# Patient Record
Sex: MALE | Race: WHITE | NOT HISPANIC OR LATINO | Employment: UNEMPLOYED | URBAN - METROPOLITAN AREA
[De-identification: names, ages, dates, MRNs, and addresses within clinical notes are randomized per-mention and may not be internally consistent; named-entity substitution may affect disease eponyms.]

---

## 2017-01-02 ENCOUNTER — HOSPITAL ENCOUNTER (EMERGENCY)
Facility: HOSPITAL | Age: 42
Discharge: LEFT BEFORE DISCHARGE | End: 2017-01-02

## 2017-01-02 VITALS
BODY MASS INDEX: 31.15 KG/M2 | SYSTOLIC BLOOD PRESSURE: 105 MMHG | HEART RATE: 79 BPM | DIASTOLIC BLOOD PRESSURE: 64 MMHG | OXYGEN SATURATION: 97 % | HEIGHT: 72 IN | RESPIRATION RATE: 16 BRPM | WEIGHT: 230 LBS | TEMPERATURE: 96 F

## 2017-01-02 LAB — GLUCOSE BLDC GLUCOMTR-MCNC: 345 MG/DL (ref 70–130)

## 2017-01-02 PROCEDURE — 82962 GLUCOSE BLOOD TEST: CPT

## 2017-01-02 PROCEDURE — 99211 OFF/OP EST MAY X REQ PHY/QHP: CPT

## 2017-01-02 RX ORDER — BUPRENORPHINE HYDROCHLORIDE AND NALOXONE HYDROCHLORIDE DIHYDRATE 8; 2 MG/1; MG/1
1 TABLET SUBLINGUAL DAILY
COMMUNITY
End: 2017-01-06 | Stop reason: HOSPADM

## 2017-01-02 RX ORDER — GLIMEPIRIDE 4 MG/1
4 TABLET ORAL
COMMUNITY
End: 2017-01-06 | Stop reason: HOSPADM

## 2017-01-02 RX ORDER — SODIUM CHLORIDE 0.9 % (FLUSH) 0.9 %
10 SYRINGE (ML) INJECTION AS NEEDED
Status: DISCONTINUED | OUTPATIENT
Start: 2017-01-02 | End: 2017-01-02 | Stop reason: HOSPADM

## 2017-01-02 RX ORDER — LISINOPRIL 40 MG/1
40 TABLET ORAL DAILY
COMMUNITY
End: 2017-01-06 | Stop reason: HOSPADM

## 2017-01-03 LAB
ACETONE BLD QL: NEGATIVE
ALBUMIN SERPL-MCNC: 4.7 G/DL (ref 3.5–5.2)
ALBUMIN/GLOB SERPL: 1.3 G/DL
ALP SERPL-CCNC: 76 U/L (ref 39–117)
ALT SERPL W P-5'-P-CCNC: 34 U/L (ref 1–41)
ANION GAP SERPL CALCULATED.3IONS-SCNC: 18.7 MMOL/L
AST SERPL-CCNC: 20 U/L (ref 1–40)
BASOPHILS # BLD AUTO: 0.04 10*3/MM3 (ref 0–0.2)
BASOPHILS NFR BLD AUTO: 0.4 % (ref 0–1.5)
BILIRUB SERPL-MCNC: 0.3 MG/DL (ref 0.1–1.2)
BUN BLD-MCNC: 61 MG/DL (ref 6–20)
BUN/CREAT SERPL: 14 (ref 7–25)
CALCIUM SPEC-SCNC: 10.5 MG/DL (ref 8.6–10.5)
CHLORIDE SERPL-SCNC: 92 MMOL/L (ref 98–107)
CO2 SERPL-SCNC: 21.3 MMOL/L (ref 22–29)
CREAT BLD-MCNC: 4.35 MG/DL (ref 0.76–1.27)
DEPRECATED RDW RBC AUTO: 37.5 FL (ref 37–54)
EOSINOPHIL # BLD AUTO: 0.16 10*3/MM3 (ref 0–0.7)
EOSINOPHIL NFR BLD AUTO: 1.4 % (ref 0.3–6.2)
ERYTHROCYTE [DISTWIDTH] IN BLOOD BY AUTOMATED COUNT: 11.1 % (ref 11.5–14.5)
GFR SERPL CREATININE-BSD FRML MDRD: 18 ML/MIN/1.73
GLOBULIN UR ELPH-MCNC: 3.7 GM/DL
GLUCOSE BLD-MCNC: 403 MG/DL (ref 65–99)
HCT VFR BLD AUTO: 42.2 % (ref 40.4–52.2)
HGB BLD-MCNC: 13.9 G/DL (ref 13.7–17.6)
IMM GRANULOCYTES # BLD: 0 10*3/MM3 (ref 0–0.03)
IMM GRANULOCYTES NFR BLD: 0 % (ref 0–0.5)
LYMPHOCYTES # BLD AUTO: 3.11 10*3/MM3 (ref 0.9–4.8)
LYMPHOCYTES NFR BLD AUTO: 27.4 % (ref 19.6–45.3)
MCH RBC QN AUTO: 30.7 PG (ref 27–32.7)
MCHC RBC AUTO-ENTMCNC: 32.9 G/DL (ref 32.6–36.4)
MCV RBC AUTO: 93.2 FL (ref 79.8–96.2)
MONOCYTES # BLD AUTO: 0.44 10*3/MM3 (ref 0.2–1.2)
MONOCYTES NFR BLD AUTO: 3.9 % (ref 5–12)
NEUTROPHILS # BLD AUTO: 7.59 10*3/MM3 (ref 1.9–8.1)
NEUTROPHILS NFR BLD AUTO: 66.9 % (ref 42.7–76)
PLATELET # BLD AUTO: 199 10*3/MM3 (ref 140–500)
PMV BLD AUTO: 13.1 FL (ref 6–12)
POTASSIUM BLD-SCNC: 4.6 MMOL/L (ref 3.5–5.2)
PROT SERPL-MCNC: 8.4 G/DL (ref 6–8.5)
RBC # BLD AUTO: 4.53 10*6/MM3 (ref 4.6–6)
SODIUM BLD-SCNC: 132 MMOL/L (ref 136–145)
WBC NRBC COR # BLD: 11.34 10*3/MM3 (ref 4.5–10.7)

## 2017-01-03 PROCEDURE — 81001 URINALYSIS AUTO W/SCOPE: CPT | Performed by: EMERGENCY MEDICINE

## 2017-01-03 PROCEDURE — 85025 COMPLETE CBC W/AUTO DIFF WBC: CPT | Performed by: EMERGENCY MEDICINE

## 2017-01-03 PROCEDURE — 82550 ASSAY OF CK (CPK): CPT | Performed by: EMERGENCY MEDICINE

## 2017-01-03 PROCEDURE — 80053 COMPREHEN METABOLIC PANEL: CPT | Performed by: EMERGENCY MEDICINE

## 2017-01-03 PROCEDURE — 82009 KETONE BODYS QUAL: CPT | Performed by: EMERGENCY MEDICINE

## 2017-01-03 PROCEDURE — 99284 EMERGENCY DEPT VISIT MOD MDM: CPT

## 2017-01-03 RX ORDER — SODIUM CHLORIDE 0.9 % (FLUSH) 0.9 %
10 SYRINGE (ML) INJECTION AS NEEDED
Status: DISCONTINUED | OUTPATIENT
Start: 2017-01-03 | End: 2017-01-06 | Stop reason: HOSPADM

## 2017-01-04 ENCOUNTER — HOSPITAL ENCOUNTER (INPATIENT)
Facility: HOSPITAL | Age: 42
LOS: 2 days | Discharge: HOME | End: 2017-01-06
Attending: EMERGENCY MEDICINE | Admitting: INTERNAL MEDICINE

## 2017-01-04 ENCOUNTER — APPOINTMENT (OUTPATIENT)
Dept: ULTRASOUND IMAGING | Facility: HOSPITAL | Age: 42
End: 2017-01-04

## 2017-01-04 DIAGNOSIS — N17.9 ACUTE RENAL FAILURE, UNSPECIFIED ACUTE RENAL FAILURE TYPE (HCC): Primary | ICD-10-CM

## 2017-01-04 DIAGNOSIS — E11.65 TYPE 2 DIABETES MELLITUS WITH HYPERGLYCEMIA, UNSPECIFIED LONG TERM INSULIN USE STATUS: ICD-10-CM

## 2017-01-04 PROBLEM — I10 HTN (HYPERTENSION): Status: ACTIVE | Noted: 2017-01-04

## 2017-01-04 PROBLEM — E78.5 HLD (HYPERLIPIDEMIA): Status: ACTIVE | Noted: 2017-01-04

## 2017-01-04 PROBLEM — E11.9 DM TYPE 2 (DIABETES MELLITUS, TYPE 2) (HCC): Status: ACTIVE | Noted: 2017-01-04

## 2017-01-04 LAB
ANION GAP SERPL CALCULATED.3IONS-SCNC: 17.5 MMOL/L
BACTERIA UR QL AUTO: ABNORMAL /HPF
BILIRUB UR QL STRIP: NEGATIVE
BUN BLD-MCNC: 46 MG/DL (ref 6–20)
BUN/CREAT SERPL: 13.6 (ref 7–25)
CALCIUM SPEC-SCNC: 10.1 MG/DL (ref 8.6–10.5)
CHLORIDE SERPL-SCNC: 95 MMOL/L (ref 98–107)
CK SERPL-CCNC: 163 U/L (ref 20–200)
CLARITY UR: CLEAR
CO2 SERPL-SCNC: 20.5 MMOL/L (ref 22–29)
COLOR UR: YELLOW
CREAT BLD-MCNC: 3.39 MG/DL (ref 0.76–1.27)
CREAT UR-MCNC: 64.8 MG/DL
EOSINOPHIL SPEC QL MICRO: 0 % EOS/100 CELLS (ref 0–0)
GFR SERPL CREATININE-BSD FRML MDRD: 25 ML/MIN/1.73
GLUCOSE BLD-MCNC: 339 MG/DL (ref 65–99)
GLUCOSE BLDC GLUCOMTR-MCNC: 211 MG/DL (ref 70–130)
GLUCOSE BLDC GLUCOMTR-MCNC: 212 MG/DL (ref 70–130)
GLUCOSE BLDC GLUCOMTR-MCNC: 246 MG/DL (ref 70–130)
GLUCOSE BLDC GLUCOMTR-MCNC: 258 MG/DL (ref 70–130)
GLUCOSE BLDC GLUCOMTR-MCNC: 271 MG/DL (ref 70–130)
GLUCOSE BLDC GLUCOMTR-MCNC: 273 MG/DL (ref 70–130)
GLUCOSE BLDC GLUCOMTR-MCNC: 302 MG/DL (ref 70–130)
GLUCOSE UR STRIP-MCNC: ABNORMAL MG/DL
HBA1C MFR BLD: 10.5 % (ref 4.8–5.6)
HGB UR QL STRIP.AUTO: ABNORMAL
HOLD SPECIMEN: NORMAL
HYALINE CASTS UR QL AUTO: ABNORMAL /LPF
KETONES UR QL STRIP: NEGATIVE
LEUKOCYTE ESTERASE UR QL STRIP.AUTO: NEGATIVE
NITRITE UR QL STRIP: NEGATIVE
PH UR STRIP.AUTO: <=5 [PH] (ref 5–8)
POTASSIUM BLD-SCNC: 4.8 MMOL/L (ref 3.5–5.2)
PROT UR QL STRIP: ABNORMAL
RBC # UR: ABNORMAL /HPF
REF LAB TEST METHOD: ABNORMAL
SODIUM BLD-SCNC: 133 MMOL/L (ref 136–145)
SODIUM UR-SCNC: 97 MMOL/L
SP GR UR STRIP: 1.03 (ref 1–1.03)
SQUAMOUS #/AREA URNS HPF: ABNORMAL /HPF
UROBILINOGEN UR QL STRIP: ABNORMAL
WBC UR QL AUTO: ABNORMAL /HPF
WHOLE BLOOD HOLD SPECIMEN: NORMAL
WHOLE BLOOD HOLD SPECIMEN: NORMAL

## 2017-01-04 PROCEDURE — 80048 BASIC METABOLIC PNL TOTAL CA: CPT | Performed by: INTERNAL MEDICINE

## 2017-01-04 PROCEDURE — 63710000001 INSULIN ASPART PER 5 UNITS: Performed by: INTERNAL MEDICINE

## 2017-01-04 PROCEDURE — 84300 ASSAY OF URINE SODIUM: CPT | Performed by: INTERNAL MEDICINE

## 2017-01-04 PROCEDURE — 86341 ISLET CELL ANTIBODY: CPT | Performed by: INTERNAL MEDICINE

## 2017-01-04 PROCEDURE — 99255 IP/OBS CONSLTJ NEW/EST HI 80: CPT | Performed by: INTERNAL MEDICINE

## 2017-01-04 PROCEDURE — 76775 US EXAM ABDO BACK WALL LIM: CPT

## 2017-01-04 PROCEDURE — 82962 GLUCOSE BLOOD TEST: CPT

## 2017-01-04 PROCEDURE — 90791 PSYCH DIAGNOSTIC EVALUATION: CPT | Performed by: SOCIAL WORKER

## 2017-01-04 PROCEDURE — 25010000002 HEPARIN (PORCINE) PER 1000 UNITS: Performed by: INTERNAL MEDICINE

## 2017-01-04 PROCEDURE — 82570 ASSAY OF URINE CREATININE: CPT | Performed by: INTERNAL MEDICINE

## 2017-01-04 PROCEDURE — 63710000001 INSULIN REGULAR HUMAN PER 5 UNITS: Performed by: EMERGENCY MEDICINE

## 2017-01-04 PROCEDURE — 63710000001 INSULIN DETEMER PER 5 UNITS: Performed by: INTERNAL MEDICINE

## 2017-01-04 PROCEDURE — 84681 ASSAY OF C-PEPTIDE: CPT | Performed by: INTERNAL MEDICINE

## 2017-01-04 PROCEDURE — 83036 HEMOGLOBIN GLYCOSYLATED A1C: CPT | Performed by: INTERNAL MEDICINE

## 2017-01-04 PROCEDURE — 87205 SMEAR GRAM STAIN: CPT | Performed by: INTERNAL MEDICINE

## 2017-01-04 RX ORDER — ONDANSETRON 4 MG/1
4 TABLET, FILM COATED ORAL EVERY 6 HOURS PRN
Status: DISCONTINUED | OUTPATIENT
Start: 2017-01-04 | End: 2017-01-06 | Stop reason: HOSPADM

## 2017-01-04 RX ORDER — SODIUM CHLORIDE 0.9 % (FLUSH) 0.9 %
10 SYRINGE (ML) INJECTION AS NEEDED
Status: DISCONTINUED | OUTPATIENT
Start: 2017-01-04 | End: 2017-01-06 | Stop reason: HOSPADM

## 2017-01-04 RX ORDER — SODIUM CHLORIDE 9 MG/ML
250 INJECTION, SOLUTION INTRAVENOUS ONCE
Status: COMPLETED | OUTPATIENT
Start: 2017-01-04 | End: 2017-01-04

## 2017-01-04 RX ORDER — ONDANSETRON 2 MG/ML
4 INJECTION INTRAMUSCULAR; INTRAVENOUS EVERY 6 HOURS PRN
Status: DISCONTINUED | OUTPATIENT
Start: 2017-01-04 | End: 2017-01-06 | Stop reason: HOSPADM

## 2017-01-04 RX ORDER — DEXTROSE MONOHYDRATE 25 G/50ML
25 INJECTION, SOLUTION INTRAVENOUS AS NEEDED
Status: DISCONTINUED | OUTPATIENT
Start: 2017-01-04 | End: 2017-01-06 | Stop reason: HOSPADM

## 2017-01-04 RX ORDER — HEPARIN SODIUM 5000 [USP'U]/ML
5000 INJECTION, SOLUTION INTRAVENOUS; SUBCUTANEOUS EVERY 12 HOURS
Status: DISCONTINUED | OUTPATIENT
Start: 2017-01-04 | End: 2017-01-06 | Stop reason: HOSPADM

## 2017-01-04 RX ORDER — SODIUM CHLORIDE 0.9 % (FLUSH) 0.9 %
1-10 SYRINGE (ML) INJECTION AS NEEDED
Status: DISCONTINUED | OUTPATIENT
Start: 2017-01-04 | End: 2017-01-06 | Stop reason: HOSPADM

## 2017-01-04 RX ORDER — SODIUM CHLORIDE 9 MG/ML
75 INJECTION, SOLUTION INTRAVENOUS CONTINUOUS
Status: DISCONTINUED | OUTPATIENT
Start: 2017-01-04 | End: 2017-01-06

## 2017-01-04 RX ORDER — NICOTINE POLACRILEX 4 MG
15 LOZENGE BUCCAL AS NEEDED
Status: DISCONTINUED | OUTPATIENT
Start: 2017-01-04 | End: 2017-01-06 | Stop reason: HOSPADM

## 2017-01-04 RX ORDER — ONDANSETRON 4 MG/1
4 TABLET, ORALLY DISINTEGRATING ORAL EVERY 6 HOURS PRN
Status: DISCONTINUED | OUTPATIENT
Start: 2017-01-04 | End: 2017-01-06 | Stop reason: HOSPADM

## 2017-01-04 RX ADMIN — INSULIN ASPART 6 UNITS: 100 INJECTION, SOLUTION INTRAVENOUS; SUBCUTANEOUS at 11:43

## 2017-01-04 RX ADMIN — LINAGLIPTIN 5 MG: 5 TABLET, FILM COATED ORAL at 14:18

## 2017-01-04 RX ADMIN — HEPARIN SODIUM 5000 UNITS: 5000 INJECTION, SOLUTION INTRAVENOUS; SUBCUTANEOUS at 11:43

## 2017-01-04 RX ADMIN — HUMAN INSULIN 4 UNITS: 100 INJECTION, SOLUTION SUBCUTANEOUS at 00:58

## 2017-01-04 RX ADMIN — SODIUM CHLORIDE 125 ML/HR: 9 INJECTION, SOLUTION INTRAVENOUS at 09:30

## 2017-01-04 RX ADMIN — SODIUM CHLORIDE 250 ML/HR: 9 INJECTION, SOLUTION INTRAVENOUS at 01:03

## 2017-01-04 RX ADMIN — SODIUM CHLORIDE 125 ML/HR: 9 INJECTION, SOLUTION INTRAVENOUS at 05:25

## 2017-01-04 RX ADMIN — SODIUM CHLORIDE 1000 ML: 9 INJECTION, SOLUTION INTRAVENOUS at 00:59

## 2017-01-04 RX ADMIN — INSULIN ASPART 7 UNITS: 100 INJECTION, SOLUTION INTRAVENOUS; SUBCUTANEOUS at 17:03

## 2017-01-04 RX ADMIN — SODIUM CHLORIDE 125 ML/HR: 9 INJECTION, SOLUTION INTRAVENOUS at 23:54

## 2017-01-04 RX ADMIN — INSULIN DETEMIR 20 UNITS: 100 INJECTION, SOLUTION SUBCUTANEOUS at 22:19

## 2017-01-04 RX ADMIN — INSULIN ASPART 6 UNITS: 100 INJECTION, SOLUTION INTRAVENOUS; SUBCUTANEOUS at 22:20

## 2017-01-04 RX ADMIN — SODIUM CHLORIDE 125 ML/HR: 9 INJECTION, SOLUTION INTRAVENOUS at 17:05

## 2017-01-04 RX ADMIN — HEPARIN SODIUM 5000 UNITS: 5000 INJECTION, SOLUTION INTRAVENOUS; SUBCUTANEOUS at 23:53

## 2017-01-04 NOTE — ED PROVIDER NOTES
EMERGENCY DEPARTMENT ENCOUNTER    CHIEF COMPLAINT  Chief Complaint: Nausea  History given by: Patient  History limited by: n/a  Room Number: 440/1  PMD: ALEXIA Vega      HPI:  Pt is a 41 y.o. male who presents complaining of nausea onset 12/26 due to change in medication. Pt denies vomiting, leg swelling, fever. Pt admits diarrhea x3, elevated blood sugar. Pt reports he has been drinking fluids but has been urinating more than normal.    Pt was told by his PCP last week to switch some of his diabetes medication. Pt was switched to metformin and glipizide. Pt has not been taking his new medication for about 2 weeks because he has had bad experiences with metformin.    Duration:  1 week  Timing: constant  Location: n/a  Radiation: n/a  Quality: nauseated due to diabetes medication  Intensity/Severity: moderate  Progression: worsening  Associated Symptoms: see ROS  Aggravating Factors: none  Alleviating Factors: none  Previous Episodes: none  Treatment before arrival: none    PAST MEDICAL HISTORY  Active Ambulatory Problems     Diagnosis Date Noted   • No Active Ambulatory Problems     Resolved Ambulatory Problems     Diagnosis Date Noted   • No Resolved Ambulatory Problems     Past Medical History   Diagnosis Date   • Diabetes mellitus    • Hypertension        PAST SURGICAL HISTORY  Past Surgical History   Procedure Laterality Date   • Eye surgery     • Hernia repair         FAMILY HISTORY  Family History   Problem Relation Age of Onset   • Alcohol abuse Mother    • Asthma Mother    • Cancer Mother    • Drug abuse Mother    • Early death Mother    • Alcohol abuse Father    • Alcohol abuse Sister    • Drug abuse Sister    • Asthma Daughter    • Diabetes Daughter    • Alcohol abuse Maternal Aunt    • Alcohol abuse Maternal Uncle    • Alcohol abuse Paternal Aunt    • Cancer Paternal Aunt    • Diabetes Paternal Aunt    • Alcohol abuse Paternal Uncle    • Alcohol abuse Maternal Grandmother    • Asthma  Maternal Grandmother    • Cancer Maternal Grandmother    • Drug abuse Maternal Grandmother    • Hypertension Maternal Grandmother    • Alcohol abuse Maternal Grandfather    • Alcohol abuse Paternal Grandmother    • Alcohol abuse Paternal Grandfather        SOCIAL HISTORY  Social History     Social History   • Marital status:      Spouse name: N/A   • Number of children: N/A   • Years of education: N/A     Occupational History   • Not on file.     Social History Main Topics   • Smoking status: Former Smoker     Packs/day: 2.00     Types: Cigarettes, Cigars     Start date: 9/4/2003     Quit date: 1/4/2006   • Smokeless tobacco: Former User     Types: Chew   • Alcohol use 12.6 oz/week     21 Shots of liquor per week      Comment: one pint a night   • Drug use: No   • Sexual activity: Yes     Partners: Female     Other Topics Concern   • Not on file     Social History Narrative       ALLERGIES  Amlodipine; Citalopram hydrobromide; Meloxicam; Metformin and related; and Quetiapine    REVIEW OF SYSTEMS  Review of Systems   Constitutional: Negative for fever.   Respiratory: Negative for shortness of breath.    Cardiovascular: Negative for chest pain and leg swelling.   Gastrointestinal: Positive for diarrhea and nausea. Negative for abdominal pain and vomiting.   Genitourinary: Positive for frequency (increased). Negative for dysuria.   All other systems reviewed and are negative.      PHYSICAL EXAM  ED Triage Vitals   Temp Heart Rate Resp BP SpO2   01/03/17 2149 01/03/17 2149 01/03/17 2149 01/03/17 2212 01/03/17 2149   98.4 °F (36.9 °C) 125 18 118/65 97 %      Temp src Heart Rate Source Patient Position BP Location FiO2 (%)   01/03/17 2149 01/03/17 2149 -- -- --   Tympanic Monitor          Physical Exam   Constitutional: He is oriented to person, place, and time.   HENT:   Head: Normocephalic and atraumatic.   Eyes: Pupils are equal, round, and reactive to light.   Neck: Normal range of motion.   Cardiovascular:  Regular rhythm and normal heart sounds.  Tachycardia present.    Pulmonary/Chest: Effort normal and breath sounds normal. No respiratory distress.   Abdominal: Soft. There is no tenderness.   Musculoskeletal: He exhibits no edema.   Neurological: He is alert and oriented to person, place, and time.   Skin: Skin is warm and dry. No rash noted.   Nursing note and vitals reviewed.      LAB RESULTS  Lab Results (last 24 hours)     Procedure Component Value Units Date/Time    CBC & Differential [[id number]] Collected:  01/03/17 2251    Specimen:  Blood Updated:  01/03/17 2302    Narrative:       The following orders were created for panel order CBC & Differential.  Procedure                               Abnormality         Status                     ---------                               -----------         ------                     CBC Auto Differential[[id number]]         Abnormal            Final result                 Please view results for these tests on the individual orders.    Comprehensive Metabolic Panel [[id number]]  (Abnormal) Collected:  01/03/17 2251    Specimen:  Blood Updated:  01/03/17 2338     Glucose 403 (C) mg/dL      BUN 61 (H) mg/dL      Creatinine 4.35 (H) mg/dL      Sodium 132 (L) mmol/L      Potassium 4.6 mmol/L      Chloride 92 (L) mmol/L      CO2 21.3 (L) mmol/L      Calcium 10.5 mg/dL      Total Protein 8.4 g/dL      Albumin 4.70 g/dL      ALT (SGPT) 34 U/L      AST (SGOT) 20 U/L      Alkaline Phosphatase 76 U/L      Total Bilirubin 0.3 mg/dL      eGFR  African Amer 18 (L) mL/min/1.73      Globulin 3.7 gm/dL      A/G Ratio 1.3 g/dL      BUN/Creatinine Ratio 14.0      Anion Gap 18.7 mmol/L     Ketone Bodies, Serum [[id number]] Collected:  01/03/17 2251    Specimen:  Blood Updated:  01/03/17 2328    Narrative:       The following orders were created for panel order Ketone Bodies, Serum.  Procedure                               Abnormality         Status                     ---------                                -----------         ------                     Acetone[[id number]]                       Normal              Final result                 Please view results for these tests on the individual orders.    CBC Auto Differential [[id number]]  (Abnormal) Collected:  01/03/17 2251    Specimen:  Blood Updated:  01/03/17 2302     WBC 11.34 (H) 10*3/mm3      RBC 4.53 (L) 10*6/mm3      Hemoglobin 13.9 g/dL      Hematocrit 42.2 %      MCV 93.2 fL      MCH 30.7 pg      MCHC 32.9 g/dL      RDW 11.1 (L) %      RDW-SD 37.5 fl      MPV 13.1 (H) fL      Platelets 199 10*3/mm3      Neutrophil % 66.9 %      Lymphocyte % 27.4 %      Monocyte % 3.9 (L) %      Eosinophil % 1.4 %      Basophil % 0.4 %      Immature Grans % 0.0 %      Neutrophils, Absolute 7.59 10*3/mm3      Lymphocytes, Absolute 3.11 10*3/mm3      Monocytes, Absolute 0.44 10*3/mm3      Eosinophils, Absolute 0.16 10*3/mm3      Basophils, Absolute 0.04 10*3/mm3      Immature Grans, Absolute 0.00 10*3/mm3     Acetone [[id number]]  (Normal) Collected:  01/03/17 2251    Specimen:  Blood Updated:  01/03/17 2328     Acetone Negative     CK [[id number]]  (Normal) Collected:  01/03/17 2251    Specimen:  Blood Updated:  01/04/17 0028     Creatine Kinase 163 U/L     Urinalysis With / Culture If Indicated [[id number]]  (Abnormal) Collected:  01/03/17 2359    Specimen:  Urine from Urine, Clean Catch Updated:  01/04/17 0015     Color, UA Yellow      Appearance, UA Clear      pH, UA <=5.0      Specific Gravity, UA 1.028      Glucose, UA >=1000 mg/dL (3+) (A)      Ketones, UA Negative      Bilirubin, UA Negative      Blood, UA Small (1+) (A)      Protein, UA Trace (A)      Leuk Esterase, UA Negative      Nitrite, UA Negative      Urobilinogen, UA 0.2 E.U./dL     Urinalysis, Microscopic Only [[id number]]  (Abnormal) Collected:  01/03/17 2359    Specimen:  Urine from Urine, Clean Catch Updated:  01/04/17 0017     RBC, UA 0-2 (A) /HPF      WBC, UA 0-2 (A) /HPF      Bacteria, UA None Seen  /HPF      Squamous Epithelial Cells, UA 0-2 /HPF      Hyaline Casts, UA 3-6 /LPF      Methodology Automated Microscopy     POC Glucose Fingerstick [[id number]]  (Abnormal) Collected:  01/04/17 0054    Specimen:  Blood Updated:  01/04/17 0055     Glucose 258 (H) mg/dL     Narrative:       Meter: [id number] : [id number] [facility]    POC Glucose Fingerstick [[id number]]  (Abnormal) Collected:  01/04/17 0210    Specimen:  Blood Updated:  01/04/17 0213     Glucose 211 (H) mg/dL     Narrative:       Meter: [id number] : [id number] [facility]    POC Glucose Fingerstick [[id number]]  (Abnormal) Collected:  01/04/17 0252    Specimen:  Blood Updated:  01/04/17 0253     Glucose 212 (H) mg/dL     Narrative:       Meter: [id number] : [id number] Ant Alvarez I ordered the above labs and reviewed the results    PROCEDURES  Procedures      PROGRESS AND CONSULTS  ED Course     2244  Ordered labs and blood work for further evaluation.    0026  Notified pt of the elevated BUN and creatinine indicating dehydration. Discussed the plan to admit the pt to the hospital for rehydration. Pt agrees with the plan.    0030  Ordered IVF, IV insulin. Placed call to [facility].    0049  Discussed case with Dr Lema ([facility])  Reviewed history, exam, results and treatments.  Discussed concerns and plan of care. Dr Lema agrees to admit the pt to telemtry.    MEDICAL DECISION MAKING  Results were reviewed/discussed with the patient and they were also made aware of online access. Pt also made aware that some labs, such as cultures, will not be resulted during ER visit and follow up with PMD is necessary.     MDM  Number of Diagnoses or Management Options  Acute renal failure, unspecified acute renal failure type:   Type 2 diabetes mellitus with hyperglycemia, unspecified long term insulin use status:   Diagnosis management comments: Patient presented the ER complaining of nausea and elevated blood sugar.  He was recently  started on Glucotrol and metformin.  However, he states he has not been taking these.  He does report increased urinary frequency but denies dysuria or vomiting.  Patient was found to be in acute renal failure.  His potassium was normal.  His blood sugar was elevated but improved after IV fluids and IV insulin.  Case was discussed with  and he will admit the patient.       Amount and/or Complexity of Data Reviewed  Clinical lab tests: reviewed and ordered  Decide to obtain previous medical records or to obtain history from someone other than the patient: yes  Review and summarize past medical records: yes (Seen i ER Oct 2016 for heat exhaustion and nontraumatic rhabdomyolysis. BUN and creatinine were normal at that time.)  Discuss the patient with other providers: yes (D/w Dr. Lema)           DIAGNOSIS  Final diagnoses:   Acute renal failure, unspecified acute renal failure type   Type 2 diabetes mellitus with hyperglycemia, unspecified long term insulin use status       DISPOSITION  ADMISSION    Discussed treatment plan and reason for admission with pt/family and admitting physician.  Pt/family voiced understanding of the plan for admission for further testing/treatment as needed.     Latest Documented Vital Signs:  As of 12:32 AM  BP- 118/65 HR- (!) 125 Temp- 98.4 °F (36.9 °C) (Tympanic) O2 sat- 97%    --  Documentation assistance provided by morteza Ramos for Dr. Blackwell.  Information recorded by the scribe was done at my direction and has been verified and validated by me.       Sudheer Ramos  01/04/17 0054       Khai Blackwell MD  01/04/17 0338

## 2017-01-04 NOTE — CONSULTS
42 y/o  male coming to the hospital w/ c/o's n/v, stomach cramping, and increased urination.  Patient also reported daily ETOH use.  He reports due to feeling bad he had decreased his ETOH intake from 0.5 - pint liquor /day.  He denies he has an ETOH problem. There has been increased tolerance, a past DUI, and blackouts.  He states that for approx a year in the past he has abused opiates.  He stopped the use on his own.  He is denying use of other substances  He reports significant problems w/ sleep.  He is type 2 diabetes.     Patient lives w/ his 2nd wife.  He has 3 children from prior relationship.  He works in [employer].  He has some college.    Patient has seen therapist in the past.  He has been evaluated by multiple clinicians/physicians for his sleep issues.  He has been on ambien for a while.  Patient has struggled using his CPAP.    He is alert, O x 4.  No SI of HI.  No a/v hallucinations.  Speech is appropriate.  Insight re: ETOH poor.  He is denying any S/S of ETOH w/ Drawal.    Wife was in room w/ patients permission.  [facility] will follow.

## 2017-01-04 NOTE — H&P
[facility] Admission H&P    Patient Care Team:  ALEXIA Vega as PCP - General (Family Medicine)    Chief complaint: Nausea, stomach cramping, increased urination    History of Present Illness    This is a 41-year-old -American male with a history of diabetes type 2 on oral agents, hypertension, hyperlipidemia who presented to the emergency room with a one-week history of nausea.  He denies any vomiting but states that anything he tries to eat does not sit well on his stomach and he essentially has had limited by mouth intake for the last week or so.  He's had several episodes of lower abdominal cramping that is relieved when he has a bowel movement.  He denies any diarrhea.  He denies any fevers or chills.  He states that 2 weeks ago he was changed from a clinical prior to glipizide and metformin.  He previously had a bad experience with metformin and thus did not start this medication.  He only had a limited prescription for the glipizide and when this ran out he went back to the glimepiride he was using previously.  He noticed that his blood sugars were consistently running in the 300s and thus he bought some over-the-counter insulin and took this but notes that he was not entirely sure how to use it properly and at what dosage.  He states that he has been referred to an endocrinologist but has not yet seen them.  He notes increased urine output.  He feels very dried out and dehydrated.  He has continued to take his hydrochlorothiazide and lisinopril.  He does take Suboxone as a means of transition off of chronic pain medicine.  He has been on this since Thanksgiving.  He denies back pain at this time.  He states that he drinks more than he should at home.  He was drinking a pint per day and decrease this to half pint.  Over the past 2 weeks while he has not been feeling well he states he has reduce this further to 1-2 shots per evening.    Past Medical History   Diagnosis Date   • Diabetes  mellitus    • Hypertension      Past Surgical History   Procedure Laterality Date   • Eye surgery     • Hernia repair       Family History   Problem Relation Age of Onset   • Alcohol abuse Mother    • Asthma Mother    • Cancer Mother    • Drug abuse Mother    • Early death Mother    • Alcohol abuse Father    • Alcohol abuse Sister    • Drug abuse Sister    • Asthma Daughter    • Diabetes Daughter    • Alcohol abuse Maternal Aunt    • Alcohol abuse Maternal Uncle    • Alcohol abuse Paternal Aunt    • Cancer Paternal Aunt    • Diabetes Paternal Aunt    • Alcohol abuse Paternal Uncle    • Alcohol abuse Maternal Grandmother    • Asthma Maternal Grandmother    • Cancer Maternal Grandmother    • Drug abuse Maternal Grandmother    • Hypertension Maternal Grandmother    • Alcohol abuse Maternal Grandfather    • Alcohol abuse Paternal Grandmother    • Alcohol abuse Paternal Grandfather      Social History   Substance Use Topics   • Smoking status: Former Smoker     Packs/day: 2.00     Types: Cigarettes, Cigars     Start date: 9/4/2003     Quit date: 1/4/2006   • Smokeless tobacco: Former User     Types: Chew   • Alcohol use 12.6 oz/week     21 Shots of liquor per week      Comment: one pint a night     Prescriptions Prior to Admission   Medication Sig Dispense Refill Last Dose   • Zolpidem Tartrate (AMBIEN PO) Take 12.5 mg by mouth Every Night.   Past Week at Unknown time   • atorvastatin (LIPITOR) 10 MG tablet Take 10 mg by mouth Daily.      • buprenorphine-naloxone (SUBOXONE) 8-2 MG per SL tablet Place 1 tablet under the tongue Daily.   Unknown at Unknown time   • glimepiride (AMARYL) 4 MG tablet Take 4 mg by mouth Every Morning Before Breakfast.      • glipiZIDE (GLUCOTROL) 5 MG tablet Take 1 tablet by mouth 2 (Two) Times a Day Before Meals. 30 tablet 0    • hydrochlorothiazide (HYDRODIURIL) 25 MG tablet Take 25 mg by mouth Daily.   Unknown at Unknown time   • insulin regular (humuLIN R) 500 UNIT/ML CONCENTRATED injection  Inject  under the skin 3 (Three) Times a Day Before Meals. States he took 15 units 1500 and another 15 at 1500 today   Unknown at Unknown time   • lisinopril (PRINIVIL,ZESTRIL) 40 MG tablet Take 40 mg by mouth Daily.   Unknown at Unknown time     Allergies:  Amlodipine; Citalopram hydrobromide; Meloxicam; Metformin and related; and Quetiapine    Review of Systems   Constitutional: Negative for chills and fever.   HENT: Negative for congestion and sore throat.    Eyes: Negative for visual disturbance.   Respiratory: Negative for cough, chest tightness, shortness of breath and wheezing.    Cardiovascular: Negative for chest pain, palpitations and leg swelling.   Gastrointestinal: Positive for abdominal pain and nausea. Negative for abdominal distention, diarrhea and vomiting.   Endocrine: Negative for polydipsia and polyuria.   Genitourinary: Negative for difficulty urinating, dysuria, frequency and urgency.        Increased urination   Musculoskeletal: Negative for arthralgias and myalgias.   Skin: Negative for color change and rash.   Neurological: Negative for dizziness and light-headedness.        PHYSICAL EXAM    Vital Signs  tMax 24 hrs:  Temp (24hrs), Av.7 °F (37.1 °C), Min:98.4 °F (36.9 °C), Max:98.8 °F (37.1 °C)    Vitals Ranges:  Temp:  [98.4 °F (36.9 °C)-98.8 °F (37.1 °C)] 98.8 °F (37.1 °C)  Heart Rate:  [105-125] 110  Resp:  [12-18] 16  BP: (100-130)/(47-84) 117/69    Physical Exam   Constitutional: He is oriented to person, place, and time. He appears well-developed and well-nourished.   HENT:   Head: Normocephalic and atraumatic.   Eyes: EOM are normal. Pupils are equal, round, and reactive to light.   Neck: Neck supple. No tracheal deviation present.   Cardiovascular: Normal rate and regular rhythm.  Exam reveals no gallop.    No murmur heard.  Pulmonary/Chest: Effort normal. No respiratory distress. He has no wheezes.   Abdominal: Soft. Bowel sounds are normal. He exhibits no distension. There is  no tenderness.   Musculoskeletal: He exhibits no edema or tenderness.   Neurological: He is alert and oriented to person, place, and time. No cranial nerve deficit.   Skin: Skin is warm and dry.   Nursing note and vitals reviewed.      Results Review:    Results from last 7 days  Lab Units 01/03/17  2251   WBC 10*3/mm3 11.34*   HEMOGLOBIN g/dL 13.9   HEMATOCRIT % 42.2   PLATELETS 10*3/mm3 199       Results from last 7 days  Lab Units 01/03/17  2251   SODIUM mmol/L 132*   POTASSIUM mmol/L 4.6   CHLORIDE mmol/L 92*   TOTAL CO2 mmol/L 21.3*   BUN mg/dL 61*   CREATININE mg/dL 4.35*   CALCIUM mg/dL 10.5   BILIRUBIN mg/dL 0.3   ALK PHOS U/L 76   ALT (SGPT) U/L 34   AST (SGOT) U/L 20   GLUCOSE mg/dL 403*        I reviewed the patient's new clinical results.      Principal Problem:    Acute renal failure  Active Problems:    DM type 2 (diabetes mellitus, type 2)    HTN (hypertension)    HLD (hyperlipidemia)      Assessment & Plan    The patient was started on IV fluids in the emergency room and we will continue this for his acute renal failure.  We will hold his lisinopril and hydrochlorothiazide.  We'll check urine studies and renal ultrasound.  I'm going to recheck a BMP now to reassess his knee function and electrolytes.  I will ask endocrinology to see him for his uncontrolled diabetes.  For now will place on sliding scale with Accu-Cheks.  Will monitor blood pressure off of his medications.  Hopefully with rehydration his renal function will improve.  Provide supportive care with antiemetics. We'll watch him closely with additional plans based on his clinical course.    I discussed the patients findings and my recommendations with patient    Darion Pink MD  01/04/17  9:41 AM

## 2017-01-04 NOTE — PROGRESS NOTES
Continued Stay Note  [facility]     Patient Name: Wu Damon  MRN: [id number]  Today's Date: 1/4/2017    Admit Date: 1/4/2017          Discharge Plan       01/04/17 1252    Case Management/Social Work Plan    Plan Home    Additional Comments Spoke with wife at bedside  She states they obtain their meds at [facility]  Pharmacy added to the data base.      01/04/17 1238    Case Management/Social Work Plan    Plan Home with wife no needs    Additional Comments Spoke with patient  at bedside.  I introduced myself and explained CCP role.Verified face sheet and confirmed  that pt does not have a regular pharmacy. Patient states he is independent and is able to perform all ADL's independently. He is employed full time at [employer]. He uses no assistive equipment.  Pt lives withhis wife  in a house. He has no history of rehab stays or Home.  CCPwill follow.              Discharge Codes     None            Bere Celis RN

## 2017-01-04 NOTE — Clinical Note
Level of Care: Telemetry [5]   Diagnosis: Acute renal failure, unspecified acute renal failure type [[id number]]   Admitting Physician: SANTY BAILON [[id number]]   Attending Physician: SANTY BAILON [[id number]]

## 2017-01-04 NOTE — PLAN OF CARE
Problem: Fluid Volume Deficit (Adult)  Goal: Identify Related Risk Factors and Signs and Symptoms  Outcome: Outcome(s) achieved Date Met:  01/04/17 01/04/17 0531   Fluid Volume Deficit   Fluid Volume Deficit: Related Risk Factors other (see comments)  (DIABETES AND ETOH)   Signs and Symptoms (Fluid Volume Deficit) lab value changes;nausea/vomiting, anorexia, diarrhea complaints;thirst;weight loss       Goal: Fluid/Electrolyte Balance  Outcome: Ongoing (interventions implemented as appropriate)  Goal: Comfort/Well Being  Outcome: Ongoing (interventions implemented as appropriate)

## 2017-01-04 NOTE — IP AVS SNAPSHOT
AFTER VISIT SUMMARY             Wu Damon           About your hospitalization     You were admitted on:  January 4, 2017 You last received care in the:  [address]       Procedures & Surgeries         Medications    If you or your caregiver advised us that you are currently taking a medication and that medication is marked below as “Resume”, this simply indicates that we have reviewed those medications to make sure our new therapy recommendations do not interfere.  If you have concerns about medications other than those new ones which we are prescribing today, please consult the physician who prescribed them (or your primary physician).  Our review of your home medications is not meant to indicate that we are directing their use.             Your Medications      START taking these medications     fenofibrate 48 MG tablet   Take 1 tablet by mouth Daily.   Last time this was given:  1/6/2017  9:06 AM   Commonly known as:  TRICOR   Next Dose Due:  1-7-2017           insulin detemir 100 UNIT/ML injection   Inject 25 Units under the skin 2 (Two) Times a Day.   Last time this was given:  1/6/2017  9:03 AM   Commonly known as:  LEVEMIR   Next Dose Due:  levemir 50 units every am           linagliptin 5 MG tablet tablet   Take 1 tablet by mouth Daily.   Last time this was given:  1/6/2017  9:06 AM   Commonly known as:  TRADJENTA   Next Dose Due:  01/06/2017             STOP taking these medications     AMBIEN PO           atorvastatin 10 MG tablet   Commonly known as:  LIPITOR           buprenorphine-naloxone 8-2 MG per SL tablet   Commonly known as:  SUBOXONE           glimepiride 4 MG tablet   Commonly known as:  AMARYL           glipiZIDE 5 MG tablet   Commonly known as:  GLUCOTROL           hydrochlorothiazide 25 MG tablet   Commonly known as:  HYDRODIURIL           insulin regular 500 UNIT/ML CONCENTRATED injection   Commonly known as:  humuLIN R           lisinopril 40 MG tablet      Commonly known as:  PRINIVIL,ZESTRIL                Where to Get Your Medications      These medications were sent to [facility] [id number] [place], [place] - [address] [facility] - [phone]  - [phone]   [address] [facility] [address]     Phone:  [phone]     fenofibrate 48 MG tablet    insulin detemir 100 UNIT/ML injection    linagliptin 5 MG tablet tablet                  Your Medications      Your Medication List           Morning Noon Evening Bedtime As Needed    fenofibrate 48 MG tablet   Take 1 tablet by mouth Daily.   Commonly known as:  TRICOR                                   insulin detemir 100 UNIT/ML injection   Inject 25 Units under the skin 2 (Two) Times a Day.   Commonly known as:  LEVEMIR            50 units every am                       linagliptin 5 MG tablet tablet   Take 1 tablet by mouth Daily.   Commonly known as:  TRADJENTA                                            Instructions for After Discharge        Activity Instructions     Activity as Tolerated               Additional Activity Instructions:      Up as tolerated-may be return to work on Tuesday January 10, 2017              Diet Instructions     Diet: Consistent Carbohydrate; Thin Liquids, No Restrictions       Discharge Diet:  Consistent Carbohydrate   Fluid Consistency:  Thin Liquids, No Restrictions               Discharge Instructions       Pt     Discharge References/Attachments     LINAGLIPTIN ORAL TABLETS (ENGLISH)    FENOFIBRATE (MICRONIZED OR NON-MICRONIZED) TABLETS (ENGLISH)       Follow-ups for After Discharge        Follow-up Information     Follow up with ALEXIA Vega. Go on 1/10/2017.    Specialty:  Family Medicine    Why:  acute kidney injury at 8:20    Contact information:    [address] [facility] [address] [phone]          Follow up with Zeina Brady MD Follow up in 3 week(s).    Specialty:  Endocrinology    Why:  or as she recs for DM2     Contact information:    [facility]  [address] [address] [phone]        Scheduled Appointments     Dr. Brady in 4- 6 weeks  [phone]           MyChart Signup     Our records indicate that you have declined [facility] signup. If you would like to sign up for [employer], please email O [email or web address] or call [phone] to obtain an activation code.         Summary of Your Hospitalization        Reason for Hospitalization     Your primary diagnosis was:  Acute Kidney Failure    Your diagnoses also included:  Diabetes With Complication, High Blood Pressure, High Cholesterol Or Triglycerides, Low Sodium Levels      Care Providers     Provider Service Role Specialty    Shruthi Cazares MD Internal Medicine Attending Provider Internal Medicine    Dorian Rogers MD -- Consulting Physician  Endocrinology      Your Allergies  Date Reviewed: 1/4/2017    Allergen Reactions    Amlodipine Not Noted    HEART PALPITATIONS- ANXIETY         Citalopram Hydrobromide Not Noted         Meloxicam Not Noted         Metformin And Related Diarrhea         Quetiapine Not Noted    Fatigue and nausea      Pending Labs     Order Current Status    Glutamic Acid Decarboxylase In process      Patient Belongings Returned     Document Return of Belongings Flowsheet     Were the patient bedside belongings sent home?   --   Belongings Retrieved from Security & Sent Home   N/A    Belongings Sent to Safe   --   Medications Retrieved from Pharmacy & Sent Home   N/A              More Information      Linagliptin oral tablets  What is this medicine?  Linagliptin (neelima a GLIP tin) helps to treat type 2 diabetes. It helps to control blood sugar. Treatment is combined with diet and exercise.  This medicine may be used for other purposes; ask your health care provider or pharmacist if you have questions.  What should I tell my health care provider before I take this medicine?  They need to know if you have any of  these conditions:  -diabetic ketoacidosis  -type 1 diabetes  -an unusual or allergic reaction to linagliptin, other medicines, foods, dyes, or preservatives  -pregnant or trying to get pregnant  -breast-feeding  How should I use this medicine?  Take this medicine by mouth with a glass of water. Follow the directions on the prescription label. You can take it with or without food. Take your dose at the same time each day. Do not take more often than directed. Do not stop taking except on your doctor's advice.  A special MedGuide will be given to you by the pharmacist with each prescription and refill. Be sure to read this information carefully each time.  Talk to your pediatrician regarding the use of this medicine in children. Special care may be needed.  Overdosage: If you think you have taken too much of this medicine contact a poison control center or emergency room at once.  NOTE: This medicine is only for you. Do not share this medicine with others.  What if I miss a dose?  If you miss a dose, take it as soon as you can. If it is almost time for your next dose, take only that dose. Do not take double or extra doses.  What may interact with this medicine?  Do not take this medicine with any of the following medications:  -gatifloxacin  This medicine may also interact with the following medications:  -alcohol  -bosentan  -certain medicines for seizures like carbamazepine, phenobarbital, phenytoin  -rifabutin  -rifampin  -[place] Wort  -sulfonylureas like glimepiride, glipizide, glyburide  This list may not describe all possible interactions. Give your health care provider a list of all the medicines, herbs, non-prescription drugs, or dietary supplements you use. Also tell them if you smoke, drink alcohol, or use illegal drugs. Some items may interact with your medicine.  What should I watch for while using this medicine?  Visit your doctor or health care professional for regular checks on your progress.  A test  called the HbA1C (A1C) will be monitored. This is a simple blood test. It measures your blood sugar control over the last 2 to 3 months. You will receive this test every 3 to 6 months.  Learn how to check your blood sugar. Learn the symptoms of low and high blood sugar and how to manage them.  Always carry a quick-source of sugar with you in case you have symptoms of low blood sugar. Examples include hard sugar candy or glucose tablets. Make sure others know that you can choke if you eat or drink when you develop serious symptoms of low blood sugar, such as seizures or unconsciousness. They must get medical help at once.  Tell your doctor or health care professional if you have high blood sugar. You might need to change the dose of your medicine. If you are sick or exercising more than usual, you might need to change the dose of your medicine.  Do not skip meals. Ask your doctor or health care professional if you should avoid alcohol. Many nonprescription cough and cold products contain sugar or alcohol. These can affect blood sugar.  Wear a medical ID bracelet or chain, and carry a card that describes your disease and details of your medicine and dosage times.  What side effects may I notice from receiving this medicine?  Side effects that you should report to your doctor or health care professional as soon as possible:  -allergic reactions like skin rash, itching or hives, swelling of the face, lips, or tongue  -breathing problems  -fever, chills  -joint pain  -nausea, vomiting  -signs and symptoms of low blood sugar such as feeling anxious, confusion, dizziness, increased hunger, unusually weak or tired, sweating, shakiness, cold, irritable, headache, blurred vision, fast heartbeat, loss of consciousness  -unusual stomach pain or discomfort  -vomiting  Side effects that usually do not require medical attention (Report these to your doctor or health care professional if they continue or are  bothersome.):  -headache  -sore throat  -stuffy or runny nose  This list may not describe all possible side effects. Call your doctor for medical advice about side effects. You may report side effects to FDA at 4-478-FDA-9085.  Where should I keep my medicine?  Keep out of the reach of children.  Store at room temperature between 15 and 30 degrees C (59 and 86 degrees F). Throw away any unused medicine after the expiration date.  NOTE: This sheet is a summary. It may not cover all possible information. If you have questions about this medicine, talk to your doctor, pharmacist, or health care provider.     © 2016, Elsevier/Gold Standard. (2015-08-28 17:55:49)          Fenofibrate (micronized or non-micronized) tablets  What is this medicine?  FENOFIBRATE (fen oh FYE brate) can help lower blood fats and cholesterol for people who are at risk of getting inflammation of the pancreas (pancreatitis) from having very high amounts of fats in their blood. This medicine is only for patients whose blood fats are not controlled by diet.  This medicine may be used for other purposes; ask your health care provider or pharmacist if you have questions.  What should I tell my health care provider before I take this medicine?  They need to know if you have any of these conditions:  -gallbladder disease  -heart disease  -kidney disease  -liver disease  -an unusual or allergic reaction to fenofibrate, gemfibrozil, other medicines, foods, dyes, or preservatives  -pregnant or trying to get pregnant  -breast-feeding  How should I use this medicine?  Take this medicine by mouth with a glass of water. Follow the directions on the prescription label. Do not take chipped or broken tablets. Take your medicine at regular intervals. Do not take it more often than directed. Do not stop taking except on your doctor's advice.  Talk to your pediatrician regarding the use of this medicine in children. Special care may be needed.  Overdosage: If you  think you have taken too much of this medicine contact a poison control center or emergency room at once.  NOTE: This medicine is only for you. Do not share this medicine with others.  What if I miss a dose?  If you miss a dose, take it as soon as you can. If it is almost time for your next dose, take only that dose. Do not take double or extra doses.  What may interact with this medicine?  Do not take this medicine with any of the following medications:  -ezetimibe  -statin-type cholesterol lowering drugs like atorvastatin, cerivastatin, fluvastatin, lovastatin, pravastatin, or simvastatin  -red yeast rice  This medicine may also interact with the following medications:  -cholestyramine or colestipol  -cyclosporine  -warfarin  This list may not describe all possible interactions. Give your health care provider a list of all the medicines, herbs, non-prescription drugs, or dietary supplements you use. Also tell them if you smoke, drink alcohol, or use illegal drugs. Some items may interact with your medicine.  What should I watch for while using this medicine?  Visit your doctor or health care professional for regular checks on your progress. Your blood fats and other tests will be measured from time to time. Do not stop taking this medicine except on the advice of your doctor or health care professional.  This medicine is only part of a total cholesterol-lowering program. Your health care professional or dietician can suggest a low-cholesterol and low-fat diet that will reduce your risk of getting heart and blood vessel disease. Avoid alcohol and smoking, and keep a proper exercise schedule.  If you are diabetic, close regulation and monitoring of your blood sugars can help your blood fat levels. This medicine may change the way your diabetic medication works, and sometimes will require that your dosages be adjusted. Check with your doctor or health care professional.  This medicine can make you more sensitive to  the sun. Keep out of the sun. If you cannot avoid being in the sun, wear protective clothing and use sunscreen. Do not use sun lamps or tanning beds/booths.  What side effects may I notice from receiving this medicine?  Side effects that you should report to your doctor or health care professional as soon as possible:  -allergic reactions like skin rash, itching or hives, swelling of the face, lips, or tongue  -dark urine  -lower back or side pain  -muscle pain, tenderness, or weakness  -skin-bruising  -stomach pain  -trouble passing urine or change in the amount of urine  -unusually weak or tired  -yellowing of the eyes or skin  Side effects that usually do not require medical attention (report to your doctor or health care professional if they continue or are bothersome):  -constipation  -headache  -nausea  This list may not describe all possible side effects. Call your doctor for medical advice about side effects. You may report side effects to FDA at 2-531-FDA-7211.  Where should I keep my medicine?  Keep out of the reach of children.  Store the tablets in the original container at room temperature between 15 and 30 degrees C (59 and 86 degrees F). Keep container tightly closed. Throw away any unused medicine after the expiration date.  NOTE: This sheet is a summary. It may not cover all possible information. If you have questions about this medicine, talk to your doctor, pharmacist, or health care provider.     © 2016, Elsevier/Gold Standard. (2011-10-12 13:39:05)            SYMPTOMS OF A STROKE    Call 911 or have someone take you to the Emergency Department if you have any of the following:    · Sudden numbness or weakness of your face, arm or leg especially on one side of the body  · Sudden confusion, diffiiculty speaking or trouble understanding   · Changes in your vision or loss of sight in one eye  · Sudden severe headache with no known cause  · sudden dizziness, trouble walking, loss of balance or  coordination    It is important to seek emergency care right away if you have further stroke symptoms. If you get emergency help quickly, the powerful clot-dissolving medicines can reduce the disabilities caused by a stroke.     For more information:    American Stroke Association  3-677-3-STROKE  www.strokeassociation.org           IF YOU SMOKE OR USE TOBACCO PLEASE READ THE FOLLOWING:    Why is smoking bad for me?  Smoking increases the risk of heart disease, lung disease, vascular disease, stroke, and cancer.     If you smoke, STOP!    If you would like more information on quitting smoking, please visit the [employer] website: [email or web address]   This link will provide additional resources including the QUIT line and the Beat the Pack support groups.     For more information:    American Cancer Society  ([phone]    American Heart Association  1-593.445.4424               YOU ARE THE MOST IMPORTANT FACTOR IN YOUR RECOVERY.     Follow all instructions carefully.     I have reviewed my discharge instructions with my nurse, including the following information, if applicable:     Information about my illness and diagnosis   Follow up appointments (including lab draws)   Wound Care   Equipment Needs   Medications (new and continuing) along with side effects   Preventative information such as vaccines and smoking cessations   Diet   Pain   I know when to contact my Doctor's office or seek emergency care      I want my nurse to describe the side effects of my medications: YES NO   If the answer is no, I understand the side effects of my medications: YES NO   My nurse described the side effects of my medications in a way that I could understand: YES NO   I have taken my personal belongings and my own medications with me at discharge: YES NO            I have received this information and my questions have been answered. I have discussed any concerns I see with this  plan with the nurse or physician. I understand these instructions.    Signature of Patient or Responsible Person: _____________________________________    Date: _________________  Time: __________________    Signature of Healthcare Provider: _______________________________________  Date: _________________  Time: __________________

## 2017-01-04 NOTE — CONSULTS
"41 y.o.  Patient Care Team:  ALEXIA Vega as PCP - General (Family Medicine)      Chief Complaint   Patient presents with   • Hyperglycemia     \"my nurse at work took my blood sugar at it was 400 something. I have been taking over the counter insulin from [employer] and other diabetes medicines and they arent working.\" Patient states he does not know how much insulin to take and he has been guessing.       HPI 41-year-old -American male with history of type 2 diabetes mellitus on oral agents, hypertension, hyperlipidemia presented to the emergency department with complaints of nausea and increased urination and thirst.  On admission patient was noted to have acute renal failure and his blood sugars were elevated.  Endocrine has been consulted for the management of his type 2 diabetes mellitus    Type 2 diabetes mellitus was diagnosed at age 28, he has been on oral agents since then but he does report noncompliance with his medications due to financial issues.   No history of pancreatitis.  He used to be on Januvia at one point but due to financial issues he discontinued the medication.He does check his blood sugars one to 2 times a week and has noted that his blood sugars have been slowly creeping up.  The highest blood sugar he has noted in the last 1 week was in 400s range.  He went to his primary care physician on Friday started the patient on glipizide and metformin.  However patient did not refill the medication and did not start metformin because of the complaints of diarrhea.  But instead due to the elevated blood sugars he took over-the-counter insulin and was not entirely sure about the exact dosage.  He complains of increased urination and thirst.  Last eye exam was about a year ago and no diabetic retinopathy noted at that time.  He does complain of burning sensation, tingling and numbness in his bilateral feet no ulcers or amputations in his feet.  No prior history of CAD, CVA, CK " D.  HbA1c is pending and here in the hospital he has been started on NovoLog sliding scale 3 times a day before meals and at bedtime.       Past Medical History   Diagnosis Date   • Diabetes mellitus    • Hypertension        Family History   Problem Relation Age of Onset   • Alcohol abuse Mother    • Asthma Mother    • Cancer Mother    • Drug abuse Mother    • Early death Mother    • Alcohol abuse Father    • Alcohol abuse Sister    • Drug abuse Sister    • Asthma Daughter    • Diabetes Daughter    • Alcohol abuse Maternal Aunt    • Alcohol abuse Maternal Uncle    • Alcohol abuse Paternal Aunt    • Cancer Paternal Aunt    • Diabetes Paternal Aunt    • Alcohol abuse Paternal Uncle    • Alcohol abuse Maternal Grandmother    • Asthma Maternal Grandmother    • Cancer Maternal Grandmother    • Drug abuse Maternal Grandmother    • Hypertension Maternal Grandmother    • Alcohol abuse Maternal Grandfather    • Alcohol abuse Paternal Grandmother    • Alcohol abuse Paternal Grandfather        Social History     Social History   • Marital status:      Spouse name: N/A   • Number of children: N/A   • Years of education: N/A     Occupational History   • Not on file.     Social History Main Topics   • Smoking status: Former Smoker     Packs/day: 2.00     Types: Cigarettes, Cigars     Start date: 9/4/2003     Quit date: 1/4/2006   • Smokeless tobacco: Former User     Types: Chew   • Alcohol use 12.6 oz/week     21 Shots of liquor per week      Comment: one pint a night   • Drug use: No   • Sexual activity: Yes     Partners: Female     Other Topics Concern   • Not on file     Social History Narrative       Allergies   Allergen Reactions   • Amlodipine      HEART PALPITATIONS- ANXIETY   • Citalopram Hydrobromide    • Meloxicam    • Metformin And Related Diarrhea   • Quetiapine      Fatigue and nausea         Current Facility-Administered Medications:   •  dextrose (D50W) solution 25 g, 25 g, Intravenous, PRN, Darion Starr  MD Apryl  •  dextrose (GLUTOSE) oral gel 15 g, 15 g, Oral, PRN, Darion Pink MD  •  glucagon (human recombinant) (GLUCAGEN DIAGNOSTIC) injection 1 mg, 1 mg, Subcutaneous, Once PRN, Darion Pink MD  •  heparin (porcine) 5000 UNIT/ML injection 5,000 Units, 5,000 Units, Subcutaneous, Q12H, Darion Pink MD  •  insulin aspart (novoLOG) injection 0-9 Units, 0-9 Units, Subcutaneous, 4x Daily AC & at Bedtime, Darion Pink MD  •  ondansetron (ZOFRAN) injection 4 mg, 4 mg, Intravenous, Q6H PRN, Pablo Lema MD  •  ondansetron (ZOFRAN) tablet 4 mg, 4 mg, Oral, Q6H PRN **OR** ondansetron ODT (ZOFRAN-ODT) disintegrating tablet 4 mg, 4 mg, Oral, Q6H PRN **OR** ondansetron (ZOFRAN) injection 4 mg, 4 mg, Intravenous, Q6H PRN, Darion Pink MD  •  sodium chloride 0.9 % flush 1-10 mL, 1-10 mL, Intravenous, PRN, Darion Pink MD  •  sodium chloride 0.9 % flush 10 mL, 10 mL, Intravenous, PRN, Khai Blackwell MD  •  Insert peripheral IV, , , Once **AND** sodium chloride 0.9 % flush 10 mL, 10 mL, Intravenous, PRN, Khai Blackwell MD  •  sodium chloride 0.9 % infusion, 125 mL/hr, Intravenous, Continuous, Pablo Lema MD, Last Rate: 125 mL/hr at 01/04/17 0930, 125 mL/hr at 01/04/17 0930         Review of Systems   Constitutional: Positive for appetite change and fatigue. Negative for chills and diaphoresis.   HENT: Negative for hearing loss, nosebleeds, postnasal drip, trouble swallowing and voice change.    Eyes: Negative for photophobia, discharge and visual disturbance.   Respiratory: Negative for cough, shortness of breath and wheezing.    Cardiovascular: Negative for chest pain, palpitations and leg swelling.   Gastrointestinal: Negative for abdominal distention, abdominal pain, constipation, diarrhea, nausea and vomiting.   Endocrine: Positive for polydipsia and polyphagia. Negative for cold intolerance, heat intolerance and  polyuria.   Genitourinary: Negative for dysuria, frequency and hematuria.   Musculoskeletal: Positive for back pain and myalgias. Negative for arthralgias.   Skin: Negative for pallor, rash and wound.   Neurological: Negative for dizziness, tremors, seizures, syncope, weakness, numbness and headaches.   Hematological: Negative for adenopathy.   Psychiatric/Behavioral: Negative for agitation, confusion and sleep disturbance. The patient is not nervous/anxious.      Objective     Vital Signs  Temp:  [98.4 °F (36.9 °C)-98.8 °F (37.1 °C)] 98.8 °F (37.1 °C)  Heart Rate:  [105-125] 110  Resp:  [12-18] 16  BP: (100-130)/(47-84) 117/69    Physical Exam  Physical Exam   Constitutional: He is oriented to person, place, and time. He appears well-developed and well-nourished.   HENT:   Head: Normocephalic and atraumatic.   Mouth/Throat: Oropharynx is clear and moist.   Eyes: Conjunctivae and EOM are normal. Pupils are equal, round, and reactive to light.   Neck: Normal range of motion. Neck supple. Carotid bruit is not present. No tracheal deviation present. No thyromegaly present.   Acanthosis nigricans   Cardiovascular: Normal rate, regular rhythm and normal heart sounds.    Pulmonary/Chest: Effort normal and breath sounds normal. No stridor. He has no wheezes.   Abdominal: Soft. Bowel sounds are normal. He exhibits no distension. There is no tenderness. No hernia.   Musculoskeletal: Normal range of motion. He exhibits no edema.   Lymphadenopathy:     He has no cervical adenopathy.   Neurological: He is alert and oriented to person, place, and time. He has normal reflexes.   Intact pin prick and proprioception   Skin: Skin is warm and dry.   Psychiatric: He has a normal mood and affect. His behavior is normal. Judgment normal.   Vitals reviewed.      Results Review:    I reviewed the patient's new clinical results.  GLUCOSE   Date/Time Value Ref Range Status   01/04/2017 0517 246 (H) 70 - 130 mg/dL Final   01/04/2017 0252 212  (H) 70 - 130 mg/dL Final   01/04/2017 0210 211 (H) 70 - 130 mg/dL Final   01/04/2017 0054 258 (H) 70 - 130 mg/dL Final   01/02/2017 2133 345 (H) 70 - 130 mg/dL Final     Lab Results (last 72 hours)     Procedure Component Value Units Date/Time    CBC & Differential [[id number]] Collected:  01/03/17 2251    Specimen:  Blood Updated:  01/03/17 2302    Narrative:       The following orders were created for panel order CBC & Differential.  Procedure                               Abnormality         Status                     ---------                               -----------         ------                     CBC Auto Differential[[id number]]         Abnormal            Final result                 Please view results for these tests on the individual orders.    CBC Auto Differential [[id number]]  (Abnormal) Collected:  01/03/17 2251    Specimen:  Blood Updated:  01/03/17 2302     WBC 11.34 (H) 10*3/mm3      RBC 4.53 (L) 10*6/mm3      Hemoglobin 13.9 g/dL      Hematocrit 42.2 %      MCV 93.2 fL      MCH 30.7 pg      MCHC 32.9 g/dL      RDW 11.1 (L) %      RDW-SD 37.5 fl      MPV 13.1 (H) fL      Platelets 199 10*3/mm3      Neutrophil % 66.9 %      Lymphocyte % 27.4 %      Monocyte % 3.9 (L) %      Eosinophil % 1.4 %      Basophil % 0.4 %      Immature Grans % 0.0 %      Neutrophils, Absolute 7.59 10*3/mm3      Lymphocytes, Absolute 3.11 10*3/mm3      Monocytes, Absolute 0.44 10*3/mm3      Eosinophils, Absolute 0.16 10*3/mm3      Basophils, Absolute 0.04 10*3/mm3      Immature Grans, Absolute 0.00 10*3/mm3     Ketone Bodies, Serum [[id number]] Collected:  01/03/17 2251    Specimen:  Blood Updated:  01/03/17 2328    Narrative:       The following orders were created for panel order Ketone Bodies, Serum.  Procedure                               Abnormality         Status                     ---------                               -----------         ------                     Acetone[[id number]]                       Normal               Final result                 Please view results for these tests on the individual orders.    Acetone [[id number]]  (Normal) Collected:  01/03/17 2251    Specimen:  Blood Updated:  01/03/17 2328     Acetone Negative     Comprehensive Metabolic Panel [[id number]]  (Abnormal) Collected:  01/03/17 2251    Specimen:  Blood Updated:  01/03/17 2338     Glucose 403 (C) mg/dL      BUN 61 (H) mg/dL      Creatinine 4.35 (H) mg/dL      Sodium 132 (L) mmol/L      Potassium 4.6 mmol/L      Chloride 92 (L) mmol/L      CO2 21.3 (L) mmol/L      Calcium 10.5 mg/dL      Total Protein 8.4 g/dL      Albumin 4.70 g/dL      ALT (SGPT) 34 U/L      AST (SGOT) 20 U/L      Alkaline Phosphatase 76 U/L      Total Bilirubin 0.3 mg/dL      eGFR  African Amer 18 (L) mL/min/1.73      Globulin 3.7 gm/dL      A/G Ratio 1.3 g/dL      BUN/Creatinine Ratio 14.0      Anion Gap 18.7 mmol/L     Urinalysis With / Culture If Indicated [[id number]]  (Abnormal) Collected:  01/03/17 2359    Specimen:  Urine from Urine, Clean Catch Updated:  01/04/17 0015     Color, UA Yellow      Appearance, UA Clear      pH, UA <=5.0      Specific Gravity, UA 1.028      Glucose, UA >=1000 mg/dL (3+) (A)      Ketones, UA Negative      Bilirubin, UA Negative      Blood, UA Small (1+) (A)      Protein, UA Trace (A)      Leuk Esterase, UA Negative      Nitrite, UA Negative      Urobilinogen, UA 0.2 E.U./dL     Urinalysis, Microscopic Only [[id number]]  (Abnormal) Collected:  01/03/17 2359    Specimen:  Urine from Urine, Clean Catch Updated:  01/04/17 0017     RBC, UA 0-2 (A) /HPF      WBC, UA 0-2 (A) /HPF      Bacteria, UA None Seen /HPF      Squamous Epithelial Cells, UA 0-2 /HPF      Hyaline Casts, UA 3-6 /LPF      Methodology Automated Microscopy     CK [[id number]]  (Normal) Collected:  01/03/17 2251    Specimen:  Blood Updated:  01/04/17 0028     Creatine Kinase 163 U/L     POC Glucose Fingerstick [[id number]]  (Abnormal) Collected:  01/04/17 0054    Specimen:  Blood Updated:   01/04/17 0055     Glucose 258 (H) mg/dL     Narrative:       Meter: [id number] : [id number] [facility]    POC Glucose Fingerstick [[id number]]  (Abnormal) Collected:  01/04/17 0210    Specimen:  Blood Updated:  01/04/17 0213     Glucose 211 (H) mg/dL     Narrative:       Meter: [id number] : [id number] [facility]    POC Glucose Fingerstick [[id number]]  (Abnormal) Collected:  01/04/17 0252    Specimen:  Blood Updated:  01/04/17 0253     Glucose 212 (H) mg/dL     Narrative:       Meter: [id number] : [id number] [facility]varez    Light Blue Top [[id number]] Collected:  01/03/17 2251    Specimen:  Blood Updated:  01/04/17 0306     Extra Tube hold for add-on       Auto resulted       Lavender Top [[id number]] Collected:  01/03/17 2251    Specimen:  Blood Updated:  01/04/17 0306     Extra Tube hold for add-on       Auto resulted       [place] [facility] [[id number]] Collected:  01/03/17 2251    Specimen:  Blood Updated:  01/04/17 0306    Narrative:       The following orders were created for panel order [place] Draw.  Procedure                               Abnormality         Status                     ---------                               -----------         ------                     Light Blue Top[[id number]]                                    Final result               Green Top (Gel)[[id number]]                                   Final result               Lavender Top[[id number]]                                      Final result               Gold Top - SST[[id number]]                                                                 Please view results for these tests on the individual orders.    Green Top (Gel) [[id number]] Collected:  01/03/17 2251    Specimen:  Blood Updated:  01/04/17 0306     Extra Tube Hold for add-ons.       Auto resulted.       POC Glucose Fingerstick [[id number]]  (Abnormal) Collected:  01/04/17 0517    Specimen:  Blood Updated:  01/04/17 0519     Glucose 246 (H) mg/dL     Narrative:        Meter: [id number] : [id number] Ant Alvarez          Imaging Results (last 72 hours)     ** No results found for the last 72 hours. **          Assessment/Plan     Active Hospital Problems (** Indicates Principal Problem)    Diagnosis Date Noted   • **Acute renal failure [N17.9] 01/04/2017   • DM type 2 (diabetes mellitus, type 2) [E11.9] 01/04/2017   • HTN (hypertension) [I10] 01/04/2017   • HLD (hyperlipidemia) [E78.5] 01/04/2017      Resolved Hospital Problems    Diagnosis Date Noted Date Resolved   No resolved problems to display.     Type 2 diabetes mellitus uncontrolled  Will check HbA1c  Will check C-peptide, shira 65, islet cell antibodies  Will start levemir 20 units at bedtime  Will continue NovoLog sliding scale 3 times a day before meals and at bedtime  Will start patient on Tradjenta 5 mg po daily.   Cannot restart metformin or Januvia due to his renal issue.    Hyperlipidemia  Will check lipid panel    Acute renal failure  Could be related to elevated blood sugars, Will check urine microalbuminuria.    The total time spent more than 110 min for old record and lab review and face- to- face, of which greater than 50% of time was spent in counseling the patient on treatment options, instructions for management/treatment and follow up and importance of compliance with chosen management or treatment options    Zeina Brady MD.  01/04/17  10:03 AM        EMR Dragon / transcription disclaimer:    Much of this encounter note is an electronic transcription/ translation of spoken language to printed text.  Electronic translation of spoken language may permit erroneous, or at times, nonsensical words or phrases to be inadvertently transcribed; although I have reviewed the note for such errors, some may still exist.

## 2017-01-04 NOTE — CONSULTS
"Adult Nutrition  Assessment/PES    Patient Name:  Wu Damon  YOB: 1975  MRN: [id number]  Admit Date:  1/4/2017    Assessment Date:  1/4/2017     Consult complete-spoke with patient-asked if he had any questions/concerns regarding diabetes and healthy eating-patient declined; left booklet of information with patient over the basics of diabetes and nutrition; RD to monitor and follow           Reason for Assessment       01/04/17 1400    Reason for Assessment    Reason For Assessment/Visit identified at risk by screening criteria;nurse/nurse practitioner consult                Anthropometrics       01/04/17 1401    Anthropometrics (Special Considerations)    Height Used for Calculations 1.854 m (6' 1\")    Weight Used for Calculations 107 kg (235 lb)    RD Calculated IBW 79.9    RD Calculated %     RD Calculated BMI (kg/m2) 31    Body Mass Index (BMI)    BMI Grade 30 - 34.9- obesity - grade I            Labs/Tests/Procedures/Meds       01/04/17 1401    Labs/Tests/Procedures/Meds    Diagnostic Test/Procedure Review reviewed    Labs/Tests Review Reviewed;Glucose;Na+;Hgb A1C    Medication Review Reviewed, pertinent   insulin, NaCl    Significant Vitals reviewed            Physical Findings       01/04/17 1401    Physical Findings/Assessment    Additional Documentation --   B=22            Estimated/Assessed Needs       01/04/17 1401    Calculation Measurements    Weight Used For Calculations 107 kg (235 lb)    Height Used for Calculations 1.854 m (6' 1\")    Estimated/Assessed Energy Needs    Energy Need Method Kcal/kg    kcal/kg 20    20 Kcal/Kg (kcal) 2131.9    Estimated/Assessed Protein Needs    Weight Used for Protein Calculation 107 kg (235 lb)    Protein (gm/kg) 0.8    0.8 Gm Protein (gm) 85.28    Estimated/Assessed Fluid Needs    Fluid Need Method RDA method    RDA Method (mL)  2000            Nutrition Prescription Ordered       01/04/17 1402    Nutrition Prescription PO    Common " Modifiers Cardiac;Consistent Carbohydrate;Renal            Evaluation of Received Nutrient/Fluid Intake       01/04/17 1402    PO Evaluation    Number of Meals 2    % PO Intake 100              Problem/Interventions:        Problem 1       01/04/17 1402    Nutrition Diagnoses Problem 1    Problem 1 Overweight/Obesity    Etiology (related to) MNT for Treatment/Condition    Signs/Symptoms (evidenced by) BMI    BMI 30 - 34.9                    Intervention Goal       01/04/17 1402    Intervention Goal    General Maintain nutrition    PO Maintain intake    Weight Appropriate weight loss            Nutrition Intervention       01/04/17 1402    Nutrition Intervention    RD/Tech Action Follow Tx progress;Care plan reviewd;Interview for preference              Education/Evaluation       01/04/17 1402    Education    Education Provided education regarding    Provided education regarding Healthy eating for diabetes    Monitor/Evaluation    Monitor Per protocol    Education Follow-up Reinforce PRN          Electronically signed by:  Seema Wyman RD  01/04/17 2:03 PM

## 2017-01-05 LAB
ANION GAP SERPL CALCULATED.3IONS-SCNC: 15 MMOL/L
ARTICHOKE IGE QN: 112 MG/DL (ref 0–100)
BASOPHILS # BLD AUTO: 0.03 10*3/MM3 (ref 0–0.2)
BASOPHILS NFR BLD AUTO: 0.4 % (ref 0–1.5)
BUN BLD-MCNC: 36 MG/DL (ref 6–20)
BUN/CREAT SERPL: 16.5 (ref 7–25)
C PEPTIDE SERPL-MCNC: 5.7 NG/ML (ref 1.1–4.4)
CALCIUM SPEC-SCNC: 9.8 MG/DL (ref 8.6–10.5)
CHLORIDE SERPL-SCNC: 94 MMOL/L (ref 98–107)
CHOLEST SERPL-MCNC: 204 MG/DL (ref 0–200)
CO2 SERPL-SCNC: 20 MMOL/L (ref 22–29)
CREAT BLD-MCNC: 2.18 MG/DL (ref 0.76–1.27)
DEPRECATED RDW RBC AUTO: 36 FL (ref 37–54)
EOSINOPHIL # BLD AUTO: 0.23 10*3/MM3 (ref 0–0.7)
EOSINOPHIL NFR BLD AUTO: 3.1 % (ref 0.3–6.2)
ERYTHROCYTE [DISTWIDTH] IN BLOOD BY AUTOMATED COUNT: 11 % (ref 11.5–14.5)
GFR SERPL CREATININE-BSD FRML MDRD: 41 ML/MIN/1.73
GLUCOSE BLD-MCNC: 252 MG/DL (ref 65–99)
GLUCOSE BLDC GLUCOMTR-MCNC: 216 MG/DL (ref 70–130)
GLUCOSE BLDC GLUCOMTR-MCNC: 218 MG/DL (ref 70–130)
GLUCOSE BLDC GLUCOMTR-MCNC: 228 MG/DL (ref 70–130)
GLUCOSE BLDC GLUCOMTR-MCNC: 310 MG/DL (ref 70–130)
HCT VFR BLD AUTO: 37.2 % (ref 40.4–52.2)
HDLC SERPL-MCNC: 31 MG/DL (ref 40–60)
HGB BLD-MCNC: 12.6 G/DL (ref 13.7–17.6)
IMM GRANULOCYTES # BLD: 0.02 10*3/MM3 (ref 0–0.03)
IMM GRANULOCYTES NFR BLD: 0.3 % (ref 0–0.5)
LDLC SERPL CALC-MCNC: ABNORMAL MG/DL (ref 0–100)
LDLC/HDLC SERPL: ABNORMAL {RATIO}
LYMPHOCYTES # BLD AUTO: 2.7 10*3/MM3 (ref 0.9–4.8)
LYMPHOCYTES NFR BLD AUTO: 36 % (ref 19.6–45.3)
MCH RBC QN AUTO: 30.6 PG (ref 27–32.7)
MCHC RBC AUTO-ENTMCNC: 33.9 G/DL (ref 32.6–36.4)
MCV RBC AUTO: 90.3 FL (ref 79.8–96.2)
MONOCYTES # BLD AUTO: 0.41 10*3/MM3 (ref 0.2–1.2)
MONOCYTES NFR BLD AUTO: 5.5 % (ref 5–12)
NEUTROPHILS # BLD AUTO: 4.12 10*3/MM3 (ref 1.9–8.1)
NEUTROPHILS NFR BLD AUTO: 54.7 % (ref 42.7–76)
OSMOLALITY UR: 511 MOSM/KG (ref 250–1200)
PLATELET # BLD AUTO: 180 10*3/MM3 (ref 140–500)
PMV BLD AUTO: 13.1 FL (ref 6–12)
POTASSIUM BLD-SCNC: 4.1 MMOL/L (ref 3.5–5.2)
RBC # BLD AUTO: 4.12 10*6/MM3 (ref 4.6–6)
SODIUM BLD-SCNC: 129 MMOL/L (ref 136–145)
TRIGL SERPL-MCNC: 603 MG/DL (ref 0–150)
VLDLC SERPL-MCNC: ABNORMAL MG/DL (ref 5–40)
WBC NRBC COR # BLD: 7.51 10*3/MM3 (ref 4.5–10.7)

## 2017-01-05 PROCEDURE — 82962 GLUCOSE BLOOD TEST: CPT

## 2017-01-05 PROCEDURE — 80048 BASIC METABOLIC PNL TOTAL CA: CPT | Performed by: INTERNAL MEDICINE

## 2017-01-05 PROCEDURE — 80061 LIPID PANEL: CPT | Performed by: INTERNAL MEDICINE

## 2017-01-05 PROCEDURE — 99233 SBSQ HOSP IP/OBS HIGH 50: CPT | Performed by: INTERNAL MEDICINE

## 2017-01-05 PROCEDURE — 83935 ASSAY OF URINE OSMOLALITY: CPT | Performed by: INTERNAL MEDICINE

## 2017-01-05 PROCEDURE — 63710000001 INSULIN DETEMER PER 5 UNITS: Performed by: INTERNAL MEDICINE

## 2017-01-05 PROCEDURE — 63710000001 INSULIN ASPART PER 5 UNITS: Performed by: INTERNAL MEDICINE

## 2017-01-05 PROCEDURE — 85025 COMPLETE CBC W/AUTO DIFF WBC: CPT | Performed by: INTERNAL MEDICINE

## 2017-01-05 PROCEDURE — 83721 ASSAY OF BLOOD LIPOPROTEIN: CPT | Performed by: INTERNAL MEDICINE

## 2017-01-05 PROCEDURE — 25010000002 HEPARIN (PORCINE) PER 1000 UNITS: Performed by: INTERNAL MEDICINE

## 2017-01-05 RX ORDER — FENOFIBRATE 48 MG/1
48 TABLET, COATED ORAL DAILY
Status: DISCONTINUED | OUTPATIENT
Start: 2017-01-05 | End: 2017-01-06 | Stop reason: HOSPADM

## 2017-01-05 RX ORDER — ACETAMINOPHEN 325 MG/1
650 TABLET ORAL EVERY 6 HOURS PRN
Status: DISCONTINUED | OUTPATIENT
Start: 2017-01-05 | End: 2017-01-06 | Stop reason: HOSPADM

## 2017-01-05 RX ADMIN — INSULIN ASPART 4 UNITS: 100 INJECTION, SOLUTION INTRAVENOUS; SUBCUTANEOUS at 17:20

## 2017-01-05 RX ADMIN — INSULIN ASPART 7 UNITS: 100 INJECTION, SOLUTION INTRAVENOUS; SUBCUTANEOUS at 11:35

## 2017-01-05 RX ADMIN — HEPARIN SODIUM 5000 UNITS: 5000 INJECTION, SOLUTION INTRAVENOUS; SUBCUTANEOUS at 22:42

## 2017-01-05 RX ADMIN — FENOFIBRATE 48 MG: 48 TABLET ORAL at 17:21

## 2017-01-05 RX ADMIN — INSULIN ASPART 4 UNITS: 100 INJECTION, SOLUTION INTRAVENOUS; SUBCUTANEOUS at 08:20

## 2017-01-05 RX ADMIN — SODIUM CHLORIDE 75 ML/HR: 9 INJECTION, SOLUTION INTRAVENOUS at 19:48

## 2017-01-05 RX ADMIN — ACETAMINOPHEN 650 MG: 325 TABLET ORAL at 20:47

## 2017-01-05 RX ADMIN — LINAGLIPTIN 5 MG: 5 TABLET, FILM COATED ORAL at 08:20

## 2017-01-05 RX ADMIN — INSULIN DETEMIR 20 UNITS: 100 INJECTION, SOLUTION SUBCUTANEOUS at 20:47

## 2017-01-05 RX ADMIN — INSULIN ASPART 4 UNITS: 100 INJECTION, SOLUTION INTRAVENOUS; SUBCUTANEOUS at 20:47

## 2017-01-05 RX ADMIN — INSULIN DETEMIR 20 UNITS: 100 INJECTION, SOLUTION SUBCUTANEOUS at 11:08

## 2017-01-05 RX ADMIN — HEPARIN SODIUM 5000 UNITS: 5000 INJECTION, SOLUTION INTRAVENOUS; SUBCUTANEOUS at 10:52

## 2017-01-05 NOTE — PROGRESS NOTES
" LOS: 1 day     Name: Wu Damon  Age: 41 y.o.  Sex: male  :  1975  MRN: [id number]         Primary Care Physician: ALEXIA Vega    Subjective   Subjective  Nausea and abd pain improved.  Feels tired but no new complaints    Objective   Vital Signs  Temp:  [97.8 °F (36.6 °C)-98.2 °F (36.8 °C)] 98.2 °F (36.8 °C)  Heart Rate:  [] 100  Resp:  [16-18] 18  BP: (119-136)/(72-85) 120/85  Body mass index is 31 kg/(m^2).    Objective:  General Appearance:  Comfortable and in no acute distress.    Vital signs: (most recent): Blood pressure 120/85, pulse 100, temperature 98.2 °F (36.8 °C), temperature source Oral, resp. rate 18, height 73\" (185.4 cm), weight 235 lb (107 kg), SpO2 99 %.    Lungs:  Normal respiratory rate and normal effort.    Heart: Normal rate.  Regular rhythm.    Abdomen: Abdomen is soft.  Bowel sounds are normal.   There is no abdominal tenderness.     Extremities: There is no local swelling or dependent edema.    Neurological: Patient is alert and oriented to person, place and time.    Skin:  Warm and dry.              Results Review:       I reviewed the patient's new clinical results.      Results from last 7 days  Lab Units 17  0327 17  2251   WBC 10*3/mm3 7.51 11.34*   HEMOGLOBIN g/dL 12.6* 13.9   PLATELETS 10*3/mm3 180 199       Results from last 7 days  Lab Units 17  0327 17  0958 17  2251   SODIUM mmol/L 129* 133* 132*   POTASSIUM mmol/L 4.1 4.8 4.6   CHLORIDE mmol/L 94* 95* 92*   TOTAL CO2 mmol/L 20.0* 20.5* 21.3*   BUN mg/dL 36* 46* 61*   CREATININE mg/dL 2.18* 3.39* 4.35*   CALCIUM mg/dL 9.8 10.1 10.5   GLUCOSE mg/dL 252* 339* 403*         Scheduled Meds:     heparin (porcine) 5,000 Units Subcutaneous Q12H   insulin aspart 0-9 Units Subcutaneous 4x Daily AC & at Bedtime   insulin detemir 20 Units Subcutaneous Nightly   linagliptin 5 mg Oral Daily     PRN Meds:   •  dextrose  •  dextrose  •  glucagon (human recombinant)  •  " ondansetron  •  ondansetron **OR** ondansetron ODT **OR** ondansetron  •  sodium chloride  •  sodium chloride  •  Insert peripheral IV **AND** sodium chloride  Continuous Infusions:    sodium chloride 100 mL/hr Last Rate: 125 mL/hr (01/04/17 4313)       Assessment/Plan   Principal Problem:    Acute renal failure  Active Problems:    DM type 2 (diabetes mellitus, type 2)    HTN (hypertension)    HLD (hyperlipidemia)      Assessment & Plan    - Renal function improving with IVF.  Will lower rate to 75cc/hr given that he is eating and drinking well now.  Kidney U/s shows no hydronephrosis.  Urinating freely.  - Appreciate endo recs for DM.    - Triglycerides significantly elevated.  Direct LDL pending.  Will consider starting tricor dosed for renal function.  - Blood pressure stable off lisinopril and HCTZ  - Sodium down today.  Check TSH and osm.    Darion Pink MD  [facility]  01/05/17  9:21 AM

## 2017-01-05 NOTE — PLAN OF CARE
Problem: Patient Care Overview (Adult)  Goal: Plan of Care Review  Outcome: Ongoing (interventions implemented as appropriate)  Goal: Adult Individualization and Mutuality  Outcome: Ongoing (interventions implemented as appropriate)  Goal: Discharge Needs Assessment  Outcome: Ongoing (interventions implemented as appropriate)    Problem: Fluid Volume Deficit (Adult)  Goal: Fluid/Electrolyte Balance  Outcome: Ongoing (interventions implemented as appropriate)  Goal: Comfort/Well Being  Outcome: Ongoing (interventions implemented as appropriate)

## 2017-01-05 NOTE — NURSING NOTE
"[facility] f/u Pt cooperating with tx.  \" just want to sleep\"   Will f/u to assist with d/c plan as needed  "

## 2017-01-05 NOTE — PLAN OF CARE
Problem: Patient Care Overview (Adult)  Goal: Plan of Care Review    01/04/17 1957   Outcome Evaluation   Outcome Summary/Follow up Plan CONTINUES ON IVFS, -300'S WITH SSC GIVEN, BOOKLETS ON DIABETES EDUCATION AT BS, PT SLEEPING MOSTLY, STATES HE DIDNT GET MUCH SLEEP LAST NIGH

## 2017-01-05 NOTE — PROGRESS NOTES
"41 y.o.   LOS: 1 day   Patient Care Team:  ALEXIA Vega as PCP - General (Family Medicine)    Chief Complaint:  Type 2 diabetes mellitus    Chief Complaint   Patient presents with   • Hyperglycemia     \"my nurse at work took my blood sugar at it was 400 something. I have been taking over the counter insulin from [employer] and other diabetes medicines and they arent working.\" Patient states he does not know how much insulin to take and he has been guessing.       Subjective  patient's nausea and vomiting improved, tolerating by mouth well.  His abdominal pain also improved.  His blood sugars are noted to be in 200s.      Interval History:    Review of Systems:   Review of Systems   Constitutional: Positive for appetite change and fatigue. Negative for chills and diaphoresis.   HENT: Negative for hearing loss, nosebleeds, postnasal drip, trouble swallowing and voice change.    Eyes: Negative for photophobia, discharge and visual disturbance.   Respiratory: Negative for cough, shortness of breath and wheezing.    Cardiovascular: Negative for chest pain, palpitations and leg swelling.   Gastrointestinal: Negative for abdominal distention, abdominal pain, constipation, diarrhea, nausea and vomiting.   Endocrine: Negative for cold intolerance, heat intolerance, polydipsia and polyuria.   Genitourinary: Negative for dysuria, frequency and hematuria.   Musculoskeletal: Negative for arthralgias, back pain and myalgias.   Skin: Negative for pallor, rash and wound.   Neurological: Positive for weakness and numbness. Negative for dizziness, tremors, seizures, syncope and headaches.   Hematological: Negative for adenopathy.   Psychiatric/Behavioral: Negative for agitation, confusion and sleep disturbance. The patient is not nervous/anxious.      Objective     Vital Signs   Temp:  [97.8 °F (36.6 °C)-98.2 °F (36.8 °C)] 98.2 °F (36.8 °C)  Heart Rate:  [] 100  Resp:  [16-18] 18  BP: (119-136)/(72-85) " 120/85    Physical Exam:  Physical Exam   Constitutional: He is oriented to person, place, and time. He appears well-developed and well-nourished.   HENT:   Head: Normocephalic and atraumatic.   Mouth/Throat: Oropharynx is clear and moist.   Eyes: Conjunctivae and EOM are normal. Pupils are equal, round, and reactive to light.   Neck: Normal range of motion. Neck supple. Carotid bruit is not present. No tracheal deviation present. No thyromegaly present.   Acanthosis nigricans   Cardiovascular: Normal rate, regular rhythm and normal heart sounds.    Pulmonary/Chest: Effort normal and breath sounds normal. No stridor. He has no wheezes.   Abdominal: Soft. Bowel sounds are normal. He exhibits no distension. There is no tenderness. No hernia.   Musculoskeletal: Normal range of motion. He exhibits no edema.   Lymphadenopathy:     He has no cervical adenopathy.   Neurological: He is alert and oriented to person, place, and time. He has normal reflexes.   Intact pin prick and proprioception, calluses noted   Skin: Skin is warm and dry.   Psychiatric: He has a normal mood and affect. His behavior is normal. Judgment normal.   Vitals reviewed.  Results Review:     I reviewed the patient's new clinical results.      GLUCOSE   Date/Time Value Ref Range Status   01/05/2017 0327 252 (H) 65 - 99 mg/dL Final   01/04/2017 0958 339 (H) 65 - 99 mg/dL Final   01/03/2017 2251 403 (C) 65 - 99 mg/dL Final     Lab Results (last 72 hours)     Procedure Component Value Units Date/Time    CBC & Differential [[id number]] Collected:  01/03/17 2251    Specimen:  Blood Updated:  01/03/17 2302    Narrative:       The following orders were created for panel order CBC & Differential.  Procedure                               Abnormality         Status                     ---------                               -----------         ------                     CBC Auto Differential[[id number]]         Abnormal            Final result                 Please  view results for these tests on the individual orders.    CBC Auto Differential [[id number]]  (Abnormal) Collected:  01/03/17 2251    Specimen:  Blood Updated:  01/03/17 2302     WBC 11.34 (H) 10*3/mm3      RBC 4.53 (L) 10*6/mm3      Hemoglobin 13.9 g/dL      Hematocrit 42.2 %      MCV 93.2 fL      MCH 30.7 pg      MCHC 32.9 g/dL      RDW 11.1 (L) %      RDW-SD 37.5 fl      MPV 13.1 (H) fL      Platelets 199 10*3/mm3      Neutrophil % 66.9 %      Lymphocyte % 27.4 %      Monocyte % 3.9 (L) %      Eosinophil % 1.4 %      Basophil % 0.4 %      Immature Grans % 0.0 %      Neutrophils, Absolute 7.59 10*3/mm3      Lymphocytes, Absolute 3.11 10*3/mm3      Monocytes, Absolute 0.44 10*3/mm3      Eosinophils, Absolute 0.16 10*3/mm3      Basophils, Absolute 0.04 10*3/mm3      Immature Grans, Absolute 0.00 10*3/mm3     Ketone Bodies, Serum [[id number]] Collected:  01/03/17 2251    Specimen:  Blood Updated:  01/03/17 2328    Narrative:       The following orders were created for panel order Ketone Bodies, Serum.  Procedure                               Abnormality         Status                     ---------                               -----------         ------                     Acetone[[id number]]                       Normal              Final result                 Please view results for these tests on the individual orders.    Acetone [[id number]]  (Normal) Collected:  01/03/17 2251    Specimen:  Blood Updated:  01/03/17 2328     Acetone Negative     Comprehensive Metabolic Panel [[id number]]  (Abnormal) Collected:  01/03/17 2251    Specimen:  Blood Updated:  01/03/17 2338     Glucose 403 (C) mg/dL      BUN 61 (H) mg/dL      Creatinine 4.35 (H) mg/dL      Sodium 132 (L) mmol/L      Potassium 4.6 mmol/L      Chloride 92 (L) mmol/L      CO2 21.3 (L) mmol/L      Calcium 10.5 mg/dL      Total Protein 8.4 g/dL      Albumin 4.70 g/dL      ALT (SGPT) 34 U/L      AST (SGOT) 20 U/L      Alkaline Phosphatase 76 U/L      Total Bilirubin  0.3 mg/dL      eGFR  African Amer 18 (L) mL/min/1.73      Globulin 3.7 gm/dL      A/G Ratio 1.3 g/dL      BUN/Creatinine Ratio 14.0      Anion Gap 18.7 mmol/L     Urinalysis With / Culture If Indicated [[id number]]  (Abnormal) Collected:  01/03/17 2359    Specimen:  Urine from Urine, Clean Catch Updated:  01/04/17 0015     Color, UA Yellow      Appearance, UA Clear      pH, UA <=5.0      Specific Gravity, UA 1.028      Glucose, UA >=1000 mg/dL (3+) (A)      Ketones, UA Negative      Bilirubin, UA Negative      Blood, UA Small (1+) (A)      Protein, UA Trace (A)      Leuk Esterase, UA Negative      Nitrite, UA Negative      Urobilinogen, UA 0.2 E.U./dL     Urinalysis, Microscopic Only [[id number]]  (Abnormal) Collected:  01/03/17 2359    Specimen:  Urine from Urine, Clean Catch Updated:  01/04/17 0017     RBC, UA 0-2 (A) /HPF      WBC, UA 0-2 (A) /HPF      Bacteria, UA None Seen /HPF      Squamous Epithelial Cells, UA 0-2 /HPF      Hyaline Casts, UA 3-6 /LPF      Methodology Automated Microscopy     CK [[id number]]  (Normal) Collected:  01/03/17 2251    Specimen:  Blood Updated:  01/04/17 0028     Creatine Kinase 163 U/L     POC Glucose Fingerstick [[id number]]  (Abnormal) Collected:  01/04/17 0054    Specimen:  Blood Updated:  01/04/17 0055     Glucose 258 (H) mg/dL     Narrative:       Meter: [id number] : [id number] [facility] [place]    POC Glucose Fingerstick [[id number]]  (Abnormal) Collected:  01/04/17 0210    Specimen:  Blood Updated:  01/04/17 0213     Glucose 211 (H) mg/dL     Narrative:       Meter: [id number] : [id number] [facility] [place]    POC Glucose Fingerstick [[id number]]  (Abnormal) Collected:  01/04/17 0252    Specimen:  Blood Updated:  01/04/17 0253     Glucose 212 (H) mg/dL     Narrative:       Meter: [id number] : [id number] Ant Alvarez    Light Blue Top [[id number]] Collected:  01/03/17 3703    Specimen:  Blood Updated:  01/04/17 0306     Extra Tube hold for add-on       Auto resulted        Lavender Top [[id number]] Collected:  01/03/17 2251    Specimen:  Blood Updated:  01/04/17 0306     Extra Tube hold for add-on       Auto resulted       Green Top (Gel) [[id number]] Collected:  01/03/17 2251    Specimen:  Blood Updated:  01/04/17 0306     Extra Tube Hold for add-ons.       Auto resulted.       POC Glucose Fingerstick [[id number]]  (Abnormal) Collected:  01/04/17 0517    Specimen:  Blood Updated:  01/04/17 0519     Glucose 246 (H) mg/dL     Narrative:       Meter: [id number] : [id number] Ant Alvarez    Basic Metabolic Panel [[id number]]  (Abnormal) Collected:  01/04/17 0958    Specimen:  Blood Updated:  01/04/17 1034     Glucose 339 (H) mg/dL      BUN 46 (H) mg/dL      Creatinine 3.39 (H) mg/dL      Sodium 133 (L) mmol/L      Potassium 4.8 mmol/L      Chloride 95 (L) mmol/L      CO2 20.5 (L) mmol/L      Calcium 10.1 mg/dL      eGFR  African Amer 25 (L) mL/min/1.73      BUN/Creatinine Ratio 13.6      Anion Gap 17.5 mmol/L     Narrative:       GFR Normal >60  Chronic Kidney Disease <60  Kidney Failure <15    [place] Draw [[id number]] Collected:  01/03/17 2251    Specimen:  Blood Updated:  01/04/17 1051    Narrative:       The following orders were created for panel order [place] Draw.  Procedure                               Abnormality         Status                     ---------                               -----------         ------                     Light Blue Top[[id number]]                                    Final result               Green Top (Gel)[[id number]]                                   Final result               Lavender Top[[id number]]                                      Final result               Gold Top - SST[[id number]]                                                                 Please view results for these tests on the individual orders.    POC Glucose Fingerstick [[id number]]  (Abnormal) Collected:  01/04/17 1113    Specimen:  Blood Updated:  01/04/17 1115     Glucose 271 (H) mg/dL      Narrative:       Meter: [id number] : [id number] Ashok Velez    C-Peptide [[id number]] Collected:  01/04/17 1122    Specimen:  Blood Updated:  01/04/17 1134    Glutamic Acid Decarboxylase [[id number]] Collected:  01/04/17 1122    Specimen:  Blood Updated:  01/04/17 1134    Anti-islet Cell Antibody [[id number]] Collected:  01/04/17 1122    Specimen:  Blood Updated:  01/04/17 1134    Hemoglobin A1c [[id number]]  (Abnormal) Collected:  01/04/17 1122    Specimen:  Blood Updated:  01/04/17 1152     Hemoglobin A1C 10.50 (H) %     Narrative:       Hemoglobin A1C Ranges:    Increased Risk for Diabetes  5.7% to 6.4%  Diabetes                     >= 6.5%  Diabetic Goal                < 7.0%    Sodium, Urine, Random [[id number]] Collected:  01/04/17 1233    Specimen:  Urine from Urine, Clean Catch Updated:  01/04/17 1417     Sodium, Urine 97 mmol/L     Narrative:       Reference intervals for random urine have not been established.  Clinical usage is dependent upon physician's interpretation in combination with other laboratory tests.     Eosinophil Smear [[id number]]  (Normal) Collected:  01/04/17 1233    Specimen:  Urine from Urine, Clean Catch Updated:  01/04/17 1503     Eosinophil Smear 0 % EOS/100 Cells     Creatinine, Urine, Random [[id number]] Collected:  01/04/17 1233    Specimen:  Urine from Urine, Clean Catch Updated:  01/04/17 1551     Creatinine, Urine 64.8 mg/dL     Narrative:       Reference intervals for random urine have not been established.  Clinical usage is dependent upon physician's interpretation in combination with other laboratory tests.     POC Glucose Fingerstick [[id number]]  (Abnormal) Collected:  01/04/17 1636    Specimen:  Blood Updated:  01/04/17 1648     Glucose 302 (H) mg/dL     Narrative:       Meter: [id number] : [id number] Hilton Shaikh    POC Glucose Fingerstick [[id number]]  (Abnormal) Collected:  01/04/17 2035    Specimen:  Blood Updated:  01/04/17 2047     Glucose 273 (H) mg/dL      Narrative:       Meter: [id number] : [id number] Amy eZlaya CNA    CBC & Differential [[id number]] Collected:  01/05/17 0327    Specimen:  Blood Updated:  01/05/17 0417    Narrative:       The following orders were created for panel order CBC & Differential.  Procedure                               Abnormality         Status                     ---------                               -----------         ------                     CBC Auto Differential[[id number]]         Abnormal            Final result                 Please view results for these tests on the individual orders.    CBC Auto Differential [[id number]]  (Abnormal) Collected:  01/05/17 0327    Specimen:  Blood Updated:  01/05/17 0417     WBC 7.51 10*3/mm3      RBC 4.12 (L) 10*6/mm3      Hemoglobin 12.6 (L) g/dL      Hematocrit 37.2 (L) %      MCV 90.3 fL      MCH 30.6 pg      MCHC 33.9 g/dL      RDW 11.0 (L) %      RDW-SD 36.0 (L) fl      MPV 13.1 (H) fL      Platelets 180 10*3/mm3      Neutrophil % 54.7 %      Lymphocyte % 36.0 %      Monocyte % 5.5 %      Eosinophil % 3.1 %      Basophil % 0.4 %      Immature Grans % 0.3 %      Neutrophils, Absolute 4.12 10*3/mm3      Lymphocytes, Absolute 2.70 10*3/mm3      Monocytes, Absolute 0.41 10*3/mm3      Eosinophils, Absolute 0.23 10*3/mm3      Basophils, Absolute 0.03 10*3/mm3      Immature Grans, Absolute 0.02 10*3/mm3     LDL Cholesterol, Direct [[id number]] Collected:  01/05/17 0327    Specimen:  Blood Updated:  01/05/17 0441    Basic Metabolic Panel [[id number]]  (Abnormal) Collected:  01/05/17 0327    Specimen:  Blood Updated:  01/05/17 0441     Glucose 252 (H) mg/dL      BUN 36 (H) mg/dL      Creatinine 2.18 (H) mg/dL      Sodium 129 (L) mmol/L      Potassium 4.1 mmol/L      Chloride 94 (L) mmol/L      CO2 20.0 (L) mmol/L      Calcium 9.8 mg/dL      eGFR   Amer 41 (L) mL/min/1.73      BUN/Creatinine Ratio 16.5      Anion Gap 15.0 mmol/L     Narrative:       GFR Normal >60  Chronic Kidney Disease  <60  Kidney Failure <15    Lipid Panel [[id number]]  (Abnormal) Collected:  01/05/17 0327    Specimen:  Blood Updated:  01/05/17 0447     Total Cholesterol 204 (H) mg/dL      Triglycerides 603 (H) mg/dL      HDL Cholesterol 31 (L) mg/dL      LDL Cholesterol  -- mg/dL       Unable to calculate        VLDL Cholesterol -- mg/dL       Unable to calculate        LDL/HDL Ratio --       Unable to calculate       Narrative:       Cholesterol Reference Ranges  (U.S. Department of Health and Human Services ATP III Classifications)    Desirable          <200 mg/dL  Borderline High    200-239 mg/dL  High Risk          >240 mg/dL      Triglyceride Reference Ranges  (U.S. Department of Health and Human Services ATP III Classifications)    Normal           <150 mg/dL  Borderline High  150-199 mg/dL  High             200-499 mg/dL  Very High        >500 mg/dL    HDL Reference Ranges  (U.S. Department of Health and Human Services ATP III Classifcations)    Low     <40 mg/dl (major risk factor for CHD)  High    >60 mg/dl ('negative' risk factor for CHD)        LDL Reference Ranges  (U.S. Department of Health and Human Services ATP III Classifcations)    Optimal          <100 mg/dL  Near Optimal     100-129 mg/dL  Borderline High  130-159 mg/dL  High             160-189 mg/dL  Very High        >189 mg/dL    POC Glucose Fingerstick [[id number]]  (Abnormal) Collected:  01/05/17 0649    Specimen:  Blood Updated:  01/05/17 0658     Glucose 216 (H) mg/dL     Narrative:       Meter: [id number] : [id number] [facility]        Imaging Results (last 72 hours)     ** No results found for the last 72 hours. **          Medication Review: Done      Current Facility-Administered Medications:   •  dextrose (D50W) solution 25 g, 25 g, Intravenous, PRN, Darion Pink MD  •  dextrose (GLUTOSE) oral gel 15 g, 15 g, Oral, PRN, Darion Pink MD  •  fenofibrate (TRICOR) tablet 48 mg, 48 mg, Oral, Daily, Zeina Brady MD  •   glucagon (human recombinant) (GLUCAGEN DIAGNOSTIC) injection 1 mg, 1 mg, Subcutaneous, Once PRN, Darion Pink MD  •  heparin (porcine) 5000 UNIT/ML injection 5,000 Units, 5,000 Units, Subcutaneous, Q12H, Darion Pink MD, 5,000 Units at 01/05/17 1052  •  insulin aspart (novoLOG) injection 0-9 Units, 0-9 Units, Subcutaneous, 4x Daily AC & at Bedtime, Darion Pink MD, 4 Units at 01/05/17 0820  •  insulin detemir (LEVEMIR) injection 20 Units, 20 Units, Subcutaneous, Nightly, Zeina Brady MD, 20 Units at 01/04/17 2219  •  insulin detemir (LEVEMIR) injection 20 Units, 20 Units, Subcutaneous, QAM, Zeina Brady MD  •  linagliptin (TRADJENTA) tablet 5 mg, 5 mg, Oral, Daily, Zeina Brady MD, 5 mg at 01/05/17 0820  •  ondansetron (ZOFRAN) injection 4 mg, 4 mg, Intravenous, Q6H PRN, Pablo Lema MD  •  ondansetron (ZOFRAN) tablet 4 mg, 4 mg, Oral, Q6H PRN **OR** ondansetron ODT (ZOFRAN-ODT) disintegrating tablet 4 mg, 4 mg, Oral, Q6H PRN **OR** ondansetron (ZOFRAN) injection 4 mg, 4 mg, Intravenous, Q6H PRN, Darion Pink MD  •  sodium chloride 0.9 % flush 1-10 mL, 1-10 mL, Intravenous, PRN, Darion Pink MD  •  sodium chloride 0.9 % flush 10 mL, 10 mL, Intravenous, PRN, Khai Blackwell MD  •  Insert peripheral IV, , , Once **AND** sodium chloride 0.9 % flush 10 mL, 10 mL, Intravenous, PRN, Khai Blackwell MD  •  sodium chloride 0.9 % infusion, 75 mL/hr, Intravenous, Continuous, Darion Pink MD, Last Rate: 75 mL/hr at 01/05/17 1050, 75 mL/hr at 01/05/17 1050    Assessment/Plan     Active Hospital Problems (** Indicates Principal Problem)    Diagnosis Date Noted   • **Acute renal failure [N17.9] 01/04/2017   • DM type 2 (diabetes mellitus, type 2) [E11.9] 01/04/2017   • HTN (hypertension) [I10] 01/04/2017   • HLD (hyperlipidemia) [E78.5] 01/04/2017      Resolved Hospital Problems    Diagnosis Date Noted Date Resolved   No  resolved problems to display.     Type 2 diabetes mellitus uncontrolled - Hba1c - 10.5%  Pending C-peptide, shira 65, islet cell antibodies  Will increase levemir to 20 units bid.   Will continue NovoLog sliding scale 3 times a day before meals and at bedtime  Will continue on Tradjenta 5 mg po daily.    Cannot restart metformin or Januvia due to his renal issue.     Hyperlipidemia - TGY elevated.   Will start pt on tricor 48 mg po daily, renally dosed.     Acute renal failure  Could be related to elevated blood sugars.  Hyponatremia - per primary team.   Pending TSH.     The total time spent more than 35 min for lab review and face- to- face, of which greater than 50% of time was spent in counseling the patient on treatment options, instructions for management/treatment and follow up and importance of compliance with chosen management or treatment options       Zeina Brady MD.  01/05/17  11:04 AM      EMR Dragon / transcription disclaimer:    Much of this encounter note is an electronic transcription/ translation of spoken language to printed text.  Electronic translation of spoken language may permit erroneous, or at times, nonsensical words or phrases to be inadvertently transcribed; although I have reviewed the note for such errors, some may still exist.

## 2017-01-06 VITALS
SYSTOLIC BLOOD PRESSURE: 133 MMHG | HEIGHT: 73 IN | TEMPERATURE: 98.6 F | HEART RATE: 100 BPM | BODY MASS INDEX: 32.37 KG/M2 | RESPIRATION RATE: 18 BRPM | WEIGHT: 244.2 LBS | DIASTOLIC BLOOD PRESSURE: 72 MMHG | OXYGEN SATURATION: 97 %

## 2017-01-06 PROBLEM — E11.65 TYPE 2 DIABETES MELLITUS WITH HYPERGLYCEMIA: Status: ACTIVE | Noted: 2017-01-04

## 2017-01-06 PROBLEM — E87.1 HYPONATREMIA: Status: ACTIVE | Noted: 2017-01-06

## 2017-01-06 PROBLEM — E87.1 HYPONATREMIA: Status: RESOLVED | Noted: 2017-01-06 | Resolved: 2017-01-06

## 2017-01-06 LAB
ANION GAP SERPL CALCULATED.3IONS-SCNC: 17.6 MMOL/L
BASOPHILS # BLD AUTO: 0.03 10*3/MM3 (ref 0–0.2)
BASOPHILS NFR BLD AUTO: 0.3 % (ref 0–1.5)
BUN BLD-MCNC: 25 MG/DL (ref 6–20)
BUN/CREAT SERPL: 13.4 (ref 7–25)
CALCIUM SPEC-SCNC: 10 MG/DL (ref 8.6–10.5)
CHLORIDE SERPL-SCNC: 99 MMOL/L (ref 98–107)
CO2 SERPL-SCNC: 20.4 MMOL/L (ref 22–29)
CREAT BLD-MCNC: 1.87 MG/DL (ref 0.76–1.27)
DEPRECATED RDW RBC AUTO: 35.8 FL (ref 37–54)
EOSINOPHIL # BLD AUTO: 0.15 10*3/MM3 (ref 0–0.7)
EOSINOPHIL NFR BLD AUTO: 1.6 % (ref 0.3–6.2)
ERYTHROCYTE [DISTWIDTH] IN BLOOD BY AUTOMATED COUNT: 10.9 % (ref 11.5–14.5)
GAD65 AB SER-ACNC: <5 U/ML (ref 0–5)
GFR SERPL CREATININE-BSD FRML MDRD: 49 ML/MIN/1.73
GLUCOSE BLD-MCNC: 241 MG/DL (ref 65–99)
GLUCOSE BLDC GLUCOMTR-MCNC: 199 MG/DL (ref 70–130)
GLUCOSE BLDC GLUCOMTR-MCNC: 230 MG/DL (ref 70–130)
HCT VFR BLD AUTO: 37.5 % (ref 40.4–52.2)
HGB BLD-MCNC: 12.5 G/DL (ref 13.7–17.6)
IMM GRANULOCYTES # BLD: 0.02 10*3/MM3 (ref 0–0.03)
IMM GRANULOCYTES NFR BLD: 0.2 % (ref 0–0.5)
LYMPHOCYTES # BLD AUTO: 2.36 10*3/MM3 (ref 0.9–4.8)
LYMPHOCYTES NFR BLD AUTO: 25.6 % (ref 19.6–45.3)
MCH RBC QN AUTO: 30.1 PG (ref 27–32.7)
MCHC RBC AUTO-ENTMCNC: 33.3 G/DL (ref 32.6–36.4)
MCV RBC AUTO: 90.4 FL (ref 79.8–96.2)
MONOCYTES # BLD AUTO: 0.41 10*3/MM3 (ref 0.2–1.2)
MONOCYTES NFR BLD AUTO: 4.4 % (ref 5–12)
NEUTROPHILS # BLD AUTO: 6.26 10*3/MM3 (ref 1.9–8.1)
NEUTROPHILS NFR BLD AUTO: 67.9 % (ref 42.7–76)
OSMOLALITY SERPL: 295 MOSM/KG (ref 285–295)
PANC ISLET CELL AB TITR SER: NEGATIVE {TITER}
PLATELET # BLD AUTO: 199 10*3/MM3 (ref 140–500)
PMV BLD AUTO: 13.2 FL (ref 6–12)
POTASSIUM BLD-SCNC: 3.5 MMOL/L (ref 3.5–5.2)
RBC # BLD AUTO: 4.15 10*6/MM3 (ref 4.6–6)
SODIUM BLD-SCNC: 137 MMOL/L (ref 136–145)
TSH SERPL DL<=0.05 MIU/L-ACNC: 1.51 MIU/ML (ref 0.27–4.2)
WBC NRBC COR # BLD: 9.23 10*3/MM3 (ref 4.5–10.7)

## 2017-01-06 PROCEDURE — 25010000002 HEPARIN (PORCINE) PER 1000 UNITS: Performed by: INTERNAL MEDICINE

## 2017-01-06 PROCEDURE — 82962 GLUCOSE BLOOD TEST: CPT

## 2017-01-06 PROCEDURE — 80048 BASIC METABOLIC PNL TOTAL CA: CPT | Performed by: INTERNAL MEDICINE

## 2017-01-06 PROCEDURE — 84443 ASSAY THYROID STIM HORMONE: CPT | Performed by: INTERNAL MEDICINE

## 2017-01-06 PROCEDURE — 63710000001 INSULIN ASPART PER 5 UNITS: Performed by: INTERNAL MEDICINE

## 2017-01-06 PROCEDURE — 83930 ASSAY OF BLOOD OSMOLALITY: CPT | Performed by: INTERNAL MEDICINE

## 2017-01-06 PROCEDURE — 85025 COMPLETE CBC W/AUTO DIFF WBC: CPT | Performed by: INTERNAL MEDICINE

## 2017-01-06 PROCEDURE — 99233 SBSQ HOSP IP/OBS HIGH 50: CPT | Performed by: INTERNAL MEDICINE

## 2017-01-06 RX ORDER — FENOFIBRATE 48 MG/1
48 TABLET, COATED ORAL DAILY
Qty: 30 TABLET | Refills: 0 | Status: SHIPPED | OUTPATIENT
Start: 2017-01-06

## 2017-01-06 RX ADMIN — HEPARIN SODIUM 5000 UNITS: 5000 INJECTION, SOLUTION INTRAVENOUS; SUBCUTANEOUS at 11:46

## 2017-01-06 RX ADMIN — INSULIN ASPART 4 UNITS: 100 INJECTION, SOLUTION INTRAVENOUS; SUBCUTANEOUS at 11:46

## 2017-01-06 RX ADMIN — FENOFIBRATE 48 MG: 48 TABLET ORAL at 09:06

## 2017-01-06 RX ADMIN — INSULIN ASPART 2 UNITS: 100 INJECTION, SOLUTION INTRAVENOUS; SUBCUTANEOUS at 09:02

## 2017-01-06 RX ADMIN — LINAGLIPTIN 5 MG: 5 TABLET, FILM COATED ORAL at 09:06

## 2017-01-06 NOTE — PLAN OF CARE
Problem: Patient Care Overview (Adult)  Goal: Plan of Care Review  Outcome: Ongoing (interventions implemented as appropriate)    01/06/17 1055   Coping/Psychosocial Response Interventions   Plan Of Care Reviewed With patient   Patient Care Overview   Progress improving   Outcome Evaluation   Outcome Summary/Follow up Plan Diabetes education completed       Goal: Discharge Needs Assessment  Outcome: Ongoing (interventions implemented as appropriate)

## 2017-01-06 NOTE — PAYOR COMM NOTE
"Wu Pulliam (41 y.o. Male)     PLEASE SEE ATTACHED DISCHARGE SUMMARY.     REF#[id number]    THANK YOU    FLETCHER RUSSELL LPN, CCP        Date of Birth Social Security Number Address Home Phone MRN    1975  [id number]  [facility] [address] [phone] [id number]    Oriental orthodox Marital Status          None        Admission Date Admission Type Admitting Provider Attending Provider Department, Room/Bed    1/4/17 Emergency Darion Pink MD  [address], 433/1    Discharge Date Discharge Disposition Discharge Destination        1/6/2017 Home or Self Care             Attending Provider: (none)    Allergies:  Amlodipine, Citalopram Hydrobromide, Meloxicam, Metformin And Related, Quetiapine    Isolation:  None   Infection:  None   Code Status:  FULL    Ht:  73\" (185.4 cm)   Wt:  244 lb 3.2 oz (111 kg)    Admission Cmt:  None   Principal Problem:  Acute renal failure [N17.9]                 Active Insurance as of 1/4/2017     Primary Coverage     Payor Plan Insurance Group Employer/Plan Group    [id number] PPO [id number]     Payor Plan Address Payor Plan Phone Number Effective From Effective To    [phone] 6/6/2016     [place] [address]       Subscriber Name Subscriber Birth Date Member ID       WU PULLIAM 1975 [id number]                 Emergency Contacts      (Rel.) Home Phone Work Phone Mobile Phone    Minerva Pulliam (Spouse) [phone] -- [phone]                 DATE OF ADMISSION: 01/04/2017  DATE OF DISCHARGE: 01/06/2017     DISCHARGE DIAGNOSIS: Acute kidney injury.     SECONDARY DIAGNOSES:  1. Diabetes mellitus type 2, uncontrolled.  2. Hyponatremia.    3. Hyperlipidemia.    4. History of hypertension.  5. Excess alcohol intake.       CONSULTANT: Zeina Brady MD, Endocrinology       DIAGNOSTIC DATA: Ultrasound of the kidneys, 01/04/2017, showed no " hydronephrosis, possible right renal calculus; otherwise negative. Labs today: White count 9.2, hemoglobin 12.5, platelets 199,000. Sodium 137, potassium 3.5, chloride 99, CO2 of 20, BUN 25, creatinine 1.9, glucose 241. A1c 10.5. Last Accu-Cheks 199 to 228, and 218. TSH 1.5. Total cholesterol 204, triglycerides 603, HDL 31, LDL direct high at 112. C-peptide high at 5.7. Acetone negative.       HOSPITAL COURSE: The patient was admitted to our service for nausea with abdominal pain and polyuria. He was found to have a blood sugar of over 400. He started on IV fluids. He had a creatinine of 4.4 at admission. Renal ultrasound was done with results as noted above. His creatinine has continued to improve. Lisinopril, hydrochlorothiazide and metformin were all stopped. He was seen by Dr. Brady of Endocrinology. She recommended sliding scale and initiation of Levemir.       The patient feels good today and is hopeful he will be able to go home. Creatinine again is improved from yesterday. I will discontinue IV fluids. We will see what his sugars do through the morning. If okay with Dr. Brady, he can be discharged later today with close outpatient followup for the renal function. I also discussed alcohol usage with the patient. He told me that he was drinking too much and plans to correct that. His blood pressures have been fine off the diuretic and ACE inhibitor. They will likely improve more with cessation of alcohol.       CONDITION: Stable.       PROGNOSIS: Hopefully good prognosis.       DISCHARGE MEDICATIONS:  1. TriCor 48 mg daily.    2. Levemir 25 units subcutaneous b.i.d.    3. Tradjenta 5 mg daily.       DISCONTINUED MEDICATIONS:    1. Ambien.  2. Lipitor.  3. Suboxone.  4. Amaryl.  5. Glucotrol.  6. HydroDIURIL.  7. Regular insulin.  8. Lisinopril.       ACTIVITY: As tolerated. He may return to work on 01/09/2017.       DIET: No concentrated sweets.     FOLLOWUP: With Ashley Green in 4 days for acute  renal injury. He will need a BMP at that time. Followup with Dr. Brady in 3 weeks or as she recommends.       I have discussed above with the patient. Discussed with his nurse. Medical record reviewed. Total time including preparation for discharge was 35 minutes.                Shruthi Cazares M.D.  JH:dillon  D: 01/06/2017 09:49:02  T: 01/06/2017 10:37:18  Job ID: [id number]  Document ID: [id number]  cc:

## 2017-01-06 NOTE — PLAN OF CARE
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

## 2017-01-06 NOTE — DISCHARGE SUMMARY
DATE OF ADMISSION: 01/04/2017  DATE OF DISCHARGE: 01/06/2017    DISCHARGE DIAGNOSIS: Acute kidney injury.    SECONDARY DIAGNOSES:  1. Diabetes mellitus type 2, uncontrolled.  2. Hyponatremia.   3. Hyperlipidemia.   4. History of hypertension.  5. Excess alcohol intake.     CONSULTANT: Zeina Brady MD, Endocrinology     DIAGNOSTIC DATA: Ultrasound of the kidneys, 01/04/2017, showed no hydronephrosis, possible right renal calculus; otherwise negative. Labs today: White count 9.2, hemoglobin 12.5, platelets 199,000. Sodium 137, potassium 3.5, chloride 99, CO2 of 20, BUN 25, creatinine 1.9, glucose 241. A1c 10.5. Last Accu-Cheks 199 to 228, and 218. TSH 1.5. Total cholesterol 204, triglycerides 603, HDL 31, LDL direct high at 112. C-peptide high at 5.7. Acetone negative.     HOSPITAL COURSE: The patient was admitted to our service for nausea with abdominal pain and polyuria. He was found to have a blood sugar of over 400. He started on IV fluids. He had a creatinine of 4.4 at admission. Renal ultrasound was done with results as noted above. His creatinine has continued to improve. Lisinopril, hydrochlorothiazide and metformin were all stopped. He was seen by Dr. Brady of Endocrinology. She recommended sliding scale and initiation of Levemir.     The patient feels good today and is hopeful he will be able to go home. Creatinine again is improved from yesterday. I will discontinue IV fluids. We will see what his sugars do through the morning. If okay with Dr. Brady, he can be discharged later today with close outpatient followup for the renal function. I also discussed alcohol usage with the patient. He told me that he was drinking too much and plans to correct that. His blood pressures have been fine off the diuretic and ACE inhibitor. They will likely improve more with cessation of alcohol.     CONDITION: Stable.     PROGNOSIS: Hopefully good prognosis.     DISCHARGE MEDICATIONS:  1. TriCor 48 mg daily.   2.  Levemir 25 units subcutaneous b.i.d.   3. Tradjenta 5 mg daily.     DISCONTINUED MEDICATIONS:   1. Ambien.  2. Lipitor.  3. Suboxone.  4. Amaryl.  5. Glucotrol.  6. HydroDIURIL.  7. Regular insulin.  8. Lisinopril.     ACTIVITY: As tolerated. He may return to work on 01/09/2017.     DIET: No concentrated sweets.    FOLLOWUP: With Ashley Green in 4 days for acute renal injury. He will need a BMP at that time. Followup with Dr. Brady in 3 weeks or as she recommends.     I have discussed above with the patient. Discussed with his nurse. Medical record reviewed. Total time including preparation for discharge was 35 minutes.           Shruthi Cazares M.D.  JH:dillon  D:   01/06/2017 09:49:02  T:   01/06/2017 10:37:18  Job ID:   [id number]  Document ID:   [id number]  cc:

## 2017-01-06 NOTE — PLAN OF CARE
Problem: Patient Care Overview (Adult)  Goal: Plan of Care Review  Outcome: Ongoing (interventions implemented as appropriate)    01/06/17 0138   Coping/Psychosocial Response Interventions   Plan Of Care Reviewed With patient   Patient Care Overview   Progress improving   Outcome Evaluation   Outcome Summary/Follow up Plan CONTINUE TO MONITOR VS, LABS, GLUCOSE; TEACHING R/T DIABETES       Goal: Adult Individualization and Mutuality  Outcome: Ongoing (interventions implemented as appropriate)    Problem: Fluid Volume Deficit (Adult)  Goal: Fluid/Electrolyte Balance  Outcome: Ongoing (interventions implemented as appropriate)    01/06/17 0138   Fluid Volume Deficit (Adult)   Fluid/Electrolyte Balance making progress toward outcome       Goal: Comfort/Well Being  Outcome: Ongoing (interventions implemented as appropriate)    01/06/17 0138   Fluid Volume Deficit (Adult)   Comfort/Well Being making progress toward outcome

## 2017-01-06 NOTE — PLAN OF CARE
Problem: Patient Care Overview (Adult)  Goal: Adult Individualization and Mutuality  Outcome: Outcome(s) achieved Date Met:  01/06/17  Pt discharged home after instructions-medications clarified with Dr. Brady-discussed appointments that need to be kept and new meds-indications and side effects-pt verbalized understanding of plan of care and left via escorted by RN    Problem: Fluid Volume Deficit (Adult)  Goal: Comfort/Well Being  Outcome: Outcome(s) achieved Date Met:  01/06/17  vss-pt denies shortness of breath and is discharged home

## 2017-01-06 NOTE — CONSULTS
"Diabetes Education  Assessment/Teaching    Patient Name:  Wu Damon  YOB: 1975  MRN: [id number]  Admit Date:  1/4/2017      Assessment Date:  1/6/2017       Most Recent Value    General Information      Referral From:  Other (comment) [Rn consult]    Height  6' 1\" (1.854 m)    Height Method  Stated    Weight  244 lb 3.2 oz (111 kg)    Pregnancy Assessment     Diabetes History     What type of diabetes do you have?  Type 2    Length of Diabetes Diagnosis  10 + years [At 28 year old]    Current DM knowledge  fair    Have you had diabetes education/teaching in the past?  no    Do you test your blood sugar at home?  yes    Education Preferences     Nutrition Information     Assessment Topics     DM Goals     Reducing Risk - Goal  30-90 days from discharge    Contact Plan  Follow-up medical care               Most Recent Value    DM Education Needs     Meter  Has own    Blood Glucose Target Range   premeal    Medication  Insulin, Oral, Actions, Side effects [Tradjenta and Levemir.  Pt familiar with pen use.  Co pay card provided for both]    Problem Solving  Hyperglycemia    Reducing Risks  A1C testing [A1c 10.5%]    Healthy Coping  Appropriate    Discharge Plan  Home, Follow-up with MD [Encouraged follow up with endocrinologist and attending outpatient DSME.  Information provided]    Motivation  Moderate    Teaching Method  Explanation, Discussion    Patient Response  Verbalized understanding            Other Comments:          Electronically signed by:  Queta Mcgee RN  01/06/17 10:59 AM  "

## 2017-01-06 NOTE — PROGRESS NOTES
" LOS: 2 days   Primary Care Physician: ALEXIA Vega     Subjective  Feels fine.  Has been up and about.  Anxious to get home.    Vital Signs  Body mass index is 32.22 kg/(m^2).  Temp:  [98.1 °F (36.7 °C)-98.6 °F (37 °C)] 98.6 °F (37 °C)  Resp:  [18] 18  BP: (117-137)/(71-93) 133/72      Objective:  General Appearance:  In no acute distress.    Vital signs: (most recent): Blood pressure 133/72, pulse 100, temperature 98.6 °F (37 °C), temperature source Oral, resp. rate 18, height 73\" (185.4 cm), weight 244 lb 3.2 oz (111 kg), SpO2 97 %.    Lungs:  Normal respiratory rate and normal effort.  Breath sounds clear to auscultation.    Heart: Normal rate.  Regular rhythm.  No murmur.   Abdomen: Abdomen is soft and non-distended.  There is no abdominal tenderness.   There is no mass.   Extremities: There is no dependent edema.    Neurological: Patient is alert.          Results Review:    I reviewed the patient's new clinical results.      Results from last 7 days  Lab Units 01/06/17  0326 01/05/17  0327   WBC 10*3/mm3 9.23 7.51   HEMOGLOBIN g/dL 12.5* 12.6*   PLATELETS 10*3/mm3 199 180       Results from last 7 days  Lab Units 01/06/17  0326 01/05/17  0327   SODIUM mmol/L 137 129*   POTASSIUM mmol/L 3.5 4.1   CHLORIDE mmol/L 99 94*   TOTAL CO2 mmol/L 20.4* 20.0*   BUN mg/dL 25* 36*   CREATININE mg/dL 1.87* 2.18*   CALCIUM mg/dL 10.0 9.8   GLUCOSE mg/dL 241* 252*         Hemoglobin A1C:  Lab Results   Component Value Date    HGBA1C 10.50 (H) 01/04/2017       Glucose Range:  GLUCOSE   Date/Time Value Ref Range Status   01/06/2017 0555 199 (H) 70 - 130 mg/dL Final   01/05/2017 2035 228 (H) 70 - 130 mg/dL Final   01/05/2017 1630 218 (H) 70 - 130 mg/dL Final   01/05/2017 1109 310 (H) 70 - 130 mg/dL Final   01/05/2017 0649 216 (H) 70 - 130 mg/dL Final   01/04/2017 2035 273 (H) 70 - 130 mg/dL Final       Medication Review: Yes    Physical Therapy:    Assessment/Plan     Active Hospital Problems (** Indicates " Principal Problem)    Diagnosis Date Noted   • **Acute renal failure [N17.9] 01/04/2017   • Type 2 diabetes mellitus with hyperglycemia [E11.65] 01/04/2017   • HTN (hypertension) [I10] 01/04/2017   • HLD (hyperlipidemia) [E78.5] 01/04/2017      Resolved Hospital Problems    Diagnosis Date Noted Date Resolved   • Hyponatremia [E87.1] 01/06/2017 01/06/2017       Assessment & Plan  1.  Acute renal failure.  Improved.  Will DC IV fluids.  Renal ultrasound negative.  Off ACE inhibitor, diuretic and metformin.  Recheck with PCP in 4-5 days.  2.  Diabetes mellitus type 2, uncontrolled.  Numbers improving.  We'll follow this morning and if okay with endocrine, discharge home later today.  Continue insulin and tradjenta.  3.  Hyponatremia.  Resolved.  TSH normal  4.  Excessive alcohol intake.  Discussed with patient.  He had already decided to decrease intake.  5.  Hyperlipidemia.  TriCor started per endocrine  6.  Blood pressures- okay off diuretic and ACE inhibitor.  Outpatient follow-up.  History of hypertension    Disposition: Home later today  [id number]      Shruthi Cazares MD  01/06/17  9:33 AM

## 2017-01-06 NOTE — PROGRESS NOTES
"41 y.o.   LOS: 2 days   Patient Care Team:  ALEXIA Vega as PCP - General (Family Medicine)    Chief Complaint:  Type 2 diabetes mellitus    Chief Complaint   Patient presents with   • Hyperglycemia     \"my nurse at work took my blood sugar at it was 400 something. I have been taking over the counter insulin from [employer] and other diabetes medicines and they arent working.\" Patient states he does not know how much insulin to take and he has been guessing.       Subjective  no major overnight events.  BG still in 200s.  Patient eating well.  No other complaints this morning. Noted to have positive C - peptide.     Interval History:    Review of Systems:   Review of Systems   Constitutional: Positive for appetite change and fatigue. Negative for chills and diaphoresis.   HENT: Negative for hearing loss, nosebleeds, postnasal drip, trouble swallowing and voice change.    Eyes: Negative for photophobia, discharge and visual disturbance.   Respiratory: Negative for cough, shortness of breath and wheezing.    Cardiovascular: Negative for chest pain, palpitations and leg swelling.   Gastrointestinal: Negative for abdominal distention, abdominal pain, constipation, diarrhea, nausea and vomiting.   Endocrine: Negative for cold intolerance, heat intolerance, polydipsia and polyuria.   Genitourinary: Negative for dysuria, frequency and hematuria.   Musculoskeletal: Positive for back pain. Negative for arthralgias and myalgias.   Skin: Negative for pallor, rash and wound.   Neurological: Positive for weakness and numbness. Negative for dizziness, tremors, seizures, syncope and headaches.   Hematological: Negative for adenopathy.   Psychiatric/Behavioral: Negative for agitation, confusion and sleep disturbance. The patient is not nervous/anxious.      Objective     Vital Signs   Temp:  [98.1 °F (36.7 °C)-98.6 °F (37 °C)] 98.6 °F (37 °C)  Resp:  [18] 18  BP: (117-137)/(71-93) 133/72    Physical Exam:  Physical " Exam   Constitutional: He is oriented to person, place, and time. He appears well-developed and well-nourished.   HENT:   Head: Normocephalic and atraumatic.   Mouth/Throat: Oropharynx is clear and moist.   Eyes: Conjunctivae and EOM are normal. Pupils are equal, round, and reactive to light.   Neck: Normal range of motion. Neck supple. Carotid bruit is not present. No tracheal deviation present. No thyromegaly present.   Acanthosis nigricans   Cardiovascular: Normal rate, regular rhythm and normal heart sounds.    HR - 74/min   Pulmonary/Chest: Effort normal and breath sounds normal. No stridor. He has no wheezes.   Abdominal: Soft. Bowel sounds are normal. He exhibits no distension. There is no tenderness. No hernia.   Central obesity   Musculoskeletal: Normal range of motion. He exhibits no edema.   Lymphadenopathy:     He has no cervical adenopathy.   Neurological: He is alert and oriented to person, place, and time. He has normal reflexes.   Intact pin prick and proprioception, calluses noted   Skin: Skin is warm and dry.   Psychiatric: He has a normal mood and affect. His behavior is normal. Judgment normal.   Vitals reviewed.  Results Review:     I reviewed the patient's new clinical results.      GLUCOSE   Date/Time Value Ref Range Status   01/06/2017 0326 241 (H) 65 - 99 mg/dL Final   01/05/2017 0327 252 (H) 65 - 99 mg/dL Final   01/04/2017 0958 339 (H) 65 - 99 mg/dL Final   01/03/2017 2251 403 (C) 65 - 99 mg/dL Final     Lab Results (last 72 hours)     Procedure Component Value Units Date/Time    CBC & Differential [[id number]] Collected:  01/03/17 2251    Specimen:  Blood Updated:  01/03/17 2302    Narrative:       The following orders were created for panel order CBC & Differential.  Procedure                               Abnormality         Status                     ---------                               -----------         ------                     CBC Auto Differential[[id number]]         Abnormal             Final result                 Please view results for these tests on the individual orders.    CBC Auto Differential [[id number]]  (Abnormal) Collected:  01/03/17 2251    Specimen:  Blood Updated:  01/03/17 2302     WBC 11.34 (H) 10*3/mm3      RBC 4.53 (L) 10*6/mm3      Hemoglobin 13.9 g/dL      Hematocrit 42.2 %      MCV 93.2 fL      MCH 30.7 pg      MCHC 32.9 g/dL      RDW 11.1 (L) %      RDW-SD 37.5 fl      MPV 13.1 (H) fL      Platelets 199 10*3/mm3      Neutrophil % 66.9 %      Lymphocyte % 27.4 %      Monocyte % 3.9 (L) %      Eosinophil % 1.4 %      Basophil % 0.4 %      Immature Grans % 0.0 %      Neutrophils, Absolute 7.59 10*3/mm3      Lymphocytes, Absolute 3.11 10*3/mm3      Monocytes, Absolute 0.44 10*3/mm3      Eosinophils, Absolute 0.16 10*3/mm3      Basophils, Absolute 0.04 10*3/mm3      Immature Grans, Absolute 0.00 10*3/mm3     Ketone Bodies, Serum [[id number]] Collected:  01/03/17 2251    Specimen:  Blood Updated:  01/03/17 2328    Narrative:       The following orders were created for panel order Ketone Bodies, Serum.  Procedure                               Abnormality         Status                     ---------                               -----------         ------                     Acetone[[id number]]                       Normal              Final result                 Please view results for these tests on the individual orders.    Acetone [[id number]]  (Normal) Collected:  01/03/17 2251    Specimen:  Blood Updated:  01/03/17 2328     Acetone Negative     Comprehensive Metabolic Panel [[id number]]  (Abnormal) Collected:  01/03/17 2251    Specimen:  Blood Updated:  01/03/17 2338     Glucose 403 (C) mg/dL      BUN 61 (H) mg/dL      Creatinine 4.35 (H) mg/dL      Sodium 132 (L) mmol/L      Potassium 4.6 mmol/L      Chloride 92 (L) mmol/L      CO2 21.3 (L) mmol/L      Calcium 10.5 mg/dL      Total Protein 8.4 g/dL      Albumin 4.70 g/dL      ALT (SGPT) 34 U/L      AST (SGOT) 20 U/L       Alkaline Phosphatase 76 U/L      Total Bilirubin 0.3 mg/dL      eGFR  African Amer 18 (L) mL/min/1.73      Globulin 3.7 gm/dL      A/G Ratio 1.3 g/dL      BUN/Creatinine Ratio 14.0      Anion Gap 18.7 mmol/L     Urinalysis With / Culture If Indicated [[id number]]  (Abnormal) Collected:  01/03/17 2359    Specimen:  Urine from Urine, Clean Catch Updated:  01/04/17 0015     Color, UA Yellow      Appearance, UA Clear      pH, UA <=5.0      Specific Gravity, UA 1.028      Glucose, UA >=1000 mg/dL (3+) (A)      Ketones, UA Negative      Bilirubin, UA Negative      Blood, UA Small (1+) (A)      Protein, UA Trace (A)      Leuk Esterase, UA Negative      Nitrite, UA Negative      Urobilinogen, UA 0.2 E.U./dL     Urinalysis, Microscopic Only [[id number]]  (Abnormal) Collected:  01/03/17 2359    Specimen:  Urine from Urine, Clean Catch Updated:  01/04/17 0017     RBC, UA 0-2 (A) /HPF      WBC, UA 0-2 (A) /HPF      Bacteria, UA None Seen /HPF      Squamous Epithelial Cells, UA 0-2 /HPF      Hyaline Casts, UA 3-6 /LPF      Methodology Automated Microscopy     CK [[id number]]  (Normal) Collected:  01/03/17 2251    Specimen:  Blood Updated:  01/04/17 0028     Creatine Kinase 163 U/L     POC Glucose Fingerstick [[id number]]  (Abnormal) Collected:  01/04/17 0054    Specimen:  Blood Updated:  01/04/17 0055     Glucose 258 (H) mg/dL     Narrative:       Meter: [id number] : [id number] [facility]    POC Glucose Fingerstick [[id number]]  (Abnormal) Collected:  01/04/17 0210    Specimen:  Blood Updated:  01/04/17 0213     Glucose 211 (H) mg/dL     Narrative:       Meter: [id number] : [id number] [facility]    POC Glucose Fingerstick [[id number]]  (Abnormal) Collected:  01/04/17 0252    Specimen:  Blood Updated:  01/04/17 0253     Glucose 212 (H) mg/dL     Narrative:       Meter: [id number] : [id number] Ant Santoyo Blue Top [[id number]] Collected:  01/03/17 225    Specimen:  Blood Updated:  01/04/17 1234      Extra Tube hold for add-on       Auto resulted       Lavender Top [[id number]] Collected:  01/03/17 2251    Specimen:  Blood Updated:  01/04/17 0306     Extra Tube hold for add-on       Auto resulted       Green Top (Gel) [[id number]] Collected:  01/03/17 2251    Specimen:  Blood Updated:  01/04/17 0306     Extra Tube Hold for add-ons.       Auto resulted.       POC Glucose Fingerstick [[id number]]  (Abnormal) Collected:  01/04/17 0517    Specimen:  Blood Updated:  01/04/17 0519     Glucose 246 (H) mg/dL     Narrative:       Meter: [id number] : [id number] Ant Alvarez    Basic Metabolic Panel [[id number]]  (Abnormal) Collected:  01/04/17 0958    Specimen:  Blood Updated:  01/04/17 1034     Glucose 339 (H) mg/dL      BUN 46 (H) mg/dL      Creatinine 3.39 (H) mg/dL      Sodium 133 (L) mmol/L      Potassium 4.8 mmol/L      Chloride 95 (L) mmol/L      CO2 20.5 (L) mmol/L      Calcium 10.1 mg/dL      eGFR  African Amer 25 (L) mL/min/1.73      BUN/Creatinine Ratio 13.6      Anion Gap 17.5 mmol/L     Narrative:       GFR Normal >60  Chronic Kidney Disease <60  Kidney Failure <15    [place] Draw [[id number]] Collected:  01/03/17 2251    Specimen:  Blood Updated:  01/04/17 1051    Narrative:       The following orders were created for panel order [place] Draw.  Procedure                               Abnormality         Status                     ---------                               -----------         ------                     Light Blue Top[[id number]]                                    Final result               Green Top (Gel)[[id number]]                                   Final result               Lavender Top[[id number]]                                      Final result               Gold Top - SST[[id number]]                                                                 Please view results for these tests on the individual orders.    POC Glucose Fingerstick [[id number]]  (Abnormal) Collected:  01/04/17 1113    Specimen:   Blood Updated:  01/04/17 1115     Glucose 271 (H) mg/dL     Narrative:       Meter: [id number] : [id number] Ashok Rosie    C-Peptide [[id number]] Collected:  01/04/17 1122    Specimen:  Blood Updated:  01/04/17 1134    Glutamic Acid Decarboxylase [[id number]] Collected:  01/04/17 1122    Specimen:  Blood Updated:  01/04/17 1134    Anti-islet Cell Antibody [[id number]] Collected:  01/04/17 1122    Specimen:  Blood Updated:  01/04/17 1134    Hemoglobin A1c [[id number]]  (Abnormal) Collected:  01/04/17 1122    Specimen:  Blood Updated:  01/04/17 1152     Hemoglobin A1C 10.50 (H) %     Narrative:       Hemoglobin A1C Ranges:    Increased Risk for Diabetes  5.7% to 6.4%  Diabetes                     >= 6.5%  Diabetic Goal                < 7.0%    Sodium, Urine, Random [[id number]] Collected:  01/04/17 1233    Specimen:  Urine from Urine, Clean Catch Updated:  01/04/17 1417     Sodium, Urine 97 mmol/L     Narrative:       Reference intervals for random urine have not been established.  Clinical usage is dependent upon physician's interpretation in combination with other laboratory tests.     Eosinophil Smear [[id number]]  (Normal) Collected:  01/04/17 1233    Specimen:  Urine from Urine, Clean Catch Updated:  01/04/17 1503     Eosinophil Smear 0 % EOS/100 Cells     Creatinine, Urine, Random [[id number]] Collected:  01/04/17 1233    Specimen:  Urine from Urine, Clean Catch Updated:  01/04/17 1551     Creatinine, Urine 64.8 mg/dL     Narrative:       Reference intervals for random urine have not been established.  Clinical usage is dependent upon physician's interpretation in combination with other laboratory tests.     POC Glucose Fingerstick [[id number]]  (Abnormal) Collected:  01/04/17 1636    Specimen:  Blood Updated:  01/04/17 1648     Glucose 302 (H) mg/dL     Narrative:       Meter: [id number] : [id number] Hilton Shaikh    POC Glucose Fingerstick [[id number]]  (Abnormal) Collected:  01/04/17 2035    Specimen:  Blood  Updated:  01/04/17 2047     Glucose 273 (H) mg/dL     Narrative:       Meter: [id number] : [id number] Amy Zelaya CNA    CBC & Differential [[id number]] Collected:  01/05/17 0327    Specimen:  Blood Updated:  01/05/17 0417    Narrative:       The following orders were created for panel order CBC & Differential.  Procedure                               Abnormality         Status                     ---------                               -----------         ------                     CBC Auto Differential[[id number]]         Abnormal            Final result                 Please view results for these tests on the individual orders.    CBC Auto Differential [[id number]]  (Abnormal) Collected:  01/05/17 0327    Specimen:  Blood Updated:  01/05/17 0417     WBC 7.51 10*3/mm3      RBC 4.12 (L) 10*6/mm3      Hemoglobin 12.6 (L) g/dL      Hematocrit 37.2 (L) %      MCV 90.3 fL      MCH 30.6 pg      MCHC 33.9 g/dL      RDW 11.0 (L) %      RDW-SD 36.0 (L) fl      MPV 13.1 (H) fL      Platelets 180 10*3/mm3      Neutrophil % 54.7 %      Lymphocyte % 36.0 %      Monocyte % 5.5 %      Eosinophil % 3.1 %      Basophil % 0.4 %      Immature Grans % 0.3 %      Neutrophils, Absolute 4.12 10*3/mm3      Lymphocytes, Absolute 2.70 10*3/mm3      Monocytes, Absolute 0.41 10*3/mm3      Eosinophils, Absolute 0.23 10*3/mm3      Basophils, Absolute 0.03 10*3/mm3      Immature Grans, Absolute 0.02 10*3/mm3     LDL Cholesterol, Direct [[id number]] Collected:  01/05/17 0327    Specimen:  Blood Updated:  01/05/17 0441    Basic Metabolic Panel [[id number]]  (Abnormal) Collected:  01/05/17 0327    Specimen:  Blood Updated:  01/05/17 0441     Glucose 252 (H) mg/dL      BUN 36 (H) mg/dL      Creatinine 2.18 (H) mg/dL      Sodium 129 (L) mmol/L      Potassium 4.1 mmol/L      Chloride 94 (L) mmol/L      CO2 20.0 (L) mmol/L      Calcium 9.8 mg/dL      eGFR   Amer 41 (L) mL/min/1.73      BUN/Creatinine Ratio 16.5      Anion Gap 15.0 mmol/L      Narrative:       GFR Normal >60  Chronic Kidney Disease <60  Kidney Failure <15    Lipid Panel [[id number]]  (Abnormal) Collected:  01/05/17 0327    Specimen:  Blood Updated:  01/05/17 0447     Total Cholesterol 204 (H) mg/dL      Triglycerides 603 (H) mg/dL      HDL Cholesterol 31 (L) mg/dL      LDL Cholesterol  -- mg/dL       Unable to calculate        VLDL Cholesterol -- mg/dL       Unable to calculate        LDL/HDL Ratio --       Unable to calculate       Narrative:       Cholesterol Reference Ranges  (U.S. Department of Health and Human Services ATP III Classifications)    Desirable          <200 mg/dL  Borderline High    200-239 mg/dL  High Risk          >240 mg/dL      Triglyceride Reference Ranges  (U.S. Department of Health and Human Services ATP III Classifications)    Normal           <150 mg/dL  Borderline High  150-199 mg/dL  High             200-499 mg/dL  Very High        >500 mg/dL    HDL Reference Ranges  (U.S. Department of Health and Human Services ATP III Classifcations)    Low     <40 mg/dl (major risk factor for CHD)  High    >60 mg/dl ('negative' risk factor for CHD)        LDL Reference Ranges  (U.S. Department of Health and Human Services ATP III Classifcations)    Optimal          <100 mg/dL  Near Optimal     100-129 mg/dL  Borderline High  130-159 mg/dL  High             160-189 mg/dL  Very High        >189 mg/dL    POC Glucose Fingerstick [[id number]]  (Abnormal) Collected:  01/05/17 0649    Specimen:  Blood Updated:  01/05/17 0658     Glucose 216 (H) mg/dL     Narrative:       Meter: [id number] : [id number] [facility]        Imaging Results (last 72 hours)     ** No results found for the last 72 hours. **          Medication Review: Done      Current Facility-Administered Medications:   •  acetaminophen (TYLENOL) tablet 650 mg, 650 mg, Oral, Q6H PRN, Junior Fajardo MD, 650 mg at 01/05/17 2047  •  dextrose (D50W) solution 25 g, 25 g, Intravenous, PRN, Darion Starr  MD Apryl  •  dextrose (GLUTOSE) oral gel 15 g, 15 g, Oral, PRN, Darion Pink MD  •  fenofibrate (TRICOR) tablet 48 mg, 48 mg, Oral, Daily, Zeina Brady MD, 48 mg at 01/05/17 1721  •  glucagon (human recombinant) (GLUCAGEN DIAGNOSTIC) injection 1 mg, 1 mg, Subcutaneous, Once PRN, Darion Pink MD  •  heparin (porcine) 5000 UNIT/ML injection 5,000 Units, 5,000 Units, Subcutaneous, Q12H, Darion Pink MD, 5,000 Units at 01/05/17 2242  •  insulin aspart (novoLOG) injection 0-9 Units, 0-9 Units, Subcutaneous, 4x Daily AC & at Bedtime, Darion Pink MD, 4 Units at 01/05/17 2047  •  insulin detemir (LEVEMIR) injection 25 Units, 25 Units, Subcutaneous, Nightly, Zeina Brady MD  •  [START ON 1/7/2017] insulin detemir (LEVEMIR) injection 25 Units, 25 Units, Subcutaneous, QAM, Zeina Brady MD  •  linagliptin (TRADJENTA) tablet 5 mg, 5 mg, Oral, Daily, Zeina Brady MD, 5 mg at 01/05/17 0820  •  ondansetron (ZOFRAN) injection 4 mg, 4 mg, Intravenous, Q6H PRN, Pablo Lema MD  •  ondansetron (ZOFRAN) tablet 4 mg, 4 mg, Oral, Q6H PRN **OR** ondansetron ODT (ZOFRAN-ODT) disintegrating tablet 4 mg, 4 mg, Oral, Q6H PRN **OR** ondansetron (ZOFRAN) injection 4 mg, 4 mg, Intravenous, Q6H PRN, Darion Pink MD  •  sodium chloride 0.9 % flush 1-10 mL, 1-10 mL, Intravenous, PRN, Darion Pink MD  •  sodium chloride 0.9 % flush 10 mL, 10 mL, Intravenous, PRN, Khai Blackwell MD  •  Insert peripheral IV, , , Once **AND** sodium chloride 0.9 % flush 10 mL, 10 mL, Intravenous, PRN, Khai Blackwell MD  •  sodium chloride 0.9 % infusion, 75 mL/hr, Intravenous, Continuous, Darion Pink MD, Last Rate: 75 mL/hr at 01/05/17 1948, 75 mL/hr at 01/05/17 1948    Assessment/Plan     Active Hospital Problems (** Indicates Principal Problem)    Diagnosis Date Noted   • **Acute renal failure [N17.9] 01/04/2017   • DM type 2 (diabetes  mellitus, type 2) [E11.9] 01/04/2017   • HTN (hypertension) [I10] 01/04/2017   • HLD (hyperlipidemia) [E78.5] 01/04/2017      Resolved Hospital Problems    Diagnosis Date Noted Date Resolved   No resolved problems to display.     Type 2 diabetes mellitus uncontrolled - Hba1c - 10.5%  Pending C-peptide, shira 65, islet cell antibodies  Will increase levemir to 25 units bid.   Will continue NovoLog sliding scale 3 times a day before meals and at bedtime  Will continue on Tradjenta 5 mg po daily.    Cannot restart metformin or Januvia due to his renal issue.    DC instructions  Will discharge patient on levemir 50 units in the morning  Will discharge patient on Tradjenta 5 mg oral daily  Will see the patient in clinic in a period of 4-6 weeks.     Hyperlipidemia - TGY elevated.   Will continue tricor 48 mg po daily, renally dosed.     Acute renal failure  Could be related to elevated blood sugars.  Hyponatremia - per primary team.   TSH - wnl.     The total time spent more than 35 min for old record and lab review and face- to- face, of which greater than 50% of time was spent in counseling the patient on treatment options, instructions for management/treatment and follow up and importance of compliance with chosen management or treatment options         Zeina Brady MD.  01/06/17  11:04 AM      EMR Dragon / transcription disclaimer:    Much of this encounter note is an electronic transcription/ translation of spoken language to printed text.  Electronic translation of spoken language may permit erroneous, or at times, nonsensical words or phrases to be inadvertently transcribed; although I have reviewed the note for such errors, some may still exist.

## 2017-01-09 NOTE — PROGRESS NOTES
Continued Stay Note  [facility]     Patient Name: Wu Damon  MRN: [id number]  Today's Date: 1/9/2017    Admit Date: 1/4/2017          Discharge Plan       01/09/17 1532    Final Note    Final Note Home no needs              Discharge Codes       01/09/17 1532    Discharge Codes    Discharge Codes 01  Discharge to home        Expected Discharge Date and Time     Expected Discharge Date Expected Discharge Time    Jan 6, 2017             Bere Celis RN

## 2019-01-23 ENCOUNTER — HOSPITAL ENCOUNTER (EMERGENCY)
Facility: HOSPITAL | Age: 44
Discharge: HOME | End: 2019-01-23
Attending: EMERGENCY MEDICINE | Admitting: EMERGENCY MEDICINE

## 2019-01-23 VITALS
TEMPERATURE: 97.8 F | BODY MASS INDEX: 34.96 KG/M2 | SYSTOLIC BLOOD PRESSURE: 141 MMHG | OXYGEN SATURATION: 98 % | WEIGHT: 263.8 LBS | HEART RATE: 110 BPM | DIASTOLIC BLOOD PRESSURE: 84 MMHG | HEIGHT: 73 IN | RESPIRATION RATE: 18 BRPM

## 2019-01-23 DIAGNOSIS — M62.82 EXERTIONAL RHABDOMYOLYSIS: ICD-10-CM

## 2019-01-23 DIAGNOSIS — N28.9 ACUTE RENAL INSUFFICIENCY: Primary | ICD-10-CM

## 2019-01-23 LAB
ALBUMIN SERPL-MCNC: 4.6 G/DL (ref 3.5–5.2)
ALBUMIN/GLOB SERPL: 1.3 G/DL
ALP SERPL-CCNC: 52 U/L (ref 39–117)
ALT SERPL W P-5'-P-CCNC: 37 U/L (ref 1–41)
ANION GAP SERPL CALCULATED.3IONS-SCNC: 13.5 MMOL/L
AST SERPL-CCNC: 33 U/L (ref 1–40)
BACTERIA UR QL AUTO: NORMAL /HPF
BASOPHILS # BLD AUTO: 0.04 10*3/MM3 (ref 0–0.2)
BASOPHILS NFR BLD AUTO: 0.3 % (ref 0–1.5)
BILIRUB SERPL-MCNC: 0.7 MG/DL (ref 0.1–1.2)
BILIRUB UR QL STRIP: NEGATIVE
BUN BLD-MCNC: 39 MG/DL (ref 6–20)
BUN/CREAT SERPL: 14.6 (ref 7–25)
CALCIUM SPEC-SCNC: 10.3 MG/DL (ref 8.6–10.5)
CHLORIDE SERPL-SCNC: 94 MMOL/L (ref 98–107)
CK SERPL-CCNC: 836 U/L (ref 20–200)
CLARITY UR: CLEAR
CO2 SERPL-SCNC: 27.5 MMOL/L (ref 22–29)
COLOR UR: YELLOW
CREAT BLD-MCNC: 2.68 MG/DL (ref 0.76–1.27)
DEPRECATED RDW RBC AUTO: 41.2 FL (ref 37–54)
EOSINOPHIL # BLD AUTO: 0.22 10*3/MM3 (ref 0–0.7)
EOSINOPHIL NFR BLD AUTO: 1.6 % (ref 0.3–6.2)
ERYTHROCYTE [DISTWIDTH] IN BLOOD BY AUTOMATED COUNT: 11.6 % (ref 11.5–14.5)
GFR SERPL CREATININE-BSD FRML MDRD: 32 ML/MIN/1.73
GLOBULIN UR ELPH-MCNC: 3.6 GM/DL
GLUCOSE BLD-MCNC: 189 MG/DL (ref 65–99)
GLUCOSE UR STRIP-MCNC: ABNORMAL MG/DL
HCT VFR BLD AUTO: 47.1 % (ref 40.4–52.2)
HGB BLD-MCNC: 15.6 G/DL (ref 13.7–17.6)
HGB UR QL STRIP.AUTO: ABNORMAL
HYALINE CASTS UR QL AUTO: NORMAL /LPF
IMM GRANULOCYTES # BLD AUTO: 0.04 10*3/MM3 (ref 0–0.03)
IMM GRANULOCYTES NFR BLD AUTO: 0.3 % (ref 0–0.5)
KETONES UR QL STRIP: NEGATIVE
LEUKOCYTE ESTERASE UR QL STRIP.AUTO: NEGATIVE
LYMPHOCYTES # BLD AUTO: 3.21 10*3/MM3 (ref 0.9–4.8)
LYMPHOCYTES NFR BLD AUTO: 24 % (ref 19.6–45.3)
MCH RBC QN AUTO: 32 PG (ref 27–32.7)
MCHC RBC AUTO-ENTMCNC: 33.1 G/DL (ref 32.6–36.4)
MCV RBC AUTO: 96.5 FL (ref 79.8–96.2)
MONOCYTES # BLD AUTO: 1.01 10*3/MM3 (ref 0.2–1.2)
MONOCYTES NFR BLD AUTO: 7.5 % (ref 5–12)
NEUTROPHILS # BLD AUTO: 8.86 10*3/MM3 (ref 1.9–8.1)
NEUTROPHILS NFR BLD AUTO: 66.3 % (ref 42.7–76)
NITRITE UR QL STRIP: NEGATIVE
PH UR STRIP.AUTO: <=5 [PH] (ref 5–8)
PLATELET # BLD AUTO: 212 10*3/MM3 (ref 140–500)
PMV BLD AUTO: 12.9 FL (ref 6–12)
POTASSIUM BLD-SCNC: 4.2 MMOL/L (ref 3.5–5.2)
PROT SERPL-MCNC: 8.2 G/DL (ref 6–8.5)
PROT UR QL STRIP: NEGATIVE
RBC # BLD AUTO: 4.88 10*6/MM3 (ref 4.6–6)
RBC # UR: NORMAL /HPF
REF LAB TEST METHOD: NORMAL
SODIUM BLD-SCNC: 135 MMOL/L (ref 136–145)
SP GR UR STRIP: 1.02 (ref 1–1.03)
SQUAMOUS #/AREA URNS HPF: NORMAL /HPF
UROBILINOGEN UR QL STRIP: ABNORMAL
WBC NRBC COR # BLD: 13.38 10*3/MM3 (ref 4.5–10.7)
WBC UR QL AUTO: NORMAL /HPF

## 2019-01-23 PROCEDURE — 96360 HYDRATION IV INFUSION INIT: CPT

## 2019-01-23 PROCEDURE — 96361 HYDRATE IV INFUSION ADD-ON: CPT

## 2019-01-23 PROCEDURE — 99284 EMERGENCY DEPT VISIT MOD MDM: CPT

## 2019-01-23 PROCEDURE — 80053 COMPREHEN METABOLIC PANEL: CPT | Performed by: EMERGENCY MEDICINE

## 2019-01-23 PROCEDURE — 81001 URINALYSIS AUTO W/SCOPE: CPT | Performed by: EMERGENCY MEDICINE

## 2019-01-23 PROCEDURE — 93010 ELECTROCARDIOGRAM REPORT: CPT | Performed by: INTERNAL MEDICINE

## 2019-01-23 PROCEDURE — 82550 ASSAY OF CK (CPK): CPT | Performed by: EMERGENCY MEDICINE

## 2019-01-23 PROCEDURE — 93005 ELECTROCARDIOGRAM TRACING: CPT | Performed by: EMERGENCY MEDICINE

## 2019-01-23 PROCEDURE — 85025 COMPLETE CBC W/AUTO DIFF WBC: CPT | Performed by: EMERGENCY MEDICINE

## 2019-01-23 RX ORDER — INSULIN GLARGINE 100 [IU]/ML
36 INJECTION, SOLUTION SUBCUTANEOUS 2 TIMES DAILY
COMMUNITY
End: 2020-08-14 | Stop reason: HOSPADM

## 2019-01-23 RX ORDER — HYDROCHLOROTHIAZIDE 25 MG/1
25 TABLET ORAL DAILY
COMMUNITY
End: 2020-08-14 | Stop reason: HOSPADM

## 2019-01-23 RX ORDER — SODIUM CHLORIDE 0.9 % (FLUSH) 0.9 %
10 SYRINGE (ML) INJECTION AS NEEDED
Status: DISCONTINUED | OUTPATIENT
Start: 2019-01-23 | End: 2019-01-23 | Stop reason: HOSPADM

## 2019-01-23 RX ORDER — METOPROLOL TARTRATE 50 MG/1
50 TABLET, FILM COATED ORAL DAILY
COMMUNITY
End: 2020-08-14 | Stop reason: HOSPADM

## 2019-01-23 RX ADMIN — SODIUM CHLORIDE 1000 ML: 9 INJECTION, SOLUTION INTRAVENOUS at 03:28

## 2019-01-23 RX ADMIN — SODIUM CHLORIDE 1000 ML: 9 INJECTION, SOLUTION INTRAVENOUS at 01:33

## 2019-01-23 RX ADMIN — SODIUM CHLORIDE 1000 ML: 9 INJECTION, SOLUTION INTRAVENOUS at 05:16

## 2019-01-23 RX ADMIN — SODIUM CHLORIDE 1000 ML: 9 INJECTION, SOLUTION INTRAVENOUS at 03:29

## 2019-01-23 NOTE — ED PROVIDER NOTES
EMERGENCY DEPARTMENT ENCOUNTER    CHIEF COMPLAINT  Chief Complaint: Fatigue  History given by: patient   History limited by: n/a  Room Number: 15/15  PMD: Juan Carlos Kwan APRN      HPI:  Pt is a 43 y.o. male who presents complaining of fatigue that has been ongoing for about 5-6 weeks. Pt states that he has recently had issues with hypertension, and was started on Metoprolol about 6 weeks ago. Pt states that since starting the medication, he has been fatigued. He denies any associated pain, SOA, fever, chills, nausea, vomiting, or any other sx.     Duration:  5-6 weeks  Onset: gradual  Timing: constant   Location: generalized   Radiation: none  Quality: fatigue   Intensity/Severity: moderate   Progression: unchanged   Associated Symptoms: none  Aggravating Factors: none  Alleviating Factors: none    PAST MEDICAL HISTORY  Active Ambulatory Problems     Diagnosis Date Noted   • Acute renal failure (CMS/HCC) 01/04/2017   • Type 2 diabetes mellitus with hyperglycemia (CMS/HCC) 01/04/2017   • HTN (hypertension) 01/04/2017   • HLD (hyperlipidemia) 01/04/2017     Resolved Ambulatory Problems     Diagnosis Date Noted   • Hyponatremia 01/06/2017     Past Medical History:   Diagnosis Date   • Diabetes mellitus (CMS/HCC)    • Hypertension        PAST SURGICAL HISTORY  Past Surgical History:   Procedure Laterality Date   • EYE SURGERY     • HERNIA REPAIR         FAMILY HISTORY  Family History   Problem Relation Age of Onset   • Alcohol abuse Mother    • Asthma Mother    • Cancer Mother    • Drug abuse Mother    • Early death Mother    • Alcohol abuse Father    • Alcohol abuse Sister    • Drug abuse Sister    • Asthma Daughter    • Diabetes Daughter    • Alcohol abuse Maternal Aunt    • Alcohol abuse Maternal Uncle    • Alcohol abuse Paternal Aunt    • Cancer Paternal Aunt    • Diabetes Paternal Aunt    • Alcohol abuse Paternal Uncle    • Alcohol abuse Maternal Grandmother    • Asthma Maternal Grandmother    • Cancer Maternal  Grandmother    • Drug abuse Maternal Grandmother    • Hypertension Maternal Grandmother    • Alcohol abuse Maternal Grandfather    • Alcohol abuse Paternal Grandmother    • Alcohol abuse Paternal Grandfather        SOCIAL HISTORY  Social History     Socioeconomic History   • Marital status:      Spouse name: Not on file   • Number of children: Not on file   • Years of education: Not on file   • Highest education level: Not on file   Social Needs   • Financial resource strain: Not on file   • Food insecurity - worry: Not on file   • Food insecurity - inability: Not on file   • Transportation needs - medical: Not on file   • Transportation needs - non-medical: Not on file   Occupational History   • Not on file   Tobacco Use   • Smoking status: Former Smoker     Packs/day: 2.00     Types: Cigarettes, Cigars     Start date: 2003     Last attempt to quit: 2006     Years since quittin.0   • Smokeless tobacco: Former User     Types: Chew   Substance and Sexual Activity   • Alcohol use: Yes     Alcohol/week: 12.6 oz     Types: 21 Shots of liquor per week     Comment: one pint a night   • Drug use: No   • Sexual activity: Yes     Partners: Female   Other Topics Concern   • Not on file   Social History Narrative   • Not on file       ALLERGIES  Avelox [moxifloxacin hcl]; Amlodipine; Citalopram hydrobromide; Meloxicam; Metformin and related; and Quetiapine    REVIEW OF SYSTEMS  Review of Systems   Constitutional: Positive for fatigue. Negative for activity change, appetite change and fever.   HENT: Negative for congestion and sore throat.    Eyes: Negative.    Respiratory: Negative for cough and shortness of breath.    Cardiovascular: Negative for chest pain and leg swelling.   Gastrointestinal: Negative for abdominal pain, diarrhea and vomiting.   Endocrine: Negative.    Genitourinary: Negative for decreased urine volume and dysuria.   Musculoskeletal: Negative for neck pain.   Skin: Negative for rash and  wound.   Allergic/Immunologic: Negative.    Neurological: Negative for weakness, numbness and headaches.   Hematological: Negative.    Psychiatric/Behavioral: Negative.    All other systems reviewed and are negative.      PHYSICAL EXAM  ED Triage Vitals   Temp Heart Rate Resp BP SpO2   01/23/19 0057 01/23/19 0057 01/23/19 0057 01/23/19 0116 01/23/19 0057   97.8 °F (36.6 °C) (!) 122 16 122/75 98 %      Temp src Heart Rate Source Patient Position BP Location FiO2 (%)   01/23/19 0057 01/23/19 0057 -- -- --   Tympanic Monitor          Physical Exam   Constitutional: He is oriented to person, place, and time. No distress.   HENT:   Head: Normocephalic and atraumatic.   Eyes: EOM are normal. Pupils are equal, round, and reactive to light.   Neck: Normal range of motion. Neck supple.   Cardiovascular: Regular rhythm and normal heart sounds. Tachycardia present.   Pulmonary/Chest: Effort normal and breath sounds normal. No respiratory distress.   Abdominal: Soft. There is no tenderness. There is no rebound and no guarding.   Musculoskeletal: Normal range of motion. He exhibits no edema.   Neurological: He is alert and oriented to person, place, and time. He has normal sensation and normal strength.   Skin: Skin is warm and dry.   Psychiatric: Mood and affect normal.   Nursing note and vitals reviewed.      LAB RESULTS  Lab Results (last 24 hours)     Procedure Component Value Units Date/Time    CBC & Differential [[id number]] Collected:  01/23/19 0122    Specimen:  Blood Updated:  01/23/19 0153    Narrative:       The following orders were created for panel order CBC & Differential.  Procedure                               Abnormality         Status                     ---------                               -----------         ------                     CBC Auto Differential[[id number]]         Abnormal            Final result                 Please view results for these tests on the individual orders.    Comprehensive  Metabolic Panel [[id number]]  (Abnormal) Collected:  01/23/19 0122    Specimen:  Blood Updated:  01/23/19 0216     Glucose 189 mg/dL      BUN 39 mg/dL      Creatinine 2.68 mg/dL      Sodium 135 mmol/L      Potassium 4.2 mmol/L      Chloride 94 mmol/L      CO2 27.5 mmol/L      Calcium 10.3 mg/dL      Total Protein 8.2 g/dL      Albumin 4.60 g/dL      ALT (SGPT) 37 U/L      AST (SGOT) 33 U/L      Alkaline Phosphatase 52 U/L      Total Bilirubin 0.7 mg/dL      eGFR  African Amer 32 mL/min/1.73      Globulin 3.6 gm/dL      A/G Ratio 1.3 g/dL      BUN/Creatinine Ratio 14.6     Anion Gap 13.5 mmol/L     CK [[id number]]  (Abnormal) Collected:  01/23/19 0122    Specimen:  Blood Updated:  01/23/19 0216     Creatine Kinase 836 U/L     CBC Auto Differential [[id number]]  (Abnormal) Collected:  01/23/19 0122    Specimen:  Blood Updated:  01/23/19 0153     WBC 13.38 10*3/mm3      RBC 4.88 10*6/mm3      Hemoglobin 15.6 g/dL      Hematocrit 47.1 %      MCV 96.5 fL      MCH 32.0 pg      MCHC 33.1 g/dL      RDW 11.6 %      RDW-SD 41.2 fl      MPV 12.9 fL      Platelets 212 10*3/mm3      Neutrophil % 66.3 %      Lymphocyte % 24.0 %      Monocyte % 7.5 %      Eosinophil % 1.6 %      Basophil % 0.3 %      Immature Grans % 0.3 %      Neutrophils, Absolute 8.86 10*3/mm3      Lymphocytes, Absolute 3.21 10*3/mm3      Monocytes, Absolute 1.01 10*3/mm3      Eosinophils, Absolute 0.22 10*3/mm3      Basophils, Absolute 0.04 10*3/mm3      Immature Grans, Absolute 0.04 10*3/mm3     Urinalysis With Microscopic If Indicated (No Culture) - Urine, Clean Catch [[id number]]  (Abnormal) Collected:  01/23/19 0123    Specimen:  Urine, Clean Catch Updated:  01/23/19 0227     Color, UA Yellow     Appearance, UA Clear     pH, UA <=5.0     Specific Gravity, UA 1.022     Glucose, UA >=1000 mg/dL (3+)     Ketones, UA Negative     Bilirubin, UA Negative     Blood, UA Small (1+)     Protein, UA Negative     Leuk Esterase, UA Negative     Nitrite, UA Negative      Urobilinogen, UA 0.2 E.U./dL    Urinalysis, Microscopic Only - Urine, Clean Catch [[id number]] Collected:  01/23/19 0123    Specimen:  Urine, Clean Catch Updated:  01/23/19 0228     RBC, UA 0-2 /HPF      WBC, UA 0-2 /HPF      Bacteria, UA None Seen /HPF      Squamous Epithelial Cells, UA 0-2 /HPF      Hyaline Casts, UA None Seen /LPF      Methodology Automated Microscopy          I ordered the above labs and reviewed the results      PROCEDURES  Procedures    EKG          EKG time: 01:28  Rhythm/Rate: Sinus tachycardia 106  P waves and NM: nml  QRS, axis: nml   ST and T waves: nml     Interpreted Contemporaneously by me, independently viewed  Unchanged compared to prior 10/17/16    PROGRESS AND CONSULTS        01;09  UA, CMP, CBC, CK, and EKG ordered..     01:17  BP- 122/75 HR- 110 Temp- 97.8 °F (36.6 °C) (Tympanic) O2 sat- 97%  Informed pt of the plan for labs and EKG. Pt understands and agrees with the plan, all questions answered.    01:22  IVF ordered for hydration.     02:30  IVF ordered for hydration.     02:41  BP- 122/75 HR- 110 Temp- 97.8 °F (36.6 °C) (Tympanic) O2 sat- 97%  Rechecked the patient who is in NAD and is resting comfortably. Informed pt that the labs show HAVEN. Informed him of the plan for admission for IVF. Pt states that his wife is pregnant and due to deliver with triplets at any time, and is not sure that he can stay. He would like to call his wife prior to disposition.     02:59  Additional IVF ordered.    03:31  BP- 122/75 HR- 110 Temp- 97.8 °F (36.6 °C) (Tympanic) O2 sat- 97%  Rechecked the patient who is in NAD and is resting comfortably. Again discussed the need for admission for continued IVF with the pt. Informed pt that the IVF in the ED will begin to correct the dehydration and HAVEN, he needs to be admitted. Pt still awaiting phone call from his wife.     05:12  BP- 122/75 HR- 110 Temp- 97.8 °F (36.6 °C) (Tympanic) O2 sat- 97%  Rechecked the patient who is in NAD and is resting  comfortably. Plan for a 4th L of IVF in the ED, and pt will be discharged. Pt is instructed to f/u closely with his PMD. Pt understands and agrees with the plan, all questions answered.    05:14  IVF ordered for hydration.     MEDICAL DECISION MAKING  Results were reviewed/discussed with the patient and they were also made aware of online access. Pt also made aware that some labs, such as cultures, will not be resulted during ER visit and follow up with PMD is necessary.     MDM  Number of Diagnoses or Management Options  Acute renal insufficiency:      Amount and/or Complexity of Data Reviewed  Clinical lab tests: ordered and reviewed (CO=874, Creatinine=2.68, BUN=39)  Tests in the medicine section of CPT®: ordered and reviewed (See EKG procedure note. )  Decide to obtain previous medical records or to obtain history from someone other than the patient: yes  Review and summarize past medical records: yes (Pt was admitted on 1/4/17 s/t acute renal failure and DM type 2.)    Patient Progress  Patient progress: stable         DIAGNOSIS  Final diagnoses:   Acute renal insufficiency   Exertional rhabdomyolysis       DISPOSITION  DISCHARGE    Patient discharged in stable condition.    Reviewed implications of results, diagnosis, meds, responsibility to follow up, warning signs and symptoms of possible worsening, potential complications and reasons to return to E.    Patient/Family voiced understanding of above instructions.    Discussed plan for discharge, as there is no emergent indication for admission. Patient referred to primary care provider for BP management due to today's BP. Pt/family is agreeable and understands need for follow up and repeat testing.  Pt is aware that discharge does not mean that nothing is wrong but it indicates no emergency is present that requires admission and they must continue care with follow-up as given below or physician of their choice.     FOLLOW-UP  Juan Carlos Kwan, APRN  [id number] [address]  [phone]    Schedule an appointment as soon as possible for a visit            Medication List      No changes were made to your prescriptions during this visit.       Latest Documented Vital Signs:  As of 6:45 AM  BP- 135/78 HR- 110 Temp- 97.8 °F (36.6 °C) (Tympanic) O2 sat- 99%    --  Documentation assistance provided by morteza Gonzalez for Dr Gomez.  Information recorded by the scribdon was done at my direction and has been verified and validated by me.       Kim Gonzalez  01/23/19 0626       Fidencio Gomez MD  01/23/19 0645

## 2020-03-01 ENCOUNTER — HOSPITAL ENCOUNTER (EMERGENCY)
Facility: HOSPITAL | Age: 45
Discharge: HOME | End: 2020-03-02
Attending: EMERGENCY MEDICINE | Admitting: EMERGENCY MEDICINE

## 2020-03-01 DIAGNOSIS — F10.920 ALCOHOLIC INTOXICATION WITHOUT COMPLICATION (HCC): ICD-10-CM

## 2020-03-01 DIAGNOSIS — F32.A DEPRESSION, UNSPECIFIED DEPRESSION TYPE: Primary | ICD-10-CM

## 2020-03-01 LAB
AMPHET+METHAMPHET UR QL: NEGATIVE
BARBITURATES UR QL SCN: NEGATIVE
BENZODIAZ UR QL SCN: NEGATIVE
CANNABINOIDS SERPL QL: NEGATIVE
COCAINE UR QL: NEGATIVE
ETHANOL BLD-MCNC: 150 MG/DL (ref 0–10)
ETHANOL BLD-MCNC: 198 MG/DL (ref 0–10)
ETHANOL BLD-MCNC: 237 MG/DL (ref 0–10)
ETHANOL BLD-MCNC: 340 MG/DL (ref 0–10)
ETHANOL UR QL: 0.15 %
ETHANOL UR QL: 0.2 %
ETHANOL UR QL: 0.24 %
ETHANOL UR QL: 0.34 %
GLUCOSE BLDC GLUCOMTR-MCNC: 110 MG/DL (ref 70–130)
METHADONE UR QL SCN: NEGATIVE
OPIATES UR QL: NEGATIVE
OXYCODONE UR QL SCN: NEGATIVE

## 2020-03-01 PROCEDURE — 82962 GLUCOSE BLOOD TEST: CPT

## 2020-03-01 PROCEDURE — 80307 DRUG TEST PRSMV CHEM ANLYZR: CPT | Performed by: EMERGENCY MEDICINE

## 2020-03-01 PROCEDURE — 25010000002 LORAZEPAM PER 2 MG: Performed by: EMERGENCY MEDICINE

## 2020-03-01 PROCEDURE — 96361 HYDRATE IV INFUSION ADD-ON: CPT

## 2020-03-01 PROCEDURE — 96374 THER/PROPH/DIAG INJ IV PUSH: CPT

## 2020-03-01 PROCEDURE — 99284 EMERGENCY DEPT VISIT MOD MDM: CPT

## 2020-03-01 RX ORDER — SODIUM CHLORIDE 0.9 % (FLUSH) 0.9 %
10 SYRINGE (ML) INJECTION AS NEEDED
Status: DISCONTINUED | OUTPATIENT
Start: 2020-03-01 | End: 2020-03-02 | Stop reason: HOSPADM

## 2020-03-01 RX ORDER — LORAZEPAM 2 MG/ML
1 INJECTION INTRAMUSCULAR ONCE
Status: COMPLETED | OUTPATIENT
Start: 2020-03-01 | End: 2020-03-01

## 2020-03-01 RX ADMIN — SODIUM CHLORIDE 1000 ML: 9 INJECTION, SOLUTION INTRAVENOUS at 14:25

## 2020-03-01 RX ADMIN — LORAZEPAM 1 MG: 2 INJECTION INTRAMUSCULAR; INTRAVENOUS at 18:10

## 2020-03-01 NOTE — ED PROVIDER NOTES
EMERGENCY DEPARTMENT ENCOUNTER    Room number:  35/35  Date Seen:  3/1/2020  Time of transfer: 1600  PCP:  Juan Carlos Kwan APRN HPI  Wu Damon is a 44 y.o. male who presents to the ED c/o gradual onset of constant depression.    LABORATORY RESULTS:  Recent Results (from the past 24 hour(s))   POC Glucose Once    Collection Time: 03/01/20 11:52 AM   Result Value Ref Range    Glucose 110 70 - 130 mg/dL   Ethanol    Collection Time: 03/01/20 12:29 PM   Result Value Ref Range    Ethanol 340 (H) 0 - 10 mg/dL    Ethanol % 0.340 %   Urine Drug Screen - Urine, Clean Catch    Collection Time: 03/01/20 12:29 PM   Result Value Ref Range    Amphet/Methamphet, Screen Negative Negative    Barbiturates Screen, Urine Negative Negative    Benzodiazepine Screen, Urine Negative Negative    Cocaine Screen, Urine Negative Negative    Opiate Screen Negative Negative    THC, Screen, Urine Negative Negative    Methadone Screen, Urine Negative Negative    Oxycodone Screen, Urine Negative Negative   Ethanol    Collection Time: 03/01/20  5:15 PM   Result Value Ref Range    Ethanol 237 (H) 0 - 10 mg/dL    Ethanol % 0.237 %   Ethanol    Collection Time: 03/01/20  7:33 PM   Result Value Ref Range    Ethanol 198 (H) 0 - 10 mg/dL    Ethanol % 0.198 %   Ethanol    Collection Time: 03/01/20 11:03 PM   Result Value Ref Range    Ethanol 150 (H) 0 - 10 mg/dL    Ethanol % 0.150 %     I reviewed the above results.     RADIOLOGY:  No orders to display     I reviewed the above results    MEDICATIONS ORDERED IN THE ED:  Medications   sodium chloride 0.9 % flush 10 mL (has no administration in time range)   sodium chloride 0.9 % bolus 1,000 mL (0 mL Intravenous Stopped 3/1/20 1658)   LORazepam (ATIVAN) injection 1 mg (1 mg Intravenous Given 3/1/20 1810)       PROGRESS AND CONSULT NOTES:  1600:  Patient care transferred from Dr. Veras for pending ACCESS evaluation and final disposition.    1817: As per nurse in charge, pt is hypertensive and  tachycardiac. His old records show he has hx of heavy EtOH use. Nurse reported the pt was anxious, ordered Ativan for anxiety. Upon examination, pt is currently sleeping and his HR is down to 100 to 105 BPM.    2210 I called [facility]ESS to see when they were going to see patient.  Given that patient's alcohol level still high, the request that we repeat his alcohol level so they can determine when patient can be seen.    2305 patient's alcohol is 150.  He is currently sleeping.  We will have to wait until patient's alcohol level is down to 100 before [facility] will come to assess patient.    0002 I have discussed the case with Dr. Garner.  Plan is to wait for access to assess patient or we determine disposition.    DIAGNOSIS:  Final diagnoses:   Depression, unspecified depression type   Alcoholic intoxication without complication (CMS/[facility])       DISPOSITION:  Pending    Provider attestation:  I personally reviewed the past medical history, past surgical history, social history, family history, current medications, and allergies as they appear in the chart.    Documentation assistance provided by morteza Gates for Dr. Martin Tee MD.  Information recorded by the scribe was done at my direction and has been verified and validated by me.       Michelle Gates  03/01/20 4677       Andrew Tee MD  03/02/20 9524

## 2020-03-01 NOTE — ED NOTES
Patient denies suicide ideation and wanting to harm himself.  This was witnessed by myself, Dr Veras and newtonibdon.     Burt Goldberg, RN  03/01/20 9962

## 2020-03-01 NOTE — ED PROVIDER NOTES
" EMERGENCY DEPARTMENT ENCOUNTER    Room Number:  35/35  Date seen:  3/1/2020  Time seen: 12:05 PM  PCP: Juan Carlos Kwan APRN  Historian: patient      HPI:  Chief Complaint: depression  A complete HPI/ROS/PMH/PSH/SH/FH are unobtainable due to: nothing  Context: Wu Damon is a 44 y.o. male who presents to the ED c/o gradual onset of constant depression. He states \"I am tired of life. I just want to sleep. I want to sleep, and I don't want to wake up. What's the point of living? I just don't get it.\" Patient reports \"my wife and kids deserve better than me\". However, he denies suicidal ideality. The patient reports his depression has been exacerbated by his recent job loss, guilt related to his addiction, and Efren Bob's death. The patient reports he has a hx of opioid and EtOH abuse. The patient reports he is currently taking Suboxone as directed. The patient reports he consumes half a gallon of vodka daily. The patient denies hx of EtOH withdrawal symptoms in the past. The patient denies any physical symptoms at this time. There are no other complaints at this time. Pt's spouse at bedside.          PAST MEDICAL HISTORY  Active Ambulatory Problems     Diagnosis Date Noted   • Acute renal failure (CMS/HCC) 01/04/2017   • Type 2 diabetes mellitus with hyperglycemia (CMS/HCC) 01/04/2017   • HTN (hypertension) 01/04/2017   • HLD (hyperlipidemia) 01/04/2017     Resolved Ambulatory Problems     Diagnosis Date Noted   • Hyponatremia 01/06/2017     Past Medical History:   Diagnosis Date   • Diabetes mellitus (CMS/HCC)    • Hypertension          PAST SURGICAL HISTORY  Past Surgical History:   Procedure Laterality Date   • EYE SURGERY     • HERNIA REPAIR           FAMILY HISTORY  Family History   Problem Relation Age of Onset   • Alcohol abuse Mother    • Asthma Mother    • Cancer Mother    • Drug abuse Mother    • Early death Mother    • Alcohol abuse Father    • Alcohol abuse Sister    • Drug abuse Sister    • " Asthma Daughter    • Diabetes Daughter    • Alcohol abuse Maternal Aunt    • Alcohol abuse Maternal Uncle    • Alcohol abuse Paternal Aunt    • Cancer Paternal Aunt    • Diabetes Paternal Aunt    • Alcohol abuse Paternal Uncle    • Alcohol abuse Maternal Grandmother    • Asthma Maternal Grandmother    • Cancer Maternal Grandmother    • Drug abuse Maternal Grandmother    • Hypertension Maternal Grandmother    • Alcohol abuse Maternal Grandfather    • Alcohol abuse Paternal Grandmother    • Alcohol abuse Paternal Grandfather          SOCIAL HISTORY  Social History     Socioeconomic History   • Marital status:      Spouse name: Not on file   • Number of children: Not on file   • Years of education: Not on file   • Highest education level: Not on file   Tobacco Use   • Smoking status: Former Smoker     Packs/day: 2.00     Types: Cigarettes, Cigars     Start date: 2003     Last attempt to quit: 2006     Years since quittin.1   • Smokeless tobacco: Former User     Types: Chew   Substance and Sexual Activity   • Alcohol use: Yes     Alcohol/week: 21.0 standard drinks     Types: 21 Shots of liquor per week     Comment: one pint a night   • Drug use: No   • Sexual activity: Yes     Partners: Female         ALLERGIES  Avelox [moxifloxacin hcl]; Amlodipine; Citalopram hydrobromide; Meloxicam; Metformin and related; and Quetiapine        REVIEW OF SYSTEMS  Review of Systems   Constitutional: Negative for diaphoresis and fever.   HENT: Negative for congestion.    Eyes: Negative for visual disturbance.   Respiratory: Negative for shortness of breath.    Cardiovascular: Negative for palpitations.   Gastrointestinal: Negative for blood in stool and vomiting.   Endocrine: Negative for polyuria.   Genitourinary: Negative for flank pain.   Musculoskeletal: Negative for joint swelling.   Skin: Negative for wound.   Neurological: Negative for seizures.   Hematological: Negative for adenopathy.  "  Psychiatric/Behavioral: Positive for dysphoric mood (depressed mood). Negative for sleep disturbance and suicidal ideas.       PHYSICAL EXAM  ED Triage Vitals [03/01/20 1152]   Temp Heart Rate Resp BP SpO2   99 °F (37.2 °C) (!) 121 16 -- 99 %      Temp src Heart Rate Source Patient Position BP Location FiO2 (%)   Tympanic Monitor -- -- --         GENERAL: awake, alert, tearful, no acute distress  HENT: nares patent  EYES: no scleral icterus  CV: regular rhythm, normal rate  RESPIRATORY: normal effort, CTAB  ABDOMEN: soft, non-tender throughout  MUSCULOSKELETAL: no deformity  NEURO: alert, moves all extremities, follows commands  PSYCH: tearful, depressed mood, pt reports \" I just want to sleep and not wake up\" but denies suicidal ideality  SKIN: warm, dry    Vital signs and nursing notes reviewed.          LAB RESULTS  Recent Results (from the past 24 hour(s))   POC Glucose Once    Collection Time: 03/01/20 11:52 AM   Result Value Ref Range    Glucose 110 70 - 130 mg/dL   Ethanol    Collection Time: 03/01/20 12:29 PM   Result Value Ref Range    Ethanol 340 (H) 0 - 10 mg/dL    Ethanol % 0.340 %   Urine Drug Screen - Urine, Clean Catch    Collection Time: 03/01/20 12:29 PM   Result Value Ref Range    Amphet/Methamphet, Screen Negative Negative    Barbiturates Screen, Urine Negative Negative    Benzodiazepine Screen, Urine Negative Negative    Cocaine Screen, Urine Negative Negative    Opiate Screen Negative Negative    THC, Screen, Urine Negative Negative    Methadone Screen, Urine Negative Negative    Oxycodone Screen, Urine Negative Negative       Ordered the above labs and reviewed the results.      PROCEDURES  Procedures        MEDICATIONS GIVEN IN ER  Medications   sodium chloride 0.9 % flush 10 mL (has no administration in time range)   sodium chloride 0.9 % bolus 1,000 mL (1,000 mL Intravenous New Bag 3/1/20 1178)             MEDICAL DECISION MAKING and ED Course  ED Course as of Mar 01 1526   Sun Mar 01, " 2020   1314 Ethanol(!): 340 [JR]      ED Course User Index  [JR] Chris Veras MD     1201 Ordered BAL and UDS for further evaluation.    1224 Ordered ACCESS consult for further evaluation.    1339 Reassessed the patient. They are resting comfortably and in NAD. Patient is stable. BP- 148/96 HR- 99 Temp- 99 °F (37.2 °C) (Tympanic) O2 sat- 98%. Informed the patient of his BAL of 340. Discussed the plan for ACCESS to evaluate the patient once he is sober. Pt understands and agrees with the plan, all questions answered. Pt requests IV Fluids to treat dehydration.    1419 I complied with the patient's request and ordered IV Fluids for hydration.    1600 Patient's history and workup reviewed with Dr. Tee. He agrees with the plan to assume patient care pending sobriety and ACCESS evaluation. Please see his note for final disposition.    MDM  Number of Diagnoses or Management Options     Amount and/or Complexity of Data Reviewed  Clinical lab tests: ordered and reviewed (BAL: 340 (at 1229) and negative UDS)  Decide to obtain previous medical records or to obtain history from someone other than the patient: yes  Review and summarize past medical records: yes (The patient was last seen at [facility] ER in 01/2019 for acute renal insufficiency.)  Discuss the patient with other providers: yes    Patient Progress  Patient progress: stable         44-year-old male with depression, continued problems with Suboxone and alcohol use, presented today for psychiatric evaluation.  He denies suicidal ideation however he does make comments and concern me such as I just want to go to sleep and not wake up.  He is currently intoxicated and is awaiting sobriety to be evaluated by ACCESS.  Care has been transferred to the oncoming physician at the end of my shift.    DIAGNOSIS  Final diagnoses:   Depression, unspecified depression type   Alcoholic intoxication without complication (CMS/[facility])         DISPOSITION  Patient care transferred  to Dr. Tee. Please see his note for final disposition.            Latest Documented Vital Signs:  As of 3:26 PM  BP- 148/96 HR- 99 Temp- 99 °F (37.2 °C) (Tympanic) O2 sat- 98%        --  Documentation assistance provided by morteza Guerin for Dr. PATRICIA Veras MD.  Information recorded by the scribe was done at my direction and has been verified and validated by me.      Please note that portions of this were completed with a voice recognition program.     Kelly Guerin  03/01/20 8113       Chris Veras MD  03/01/20 7987

## 2020-03-02 VITALS
TEMPERATURE: 99 F | HEIGHT: 71 IN | RESPIRATION RATE: 18 BRPM | BODY MASS INDEX: 36.79 KG/M2 | OXYGEN SATURATION: 97 % | HEART RATE: 129 BPM | SYSTOLIC BLOOD PRESSURE: 176 MMHG | DIASTOLIC BLOOD PRESSURE: 108 MMHG

## 2020-03-02 LAB — GLUCOSE BLDC GLUCOMTR-MCNC: 123 MG/DL (ref 70–130)

## 2020-03-02 PROCEDURE — 82962 GLUCOSE BLOOD TEST: CPT

## 2020-03-02 PROCEDURE — 90791 PSYCH DIAGNOSTIC EVALUATION: CPT

## 2020-03-02 RX ORDER — CLONIDINE HYDROCHLORIDE 0.1 MG/1
0.1 TABLET ORAL ONCE
Status: COMPLETED | OUTPATIENT
Start: 2020-03-02 | End: 2020-03-02

## 2020-03-02 RX ADMIN — CLONIDINE HYDROCHLORIDE 0.1 MG: 0.1 TABLET ORAL at 02:37
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
## 2020-03-02 NOTE — ED PROVIDER NOTES
EMERGENCY DEPARTMENT ENCOUNTER    Room number:  10/10  Date Seen:  3/2/2020  Time of transfer:0002  PCP:  Juan Carlos Kwan, APRN    HPI  Pt presents to ED due to worsening depression and vague SI without plan. Pt affirms EtOH usage.     PHYSICAL EXAM  Pt is AOx3 and in NAD. Pt's heart is RRR and lungs CTAB.    LABORATORY RESULTS:  Recent Results (from the past 24 hour(s))   POC Glucose Once    Collection Time: 03/01/20 11:52 AM   Result Value Ref Range    Glucose 110 70 - 130 mg/dL   Ethanol    Collection Time: 03/01/20 12:29 PM   Result Value Ref Range    Ethanol 340 (H) 0 - 10 mg/dL    Ethanol % 0.340 %   Urine Drug Screen - Urine, Clean Catch    Collection Time: 03/01/20 12:29 PM   Result Value Ref Range    Amphet/Methamphet, Screen Negative Negative    Barbiturates Screen, Urine Negative Negative    Benzodiazepine Screen, Urine Negative Negative    Cocaine Screen, Urine Negative Negative    Opiate Screen Negative Negative    THC, Screen, Urine Negative Negative    Methadone Screen, Urine Negative Negative    Oxycodone Screen, Urine Negative Negative   Ethanol    Collection Time: 03/01/20  5:15 PM   Result Value Ref Range    Ethanol 237 (H) 0 - 10 mg/dL    Ethanol % 0.237 %   Ethanol    Collection Time: 03/01/20  7:33 PM   Result Value Ref Range    Ethanol 198 (H) 0 - 10 mg/dL    Ethanol % 0.198 %   Ethanol    Collection Time: 03/01/20 11:03 PM   Result Value Ref Range    Ethanol 150 (H) 0 - 10 mg/dL    Ethanol % 0.150 %   POC Glucose Once    Collection Time: 03/02/20  2:30 AM   Result Value Ref Range    Glucose 123 70 - 130 mg/dL     I reviewed the above results.     RADIOLOGY:  No orders to display     I reviewed the above results    MEDICATIONS ORDERED IN THE ED:  Medications   sodium chloride 0.9 % flush 10 mL (has no administration in time range)   sodium chloride 0.9 % bolus 1,000 mL (0 mL Intravenous Stopped 3/1/20 1658)   LORazepam (ATIVAN) injection 1 mg (1 mg Intravenous Given 3/1/20 1810)   cloNIDine  (CATAPRES) tablet 0.1 mg (0.1 mg Oral Given 3/2/20 0237)       PROGRESS AND CONSULT NOTES:  0002:  Patient care transferred from Dr. Tee pending Access evaluation and final disposition.    0225: RN approached and states pt is requesting to leave AMA. Pt informed of need to stay for Access evaluation due to SI complaints, plan for awaiting sobriety. Pt is willing to stay for box lunch in ED. Pt expressed concern for hypertension and diabetes, states he has not taken his nightly medication. Pt's blood glucose is 115 and blood pressure is 150s systolic. Plan for BP treatment in ED but plan to not treat glucose level at this time.     0233: Catapres ordered for BP.     0237: Call to Access for bedside evaluation of patient. Access agreed to evaluate pt in ED in about 1 hour.     0445: Discussed pt's case with Teo Vaughan RN, following bedside evaluation. She states pt denies SI/HI and is prepared for discharge. She offered pt outpatient resources and pt affirms he has appointment scheduled. I agreed with plan for care.     0510: I am aware of pt's BP at this time upon discharge. Pt directed to take his BP medication as soon as he arrives home and follow up with PCP for recheck. Pt understands and agrees with the plan, all questions answered.    DIAGNOSIS:  Final diagnoses:   Depression, unspecified depression type   Alcoholic intoxication without complication (CMS/[facility])       DISPOSITION:  DISCHARGE    Patient discharged in stable condition.    Reviewed implications of results, diagnosis, meds, responsibility to follow up, warning signs and symptoms of possible worsening, potential complications and reasons to return to ER.    Patient/Family voiced understanding of above instructions.    Discussed plan for discharge, as there is no emergent indication for admission. Patient referred to primary care provider for BP management due to today's BP. Pt/family is agreeable and understands need for follow up and repeat  testing.  Pt is aware that discharge does not mean that nothing is wrong but it indicates no emergency is present that requires admission and they must continue care with follow-up as given below or physician of their choice.     FOLLOW-UP  Juan Carlos Kwan, APRN  [address] [facility] [place] [id number] [phone]    Schedule an appointment as soon as possible for a visit            Medication List      No changes were made to your prescriptions during this visit.         Provider attestation:  I personally reviewed the past medical history, past surgical history, social history, family history, current medications, and allergies as they appear in the chart.    Documentation assistance provided by morteza Coley for Dr. Garner.  Information recorded by the scribe was done at my direction and has been verified and validated by me.              Danielle Coley  03/02/20 0513       Prasanna Garner MD  03/02/20 0647

## 2020-08-10 ENCOUNTER — HOSPITAL ENCOUNTER (INPATIENT)
Facility: HOSPITAL | Age: 45
LOS: 4 days | Discharge: HOME | End: 2020-08-14
Attending: EMERGENCY MEDICINE | Admitting: HOSPITALIST

## 2020-08-10 DIAGNOSIS — R45.851 SUICIDAL IDEATION: ICD-10-CM

## 2020-08-10 DIAGNOSIS — F10.931 ALCOHOL WITHDRAWAL SYNDROME, WITH DELIRIUM (HCC): Primary | ICD-10-CM

## 2020-08-10 DIAGNOSIS — K70.10 ALCOHOLIC HEPATITIS, UNSPECIFIED WHETHER ASCITES PRESENT: ICD-10-CM

## 2020-08-10 PROBLEM — G72.1 ALCOHOLIC MYOPATHY: Status: ACTIVE | Noted: 2020-08-10

## 2020-08-10 PROBLEM — K76.9 LIVER LESION: Status: ACTIVE | Noted: 2020-08-10

## 2020-08-10 PROBLEM — R07.9 CHEST PAIN: Status: ACTIVE | Noted: 2020-08-10

## 2020-08-10 PROBLEM — F10.10 ALCOHOL ABUSE: Status: ACTIVE | Noted: 2020-08-10

## 2020-08-10 PROBLEM — E88.89 ALCOHOLIC KETOSIS (HCC): Status: ACTIVE | Noted: 2020-08-10

## 2020-08-10 LAB
ALBUMIN SERPL-MCNC: 4.7 G/DL (ref 3.5–5.2)
ALBUMIN/GLOB SERPL: 1.5 G/DL
ALP SERPL-CCNC: 120 U/L (ref 39–117)
ALT SERPL W P-5'-P-CCNC: 242 U/L (ref 1–41)
AMPHET+METHAMPHET UR QL: NEGATIVE
ANION GAP SERPL CALCULATED.3IONS-SCNC: 27.1 MMOL/L (ref 5–15)
APAP SERPL-MCNC: <5 MCG/ML (ref 10–30)
AST SERPL-CCNC: 411 U/L (ref 1–40)
BACTERIA UR QL AUTO: NORMAL /HPF
BARBITURATES UR QL SCN: NEGATIVE
BASOPHILS # BLD AUTO: 0.07 10*3/MM3 (ref 0–0.2)
BASOPHILS NFR BLD AUTO: 1 % (ref 0–1.5)
BENZODIAZ UR QL SCN: NEGATIVE
BILIRUB SERPL-MCNC: 1.8 MG/DL (ref 0–1.2)
BILIRUB UR QL STRIP: NEGATIVE
BUN SERPL-MCNC: 10 MG/DL (ref 6–20)
BUN/CREAT SERPL: 9.7 (ref 7–25)
CALCIUM SPEC-SCNC: 9.3 MG/DL (ref 8.6–10.5)
CANNABINOIDS SERPL QL: NEGATIVE
CHLORIDE SERPL-SCNC: 101 MMOL/L (ref 98–107)
CLARITY UR: CLEAR
CO2 SERPL-SCNC: 18.9 MMOL/L (ref 22–29)
COCAINE UR QL: NEGATIVE
COLOR UR: ABNORMAL
CREAT SERPL-MCNC: 1.03 MG/DL (ref 0.76–1.27)
DEPRECATED RDW RBC AUTO: 43 FL (ref 37–54)
EOSINOPHIL # BLD AUTO: 0.02 10*3/MM3 (ref 0–0.4)
EOSINOPHIL NFR BLD AUTO: 0.3 % (ref 0.3–6.2)
ERYTHROCYTE [DISTWIDTH] IN BLOOD BY AUTOMATED COUNT: 12.1 % (ref 12.3–15.4)
ETHANOL BLD-MCNC: 302 MG/DL (ref 0–10)
ETHANOL UR QL: 0.3 %
GFR SERPL CREATININE-BSD FRML MDRD: 96 ML/MIN/1.73
GLOBULIN UR ELPH-MCNC: 3.2 GM/DL
GLUCOSE BLDC GLUCOMTR-MCNC: 172 MG/DL (ref 70–130)
GLUCOSE BLDC GLUCOMTR-MCNC: 193 MG/DL (ref 70–130)
GLUCOSE SERPL-MCNC: 103 MG/DL (ref 65–99)
GLUCOSE UR STRIP-MCNC: NEGATIVE MG/DL
HAV IGM SERPL QL IA: NORMAL
HBA1C MFR BLD: 5.9 % (ref 4.8–5.6)
HBV CORE IGM SERPL QL IA: NORMAL
HBV SURFACE AG SERPL QL IA: NORMAL
HCT VFR BLD AUTO: 48.5 % (ref 37.5–51)
HCV AB SER DONR QL: NORMAL
HGB BLD-MCNC: 16.6 G/DL (ref 13–17.7)
HGB UR QL STRIP.AUTO: ABNORMAL
HYALINE CASTS UR QL AUTO: NORMAL /LPF
IMM GRANULOCYTES # BLD AUTO: 0.02 10*3/MM3 (ref 0–0.05)
IMM GRANULOCYTES NFR BLD AUTO: 0.3 % (ref 0–0.5)
KETONES UR QL STRIP: ABNORMAL
LEUKOCYTE ESTERASE UR QL STRIP.AUTO: ABNORMAL
LIPASE SERPL-CCNC: 58 U/L (ref 13–60)
LYMPHOCYTES # BLD AUTO: 1.85 10*3/MM3 (ref 0.7–3.1)
LYMPHOCYTES NFR BLD AUTO: 27 % (ref 19.6–45.3)
MCH RBC QN AUTO: 33.3 PG (ref 26.6–33)
MCHC RBC AUTO-ENTMCNC: 34.2 G/DL (ref 31.5–35.7)
MCV RBC AUTO: 97.2 FL (ref 79–97)
METHADONE UR QL SCN: NEGATIVE
MONOCYTES # BLD AUTO: 0.5 10*3/MM3 (ref 0.1–0.9)
MONOCYTES NFR BLD AUTO: 7.3 % (ref 5–12)
NEUTROPHILS NFR BLD AUTO: 4.39 10*3/MM3 (ref 1.7–7)
NEUTROPHILS NFR BLD AUTO: 64.1 % (ref 42.7–76)
NITRITE UR QL STRIP: NEGATIVE
NRBC BLD AUTO-RTO: 0 /100 WBC (ref 0–0.2)
OPIATES UR QL: NEGATIVE
OXYCODONE UR QL SCN: NEGATIVE
PH UR STRIP.AUTO: 5.5 [PH] (ref 5–8)
PLATELET # BLD AUTO: 186 10*3/MM3 (ref 140–450)
PMV BLD AUTO: 11.8 FL (ref 6–12)
POTASSIUM SERPL-SCNC: 3.6 MMOL/L (ref 3.5–5.2)
PROT SERPL-MCNC: 7.9 G/DL (ref 6–8.5)
PROT UR QL STRIP: ABNORMAL
RBC # BLD AUTO: 4.99 10*6/MM3 (ref 4.14–5.8)
RBC # UR: NORMAL /HPF
REF LAB TEST METHOD: NORMAL
SODIUM SERPL-SCNC: 147 MMOL/L (ref 136–145)
SP GR UR STRIP: >=1.03 (ref 1–1.03)
SQUAMOUS #/AREA URNS HPF: NORMAL /HPF
TROPONIN T SERPL-MCNC: <0.01 NG/ML (ref 0–0.03)
UROBILINOGEN UR QL STRIP: ABNORMAL
WBC # BLD AUTO: 6.85 10*3/MM3 (ref 3.4–10.8)
WBC UR QL AUTO: NORMAL /HPF

## 2020-08-10 PROCEDURE — 80307 DRUG TEST PRSMV CHEM ANLYZR: CPT | Performed by: EMERGENCY MEDICINE

## 2020-08-10 PROCEDURE — 81001 URINALYSIS AUTO W/SCOPE: CPT | Performed by: HOSPITALIST

## 2020-08-10 PROCEDURE — 82962 GLUCOSE BLOOD TEST: CPT

## 2020-08-10 PROCEDURE — 63710000001 INSULIN GLARGINE PER 5 UNITS: Performed by: HOSPITALIST

## 2020-08-10 PROCEDURE — 84484 ASSAY OF TROPONIN QUANT: CPT | Performed by: HOSPITALIST

## 2020-08-10 PROCEDURE — 85025 COMPLETE CBC W/AUTO DIFF WBC: CPT | Performed by: EMERGENCY MEDICINE

## 2020-08-10 PROCEDURE — 25010000002 THIAMINE PER 100 MG: Performed by: EMERGENCY MEDICINE

## 2020-08-10 PROCEDURE — 25010000002 LORAZEPAM PER 2 MG: Performed by: HOSPITALIST

## 2020-08-10 PROCEDURE — 80074 ACUTE HEPATITIS PANEL: CPT | Performed by: EMERGENCY MEDICINE

## 2020-08-10 PROCEDURE — 25010000002 LORAZEPAM PER 2 MG: Performed by: EMERGENCY MEDICINE

## 2020-08-10 PROCEDURE — 83690 ASSAY OF LIPASE: CPT | Performed by: EMERGENCY MEDICINE

## 2020-08-10 PROCEDURE — 99285 EMERGENCY DEPT VISIT HI MDM: CPT

## 2020-08-10 PROCEDURE — 83036 HEMOGLOBIN GLYCOSYLATED A1C: CPT | Performed by: HOSPITALIST

## 2020-08-10 PROCEDURE — 80053 COMPREHEN METABOLIC PANEL: CPT | Performed by: EMERGENCY MEDICINE

## 2020-08-10 PROCEDURE — 63710000001 INSULIN LISPRO (HUMAN) PER 5 UNITS: Performed by: HOSPITALIST

## 2020-08-10 PROCEDURE — U0004 COV-19 TEST NON-CDC HGH THRU: HCPCS | Performed by: EMERGENCY MEDICINE

## 2020-08-10 RX ORDER — SODIUM CHLORIDE, SODIUM LACTATE, POTASSIUM CHLORIDE, CALCIUM CHLORIDE 600; 310; 30; 20 MG/100ML; MG/100ML; MG/100ML; MG/100ML
100 INJECTION, SOLUTION INTRAVENOUS CONTINUOUS
Status: DISCONTINUED | OUTPATIENT
Start: 2020-08-10 | End: 2020-08-12

## 2020-08-10 RX ORDER — NICOTINE POLACRILEX 4 MG
15 LOZENGE BUCCAL
Status: DISCONTINUED | OUTPATIENT
Start: 2020-08-10 | End: 2020-08-14 | Stop reason: HOSPADM

## 2020-08-10 RX ORDER — LORAZEPAM 2 MG/ML
2 INJECTION INTRAMUSCULAR
Status: DISCONTINUED | OUTPATIENT
Start: 2020-08-10 | End: 2020-08-14 | Stop reason: HOSPADM

## 2020-08-10 RX ORDER — ACETAMINOPHEN 500 MG
1000 TABLET ORAL ONCE
Status: COMPLETED | OUTPATIENT
Start: 2020-08-10 | End: 2020-08-10

## 2020-08-10 RX ORDER — BUPROPION HYDROCHLORIDE 150 MG/1
150 TABLET ORAL EVERY MORNING
Status: DISCONTINUED | OUTPATIENT
Start: 2020-08-11 | End: 2020-08-14 | Stop reason: HOSPADM

## 2020-08-10 RX ORDER — AMLODIPINE BESYLATE 10 MG/1
10 TABLET ORAL DAILY
Status: DISCONTINUED | OUTPATIENT
Start: 2020-08-10 | End: 2020-08-14 | Stop reason: HOSPADM

## 2020-08-10 RX ORDER — METOPROLOL SUCCINATE 50 MG/1
50 TABLET, EXTENDED RELEASE ORAL DAILY
Status: DISCONTINUED | OUTPATIENT
Start: 2020-08-10 | End: 2020-08-14 | Stop reason: HOSPADM

## 2020-08-10 RX ORDER — THIAMINE MONONITRATE (VIT B1) 100 MG
100 TABLET ORAL DAILY
Status: COMPLETED | OUTPATIENT
Start: 2020-08-11 | End: 2020-08-13

## 2020-08-10 RX ORDER — BUPRENORPHINE AND NALOXONE 8; 2 MG/1; MG/1
1 FILM, SOLUBLE BUCCAL; SUBLINGUAL DAILY
Status: DISCONTINUED | OUTPATIENT
Start: 2020-08-10 | End: 2020-08-14 | Stop reason: HOSPADM

## 2020-08-10 RX ORDER — BUPRENORPHINE HYDROCHLORIDE AND NALOXONE HYDROCHLORIDE DIHYDRATE 8; 2 MG/1; MG/1
1.5 TABLET SUBLINGUAL DAILY
COMMUNITY

## 2020-08-10 RX ORDER — INSULIN GLARGINE 100 [IU]/ML
15 INJECTION, SOLUTION SUBCUTANEOUS NIGHTLY
Status: DISCONTINUED | OUTPATIENT
Start: 2020-08-10 | End: 2020-08-10

## 2020-08-10 RX ORDER — SODIUM CHLORIDE 0.9 % (FLUSH) 0.9 %
10 SYRINGE (ML) INJECTION EVERY 12 HOURS SCHEDULED
Status: DISCONTINUED | OUTPATIENT
Start: 2020-08-10 | End: 2020-08-14 | Stop reason: HOSPADM

## 2020-08-10 RX ORDER — INSULIN GLARGINE 100 [IU]/ML
20 INJECTION, SOLUTION SUBCUTANEOUS NIGHTLY
Status: DISCONTINUED | OUTPATIENT
Start: 2020-08-10 | End: 2020-08-14 | Stop reason: HOSPADM

## 2020-08-10 RX ORDER — LORAZEPAM 2 MG/ML
1 INJECTION INTRAMUSCULAR
Status: DISCONTINUED | OUTPATIENT
Start: 2020-08-10 | End: 2020-08-14 | Stop reason: HOSPADM

## 2020-08-10 RX ORDER — THIAMINE MONONITRATE (VIT B1) 100 MG
100 TABLET ORAL ONCE
Status: DISCONTINUED | OUTPATIENT
Start: 2020-08-10 | End: 2020-08-14 | Stop reason: HOSPADM

## 2020-08-10 RX ORDER — LORAZEPAM 2 MG/ML
1 INJECTION INTRAMUSCULAR ONCE
Status: COMPLETED | OUTPATIENT
Start: 2020-08-10 | End: 2020-08-10

## 2020-08-10 RX ORDER — METOPROLOL TARTRATE 50 MG/1
50 TABLET, FILM COATED ORAL DAILY
Status: DISCONTINUED | OUTPATIENT
Start: 2020-08-10 | End: 2020-08-10

## 2020-08-10 RX ORDER — FOLIC ACID 1 MG/1
1 TABLET ORAL DAILY
Status: COMPLETED | OUTPATIENT
Start: 2020-08-11 | End: 2020-08-13

## 2020-08-10 RX ORDER — AMLODIPINE BESYLATE 10 MG/1
10 TABLET ORAL DAILY
COMMUNITY
End: 2021-10-10

## 2020-08-10 RX ORDER — LORAZEPAM 1 MG/1
1 TABLET ORAL
Status: DISCONTINUED | OUTPATIENT
Start: 2020-08-10 | End: 2020-08-14 | Stop reason: HOSPADM

## 2020-08-10 RX ORDER — SODIUM CHLORIDE 9 MG/ML
125 INJECTION, SOLUTION INTRAVENOUS CONTINUOUS
Status: DISCONTINUED | OUTPATIENT
Start: 2020-08-10 | End: 2020-08-10

## 2020-08-10 RX ORDER — CHLORDIAZEPOXIDE HYDROCHLORIDE 25 MG/1
50 CAPSULE, GELATIN COATED ORAL ONCE
Status: COMPLETED | OUTPATIENT
Start: 2020-08-10 | End: 2020-08-10

## 2020-08-10 RX ORDER — SODIUM CHLORIDE 0.9 % (FLUSH) 0.9 %
10 SYRINGE (ML) INJECTION AS NEEDED
Status: DISCONTINUED | OUTPATIENT
Start: 2020-08-10 | End: 2020-08-14 | Stop reason: HOSPADM

## 2020-08-10 RX ORDER — DIPHENOXYLATE HYDROCHLORIDE AND ATROPINE SULFATE 2.5; .025 MG/1; MG/1
1 TABLET ORAL DAILY
Status: COMPLETED | OUTPATIENT
Start: 2020-08-11 | End: 2020-08-13

## 2020-08-10 RX ORDER — DEXTROSE MONOHYDRATE 25 G/50ML
25 INJECTION, SOLUTION INTRAVENOUS
Status: DISCONTINUED | OUTPATIENT
Start: 2020-08-10 | End: 2020-08-14 | Stop reason: HOSPADM

## 2020-08-10 RX ORDER — BUPROPION HYDROCHLORIDE 150 MG/1
150 TABLET ORAL EVERY MORNING
COMMUNITY
End: 2020-08-14 | Stop reason: HOSPADM

## 2020-08-10 RX ORDER — ZOLPIDEM TARTRATE 12.5 MG/1
12.5 TABLET, FILM COATED, EXTENDED RELEASE ORAL NIGHTLY PRN
COMMUNITY

## 2020-08-10 RX ORDER — LORAZEPAM 1 MG/1
2 TABLET ORAL
Status: DISCONTINUED | OUTPATIENT
Start: 2020-08-10 | End: 2020-08-14 | Stop reason: HOSPADM

## 2020-08-10 RX ORDER — METOPROLOL SUCCINATE 50 MG/1
50 TABLET, EXTENDED RELEASE ORAL DAILY
COMMUNITY
End: 2021-10-10

## 2020-08-10 RX ADMIN — INSULIN GLARGINE 20 UNITS: 100 INJECTION, SOLUTION SUBCUTANEOUS at 21:00

## 2020-08-10 RX ADMIN — SODIUM CHLORIDE, PRESERVATIVE FREE 10 ML: 5 INJECTION INTRAVENOUS at 21:03

## 2020-08-10 RX ADMIN — LORAZEPAM 1 MG: 2 INJECTION INTRAMUSCULAR; INTRAVENOUS at 21:55

## 2020-08-10 RX ADMIN — BUPRENORPHINE AND NALOXONE 1 FILM: 8; 2 FILM BUCCAL; SUBLINGUAL at 22:42

## 2020-08-10 RX ADMIN — INSULIN LISPRO 2 UNITS: 100 INJECTION, SOLUTION INTRAVENOUS; SUBCUTANEOUS at 21:00

## 2020-08-10 RX ADMIN — INSULIN LISPRO 2 UNITS: 100 INJECTION, SOLUTION INTRAVENOUS; SUBCUTANEOUS at 18:04

## 2020-08-10 RX ADMIN — SODIUM CHLORIDE, POTASSIUM CHLORIDE, SODIUM LACTATE AND CALCIUM CHLORIDE 100 ML/HR: 600; 310; 30; 20 INJECTION, SOLUTION INTRAVENOUS at 17:36

## 2020-08-10 RX ADMIN — THIAMINE HYDROCHLORIDE 100 MG: 100 INJECTION, SOLUTION INTRAMUSCULAR; INTRAVENOUS at 15:12

## 2020-08-10 RX ADMIN — CHLORDIAZEPOXIDE HYDROCHLORIDE 50 MG: 25 CAPSULE ORAL at 13:16

## 2020-08-10 RX ADMIN — METOPROLOL SUCCINATE 50 MG: 50 TABLET, EXTENDED RELEASE ORAL at 18:33

## 2020-08-10 RX ADMIN — AMLODIPINE BESYLATE 10 MG: 10 TABLET ORAL at 18:33

## 2020-08-10 RX ADMIN — LORAZEPAM 1 MG: 2 INJECTION INTRAMUSCULAR; INTRAVENOUS at 15:01

## 2020-08-10 RX ADMIN — ACETAMINOPHEN 1000 MG: 500 TABLET ORAL at 15:02

## 2020-08-10 RX ADMIN — SODIUM CHLORIDE 125 ML/HR: 9 INJECTION, SOLUTION INTRAVENOUS at 15:12

## 2020-08-10 RX ADMIN — LORAZEPAM 1 MG: 2 INJECTION INTRAMUSCULAR; INTRAVENOUS at 13:19

## 2020-08-10 RX ADMIN — LORAZEPAM 1 MG: 2 INJECTION INTRAMUSCULAR; INTRAVENOUS at 17:37

## 2020-08-10 NOTE — ED PROVIDER NOTES
EMERGENCY DEPARTMENT ENCOUNTER    Room Number:  10/10  Date seen:  8/10/2020  PCP: Juan Carlos Kwan APRN  Historian: Patient      HPI:  Chief Complaint: Suicidal thoughts  A complete HPI/ROS/PMH/PSH/SH/FH are unobtainable due to: Nothing  Context: Wu Damon is a 45 y.o. male who presents to the ED c/o losing weight, hallucinating, uncontrolled shaking, and also having some thoughts of suicide this morning.  He reports that he has been drinking heavily since he lost his job.  He was drinking 1/2 gallon of liquor every 2 days.  He has been trying to wean himself off and currently is drinking about 1/2 pint to a pint each day.  He cannot recall much from the last 24 hours.  He thought that the last time he had drank was Saturday.  Reportedly, he told family today that he felt so bad that he would shoot himself if he had a gun.  He is currently denying SI.  He reports he has had some vomiting but denies abdominal pain.  He reports decreased appetite.            PAST MEDICAL HISTORY  Active Ambulatory Problems     Diagnosis Date Noted   • Acute renal failure (CMS/HCC) 01/04/2017   • Type 2 diabetes mellitus with hyperglycemia (CMS/HCC) 01/04/2017   • HTN (hypertension) 01/04/2017   • HLD (hyperlipidemia) 01/04/2017     Resolved Ambulatory Problems     Diagnosis Date Noted   • Hyponatremia 01/06/2017     Past Medical History:   Diagnosis Date   • Diabetes mellitus (CMS/HCC)    • Hypertension          PAST SURGICAL HISTORY  Past Surgical History:   Procedure Laterality Date   • EYE SURGERY     • HERNIA REPAIR           FAMILY HISTORY  Family History   Problem Relation Age of Onset   • Alcohol abuse Mother    • Asthma Mother    • Cancer Mother    • Drug abuse Mother    • Early death Mother    • Alcohol abuse Father    • Alcohol abuse Sister    • Drug abuse Sister    • Asthma Daughter    • Diabetes Daughter    • Alcohol abuse Maternal Aunt    • Alcohol abuse Maternal Uncle    • Alcohol abuse Paternal Aunt    •  Cancer Paternal Aunt    • Diabetes Paternal Aunt    • Alcohol abuse Paternal Uncle    • Alcohol abuse Maternal Grandmother    • Asthma Maternal Grandmother    • Cancer Maternal Grandmother    • Drug abuse Maternal Grandmother    • Hypertension Maternal Grandmother    • Alcohol abuse Maternal Grandfather    • Alcohol abuse Paternal Grandmother    • Alcohol abuse Paternal Grandfather          SOCIAL HISTORY  Social History     Socioeconomic History   • Marital status:      Spouse name: Not on file   • Number of children: Not on file   • Years of education: Not on file   • Highest education level: Not on file   Tobacco Use   • Smoking status: Former Smoker     Packs/day: 2.00     Types: Cigarettes, Cigars     Start date: 2003     Last attempt to quit: 2006     Years since quittin.6   • Smokeless tobacco: Former User     Types: Chew   Substance and Sexual Activity   • Alcohol use: Yes     Alcohol/week: 21.0 standard drinks     Types: 21 Shots of liquor per week     Comment: one pint a night   • Drug use: No   • Sexual activity: Yes     Partners: Female         ALLERGIES  Avelox [moxifloxacin hcl]; Amlodipine; Citalopram hydrobromide; Meloxicam; Metformin and related; and Quetiapine        REVIEW OF SYSTEMS  Review of Systems   Review of all 14 systems is negative other than stated in the HPI above.      PHYSICAL EXAM  ED Triage Vitals [08/10/20 1144]   Temp Heart Rate Resp BP SpO2   97.8 °F (36.6 °C) (!) 126 20 172/89 99 %      Temp src Heart Rate Source Patient Position BP Location FiO2 (%)   -- -- -- -- --         GENERAL: Awake and alert, anxious appearing, mild distress   HENT: nares patent  EYES: no scleral icterus, pupils 3 mm reactive bilaterally  CV: regular rhythm, tachycardic  RESPIRATORY: normal effort, lungs clear to auscultation bilaterally  ABDOMEN: soft, nondistended, nontender throughout  MUSCULOSKELETAL: no deformity  NEURO: alert, moves all extremities, follows commands, fine tremor  in both hands and both feet  PSYCH: Very anxious, denies SI currently  SKIN: warm, dry    Vital signs and nursing notes reviewed.          LAB RESULTS  Recent Results (from the past 24 hour(s))   Comprehensive Metabolic Panel    Collection Time: 08/10/20 12:01 PM   Result Value Ref Range    Glucose 103 (H) 65 - 99 mg/dL    BUN 10 6 - 20 mg/dL    Creatinine 1.03 0.76 - 1.27 mg/dL    Sodium 147 (H) 136 - 145 mmol/L    Potassium 3.6 3.5 - 5.2 mmol/L    Chloride 101 98 - 107 mmol/L    CO2 18.9 (L) 22.0 - 29.0 mmol/L    Calcium 9.3 8.6 - 10.5 mg/dL    Total Protein 7.9 6.0 - 8.5 g/dL    Albumin 4.70 3.50 - 5.20 g/dL    ALT (SGPT) 242 (H) 1 - 41 U/L    AST (SGOT) 411 (H) 1 - 40 U/L    Alkaline Phosphatase 120 (H) 39 - 117 U/L    Total Bilirubin 1.8 (H) 0.0 - 1.2 mg/dL    eGFR  African Amer 96 >60 mL/min/1.73    Globulin 3.2 gm/dL    A/G Ratio 1.5 g/dL    BUN/Creatinine Ratio 9.7 7.0 - 25.0    Anion Gap 27.1 (H) 5.0 - 15.0 mmol/L   Lipase    Collection Time: 08/10/20 12:01 PM   Result Value Ref Range    Lipase 58 13 - 60 U/L   Ethanol    Collection Time: 08/10/20 12:01 PM   Result Value Ref Range    Ethanol 302 (H) 0 - 10 mg/dL    Ethanol % 0.302 %   CBC Auto Differential    Collection Time: 08/10/20 12:01 PM   Result Value Ref Range    WBC 6.85 3.40 - 10.80 10*3/mm3    RBC 4.99 4.14 - 5.80 10*6/mm3    Hemoglobin 16.6 13.0 - 17.7 g/dL    Hematocrit 48.5 37.5 - 51.0 %    MCV 97.2 (H) 79.0 - 97.0 fL    MCH 33.3 (H) 26.6 - 33.0 pg    MCHC 34.2 31.5 - 35.7 g/dL    RDW 12.1 (L) 12.3 - 15.4 %    RDW-SD 43.0 37.0 - 54.0 fl    MPV 11.8 6.0 - 12.0 fL    Platelets 186 140 - 450 10*3/mm3    Neutrophil % 64.1 42.7 - 76.0 %    Lymphocyte % 27.0 19.6 - 45.3 %    Monocyte % 7.3 5.0 - 12.0 %    Eosinophil % 0.3 0.3 - 6.2 %    Basophil % 1.0 0.0 - 1.5 %    Immature Grans % 0.3 0.0 - 0.5 %    Neutrophils, Absolute 4.39 1.70 - 7.00 10*3/mm3    Lymphocytes, Absolute 1.85 0.70 - 3.10 10*3/mm3    Monocytes, Absolute 0.50 0.10 - 0.90 10*3/mm3     Eosinophils, Absolute 0.02 0.00 - 0.40 10*3/mm3    Basophils, Absolute 0.07 0.00 - 0.20 10*3/mm3    Immature Grans, Absolute 0.02 0.00 - 0.05 10*3/mm3    nRBC 0.0 0.0 - 0.2 /100 WBC   Acetaminophen Level    Collection Time: 08/10/20 12:01 PM   Result Value Ref Range    Acetaminophen <5.0 (L) 10.0 - 30.0 mcg/mL   Urine Drug Screen - Urine, Clean Catch    Collection Time: 08/10/20 12:25 PM   Result Value Ref Range    Amphet/Methamphet, Screen Negative Negative    Barbiturates Screen, Urine Negative Negative    Benzodiazepine Screen, Urine Negative Negative    Cocaine Screen, Urine Negative Negative    Opiate Screen Negative Negative    THC, Screen, Urine Negative Negative    Methadone Screen, Urine Negative Negative    Oxycodone Screen, Urine Negative Negative       Ordered the above labs and reviewed the results.            PROCEDURES  Procedures          MEDICATIONS GIVEN IN ER  Medications   sodium chloride 0.9 % flush 10 mL (has no administration in time range)   sodium chloride 0.9 % infusion (has no administration in time range)   thiamine (B-1) 100 mg in sodium chloride 0.9 % 100 mL IVPB (has no administration in time range)   LORazepam (ATIVAN) injection 1 mg (1 mg Intravenous Given 8/10/20 1319)   chlordiazePOXIDE (LIBRIUM) capsule 50 mg (50 mg Oral Given 8/10/20 1316)                   MEDICAL DECISION MAKING, PROGRESS, and CONSULTS    ED Course as of Aug 10 1336   Mon Aug 10, 2020   1257 Ethanol(!): 302 [JR]   1257 Sodium(!): 147 [JR]   1257 CO2(!): 18.9 [JR]   1257 ALT (SGPT)(!): 242 [JR]   1257 AST (SGOT)(!): 411 [JR]   1257 Alkaline Phosphatase(!): 120 [JR]   1257 Total Bilirubin(!): 1.8 [JR]   1257 Lipase: 58 [JR]      ED Course User Index  [JR] Chris Vears MD       MDM        45-year-old male with history of alcohol abuse presents for hallucinations, tremors, nausea vomiting, and also some suicidal thoughts this morning.  He currently denies SI.  His symptoms are concerning for alcohol  withdrawal despite his current blood alcohol level of 302.  He has been trying to wean himself off of alcohol at home.  He was given 1 mg IV Ativan and 50 mg oral Librium for the symptoms.  Initial CIWA score is 13.  He will require admission for management of withdrawal symptoms.  I have also ordered suicide precautions and ACCESS consult.      I wore a surgical mask, gloves, and eye protection during this patient encounter.  Patient also wearing a surgical mask.  Hand hygeine performed before and after seeing the patient.    DIAGNOSIS  Final diagnoses:   Alcohol withdrawal syndrome, with delirium (CMS/HCC)   Suicidal ideation         DISPOSITION  ADMIT            Latest Documented Vital Signs:  As of 13:36  BP- (!) 155/113 HR- (!) 128 Temp- 97.8 °F (36.6 °C) O2 sat- 97%        --    Please note that portions of this were completed with a voice recognition program.          Chris Veras MD  08/10/20 8320

## 2020-08-10 NOTE — ED NOTES
"Pt has multiple complaints and does not appear to be a good historian with his health/medical information. Pt states that he has \"not been able to get out of bed for the past couple of weeks\" and he was evaluated recently when he was told he has a \"mass on his liver the size of a golf ball.\" When asked about his suicidal thoughts, pt states that he has suicidal thoughts d/t his health issues and mentions \"slitting his wrists and trying to get a gun\", but states that not that he is in the hospital, he changed his mind. Pt reports he drinks a pint of vodka daily and thinks he drank today but cannot remember. Pt states he has not taken any his home medications in 2 days. lauryn Gannon at bedside sitting.           Mariana Horn, RN  08/10/20 7731    "

## 2020-08-10 NOTE — NURSING NOTE
Personal items placed in bag and under MT desk. Wallet with $150 cash with credit cards and keys picked up my security. Medications taken to pharmacy.

## 2020-08-10 NOTE — PLAN OF CARE
Patient alert and oriented with c/o HA and anxiety, denies SI. Patient states with his children he would never consider suicide. Sitter at bedside. Patient oriented to room. No acute distress noted will continue to monitor.

## 2020-08-10 NOTE — H&P
[facility]    Patient Identification:  Name: Wu Damon  Age: 45 y.o.  Sex: male  :  1975  MRN: [id number]         Primary Care Physician: Juan Carlos Kwan APRN    Chief Complaint   Patient presents with   • Psychiatric Evaluation     PT REPORTS INCREASED DEPRESSION AND NOT WANTING TO GET OUT OF BED ALONG WITH SUICIDAL THOUGHTS AND POSS. ETOH WITHDRAWAL; PT REPORTS THAT HE WOULD SHOOT HIMSELF IF HE HAD A GUN BUT DOESN'T WANT TO DIE BECAUSE HE HAS TRIPLETS AT HOME; PT WEARING FACE MASK     Subjective   Patient is a 45 y.o. male came to the hospital for alcohol withdrawal.  He normally drinks about half a gallon of liquor every couple of days and has weaned him self down to maybe a pint a day.  He expressed to family suicidal ideation this morning.  He states he was at another emergency department Saturday ([place]).  He declined admission but they sent him on metoprolol to help with his tachycardia.  He denies any chest pain today and presented with initially suicidal ideation, hallucinations, shaking.  He was also told he had a mass on his liver.  Reviewed CT angiogram from 2020 that showed fatty liver and a 3.3 x 1.5 cm masslike lesion posterior segment of right hepatic lobe.  MRI of the liver was recommended at that time.  At the moment he denies any suicidal ideation.  Alcohol level in the emergency department 302.  He also states he used to be addicted to opioids and is supposed to be on Suboxone.    Past Medical History:   Diagnosis Date   • Diabetes mellitus (CMS/HCC)    • Hypertension      Past Surgical History:   Procedure Laterality Date   • EYE SURGERY     • HERNIA REPAIR       Current medications reviewed.    Family History   Problem Relation Age of Onset   • Alcohol abuse Mother    • Asthma Mother    • Cancer Mother    • Drug abuse Mother    • Early death Mother    • Alcohol abuse Father    • Alcohol abuse Sister    • Drug  abuse Sister    • Asthma Daughter    • Diabetes Daughter    • Alcohol abuse Maternal Aunt    • Alcohol abuse Maternal Uncle    • Alcohol abuse Paternal Aunt    • Cancer Paternal Aunt    • Diabetes Paternal Aunt    • Alcohol abuse Paternal Uncle    • Alcohol abuse Maternal Grandmother    • Asthma Maternal Grandmother    • Cancer Maternal Grandmother    • Drug abuse Maternal Grandmother    • Hypertension Maternal Grandmother    • Alcohol abuse Maternal Grandfather    • Alcohol abuse Paternal Grandmother    • Alcohol abuse Paternal Grandfather      Social History     Tobacco Use   • Smoking status: Former Smoker     Packs/day: 2.00     Types: Cigarettes, Cigars     Start date: 2003     Last attempt to quit: 2006     Years since quittin.6   • Smokeless tobacco: Former User     Types: Chew   Substance Use Topics   • Alcohol use: Yes     Alcohol/week: 21.0 standard drinks     Types: 21 Shots of liquor per week     Comment: one pint a night   • Drug use: No     Review of Systems   Constitutional: Negative.  Negative for chills and fever.   HENT: Negative.    Eyes: Negative.    Respiratory: Negative.  Negative for chest tightness and shortness of breath.    Cardiovascular: Positive for chest pain.   Gastrointestinal: Negative.  Negative for abdominal pain.   Endocrine: Negative.    Genitourinary: Negative.    Musculoskeletal: Negative.    Skin: Negative.    Allergic/Immunologic: Negative.    Neurological: Positive for tremors.   Hematological: Negative.    Psychiatric/Behavioral: Positive for hallucinations.     Objective      Vital Signs  Temp:  [97.8 °F (36.6 °C)] 97.8 °F (36.6 °C)  Heart Rate:  [114-128] 128  Resp:  [20] 20  BP: (155-172)/() 155/113  Body mass index is 33.33 kg/m².    Physical Exam   Constitutional: He is oriented to person, place, and time. He appears well-developed and well-nourished.  Non-toxic appearance. He does not have a sickly appearance. He does not appear ill. No distress.      HENT:   Head: Normocephalic and atraumatic.   Eyes: Conjunctivae and EOM are normal.   Neck: Neck supple. No tracheal deviation present.   Cardiovascular: Regular rhythm and intact distal pulses. Tachycardia present.   Pulses:       Radial pulses are 2+ on the right side, and 2+ on the left side.        Dorsalis pedis pulses are 2+ on the right side, and 2+ on the left side.   Pulmonary/Chest: Effort normal and breath sounds normal. No respiratory distress. He has no wheezes. He has no rales.   Abdominal: Soft. Bowel sounds are normal. He exhibits no distension. There is no tenderness. There is no rebound and no guarding.   Musculoskeletal: He exhibits no edema.   Lymphadenopathy:     He has no cervical adenopathy.   Neurological: He is alert and oriented to person, place, and time. He displays tremor.   Skin: Skin is warm and dry.   Psychiatric: His behavior is normal. His affect is not angry and not inappropriate. He is not agitated and not aggressive. He exhibits a depressed mood.     While in the room and during my examination of the patient I wore gloves, mask, eye protection.  I washed my hands before and after this patient encounter.     Results Review:  I reviewed the patient's new clinical results.  I reviewed the patient's new imaging results and agree with the interpretation.  I personally viewed and interpreted the patient's EKG/Telemetry data.    Lab Results   Component Value Date    ANIONGAP 27.1 (H) 08/10/2020     Estimated Creatinine Clearance: 122.8 mL/min (by C-G formula based on SCr of 1.03 mg/dL).    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     45 y.o. male with a history of alcohol abuse admitted for alcohol withdrawal, alcoholic hepatitis and probably acute alcoholic myopathy, probably alcoholic ketosis.    · Alcohol withdrawal protocol.  PRN Ativan and close monitoring for deterioration.  Low threshold for transfer to unit if so  · Monitor LFTs, CPK.  Check urinalysis for ketones  · Access consult for suicidal ideation  · Liver mass on outside CT: Obtain MRI with and without contrast.  Creatinine is normal  · History of opiate use disorder supposed to be on Suboxone- not on medication list will need to verify  · Recent chest pain: Check troponin, echocardiogram  · Diabetes: Continue long-acting insulin.  Add correctional.  Check A1c  · discussed with patient and Dr. Rito Braswell MD  08/10/20  14:46

## 2020-08-11 ENCOUNTER — APPOINTMENT (OUTPATIENT)
Dept: MRI IMAGING | Facility: HOSPITAL | Age: 45
End: 2020-08-11

## 2020-08-11 ENCOUNTER — APPOINTMENT (OUTPATIENT)
Dept: CARDIOLOGY | Facility: HOSPITAL | Age: 45
End: 2020-08-11

## 2020-08-11 LAB
ALBUMIN SERPL-MCNC: 4 G/DL (ref 3.5–5.2)
ALBUMIN/GLOB SERPL: 1.4 G/DL
ALP SERPL-CCNC: 106 U/L (ref 39–117)
ALT SERPL W P-5'-P-CCNC: 173 U/L (ref 1–41)
ANION GAP SERPL CALCULATED.3IONS-SCNC: 12.7 MMOL/L (ref 5–15)
AORTIC DIMENSIONLESS INDEX: 0.9 (DI)
AST SERPL-CCNC: 205 U/L (ref 1–40)
BH CV ECHO MEAS - ACS: 2 CM
BH CV ECHO MEAS - AO ARCH DIAM (PROXIMAL TRANS.): 2.4 CM
BH CV ECHO MEAS - AO MAX PG (FULL): 2.9 MMHG
BH CV ECHO MEAS - AO MAX PG: 8.4 MMHG
BH CV ECHO MEAS - AO MEAN PG (FULL): 2 MMHG
BH CV ECHO MEAS - AO MEAN PG: 4 MMHG
BH CV ECHO MEAS - AO ROOT AREA (BSA CORRECTED): 1.5
BH CV ECHO MEAS - AO ROOT AREA: 10.2 CM^2
BH CV ECHO MEAS - AO ROOT DIAM: 3.6 CM
BH CV ECHO MEAS - AO V2 MAX: 144.8 CM/SEC
BH CV ECHO MEAS - AO V2 MEAN: 94.9 CM/SEC
BH CV ECHO MEAS - AO V2 VTI: 25.9 CM
BH CV ECHO MEAS - ASC AORTA: 3.1 CM
BH CV ECHO MEAS - AVA(I,A): 3.6 CM^2
BH CV ECHO MEAS - AVA(I,D): 3.6 CM^2
BH CV ECHO MEAS - AVA(V,A): 3.4 CM^2
BH CV ECHO MEAS - AVA(V,D): 3.4 CM^2
BH CV ECHO MEAS - BSA(HAYCOCK): 2.5 M^2
BH CV ECHO MEAS - BSA: 2.4 M^2
BH CV ECHO MEAS - BZI_BMI: 33.4 KILOGRAMS/M^2
BH CV ECHO MEAS - BZI_METRIC_HEIGHT: 185.4 CM
BH CV ECHO MEAS - BZI_METRIC_WEIGHT: 114.8 KG
BH CV ECHO MEAS - DESC AORTA: 2 CM
BH CV ECHO MEAS - EDV(CUBED): 59.3 ML
BH CV ECHO MEAS - EDV(MOD-SP2): 96 ML
BH CV ECHO MEAS - EDV(MOD-SP4): 143 ML
BH CV ECHO MEAS - EDV(TEICH): 65.9 ML
BH CV ECHO MEAS - EF(CUBED): 76.7 %
BH CV ECHO MEAS - EF(MOD-BP): 63 %
BH CV ECHO MEAS - EF(MOD-SP2): 63.5 %
BH CV ECHO MEAS - EF(MOD-SP4): 62.2 %
BH CV ECHO MEAS - EF(TEICH): 69.4 %
BH CV ECHO MEAS - ESV(CUBED): 13.8 ML
BH CV ECHO MEAS - ESV(MOD-SP2): 35 ML
BH CV ECHO MEAS - ESV(MOD-SP4): 54 ML
BH CV ECHO MEAS - ESV(TEICH): 20.2 ML
BH CV ECHO MEAS - FS: 38.5 %
BH CV ECHO MEAS - IVS/LVPW: 1
BH CV ECHO MEAS - IVSD: 1.1 CM
BH CV ECHO MEAS - LA DIMENSION: 3.5 CM
BH CV ECHO MEAS - LA/AO: 0.97
BH CV ECHO MEAS - LAT PEAK E' VEL: 11 CM/SEC
BH CV ECHO MEAS - LV DIASTOLIC VOL/BSA (35-75): 60.1 ML/M^2
BH CV ECHO MEAS - LV MASS(C)D: 140.1 GRAMS
BH CV ECHO MEAS - LV MASS(C)DI: 58.9 GRAMS/M^2
BH CV ECHO MEAS - LV MAX PG: 5.5 MMHG
BH CV ECHO MEAS - LV MEAN PG: 2 MMHG
BH CV ECHO MEAS - LV SYSTOLIC VOL/BSA (12-30): 22.7 ML/M^2
BH CV ECHO MEAS - LV V1 MAX: 117 CM/SEC
BH CV ECHO MEAS - LV V1 MEAN: 71.8 CM/SEC
BH CV ECHO MEAS - LV V1 VTI: 22.5 CM
BH CV ECHO MEAS - LVIDD: 3.9 CM
BH CV ECHO MEAS - LVIDS: 2.4 CM
BH CV ECHO MEAS - LVLD AP2: 8.3 CM
BH CV ECHO MEAS - LVLD AP4: 8.6 CM
BH CV ECHO MEAS - LVLS AP2: 7 CM
BH CV ECHO MEAS - LVLS AP4: 7.3 CM
BH CV ECHO MEAS - LVOT AREA (M): 4.2 CM^2
BH CV ECHO MEAS - LVOT AREA: 4.2 CM^2
BH CV ECHO MEAS - LVOT DIAM: 2.3 CM
BH CV ECHO MEAS - LVPWD: 1.1 CM
BH CV ECHO MEAS - MED PEAK E' VEL: 12 CM/SEC
BH CV ECHO MEAS - MV A DUR: 0.15 SEC
BH CV ECHO MEAS - MV A MAX VEL: 110 CM/SEC
BH CV ECHO MEAS - MV DEC SLOPE: 550 CM/SEC^2
BH CV ECHO MEAS - MV DEC TIME: 170 SEC
BH CV ECHO MEAS - MV E MAX VEL: 80.9 CM/SEC
BH CV ECHO MEAS - MV E/A: 0.74
BH CV ECHO MEAS - MV MAX PG: 5.9 MMHG
BH CV ECHO MEAS - MV MEAN PG: 3 MMHG
BH CV ECHO MEAS - MV P1/2T MAX VEL: 85.5 CM/SEC
BH CV ECHO MEAS - MV P1/2T: 45.5 MSEC
BH CV ECHO MEAS - MV V2 MAX: 121.5 CM/SEC
BH CV ECHO MEAS - MV V2 MEAN: 74.1 CM/SEC
BH CV ECHO MEAS - MV V2 VTI: 17.8 CM
BH CV ECHO MEAS - MVA P1/2T LCG: 2.6 CM^2
BH CV ECHO MEAS - MVA(P1/2T): 4.8 CM^2
BH CV ECHO MEAS - MVA(VTI): 5.3 CM^2
BH CV ECHO MEAS - PA ACC SLOPE: 1349 CM/SEC^2
BH CV ECHO MEAS - PA ACC TIME: 0.09 SEC
BH CV ECHO MEAS - PA MAX PG (FULL): 3.2 MMHG
BH CV ECHO MEAS - PA MAX PG: 5.4 MMHG
BH CV ECHO MEAS - PA PR(ACCEL): 40.8 MMHG
BH CV ECHO MEAS - PA V2 MAX: 116 CM/SEC
BH CV ECHO MEAS - PULM A REVS DUR: 0.17 SEC
BH CV ECHO MEAS - PULM A REVS VEL: 45.5 CM/SEC
BH CV ECHO MEAS - PULM DIAS VEL: 66.2 CM/SEC
BH CV ECHO MEAS - PULM S/D: 0.81
BH CV ECHO MEAS - PULM SYS VEL: 53.9 CM/SEC
BH CV ECHO MEAS - PVA(V,A): 3.6 CM^2
BH CV ECHO MEAS - PVA(V,D): 3.6 CM^2
BH CV ECHO MEAS - QP/QS: 0.71
BH CV ECHO MEAS - RAP SYSTOLE: 8 MMHG
BH CV ECHO MEAS - RV MAX PG: 2.2 MMHG
BH CV ECHO MEAS - RV MEAN PG: 1 MMHG
BH CV ECHO MEAS - RV V1 MAX: 73.9 CM/SEC
BH CV ECHO MEAS - RV V1 MEAN: 48.2 CM/SEC
BH CV ECHO MEAS - RV V1 VTI: 11.6 CM
BH CV ECHO MEAS - RVOT AREA: 5.7 CM^2
BH CV ECHO MEAS - RVOT DIAM: 2.7 CM
BH CV ECHO MEAS - RVSP: 38.9 MMHG
BH CV ECHO MEAS - SI(AO): 110.9 ML/M^2
BH CV ECHO MEAS - SI(CUBED): 19.1 ML/M^2
BH CV ECHO MEAS - SI(LVOT): 39.3 ML/M^2
BH CV ECHO MEAS - SI(MOD-SP2): 25.7 ML/M^2
BH CV ECHO MEAS - SI(MOD-SP4): 37.4 ML/M^2
BH CV ECHO MEAS - SI(TEICH): 19.2 ML/M^2
BH CV ECHO MEAS - SV(AO): 263.6 ML
BH CV ECHO MEAS - SV(CUBED): 45.5 ML
BH CV ECHO MEAS - SV(LVOT): 93.5 ML
BH CV ECHO MEAS - SV(MOD-SP2): 61 ML
BH CV ECHO MEAS - SV(MOD-SP4): 89 ML
BH CV ECHO MEAS - SV(RVOT): 66.4 ML
BH CV ECHO MEAS - SV(TEICH): 45.8 ML
BH CV ECHO MEAS - TAPSE (>1.6): 2.7 CM2
BH CV ECHO MEAS - TR MAX VEL: 278 CM/SEC
BH CV ECHO MEASUREMENTS AVERAGE E/E' RATIO: 7.03
BH CV VAS BP LEFT ARM: NORMAL MMHG
BH CV XLRA - RV BASE: 3.5 CM
BH CV XLRA - RV LENGTH: 7.5 CM
BH CV XLRA - RV MID: 2.6 CM
BH CV XLRA - TDI S': 23 CM/SEC
BILIRUB SERPL-MCNC: 2.3 MG/DL (ref 0–1.2)
BUN SERPL-MCNC: 8 MG/DL (ref 6–20)
BUN/CREAT SERPL: 10.3 (ref 7–25)
CALCIUM SPEC-SCNC: 9.1 MG/DL (ref 8.6–10.5)
CHLORIDE SERPL-SCNC: 97 MMOL/L (ref 98–107)
CK SERPL-CCNC: 609 U/L (ref 20–200)
CO2 SERPL-SCNC: 26.3 MMOL/L (ref 22–29)
CREAT SERPL-MCNC: 0.78 MG/DL (ref 0.76–1.27)
DEPRECATED RDW RBC AUTO: 40.3 FL (ref 37–54)
ERYTHROCYTE [DISTWIDTH] IN BLOOD BY AUTOMATED COUNT: 11.5 % (ref 12.3–15.4)
GFR SERPL CREATININE-BSD FRML MDRD: 132 ML/MIN/1.73
GLOBULIN UR ELPH-MCNC: 2.9 GM/DL
GLUCOSE BLDC GLUCOMTR-MCNC: 155 MG/DL (ref 70–130)
GLUCOSE BLDC GLUCOMTR-MCNC: 193 MG/DL (ref 70–130)
GLUCOSE BLDC GLUCOMTR-MCNC: 194 MG/DL (ref 70–130)
GLUCOSE BLDC GLUCOMTR-MCNC: 212 MG/DL (ref 70–130)
GLUCOSE SERPL-MCNC: 218 MG/DL (ref 65–99)
HCT VFR BLD AUTO: 38.6 % (ref 37.5–51)
HGB BLD-MCNC: 13.7 G/DL (ref 13–17.7)
HOLD SPECIMEN: NORMAL
LEFT ATRIUM VOLUME INDEX: 19 ML/M2
LEFT ATRIUM VOLUME: 43 CM3
MAXIMAL PREDICTED HEART RATE: 177 BPM
MCH RBC QN AUTO: 33.9 PG (ref 26.6–33)
MCHC RBC AUTO-ENTMCNC: 35.5 G/DL (ref 31.5–35.7)
MCV RBC AUTO: 95.5 FL (ref 79–97)
PLATELET # BLD AUTO: 119 10*3/MM3 (ref 140–450)
PMV BLD AUTO: 12.1 FL (ref 6–12)
POTASSIUM SERPL-SCNC: 3.5 MMOL/L (ref 3.5–5.2)
PROT SERPL-MCNC: 6.9 G/DL (ref 6–8.5)
RBC # BLD AUTO: 4.04 10*6/MM3 (ref 4.14–5.8)
REF LAB TEST METHOD: NORMAL
SARS-COV-2 RNA RESP QL NAA+PROBE: NOT DETECTED
SODIUM SERPL-SCNC: 136 MMOL/L (ref 136–145)
STRESS TARGET HR: 150 BPM
WBC # BLD AUTO: 6.66 10*3/MM3 (ref 3.4–10.8)

## 2020-08-11 PROCEDURE — 25010000002 HYDRALAZINE PER 20 MG: Performed by: NURSE PRACTITIONER

## 2020-08-11 PROCEDURE — 93306 TTE W/DOPPLER COMPLETE: CPT

## 2020-08-11 PROCEDURE — 93306 TTE W/DOPPLER COMPLETE: CPT | Performed by: INTERNAL MEDICINE

## 2020-08-11 PROCEDURE — 85027 COMPLETE CBC AUTOMATED: CPT | Performed by: HOSPITALIST

## 2020-08-11 PROCEDURE — 82550 ASSAY OF CK (CPK): CPT | Performed by: HOSPITALIST

## 2020-08-11 PROCEDURE — 80053 COMPREHEN METABOLIC PANEL: CPT | Performed by: HOSPITALIST

## 2020-08-11 PROCEDURE — 90791 PSYCH DIAGNOSTIC EVALUATION: CPT

## 2020-08-11 PROCEDURE — 25010000002 GADOTERATE MEGLUMINE 10 MMOL/20ML SOLUTION: Performed by: INTERNAL MEDICINE

## 2020-08-11 PROCEDURE — 74183 MRI ABD W/O CNTR FLWD CNTR: CPT

## 2020-08-11 PROCEDURE — 82962 GLUCOSE BLOOD TEST: CPT

## 2020-08-11 PROCEDURE — A9575 INJ GADOTERATE MEGLUMI 0.1ML: HCPCS | Performed by: INTERNAL MEDICINE

## 2020-08-11 PROCEDURE — 63710000001 INSULIN GLARGINE PER 5 UNITS: Performed by: HOSPITALIST

## 2020-08-11 PROCEDURE — 25010000002 LORAZEPAM PER 2 MG: Performed by: HOSPITALIST

## 2020-08-11 PROCEDURE — 63710000001 INSULIN LISPRO (HUMAN) PER 5 UNITS: Performed by: HOSPITALIST

## 2020-08-11 RX ORDER — ZOLPIDEM TARTRATE 5 MG/1
10 TABLET ORAL NIGHTLY PRN
Status: DISCONTINUED | OUTPATIENT
Start: 2020-08-11 | End: 2020-08-14 | Stop reason: HOSPADM

## 2020-08-11 RX ORDER — CLONIDINE HYDROCHLORIDE 0.1 MG/1
0.1 TABLET ORAL ONCE
Status: COMPLETED | OUTPATIENT
Start: 2020-08-11 | End: 2020-08-11

## 2020-08-11 RX ORDER — GADOTERATE MEGLUMINE 376.9 MG/ML
20 INJECTION INTRAVENOUS
Status: COMPLETED | OUTPATIENT
Start: 2020-08-11 | End: 2020-08-11

## 2020-08-11 RX ORDER — HYDRALAZINE HYDROCHLORIDE 20 MG/ML
10 INJECTION INTRAMUSCULAR; INTRAVENOUS EVERY 6 HOURS PRN
Status: DISCONTINUED | OUTPATIENT
Start: 2020-08-11 | End: 2020-08-14 | Stop reason: HOSPADM

## 2020-08-11 RX ADMIN — INSULIN LISPRO 2 UNITS: 100 INJECTION, SOLUTION INTRAVENOUS; SUBCUTANEOUS at 21:33

## 2020-08-11 RX ADMIN — LORAZEPAM 1 MG: 1 TABLET ORAL at 10:13

## 2020-08-11 RX ADMIN — INSULIN LISPRO 2 UNITS: 100 INJECTION, SOLUTION INTRAVENOUS; SUBCUTANEOUS at 07:52

## 2020-08-11 RX ADMIN — FOLIC ACID 1 MG: 1 TABLET ORAL at 07:53

## 2020-08-11 RX ADMIN — SODIUM CHLORIDE, POTASSIUM CHLORIDE, SODIUM LACTATE AND CALCIUM CHLORIDE 100 ML/HR: 600; 310; 30; 20 INJECTION, SOLUTION INTRAVENOUS at 14:45

## 2020-08-11 RX ADMIN — BUPROPION HYDROCHLORIDE 150 MG: 150 TABLET, FILM COATED, EXTENDED RELEASE ORAL at 07:52

## 2020-08-11 RX ADMIN — LORAZEPAM 1 MG: 1 TABLET ORAL at 08:04

## 2020-08-11 RX ADMIN — LORAZEPAM 1 MG: 1 TABLET ORAL at 12:42

## 2020-08-11 RX ADMIN — LORAZEPAM 1 MG: 1 TABLET ORAL at 14:45

## 2020-08-11 RX ADMIN — INSULIN LISPRO 3 UNITS: 100 INJECTION, SOLUTION INTRAVENOUS; SUBCUTANEOUS at 17:44

## 2020-08-11 RX ADMIN — INSULIN LISPRO 2 UNITS: 100 INJECTION, SOLUTION INTRAVENOUS; SUBCUTANEOUS at 12:42

## 2020-08-11 RX ADMIN — METOPROLOL SUCCINATE 50 MG: 50 TABLET, EXTENDED RELEASE ORAL at 07:53

## 2020-08-11 RX ADMIN — LORAZEPAM 1 MG: 1 TABLET ORAL at 18:46

## 2020-08-11 RX ADMIN — SODIUM CHLORIDE, POTASSIUM CHLORIDE, SODIUM LACTATE AND CALCIUM CHLORIDE 100 ML/HR: 600; 310; 30; 20 INJECTION, SOLUTION INTRAVENOUS at 01:38

## 2020-08-11 RX ADMIN — HYDRALAZINE HYDROCHLORIDE 10 MG: 20 INJECTION INTRAMUSCULAR; INTRAVENOUS at 21:32

## 2020-08-11 RX ADMIN — CLONIDINE HYDROCHLORIDE 0.1 MG: 0.1 TABLET ORAL at 15:37

## 2020-08-11 RX ADMIN — ZOLPIDEM TARTRATE 10 MG: 5 TABLET ORAL at 21:32

## 2020-08-11 RX ADMIN — Medication 100 MG: at 07:53

## 2020-08-11 RX ADMIN — GADOTERATE MEGLUMINE 20 ML: 376.9 INJECTION, SOLUTION INTRAVENOUS at 07:11

## 2020-08-11 RX ADMIN — Medication 1 TABLET: at 07:53

## 2020-08-11 RX ADMIN — SODIUM CHLORIDE, PRESERVATIVE FREE 10 ML: 5 INJECTION INTRAVENOUS at 07:52

## 2020-08-11 RX ADMIN — BUPRENORPHINE AND NALOXONE 1 FILM: 8; 2 FILM BUCCAL; SUBLINGUAL at 07:54

## 2020-08-11 RX ADMIN — LORAZEPAM 1 MG: 2 INJECTION INTRAMUSCULAR; INTRAVENOUS at 01:46

## 2020-08-11 RX ADMIN — SODIUM CHLORIDE, PRESERVATIVE FREE 10 ML: 5 INJECTION INTRAVENOUS at 21:32

## 2020-08-11 RX ADMIN — AMLODIPINE BESYLATE 10 MG: 10 TABLET ORAL at 07:53

## 2020-08-11 RX ADMIN — INSULIN GLARGINE 20 UNITS: 100 INJECTION, SOLUTION SUBCUTANEOUS at 21:32

## 2020-08-11 RX ADMIN — LORAZEPAM 1 MG: 1 TABLET ORAL at 16:45

## 2020-08-11 NOTE — PROGRESS NOTES
"Adult Nutrition  Assessment/PES    Patient Name:  Wu Damon  YOB: 1975  MRN: [id number]  Admit Date:  8/10/2020    Assessment Date:  8/11/2020    Comments:  Nutrition assessment triggered by MST score of 3 per nurse admission screen.  Admitted with suicidal ideation, ETOH withdrawal.  Access following.  Possible mass on liver, needs liver MRI per chart review.      Eating well, % at meals.  Obese.  Weight appears fairly stable per chart weight history.    RD to continue to follow.    Reason for Assessment     Row Name 08/11/20 1416          Reason for Assessment    Reason For Assessment  identified at risk by screening criteria     Diagnosis  psychosocial;substance use/abuse;cardiac disease;diabetes diagnosis/complications DM, HTN, ETOH abuse, history of opioid addiction; adm with ETOH withdrawal, suicidal ideations     Identified At Risk by Screening Criteria  MST SCORE 2+;unintentional loss of 10 lbs or more in the past 2 mos         Nutrition/Diet History     Row Name 08/11/20 1417          Nutrition/Diet History    Typical Food/Fluid Intake  eating % at meals         Anthropometrics     Row Name 08/11/20 1417 08/11/20 0737       Anthropometrics    Height  185.4 cm (72.99\")  185.4 cm (73\")    Weight  --  115 kg (253 lb)       Admit Weight    Admit Weight  -- 253# 8/11  --       Ideal Body Weight (IBW)    Ideal Body Weight (IBW) (kg)  84.84  84.86    % Ideal Body Weight  --  135.23       Usual Body Weight (UBW)    Weight Loss Time Frame  weight appears fairly stable per chart weight history  --       Body Mass Index (BMI)    BMI (kg/m2)  --  33.45    BMI Assessment  BMI 30-34.9: obesity grade I 33.38  --        Labs/Tests/Procedures/Meds     Row Name 08/11/20 1418          Labs/Procedures/Meds    Lab Results Reviewed  reviewed, pertinent     Lab Results Comments  Gluc, ALT, AST, Platelets        Diagnostic Tests/Procedures    Diagnostic Test/Procedure Reviewed  reviewed, " "pertinent     Diagnostic Test/Procedures Comments  needs liver MRI        Medications    Pertinent Medications Reviewed  reviewed, pertinent     Pertinent Medications Comments  folic acid, lantus, MVI, thiamine, IVFs         Physical Findings     Row Name 08/11/20 1419          Physical Findings    Overall Physical Appearance  obese     Skin  -- B=20, intact         Estimated/Assessed Needs     Row Name 08/11/20 1419 08/11/20 1417       Calculation Measurements    Weight Used For Calculations  115 kg (253 lb 8.5 oz)  --    Height  --  185.4 cm (72.99\")       Estimated/Assessed Needs       KCAL/KG    KCAL/KG  15 Kcal/Kg (kcal);20 Kcal/Kg (kcal)  --    15 Kcal/Kg (kcal)  1725  --    20 Kcal/Kg (kcal)  2300  --       Protein Requirements    Weight Used For Protein Calculations  115 kg (253 lb 8.5 oz)  --    Est Protein Requirement Amount (gms/kg)  1.0 gm protein  --    Estimated Protein Requirements (gms/day)  115  --       Fluid Requirements    Estimated Fluid Requirements (mL/day)  2300  --    Row Name 08/11/20 0737          Calculation Measurements    Height  185.4 cm (73\")         Nutrition Prescription Ordered     Row Name 08/11/20 1419          Nutrition Prescription PO    Current PO Diet  Regular         Evaluation of Received Nutrient/Fluid Intake     Row Name 08/11/20 1419          PO Evaluation    Number of Meals  2     % PO Intake  88         Problem/Interventions:  Problem 1     Row Name 08/11/20 1419          Nutrition Diagnoses Problem 1    Problem 1  Nutrition Appropriate for Condition at this Time     Etiology (related to)  MNT for Treatment/Condition     Signs/Symptoms (evidenced by)  PO Intake;Report/Observation     Percent (%) intake recorded  88 %     Over number of meals  2         Intervention Goal     Row Name 08/11/20 1420          Intervention Goal    General  Maintain nutrition;Reduce/improve symptoms;Disease management/therapy     PO  Maintain intake     Weight  Appropriate weight loss     "     Nutrition Intervention     Row Name 08/11/20 1420          Nutrition Intervention    RD/Tech Action  Follow Tx progress;Care plan reviewd         Education/Evaluation     Row Name 08/11/20 1420          Education    Education  Will Instruct as appropriate        Monitor/Evaluation    Monitor  Per protocol;PO intake;Pertinent labs;Weight;Symptoms           Electronically signed by:  Deanna Benites RD  08/11/20 14:20

## 2020-08-11 NOTE — PROGRESS NOTES
Continued Stay Note  [facility]     Patient Name: Wu Damon  MRN: [id number]  Today's Date: 8/11/2020    Admit Date: 8/10/2020    Discharge Plan     Row Name 08/11/20 0939       Plan    Plan  Plan home with family.  BRIANA Hassan RN    Patient/Family in Agreement with Plan  yes    Plan Comments  FACE SHEET VERIFIED.  Spoke to pt at bedside.  Pt's PCP is ALEXIA Liu.  Pt lives in a single story house with spouse  ( Minerva Damon ( [phone]) and triplets  ( Martin, Marizol, and Kizzy 17 months old).  Pt is independent with ADL's.  Pt has a glucometer for home use.  Pt gets prescriptions at [facility]  ([address]  Pt denies any issues affording medications.  Pt is not current with HH.  Pt has not been in SNF.  Pt may need assistance with transportation home.  Plan home with family.  BRIANA Hassan RN        Discharge Codes    No documentation.             Neena Hassan RN

## 2020-08-11 NOTE — PROGRESS NOTES
Name: Wu Damon ADMIT: 8/10/2020   : 1975  PCP: Juan Carlos Kwan ALEXIA    MRN: [id number] LOS: 1 days   AGE/SEX: 45 y.o. male  ROOM: [facility]/     Subjective   Subjective   Chest got back from echocardiogram.  States he feels anxious. no events over night.    Review of Systems   Constitutional: Negative for chills and fever.   Respiratory: Negative for chest tightness and shortness of breath.    Cardiovascular: Negative for chest pain, palpitations and leg swelling.   Genitourinary: Negative for difficulty urinating and dysuria.   Neurological: Positive for tremors. Negative for seizures.        Objective   Objective   Vital Signs  Temp:  [98.2 °F (36.8 °C)-99.3 °F (37.4 °C)] 98.2 °F (36.8 °C)  Heart Rate:  [101-145] 118  Resp:  [18-22] 18  BP: (136-185)/() 185/117  SpO2:  [94 %-98 %] 98 %  on   ;   Device (Oxygen Therapy): room air  Body mass index is 33.38 kg/m².  Physical Exam   Constitutional: He is oriented to person, place, and time. He appears well-developed and well-nourished.   HENT:   Head: Normocephalic and atraumatic.   Eyes: Conjunctivae and EOM are normal.   Neck: Neck supple. No JVD present.   Cardiovascular: Normal rate and regular rhythm.   Pulmonary/Chest: Effort normal and breath sounds normal.   Abdominal: Soft. Bowel sounds are normal. He exhibits no distension. There is no tenderness.   Musculoskeletal: Normal range of motion. He exhibits no edema.   Neurological: He is alert and oriented to person, place, and time.   Skin: Skin is warm and dry.   Psychiatric: His behavior is normal. His mood appears anxious. He is not actively hallucinating.   Nursing note and vitals reviewed.      Results Review:       I reviewed the patient's new clinical results.  Results from last 7 days   Lab Units 20  0539 08/10/20  1201 20  1734   WBC 10*3/mm3 6.66 6.85 7.06   HEMOGLOBIN g/dL 13.7 16.6 15.2   PLATELETS 10*3/mm3 119* 186 189     Results from last 7 days   Lab Units  08/11/20  0539 08/10/20  1201 08/08/20  1734   SODIUM mmol/L 136 147* 138   POTASSIUM mmol/L 3.5 3.6 3.8   CHLORIDE mmol/L 97* 101 102   TOTAL CO2 mmol/L  --   --  16*   CO2 mmol/L 26.3 18.9*  --    BUN mg/dL 8 10 17   CREATININE mg/dL 0.78 1.03 1.1   GLUCOSE mg/dL 218* 103*  --    Estimated Creatinine Clearance: 162.2 mL/min (by C-G formula based on SCr of 0.78 mg/dL).  Results from last 7 days   Lab Units 08/11/20  0539 08/10/20  1201 08/08/20  1734   ALBUMIN g/dL 4.00 4.70 5.0   BILIRUBIN mg/dL 2.3* 1.8* 2.2*   ALK PHOS U/L 106 120* 114   AST (SGOT) U/L 205* 411* 362*   ALT (SGPT) U/L 173* 242* 211*     Results from last 7 days   Lab Units 08/11/20  0539 08/10/20  1201 08/08/20  1734   CALCIUM mg/dL 9.1 9.3 9.3   ALBUMIN g/dL 4.00 4.70 5.0     No results found for: COVID19  Hemoglobin A1C   Date/Time Value Ref Range Status   08/10/2020 1201 5.90 (H) 4.80 - 5.60 % Final     Glucose   Date/Time Value Ref Range Status   08/11/2020 1227 194 (H) 70 - 130 mg/dL Final   08/11/2020 0607 193 (H) 70 - 130 mg/dL Final   08/10/2020 1923 193 (H) 70 - 130 mg/dL Final   08/10/2020 1729 172 (H) 70 - 130 mg/dL Final       Transthoracic Echo Complete With Contrast if Necessary Per Protocol  · Left ventricular systolic function is normal. Calculated EF = 63.0%.   Estimated EF was in agreement with the calculated EF. Normal left   ventricular cavity size noted. All left ventricular wall segments contract   normally. Left ventricular wall thickness is consistent with mild   concentric hypertrophy. Left ventricular diastolic function is normal.     MRI Abdomen With & Without Contrast  MRI ABDOMEN WITH AND WITHOUT CONTRAST     HISTORY: Alcohol abuse with liver mass.      TECHNIQUE: Multiplanar multisequence MRI of the abdomen was performed  before and after the IV administration of 20 mL of Clariscan.     COMPARISON: None     FINDINGS:  The gallbladder is distended, measuring approximately 11.7 cm in length.     There is a 0.7 cm T2  hyperintense lesion arising off the anterior aspect  of the pancreatic head. Pancreatic duct is not dilated.     Trace amount of ascites is present within the left upper quadrant.     The adrenal glands have an unremarkable pre and postcontrast MR  appearance. Spleen is not enlarged. Subcentimeter nonenhancing T2  hyperintense lesion within the right kidney is too small to  characterize.     Marked hepatic steatosis is present. There are 3 T2 hyperintense lesions  within the right hepatic lobe. The largest lesion is located posteriorly  (subsegment VII) and measures 3.8 x 2.1 cm. It demonstrates  discontinuous peripheral nodular enhancement with progressive filling.     The smaller lesions are located more superiorly within subsegment VII  and anteriorly within subsegment VIII and measure 1.2 cm and 0.9 cm,  respectively. While there is a suggestion of peripheral enhancement with  suggestive filling, findings remain indeterminate at this time. There is  definitely no washout.     IMPRESSION  1.  Three enhancing lesions within the right hepatic lobe. The larger  lesion meets criteria for benign hemangioma. The 2 smaller lesions have  a suggestion of hemangiomas; however, remain indeterminate at this time,  especially given patient history. If this patient has diagnosed  cirrhosis, follow-up with abdominal MRI with and without contrast in 3  months is recommended. If the patient does not have documented cirrhosis  at this time, follow-up with MRI of the abdomen with and without  contrast in 6 months is recommended to ensure stability.  2.  The gallbladder is distended, and while nonspecific can be seen in  the setting of early cholecystitis in the appropriate clinical context  and correlation with patient history is recommended with follow-up HIDA  scan if clinically indicated.  3.  Extensive hepatic steatosis.  4.  Subcentimeter T2 hyperintense lesion within the pancreatic head  without findings of additional  aggressive features. Attention on above  mentioned follow-up is recommended.  5.  Other findings as above.              amLODIPine 10 mg Oral Daily   buprenorphine-naloxone 1 film Sublingual Daily   buPROPion  mg Oral QAM   vitamin B-1 100 mg Oral Daily   And      multivitamin 1 tablet Oral Daily   And      folic acid 1 mg Oral Daily   insulin glargine 20 Units Subcutaneous Nightly   insulin lispro 0-7 Units Subcutaneous 4x Daily With Meals & Nightly   metoprolol succinate XL 50 mg Oral Daily   sodium chloride 10 mL Intravenous Q12H   thiamine (VITAMIN B1) IVPB 100 mg Intravenous Daily   thiamine (VITAMIN B1) IVPB 100 mg Intravenous Once   Or      thiamine 100 mg Oral Once       lactated ringers 100 mL/hr Last Rate: 100 mL/hr (08/11/20 0138)   Diet Regular       Assessment/Plan     Active Hospital Problems    Diagnosis  POA   • Alcohol withdrawal syndrome, with delirium (CMS/HCC) [F10.231]  Yes   • Alcoholic hepatitis [K70.10]  Unknown   • Alcohol abuse [F10.10]  Unknown   • Suicidal ideations [R45.851]  Not Applicable   • 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       · Alcohol withdrawal protocol.  PRN Ativan and close monitoring for deterioration.  BP elevated, will give one-time dose of clonidine and monitor.  Will also add on as needed hydralazine for SBP greater than 180 and DBP greater than 100.  · Monitor LFTs, CPK.  Check urinalysis for ketones  · Access consult noted  · Liver mass on outside CT. MRI ordered and demonstrates 3 enhancing lesions within the right hepatic lobe.  Likely benign hemangiomas though indeterminate at this time since he does not have any evidence of cirrhosis at this time recommendations are for MRI of the abdomen with and without contrast in 6 months.  Gallbladder was distended and could be early cholecystitis  however patient denies any abdominal pain at this time.  MRI also showed extensive hepatic steatosis.  T2 hyperintense lesion within the pancreatic head without findings of additional aggressive features.  Recommendations as above.  · History of opiate use disorder, on Suboxone  · Recent chest pain: Troponin negative and echocardiogram normal.  · Diabetes: Continue long-acting insulin.  Add correctional.   A1c 5.9.  Continue current management.  · SCDs for DVT prophylaxis.  · Full code.  · Discussed with patient and nursing staff.  · Anticipate discharge home 1-2 days      ALEXIA Miller  [facility]  08/11/20  13:28    I wore protective equipment throughout this patient encounter including a face mask, gloves and protective eyewear.  Hand hygiene was performed before donning protective equipment and after removal when leaving the room.

## 2020-08-11 NOTE — PROGRESS NOTES
Discharge Planning Assessment  [facility]     Patient Name: Wu Damon  MRN: [id number]  Today's Date: 8/11/2020    Admit Date: 8/10/2020    Discharge Needs Assessment     Row Name 08/11/20 0936       Living Environment    Lives With  child(bharti), dependent;spouse    Name(s) of Who Lives With Patient  Spouse  ( Minerva Damon ( [phone]) and triplets  ( Sernity, Journey, and Pr[place] 17 months old)    Current Living Arrangements  home/apartment/condo    Primary Care Provided by  self    Provides Primary Care For  no one    Family Caregiver if Needed  spouse    Family Caregiver Names  Spouse  ( Minerva Damon ( [phone])     Quality of Family Relationships  involved;supportive    Able to Return to Prior Arrangements  yes    Living Arrangement Comments  Pt lives in a single story house with spouse  ( Minerva Damon ( [phone]) and triplets  ( Sernity, Journey, and [place] 17 months old).       Resource/Environmental Concerns    Resource/Environmental Concerns  none    Transportation Concerns  car, none       Transition Planning    Patient/Family Anticipates Transition to  home with family    Patient/Family Anticipated Services at Transition  mental health services    Transportation Anticipated  family or friend will provide       Discharge Needs Assessment    Readmission Within the Last 30 Days  no previous admission in last 30 days    Concerns to be Addressed  denies needs/concerns at this time    Equipment Currently Used at Home  glucometer    Anticipated Changes Related to Illness  none    Equipment Needed After Discharge  glucometer        Discharge Plan     Row Name 08/11/20 0939       Plan    Plan  Plan home with family.  BRIANA Hassan RN    Patient/Family in Agreement with Plan  yes    Plan Comments  FACE SHEET VERIFIED.  Spoke to pt at bedside.  Pt's PCP is ALEXIA Liu.  Pt lives in a single story house with spouse  ( Minerva Damon ( [phone]) and triplets  (  Martin Marizol, and Kizzy 17 months old).  Pt is independent with ADL's.  Pt has a glucometer for home use.  Pt gets prescriptions at [facility]  ([address]  Pt denies any issues affording medications.  Pt is not current with HH.  Pt has not been in SNF.  Pt may need assistance with transportation home.  Plan home with family.  BRIANA Hassan RN        Destination      Coordination has not been started for this encounter.      Durable Medical Equipment      Coordination has not been started for this encounter.      Dialysis/Infusion      Coordination has not been started for this encounter.      Home Medical Care      Coordination has not been started for this encounter.      Therapy      Coordination has not been started for this encounter.      Community Resources      Coordination has not been started for this encounter.          Demographic Summary     Row Name 08/11/20 0936       General Information    Admission Type  inpatient    Arrived From  emergency department    Referral Source  admission list    Reason for Consult  discharge planning    Preferred Language  English     Used During This Interaction  no        Functional Status     Row Name 08/11/20 0936       Functional Status    Usual Activity Tolerance  good    Current Activity Tolerance  good       Functional Status, IADL    Medications  independent    Meal Preparation  independent    Housekeeping  independent    Laundry  independent    Shopping  independent       Mental Status    General Appearance WDL  WDL       Mental Status Summary    Recent Changes in Mental Status/Cognitive Functioning  no changes        Psychosocial    No documentation.       Abuse/Neglect    No documentation.       Legal    No documentation.       Substance Abuse    No documentation.       Patient Forms    No documentation.           Neena Hassan, JAZ

## 2020-08-11 NOTE — PLAN OF CARE
Patient alert and oriented on suicide and seizure precautions with CIWA ranging from 8-10 most of the day, denies pain. Medicated with PO Ativan Q 2 hours prn. Sitter at bedside. Echo and MRI completed today. Patient seen by [facility] today for SI statements from yesterday in ED and at home according to family. Patient denies SI. IV fluids infusing. Patient with good appetite. No acute distress noted, will continue to monitor.

## 2020-08-11 NOTE — PLAN OF CARE
Pt c/o h/a throughout shift, PRN ativan administered with relief. A&Ox4. IVFs and medications administered per orders. PRN ativan administered per orders. Sitter at bedside. Access to see pt today. NPO since midnight for MRI today. Up with assist. Tachycardic on monitor. Rest of VSS. No s/s of distress at this time. Will continue to monitor.       Problem: Suicide Risk (Adult)  Goal: Identify Related Risk Factors and Signs and Symptoms  Outcome: Ongoing (interventions implemented as appropriate)  Goal: Strength-Based Wellness/Recovery  Outcome: Ongoing (interventions implemented as appropriate)  Goal: Physical Safety  Outcome: Ongoing (interventions implemented as appropriate)     Problem: Patient Care Overview  Goal: Plan of Care Review  Outcome: Ongoing (interventions implemented as appropriate)  Flowsheets (Taken 8/11/2020 0322)  Progress: no change  Plan of Care Reviewed With: patient  Goal: Individualization and Mutuality  Outcome: Ongoing (interventions implemented as appropriate)  Goal: Discharge Needs Assessment  Outcome: Ongoing (interventions implemented as appropriate)  Goal: Interprofessional Rounds/Family Conf  Outcome: Ongoing (interventions implemented as appropriate)     Problem: Alcohol Withdrawal Acute, Risk/Actual (Adult)  Goal: Signs and Symptoms of Listed Potential Problems Will be Absent, Minimized or Managed (Alcohol Withdrawal Acute, Risk/Actual)  Outcome: Ongoing (interventions implemented as appropriate)

## 2020-08-11 NOTE — CONSULTS
"[facility] consulted regarding alcohol and suicidal thoughts.  Patient was evaluated alone in room 667.  He is alert and oriented however needed to read date from whiteboard.  He is pleasant and cooperative, talkative, appropriate.      He is a 46 yo MAAM, lives with his wife and their 17 month old triplets, he has 3 other adult children from a previous relationship.  He is currently unemployed, lost his job with [employer] about 8 months ago.  He reports a history of alcohol use to help him sleep, this increased after he lost his job and was drinking up to a galloon a day.  He reports that he has been trying to cut back and has now been drinking about 2 pints a day (BAL on arrival was 302, UDS negative).  He has been going to [facility] monthly, is receiving Suboxone (Lizandro torrez same) for past 4 years regarding history of opiate abuse.  He also reports attending some AA zoom meetings.  He denies tobacco use and illicit drug use.  He denies hx of withdrawal, currently noted to have tremors, BP is elevated, and tachycardic as well.    He adamantly denies SI and a wish to be dead, stating \"I wouldn't dare to.  I want to be around for those girls and watch them grow up\" referring to his triplets.  He is future oriented stated he wants to get healthy, wants to stop drinking, and hopes to improve his diabetes as he is afraid he will end up on dialysis and/or lose his extremities.  He discussed goals of being compliant with medication and having his wife poor out any remaining alcohol.  He denies intention/plan to harm/kill self.  He denies past SI attempts, has no mental health provider.  Was recently prescribed Wellbutrin.  He denies HI.  He reports poor sleep, stating when he lays down at night his mind starts racing and is difficult for him to sleep.  He reports low appetite related to drinking.  Rated current anxiety at a 4, doesn't feel he is currently depressed.  Does report stressors of being unemployed and " arguments with wife, is interested in couples therapy.    He would like to continue to follow with [facility] has a counselor named Riley, has a good rapport, is interested in receiving other outpatient psych resources.  He again denies SI, plan and intention, able to contract to safety.  SP precautions could be discontinued, RN informed.    AC will follow to provide support and other outpatient resources as needed.

## 2020-08-12 PROBLEM — E87.6 HYPOKALEMIA: Status: ACTIVE | Noted: 2020-08-12

## 2020-08-12 LAB
ALBUMIN SERPL-MCNC: 4.1 G/DL (ref 3.5–5.2)
ALBUMIN/GLOB SERPL: 1.4 G/DL
ALP SERPL-CCNC: 123 U/L (ref 39–117)
ALT SERPL W P-5'-P-CCNC: 144 U/L (ref 1–41)
ANION GAP SERPL CALCULATED.3IONS-SCNC: 11.9 MMOL/L (ref 5–15)
AST SERPL-CCNC: 145 U/L (ref 1–40)
BASOPHILS # BLD AUTO: 0.05 10*3/MM3 (ref 0–0.2)
BASOPHILS NFR BLD AUTO: 0.7 % (ref 0–1.5)
BILIRUB SERPL-MCNC: 1.9 MG/DL (ref 0–1.2)
BUN SERPL-MCNC: 6 MG/DL (ref 6–20)
BUN/CREAT SERPL: 8.7 (ref 7–25)
CALCIUM SPEC-SCNC: 9.5 MG/DL (ref 8.6–10.5)
CHLORIDE SERPL-SCNC: 95 MMOL/L (ref 98–107)
CK SERPL-CCNC: 350 U/L (ref 20–200)
CO2 SERPL-SCNC: 26.1 MMOL/L (ref 22–29)
CREAT SERPL-MCNC: 0.69 MG/DL (ref 0.76–1.27)
DEPRECATED RDW RBC AUTO: 37.9 FL (ref 37–54)
EOSINOPHIL # BLD AUTO: 0.22 10*3/MM3 (ref 0–0.4)
EOSINOPHIL NFR BLD AUTO: 3.3 % (ref 0.3–6.2)
ERYTHROCYTE [DISTWIDTH] IN BLOOD BY AUTOMATED COUNT: 11.2 % (ref 12.3–15.4)
GFR SERPL CREATININE-BSD FRML MDRD: >150 ML/MIN/1.73
GLOBULIN UR ELPH-MCNC: 2.9 GM/DL
GLUCOSE BLDC GLUCOMTR-MCNC: 162 MG/DL (ref 70–130)
GLUCOSE BLDC GLUCOMTR-MCNC: 190 MG/DL (ref 70–130)
GLUCOSE BLDC GLUCOMTR-MCNC: 214 MG/DL (ref 70–130)
GLUCOSE BLDC GLUCOMTR-MCNC: 241 MG/DL (ref 70–130)
GLUCOSE SERPL-MCNC: 193 MG/DL (ref 65–99)
HCT VFR BLD AUTO: 38.8 % (ref 37.5–51)
HGB BLD-MCNC: 14.2 G/DL (ref 13–17.7)
IMM GRANULOCYTES # BLD AUTO: 0.02 10*3/MM3 (ref 0–0.05)
IMM GRANULOCYTES NFR BLD AUTO: 0.3 % (ref 0–0.5)
LYMPHOCYTES # BLD AUTO: 1.59 10*3/MM3 (ref 0.7–3.1)
LYMPHOCYTES NFR BLD AUTO: 23.8 % (ref 19.6–45.3)
MCH RBC QN AUTO: 34.1 PG (ref 26.6–33)
MCHC RBC AUTO-ENTMCNC: 36.6 G/DL (ref 31.5–35.7)
MCV RBC AUTO: 93 FL (ref 79–97)
MONOCYTES # BLD AUTO: 0.55 10*3/MM3 (ref 0.1–0.9)
MONOCYTES NFR BLD AUTO: 8.2 % (ref 5–12)
NEUTROPHILS NFR BLD AUTO: 4.24 10*3/MM3 (ref 1.7–7)
NEUTROPHILS NFR BLD AUTO: 63.7 % (ref 42.7–76)
NRBC BLD AUTO-RTO: 0 /100 WBC (ref 0–0.2)
PLATELET # BLD AUTO: 101 10*3/MM3 (ref 140–450)
PMV BLD AUTO: 13 FL (ref 6–12)
POTASSIUM SERPL-SCNC: 3.1 MMOL/L (ref 3.5–5.2)
POTASSIUM SERPL-SCNC: 3.9 MMOL/L (ref 3.5–5.2)
PROT SERPL-MCNC: 7 G/DL (ref 6–8.5)
RBC # BLD AUTO: 4.17 10*6/MM3 (ref 4.14–5.8)
SODIUM SERPL-SCNC: 133 MMOL/L (ref 136–145)
WBC # BLD AUTO: 6.67 10*3/MM3 (ref 3.4–10.8)

## 2020-08-12 PROCEDURE — 84132 ASSAY OF SERUM POTASSIUM: CPT | Performed by: INTERNAL MEDICINE

## 2020-08-12 PROCEDURE — 82962 GLUCOSE BLOOD TEST: CPT

## 2020-08-12 PROCEDURE — 25010000002 HYDRALAZINE PER 20 MG: Performed by: NURSE PRACTITIONER

## 2020-08-12 PROCEDURE — 80053 COMPREHEN METABOLIC PANEL: CPT | Performed by: NURSE PRACTITIONER

## 2020-08-12 PROCEDURE — 85025 COMPLETE CBC W/AUTO DIFF WBC: CPT | Performed by: NURSE PRACTITIONER

## 2020-08-12 PROCEDURE — 25010000002 THIAMINE PER 100 MG: Performed by: HOSPITALIST

## 2020-08-12 PROCEDURE — 82550 ASSAY OF CK (CPK): CPT | Performed by: NURSE PRACTITIONER

## 2020-08-12 PROCEDURE — 63710000001 INSULIN LISPRO (HUMAN) PER 5 UNITS: Performed by: HOSPITALIST

## 2020-08-12 PROCEDURE — 63710000001 INSULIN GLARGINE PER 5 UNITS: Performed by: HOSPITALIST

## 2020-08-12 RX ORDER — SODIUM CHLORIDE 9 MG/ML
100 INJECTION, SOLUTION INTRAVENOUS CONTINUOUS
Status: DISCONTINUED | OUTPATIENT
Start: 2020-08-12 | End: 2020-08-14 | Stop reason: HOSPADM

## 2020-08-12 RX ORDER — ACETAMINOPHEN 325 MG/1
650 TABLET ORAL EVERY 6 HOURS PRN
Status: DISCONTINUED | OUTPATIENT
Start: 2020-08-12 | End: 2020-08-14 | Stop reason: HOSPADM

## 2020-08-12 RX ORDER — POTASSIUM CHLORIDE 750 MG/1
40 CAPSULE, EXTENDED RELEASE ORAL ONCE
Status: COMPLETED | OUTPATIENT
Start: 2020-08-12 | End: 2020-08-12

## 2020-08-12 RX ADMIN — THIAMINE HYDROCHLORIDE 100 MG: 100 INJECTION, SOLUTION INTRAMUSCULAR; INTRAVENOUS at 10:58

## 2020-08-12 RX ADMIN — INSULIN LISPRO 3 UNITS: 100 INJECTION, SOLUTION INTRAVENOUS; SUBCUTANEOUS at 17:24

## 2020-08-12 RX ADMIN — METOPROLOL SUCCINATE 50 MG: 50 TABLET, EXTENDED RELEASE ORAL at 08:39

## 2020-08-12 RX ADMIN — LORAZEPAM 1 MG: 1 TABLET ORAL at 21:36

## 2020-08-12 RX ADMIN — SODIUM CHLORIDE, POTASSIUM CHLORIDE, SODIUM LACTATE AND CALCIUM CHLORIDE 100 ML/HR: 600; 310; 30; 20 INJECTION, SOLUTION INTRAVENOUS at 11:01

## 2020-08-12 RX ADMIN — LORAZEPAM 1 MG: 1 TABLET ORAL at 12:00

## 2020-08-12 RX ADMIN — HYDRALAZINE HYDROCHLORIDE 10 MG: 20 INJECTION INTRAMUSCULAR; INTRAVENOUS at 15:04

## 2020-08-12 RX ADMIN — Medication 1 TABLET: at 08:38

## 2020-08-12 RX ADMIN — INSULIN LISPRO 2 UNITS: 100 INJECTION, SOLUTION INTRAVENOUS; SUBCUTANEOUS at 21:29

## 2020-08-12 RX ADMIN — ACETAMINOPHEN 650 MG: 325 TABLET, FILM COATED ORAL at 21:36

## 2020-08-12 RX ADMIN — SODIUM CHLORIDE, PRESERVATIVE FREE 10 ML: 5 INJECTION INTRAVENOUS at 21:38

## 2020-08-12 RX ADMIN — INSULIN GLARGINE 20 UNITS: 100 INJECTION, SOLUTION SUBCUTANEOUS at 21:28

## 2020-08-12 RX ADMIN — HYDRALAZINE HYDROCHLORIDE 10 MG: 20 INJECTION INTRAMUSCULAR; INTRAVENOUS at 21:29

## 2020-08-12 RX ADMIN — SODIUM CHLORIDE, PRESERVATIVE FREE 10 ML: 5 INJECTION INTRAVENOUS at 08:40

## 2020-08-12 RX ADMIN — AMLODIPINE BESYLATE 10 MG: 10 TABLET ORAL at 08:39

## 2020-08-12 RX ADMIN — ACETAMINOPHEN 650 MG: 325 TABLET, FILM COATED ORAL at 03:49

## 2020-08-12 RX ADMIN — HYDRALAZINE HYDROCHLORIDE 10 MG: 20 INJECTION INTRAMUSCULAR; INTRAVENOUS at 06:48

## 2020-08-12 RX ADMIN — POTASSIUM CHLORIDE 40 MEQ: 750 CAPSULE, EXTENDED RELEASE ORAL at 13:08

## 2020-08-12 RX ADMIN — BUPROPION HYDROCHLORIDE 150 MG: 150 TABLET, FILM COATED, EXTENDED RELEASE ORAL at 08:39

## 2020-08-12 RX ADMIN — ACETAMINOPHEN 650 MG: 325 TABLET, FILM COATED ORAL at 10:58

## 2020-08-12 RX ADMIN — FOLIC ACID 1 MG: 1 TABLET ORAL at 08:39

## 2020-08-12 RX ADMIN — LORAZEPAM 1 MG: 1 TABLET ORAL at 03:49

## 2020-08-12 RX ADMIN — SODIUM CHLORIDE 100 ML/HR: 9 INJECTION, SOLUTION INTRAVENOUS at 21:29

## 2020-08-12 RX ADMIN — SODIUM CHLORIDE, POTASSIUM CHLORIDE, SODIUM LACTATE AND CALCIUM CHLORIDE 100 ML/HR: 600; 310; 30; 20 INJECTION, SOLUTION INTRAVENOUS at 00:42

## 2020-08-12 RX ADMIN — LORAZEPAM 1 MG: 1 TABLET ORAL at 18:27

## 2020-08-12 RX ADMIN — INSULIN LISPRO 2 UNITS: 100 INJECTION, SOLUTION INTRAVENOUS; SUBCUTANEOUS at 12:00

## 2020-08-12 RX ADMIN — POTASSIUM CHLORIDE 40 MEQ: 750 CAPSULE, EXTENDED RELEASE ORAL at 09:56

## 2020-08-12 RX ADMIN — Medication 100 MG: at 08:39

## 2020-08-12 RX ADMIN — SODIUM CHLORIDE 100 ML/HR: 9 INJECTION, SOLUTION INTRAVENOUS at 12:02

## 2020-08-12 RX ADMIN — LORAZEPAM 1 MG: 1 TABLET ORAL at 08:39

## 2020-08-12 RX ADMIN — INSULIN LISPRO 3 UNITS: 100 INJECTION, SOLUTION INTRAVENOUS; SUBCUTANEOUS at 08:40

## 2020-08-12 NOTE — PROGRESS NOTES
"[facility] Note.    Chart reviewed.    On approach, found Pt abed with sitter at bedside. Pt readily agreed to follow up w/this writer on introduction.     Pt expressed disappointment with his still being on suicide precautoins. Pt shared with this writer that he was not ever suicidal, He shared how he spent \"15 minutes telling (EMS) I don't want to die. I've got these 17 month old babies and I don't want to die anytime before they are at least 20 yrs old. I want to see that they do\" Pt reportedly got agitated and sarcastically said yes he did want to die when EMS staff asked him after he'd spent \"15 minutes telling them I didn't want to die\". [facility] JAZ Alvarez indicated no need for further suicide precautions after consult on 8/11/2020.     Pt clearly stated to this writer he has no SI/Hi or wish to die naturally. With 10 being the most, he rated depression \"0\" and anxiety \"3 or 4\". He reported improvements in how he feels physically. Pt plans to cinue outpt tx with [facility] and his counselor Riley. Gave Pt referrals for couples' therapy.     Spoke to Jessa SEBASTIAN about recommendation to remove suicide precautions. RN places call to [facility] to request such removal.    Access to follow further briefly.      "

## 2020-08-12 NOTE — PROGRESS NOTES
Name: Wu Damon ADMIT: 8/10/2020   : 1975  PCP: Juan Carlos Kwan ALEXIA    MRN: [id number] LOS: 2 days   AGE/SEX: 45 y.o. male  ROOM: [facility]     Subjective   Subjective   feels a little better today, just very fatigued. no events overnight.     Review of Systems   Constitutional: Negative for chills and fever.   Respiratory: Negative for chest tightness and shortness of breath.    Cardiovascular: Negative for chest pain, palpitations and leg swelling.   Genitourinary: Negative for difficulty urinating and dysuria.   Neurological: Positive for tremors. Negative for seizures.        Objective   Objective   Vital Signs  Temp:  [98.2 °F (36.8 °C)-98.7 °F (37.1 °C)] 98.7 °F (37.1 °C)  Heart Rate:  [102-118] 102  Resp:  [16-18] 16  BP: (132-185)/() 132/83  SpO2:  [98 %-100 %] 98 %  on   ;   Device (Oxygen Therapy): room air  Body mass index is 33.39 kg/m².  Physical Exam   Constitutional: He is oriented to person, place, and time. He appears well-developed and well-nourished.   HENT:   Head: Normocephalic and atraumatic.   Eyes: Conjunctivae and EOM are normal.   Neck: Neck supple. No JVD present.   Cardiovascular: Normal rate and regular rhythm.   Pulmonary/Chest: Effort normal and breath sounds normal.   Abdominal: Soft. Bowel sounds are normal. He exhibits no distension. There is no tenderness.   Musculoskeletal: Normal range of motion. He exhibits no edema.   Neurological: He is alert and oriented to person, place, and time.   Skin: Skin is warm and dry.   Psychiatric: His behavior is normal. His mood appears anxious. He is not actively hallucinating.   Nursing note and vitals reviewed.      Results Review:       I reviewed the patient's new clinical results.  Results from last 7 days   Lab Units 20  0713 20  0539 08/10/20  1201 20  1734   WBC 10*3/mm3 6.67 6.66 6.85 7.06   HEMOGLOBIN g/dL 14.2 13.7 16.6 15.2   PLATELETS 10*3/mm3 101* 119* 186 189     Results from last 7  days   Lab Units 08/12/20  0713 08/11/20  0539 08/10/20  1201 08/08/20  1734   SODIUM mmol/L 133* 136 147* 138   POTASSIUM mmol/L 3.1* 3.5 3.6 3.8   CHLORIDE mmol/L 95* 97* 101 102   TOTAL CO2 mmol/L  --   --   --  16*   CO2 mmol/L 26.1 26.3 18.9*  --    BUN mg/dL 6 8 10 17   CREATININE mg/dL 0.69* 0.78 1.03 1.1   GLUCOSE mg/dL 193* 218* 103*  --    Estimated Creatinine Clearance: 183.3 mL/min (A) (by C-G formula based on SCr of 0.69 mg/dL (L)).  Results from last 7 days   Lab Units 08/12/20  0713 08/11/20  0539 08/10/20  1201 08/08/20  1734   ALBUMIN g/dL 4.10 4.00 4.70 5.0   BILIRUBIN mg/dL 1.9* 2.3* 1.8* 2.2*   ALK PHOS U/L 123* 106 120* 114   AST (SGOT) U/L 145* 205* 411* 362*   ALT (SGPT) U/L 144* 173* 242* 211*     Results from last 7 days   Lab Units 08/12/20 0713 08/11/20  0539 08/10/20  1201 08/08/20  1734   CALCIUM mg/dL 9.5 9.1 9.3 9.3   ALBUMIN g/dL 4.10 4.00 4.70 5.0       COVID19   Date Value Ref Range Status   08/10/2020 Not Detected Not Detected - Ref. Range Final     Hemoglobin A1C   Date/Time Value Ref Range Status   08/10/2020 1201 5.90 (H) 4.80 - 5.60 % Final     Glucose   Date/Time Value Ref Range Status   08/12/2020 1102 162 (H) 70 - 130 mg/dL Final   08/12/2020 0604 214 (H) 70 - 130 mg/dL Final   08/11/2020 2053 155 (H) 70 - 130 mg/dL Final   08/11/2020 1643 212 (H) 70 - 130 mg/dL Final   08/11/2020 1227 194 (H) 70 - 130 mg/dL Final   08/11/2020 0607 193 (H) 70 - 130 mg/dL Final   08/10/2020 1923 193 (H) 70 - 130 mg/dL Final       MRI Abdomen With & Without Contrast  MRI ABDOMEN WITH AND WITHOUT CONTRAST     HISTORY: Alcohol abuse with liver mass.      TECHNIQUE: Multiplanar multisequence MRI of the abdomen was performed  before and after the IV administration of 20 mL of Clariscan.     COMPARISON: None     FINDINGS:  The gallbladder is distended, measuring approximately 11.7 cm in length.     There is a 0.7 cm T2 hyperintense lesion arising off the anterior aspect  of the pancreatic head.  Pancreatic duct is not dilated.     Trace amount of ascites is present within the left upper quadrant.     The adrenal glands have an unremarkable pre and postcontrast MR  appearance. Spleen is not enlarged. Subcentimeter nonenhancing T2  hyperintense lesion within the right kidney is too small to  characterize.     Marked hepatic steatosis is present. There are 3 T2 hyperintense lesions  within the right hepatic lobe. The largest lesion is located posteriorly  (subsegment VII) and measures 3.8 x 2.1 cm. It demonstrates  discontinuous peripheral nodular enhancement with progressive filling.     The smaller lesions are located more superiorly within subsegment VII  and anteriorly within subsegment VIII and measure 1.2 cm and 0.9 cm,  respectively. While there is a suggestion of peripheral enhancement with  suggestive filling, findings remain indeterminate at this time. There is  definitely no washout.     IMPRESSION  1.  Three enhancing lesions within the right hepatic lobe. The larger  lesion meets criteria for benign hemangioma. The 2 smaller lesions have  a suggestion of hemangiomas; however, remain indeterminate at this time,  especially given patient history. If this patient has diagnosed  cirrhosis, follow-up with abdominal MRI with and without contrast in 3  months is recommended. If the patient does not have documented cirrhosis  at this time, follow-up with MRI of the abdomen with and without  contrast in 6 months is recommended to ensure stability.  2.  The gallbladder is distended, and while nonspecific can be seen in  the setting of early cholecystitis in the appropriate clinical context  and correlation with patient history is recommended with follow-up HIDA  scan if clinically indicated.  3.  Extensive hepatic steatosis.  4.  Subcentimeter T2 hyperintense lesion within the pancreatic head  without findings of additional aggressive features. Attention on above  mentioned follow-up is recommended.  5.   Other findings as above.     This report was finalized on 8/11/2020 5:10 PM by Dr. Samir Cardona M.D.     Transthoracic Echo Complete With Contrast if Necessary Per Protocol  · Left ventricular systolic function is normal. Calculated EF = 63.0%.   Estimated EF was in agreement with the calculated EF. Normal left   ventricular cavity size noted. All left ventricular wall segments contract   normally. Left ventricular wall thickness is consistent with mild   concentric hypertrophy. Left ventricular diastolic function is normal.           amLODIPine 10 mg Oral Daily   buprenorphine-naloxone 1 film Sublingual Daily   buPROPion  mg Oral QAM   vitamin B-1 100 mg Oral Daily   And      multivitamin 1 tablet Oral Daily   And      folic acid 1 mg Oral Daily   insulin glargine 20 Units Subcutaneous Nightly   insulin lispro 0-7 Units Subcutaneous 4x Daily With Meals & Nightly   metoprolol succinate XL 50 mg Oral Daily   potassium chloride 40 mEq Oral Once   sodium chloride 10 mL Intravenous Q12H   thiamine (VITAMIN B1) IVPB 100 mg Intravenous Daily   thiamine (VITAMIN B1) IVPB 100 mg Intravenous Once   Or      thiamine 100 mg Oral Once       lactated ringers 100 mL/hr Last Rate: 100 mL/hr (08/12/20 1101)   Diet Regular; Consistent Carbohydrate       Assessment/Plan     Active Hospital Problems    Diagnosis  POA   • Hypokalemia [E87.6]  Unknown   • Alcohol withdrawal syndrome, with delirium (CMS/HCC) [F10.231]  Yes   • Alcoholic hepatitis [K70.10]  Unknown   • Alcohol abuse [F10.10]  Unknown   • Suicidal ideations [R45.851]  Not Applicable   • 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       · Alcohol withdrawal protocol.  PRN Ativan and close monitoring for deterioration. BP improving.  Continue as needed hydralazine for SBP  greater than 180 and DBP greater than 100.  · Monitor LFTs, CPK. replace potassium.   · Access consult noted  · Liver mass on outside CT. MRI ordered and demonstrates 3 enhancing lesions within the right hepatic lobe.  Likely benign hemangiomas though indeterminate at this time since he does not have any evidence of cirrhosis at this time recommendations are for MRI of the abdomen with and without contrast in 6 months.  Gallbladder was distended and could be early cholecystitis however patient denies any abdominal pain at this time.  MRI also showed extensive hepatic steatosis.  T2 hyperintense lesion within the pancreatic head without findings of additional aggressive features.  Recommendations as above.  · History of opiate use disorder, on Suboxone  · Recent chest pain: Troponin negative and echocardiogram normal.  · Diabetes: Continue long-acting insulin.  Add correctional.   A1c 5.9.  Continue current management.  · DC sitter at bedside  · SCDs for DVT prophylaxis.  · Full code.  · Discussed with patient and nursing staff.  · Anticipate discharge home 1-2 days      ALEXIA Miller  [facility]  08/12/20  11:30    I wore protective equipment throughout this patient encounter including a face mask, gloves and protective eyewear.  Hand hygiene was performed before donning protective equipment and after removal when leaving the room.

## 2020-08-12 NOTE — NURSING NOTE
Pt was given back his cell phone and his backpack, sitter has been d/c'd. Pt reports no SI and no HI, pt is alert and oriented.

## 2020-08-13 LAB
ALBUMIN SERPL-MCNC: 4.2 G/DL (ref 3.5–5.2)
ALBUMIN/GLOB SERPL: 1.2 G/DL
ALP SERPL-CCNC: 110 U/L (ref 39–117)
ALT SERPL W P-5'-P-CCNC: 148 U/L (ref 1–41)
ANION GAP SERPL CALCULATED.3IONS-SCNC: 14.4 MMOL/L (ref 5–15)
AST SERPL-CCNC: 132 U/L (ref 1–40)
BASOPHILS # BLD AUTO: 0.05 10*3/MM3 (ref 0–0.2)
BASOPHILS NFR BLD AUTO: 0.6 % (ref 0–1.5)
BILIRUB SERPL-MCNC: 1.5 MG/DL (ref 0–1.2)
BUN SERPL-MCNC: 7 MG/DL (ref 6–20)
BUN/CREAT SERPL: 7 (ref 7–25)
CALCIUM SPEC-SCNC: 10.4 MG/DL (ref 8.6–10.5)
CHLORIDE SERPL-SCNC: 95 MMOL/L (ref 98–107)
CO2 SERPL-SCNC: 24.6 MMOL/L (ref 22–29)
CREAT SERPL-MCNC: 1 MG/DL (ref 0.76–1.27)
DEPRECATED RDW RBC AUTO: 38.4 FL (ref 37–54)
EOSINOPHIL # BLD AUTO: 0.19 10*3/MM3 (ref 0–0.4)
EOSINOPHIL NFR BLD AUTO: 2.4 % (ref 0.3–6.2)
ERYTHROCYTE [DISTWIDTH] IN BLOOD BY AUTOMATED COUNT: 11.5 % (ref 12.3–15.4)
GFR SERPL CREATININE-BSD FRML MDRD: 99 ML/MIN/1.73
GLOBULIN UR ELPH-MCNC: 3.5 GM/DL
GLUCOSE BLDC GLUCOMTR-MCNC: 155 MG/DL (ref 70–130)
GLUCOSE BLDC GLUCOMTR-MCNC: 163 MG/DL (ref 70–130)
GLUCOSE BLDC GLUCOMTR-MCNC: 166 MG/DL (ref 70–130)
GLUCOSE BLDC GLUCOMTR-MCNC: 206 MG/DL (ref 70–130)
GLUCOSE SERPL-MCNC: 175 MG/DL (ref 65–99)
HCT VFR BLD AUTO: 39.8 % (ref 37.5–51)
HGB BLD-MCNC: 14.4 G/DL (ref 13–17.7)
LYMPHOCYTES # BLD AUTO: 1.2 10*3/MM3 (ref 0.7–3.1)
LYMPHOCYTES NFR BLD AUTO: 15.2 % (ref 19.6–45.3)
MCH RBC QN AUTO: 33.3 PG (ref 26.6–33)
MCHC RBC AUTO-ENTMCNC: 36.2 G/DL (ref 31.5–35.7)
MCV RBC AUTO: 92.1 FL (ref 79–97)
MONOCYTES # BLD AUTO: 0.6 10*3/MM3 (ref 0.1–0.9)
MONOCYTES NFR BLD AUTO: 7.6 % (ref 5–12)
NEUTROPHILS NFR BLD AUTO: 5.85 10*3/MM3 (ref 1.7–7)
NEUTROPHILS NFR BLD AUTO: 73.8 % (ref 42.7–76)
PLATELET # BLD AUTO: 107 10*3/MM3 (ref 140–450)
PMV BLD AUTO: 13.1 FL (ref 6–12)
POTASSIUM SERPL-SCNC: 3.8 MMOL/L (ref 3.5–5.2)
PROT SERPL-MCNC: 7.7 G/DL (ref 6–8.5)
RBC # BLD AUTO: 4.32 10*6/MM3 (ref 4.14–5.8)
SODIUM SERPL-SCNC: 134 MMOL/L (ref 136–145)
WBC # BLD AUTO: 7.92 10*3/MM3 (ref 3.4–10.8)

## 2020-08-13 PROCEDURE — 85025 COMPLETE CBC W/AUTO DIFF WBC: CPT | Performed by: NURSE PRACTITIONER

## 2020-08-13 PROCEDURE — 25010000002 HYDRALAZINE PER 20 MG: Performed by: NURSE PRACTITIONER

## 2020-08-13 PROCEDURE — 82962 GLUCOSE BLOOD TEST: CPT

## 2020-08-13 PROCEDURE — 63710000001 INSULIN LISPRO (HUMAN) PER 5 UNITS: Performed by: HOSPITALIST

## 2020-08-13 PROCEDURE — 80053 COMPREHEN METABOLIC PANEL: CPT | Performed by: NURSE PRACTITIONER

## 2020-08-13 PROCEDURE — 63710000001 INSULIN GLARGINE PER 5 UNITS: Performed by: HOSPITALIST

## 2020-08-13 RX ORDER — CHLORDIAZEPOXIDE HYDROCHLORIDE 25 MG/1
25 CAPSULE, GELATIN COATED ORAL EVERY 8 HOURS SCHEDULED
Status: DISCONTINUED | OUTPATIENT
Start: 2020-08-13 | End: 2020-08-13

## 2020-08-13 RX ORDER — CHLORDIAZEPOXIDE HYDROCHLORIDE 10 MG/1
10 CAPSULE, GELATIN COATED ORAL EVERY 8 HOURS SCHEDULED
Status: DISCONTINUED | OUTPATIENT
Start: 2020-08-13 | End: 2020-08-14

## 2020-08-13 RX ADMIN — INSULIN LISPRO 3 UNITS: 100 INJECTION, SOLUTION INTRAVENOUS; SUBCUTANEOUS at 21:39

## 2020-08-13 RX ADMIN — HYDRALAZINE HYDROCHLORIDE 10 MG: 20 INJECTION INTRAMUSCULAR; INTRAVENOUS at 13:42

## 2020-08-13 RX ADMIN — INSULIN LISPRO 2 UNITS: 100 INJECTION, SOLUTION INTRAVENOUS; SUBCUTANEOUS at 12:21

## 2020-08-13 RX ADMIN — LORAZEPAM 1 MG: 1 TABLET ORAL at 08:56

## 2020-08-13 RX ADMIN — AMLODIPINE BESYLATE 10 MG: 10 TABLET ORAL at 08:46

## 2020-08-13 RX ADMIN — FOLIC ACID 1 MG: 1 TABLET ORAL at 08:46

## 2020-08-13 RX ADMIN — HYDRALAZINE HYDROCHLORIDE 10 MG: 20 INJECTION INTRAMUSCULAR; INTRAVENOUS at 03:26

## 2020-08-13 RX ADMIN — INSULIN GLARGINE 20 UNITS: 100 INJECTION, SOLUTION SUBCUTANEOUS at 21:39

## 2020-08-13 RX ADMIN — CHLORDIAZEPOXIDE HYDROCHLORIDE 10 MG: 10 CAPSULE ORAL at 21:39

## 2020-08-13 RX ADMIN — METOPROLOL SUCCINATE 50 MG: 50 TABLET, EXTENDED RELEASE ORAL at 08:46

## 2020-08-13 RX ADMIN — Medication 100 MG: at 08:46

## 2020-08-13 RX ADMIN — INSULIN LISPRO 2 UNITS: 100 INJECTION, SOLUTION INTRAVENOUS; SUBCUTANEOUS at 08:46

## 2020-08-13 RX ADMIN — Medication 1 TABLET: at 08:46

## 2020-08-13 RX ADMIN — SODIUM CHLORIDE, PRESERVATIVE FREE 10 ML: 5 INJECTION INTRAVENOUS at 21:42

## 2020-08-13 RX ADMIN — LORAZEPAM 1 MG: 1 TABLET ORAL at 12:31

## 2020-08-13 RX ADMIN — INSULIN LISPRO 2 UNITS: 100 INJECTION, SOLUTION INTRAVENOUS; SUBCUTANEOUS at 17:35

## 2020-08-13 RX ADMIN — SODIUM CHLORIDE, PRESERVATIVE FREE 10 ML: 5 INJECTION INTRAVENOUS at 08:58

## 2020-08-13 RX ADMIN — SODIUM CHLORIDE 100 ML/HR: 9 INJECTION, SOLUTION INTRAVENOUS at 06:24

## 2020-08-13 RX ADMIN — LORAZEPAM 1 MG: 1 TABLET ORAL at 03:26

## 2020-08-13 RX ADMIN — BUPROPION HYDROCHLORIDE 150 MG: 150 TABLET, FILM COATED, EXTENDED RELEASE ORAL at 06:24

## 2020-08-13 RX ADMIN — SODIUM CHLORIDE 100 ML/HR: 9 INJECTION, SOLUTION INTRAVENOUS at 17:35

## 2020-08-13 NOTE — PLAN OF CARE
Patient alert and oriented. New order for librium for patient and he is refusing until he obtains more information from the doctor. CIWA between 7-10 this shift medicated per MAR. No acute distress noted, will continue to monitor. Patient still needs assistance for ambulating in the room.

## 2020-08-13 NOTE — PROGRESS NOTES
Name: Wu Damon ADMIT: 8/10/2020   : 1975  PCP: Juan Carlos Kwan ALEXIA    MRN: [id number] LOS: 3 days   AGE/SEX: 45 y.o. male  ROOM: [facility]     Subjective   Subjective   anxious today, has been requiring iv ativan q2h. apparently was more agitated last night.     Review of Systems   Constitutional: Negative for chills and fever.   Respiratory: Negative for chest tightness and shortness of breath.    Cardiovascular: Negative for chest pain, palpitations and leg swelling.   Genitourinary: Negative for difficulty urinating and dysuria.   Neurological: Positive for tremors. Negative for seizures.        Objective   Objective   Vital Signs  Temp:  [97.5 °F (36.4 °C)-98.9 °F (37.2 °C)] 98.9 °F (37.2 °C)  Heart Rate:  [101-123] 104  Resp:  [16-18] 18  BP: (128-174)/() 150/107  SpO2:  [92 %-99 %] 97 %  on   ;   Device (Oxygen Therapy): room air  Body mass index is 33.39 kg/m².  Physical Exam   Constitutional: He is oriented to person, place, and time. He appears well-developed and well-nourished.   HENT:   Head: Normocephalic and atraumatic.   Eyes: Conjunctivae and EOM are normal.   Neck: Neck supple. No JVD present.   Cardiovascular: Normal rate and regular rhythm.   Pulmonary/Chest: Effort normal and breath sounds normal.   Abdominal: Soft. Bowel sounds are normal. He exhibits no distension. There is no tenderness.   Musculoskeletal: Normal range of motion. He exhibits no edema.   Neurological: He is alert and oriented to person, place, and time.   Skin: Skin is warm and dry.   Psychiatric: His behavior is normal. His mood appears anxious. He is not actively hallucinating.   Nursing note and vitals reviewed.      Results Review:       I reviewed the patient's new clinical results.  Results from last 7 days   Lab Units 20  0537 20  0713 20  0539 08/10/20  1201   WBC 10*3/mm3 7.92 6.67 6.66 6.85   HEMOGLOBIN g/dL 14.4 14.2 13.7 16.6   PLATELETS 10*3/mm3 107* 101* 119* 186          Results from last 7 days   Lab Units 08/13/20 0918 08/12/20 2209 08/12/20 0713 08/11/20  0539 08/10/20  1201   SODIUM mmol/L 134*  --  133* 136 147*   POTASSIUM mmol/L 3.8 3.9 3.1* 3.5 3.6   CHLORIDE mmol/L 95*  --  95* 97* 101   CO2 mmol/L 24.6  --  26.1 26.3 18.9*   BUN mg/dL 7  --  6 8 10   CREATININE mg/dL 1.00  --  0.69* 0.78 1.03   GLUCOSE mg/dL 175*  --  193* 218* 103*   Estimated Creatinine Clearance: 126.5 mL/min (by C-G formula based on SCr of 1 mg/dL).  Results from last 7 days   Lab Units 08/13/20 0918 08/12/20 0713 08/11/20 0539 08/10/20  1201   ALBUMIN g/dL 4.20 4.10 4.00 4.70   BILIRUBIN mg/dL 1.5* 1.9* 2.3* 1.8*   ALK PHOS U/L 110 123* 106 120*   AST (SGOT) U/L 132* 145* 205* 411*   ALT (SGPT) U/L 148* 144* 173* 242*     Results from last 7 days   Lab Units 08/13/20 0918 08/12/20 0713 08/11/20 0539 08/10/20  1201   CALCIUM mg/dL 10.4 9.5 9.1 9.3   ALBUMIN g/dL 4.20 4.10 4.00 4.70       COVID19   Date Value Ref Range Status   08/10/2020 Not Detected Not Detected - Ref. Range Final     Glucose   Date/Time Value Ref Range Status   08/13/2020 1050 155 (H) 70 - 130 mg/dL Final   08/13/2020 0606 163 (H) 70 - 130 mg/dL Final   08/12/2020 2055 190 (H) 70 - 130 mg/dL Final   08/12/2020 1703 241 (H) 70 - 130 mg/dL Final   08/12/2020 1102 162 (H) 70 - 130 mg/dL Final   08/12/2020 0604 214 (H) 70 - 130 mg/dL Final   08/11/2020 2053 155 (H) 70 - 130 mg/dL Final       MRI Abdomen With & Without Contrast  MRI ABDOMEN WITH AND WITHOUT CONTRAST     HISTORY: Alcohol abuse with liver mass.      TECHNIQUE: Multiplanar multisequence MRI of the abdomen was performed  before and after the IV administration of 20 mL of Clariscan.     COMPARISON: None     FINDINGS:  The gallbladder is distended, measuring approximately 11.7 cm in length.     There is a 0.7 cm T2 hyperintense lesion arising off the anterior aspect  of the pancreatic head. Pancreatic duct is not dilated.     Trace amount of ascites is present  within the left upper quadrant.     The adrenal glands have an unremarkable pre and postcontrast MR  appearance. Spleen is not enlarged. Subcentimeter nonenhancing T2  hyperintense lesion within the right kidney is too small to  characterize.     Marked hepatic steatosis is present. There are 3 T2 hyperintense lesions  within the right hepatic lobe. The largest lesion is located posteriorly  (subsegment VII) and measures 3.8 x 2.1 cm. It demonstrates  discontinuous peripheral nodular enhancement with progressive filling.     The smaller lesions are located more superiorly within subsegment VII  and anteriorly within subsegment VIII and measure 1.2 cm and 0.9 cm,  respectively. While there is a suggestion of peripheral enhancement with  suggestive filling, findings remain indeterminate at this time. There is  definitely no washout.     IMPRESSION  1.  Three enhancing lesions within the right hepatic lobe. The larger  lesion meets criteria for benign hemangioma. The 2 smaller lesions have  a suggestion of hemangiomas; however, remain indeterminate at this time,  especially given patient history. If this patient has diagnosed  cirrhosis, follow-up with abdominal MRI with and without contrast in 3  months is recommended. If the patient does not have documented cirrhosis  at this time, follow-up with MRI of the abdomen with and without  contrast in 6 months is recommended to ensure stability.  2.  The gallbladder is distended, and while nonspecific can be seen in  the setting of early cholecystitis in the appropriate clinical context  and correlation with patient history is recommended with follow-up HIDA  scan if clinically indicated.  3.  Extensive hepatic steatosis.  4.  Subcentimeter T2 hyperintense lesion within the pancreatic head  without findings of additional aggressive features. Attention on above  mentioned follow-up is recommended.  5.  Other findings as above.     This report was finalized on 8/11/2020 5:10  PM by Dr. Samir Cardona M.D.     Transthoracic Echo Complete With Contrast if Necessary Per Protocol  · Left ventricular systolic function is normal. Calculated EF = 63.0%.   Estimated EF was in agreement with the calculated EF. Normal left   ventricular cavity size noted. All left ventricular wall segments contract   normally. Left ventricular wall thickness is consistent with mild   concentric hypertrophy. Left ventricular diastolic function is normal.           amLODIPine 10 mg Oral Daily   buprenorphine-naloxone 1 film Sublingual Daily   buPROPion  mg Oral QAM   chlordiazePOXIDE 25 mg Oral Q8H   insulin glargine 20 Units Subcutaneous Nightly   insulin lispro 0-7 Units Subcutaneous 4x Daily With Meals & Nightly   metoprolol succinate XL 50 mg Oral Daily   sodium chloride 10 mL Intravenous Q12H   thiamine (VITAMIN B1) IVPB 100 mg Intravenous Daily   thiamine (VITAMIN B1) IVPB 100 mg Intravenous Once   Or      thiamine 100 mg Oral Once       sodium chloride 100 mL/hr Last Rate: 100 mL/hr (08/13/20 0624)   Diet Regular; Consistent Carbohydrate       Assessment/Plan     Active Hospital Problems    Diagnosis  POA   • Hypokalemia [E87.6]  Yes   • Alcohol withdrawal syndrome, with delirium (CMS/HCC) [F10.231]  Yes   • Alcoholic hepatitis [K70.10]  Yes   • Alcohol abuse [F10.10]  Yes   • Suicidal ideations [R45.851]  Not Applicable   • 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       · Alcohol withdrawal protocol.  close monitoring for deterioration. BP elevated.  Continue as needed hydralazine for SBP greater than 180 or DBP greater than 100.  Add Librium every 8 H and keep PRN Ativan but try to avoid if possible.  Plan is to follow-up with psychiatrist at [facility] recovery and start couples therapy.  · Monitor LFTs. replaced potassium.    · Access consult noted  · Liver mass on outside CT. MRI ordered and demonstrates 3 enhancing lesions within the right hepatic lobe.  Likely benign hemangiomas though indeterminate at this time since he does not have any evidence of cirrhosis at this time recommendations are for MRI of the abdomen with and without contrast in 6 months.  Gallbladder was distended and could be early cholecystitis however patient denies any abdominal pain at this time.  MRI also showed extensive hepatic steatosis.  T2 hyperintense lesion within the pancreatic head without findings of additional aggressive features.  Recommendations as above.  · History of opiate use disorder, on Suboxone  · Recent chest pain: Troponin negative and echocardiogram normal.  · Diabetes: Continue long-acting insulin.  Add correctional.   A1c 5.9.  Continue current management.  · DC sitter at bedside  · SCDs for DVT prophylaxis.  · Full code.  · Discussed with patient and nursing staff.  · Anticipate discharge home 1-2 days      ALEXIA Miller  [facility]  08/13/20  13:55    I wore protective equipment throughout this patient encounter including a face mask, gloves and protective eyewear.  Hand hygiene was performed before donning protective equipment and after removal when leaving the room.

## 2020-08-13 NOTE — CONSULTS
attempted spiritual care consult multiple times today, but both times pt was sleeping heavily and did not wake when I said his name.    Will attempt again at later date.

## 2020-08-13 NOTE — PLAN OF CARE
Spoke with pt about his anxiety. Pt is worried about what will happen when he is discharged home and does not have any medications that he could take to help with this anxiety/stress. Medicated for a headache with relief. No c/o SOA or nausea. Medications and IVFs administered per orders. BP has been elevated, PRN medication administered with improvement. BP is normally elevated when pt is anxious. Rest of VSS. No s/s of distress at this time. Will continue to monitor.      Problem: Suicide Risk (Adult)  Goal: Identify Related Risk Factors and Signs and Symptoms  Outcome: Ongoing (interventions implemented as appropriate)  Goal: Strength-Based Wellness/Recovery  Outcome: Ongoing (interventions implemented as appropriate)  Goal: Physical Safety  Outcome: Ongoing (interventions implemented as appropriate)     Problem: Patient Care Overview  Goal: Plan of Care Review  Outcome: Ongoing (interventions implemented as appropriate)  Flowsheets (Taken 8/13/2020 0348)  Progress: no change  Plan of Care Reviewed With: patient  Goal: Individualization and Mutuality  Outcome: Ongoing (interventions implemented as appropriate)  Goal: Discharge Needs Assessment  Outcome: Ongoing (interventions implemented as appropriate)  Goal: Interprofessional Rounds/Family Conf  Outcome: Ongoing (interventions implemented as appropriate)     Problem: Alcohol Withdrawal Acute, Risk/Actual (Adult)  Goal: Signs and Symptoms of Listed Potential Problems Will be Absent, Minimized or Managed (Alcohol Withdrawal Acute, Risk/Actual)  Outcome: Ongoing (interventions implemented as appropriate)     Problem: Fall Risk (Adult)  Goal: Identify Related Risk Factors and Signs and Symptoms  Outcome: Ongoing (interventions implemented as appropriate)  Goal: Absence of Fall  Outcome: Ongoing (interventions implemented as appropriate)

## 2020-08-14 VITALS
TEMPERATURE: 99.3 F | HEIGHT: 73 IN | SYSTOLIC BLOOD PRESSURE: 156 MMHG | OXYGEN SATURATION: 100 % | HEART RATE: 94 BPM | DIASTOLIC BLOOD PRESSURE: 110 MMHG | BODY MASS INDEX: 33.53 KG/M2 | RESPIRATION RATE: 18 BRPM | WEIGHT: 253 LBS

## 2020-08-14 LAB
ALBUMIN SERPL-MCNC: 3.9 G/DL (ref 3.5–5.2)
ALBUMIN/GLOB SERPL: 1.2 G/DL
ALP SERPL-CCNC: 92 U/L (ref 39–117)
ALT SERPL W P-5'-P-CCNC: 136 U/L (ref 1–41)
ANION GAP SERPL CALCULATED.3IONS-SCNC: 14.1 MMOL/L (ref 5–15)
AST SERPL-CCNC: 128 U/L (ref 1–40)
BASOPHILS # BLD AUTO: 0.05 10*3/MM3 (ref 0–0.2)
BASOPHILS NFR BLD AUTO: 0.6 % (ref 0–1.5)
BILIRUB SERPL-MCNC: 1.5 MG/DL (ref 0–1.2)
BUN SERPL-MCNC: 8 MG/DL (ref 6–20)
BUN/CREAT SERPL: 9.4 (ref 7–25)
CALCIUM SPEC-SCNC: 10 MG/DL (ref 8.6–10.5)
CHLORIDE SERPL-SCNC: 101 MMOL/L (ref 98–107)
CO2 SERPL-SCNC: 19.9 MMOL/L (ref 22–29)
CREAT SERPL-MCNC: 0.85 MG/DL (ref 0.76–1.27)
DEPRECATED RDW RBC AUTO: 39.3 FL (ref 37–54)
EOSINOPHIL # BLD AUTO: 0.19 10*3/MM3 (ref 0–0.4)
EOSINOPHIL NFR BLD AUTO: 2.3 % (ref 0.3–6.2)
ERYTHROCYTE [DISTWIDTH] IN BLOOD BY AUTOMATED COUNT: 11.5 % (ref 12.3–15.4)
GFR SERPL CREATININE-BSD FRML MDRD: 119 ML/MIN/1.73
GLOBULIN UR ELPH-MCNC: 3.3 GM/DL
GLUCOSE BLDC GLUCOMTR-MCNC: 137 MG/DL (ref 70–130)
GLUCOSE BLDC GLUCOMTR-MCNC: 173 MG/DL (ref 70–130)
GLUCOSE SERPL-MCNC: 157 MG/DL (ref 65–99)
HCT VFR BLD AUTO: 40.9 % (ref 37.5–51)
HGB BLD-MCNC: 14.8 G/DL (ref 13–17.7)
IMM GRANULOCYTES # BLD AUTO: 0.04 10*3/MM3 (ref 0–0.05)
IMM GRANULOCYTES NFR BLD AUTO: 0.5 % (ref 0–0.5)
LYMPHOCYTES # BLD AUTO: 1.22 10*3/MM3 (ref 0.7–3.1)
LYMPHOCYTES NFR BLD AUTO: 14.9 % (ref 19.6–45.3)
MCH RBC QN AUTO: 33.9 PG (ref 26.6–33)
MCHC RBC AUTO-ENTMCNC: 36.2 G/DL (ref 31.5–35.7)
MCV RBC AUTO: 93.8 FL (ref 79–97)
MONOCYTES # BLD AUTO: 0.65 10*3/MM3 (ref 0.1–0.9)
MONOCYTES NFR BLD AUTO: 7.9 % (ref 5–12)
NEUTROPHILS NFR BLD AUTO: 6.03 10*3/MM3 (ref 1.7–7)
NEUTROPHILS NFR BLD AUTO: 73.8 % (ref 42.7–76)
NRBC BLD AUTO-RTO: 0 /100 WBC (ref 0–0.2)
PLATELET # BLD AUTO: 114 10*3/MM3 (ref 140–450)
PMV BLD AUTO: 12.9 FL (ref 6–12)
POTASSIUM SERPL-SCNC: 3.5 MMOL/L (ref 3.5–5.2)
PROT SERPL-MCNC: 7.2 G/DL (ref 6–8.5)
RBC # BLD AUTO: 4.36 10*6/MM3 (ref 4.14–5.8)
SODIUM SERPL-SCNC: 135 MMOL/L (ref 136–145)
WBC # BLD AUTO: 8.18 10*3/MM3 (ref 3.4–10.8)

## 2020-08-14 PROCEDURE — 82962 GLUCOSE BLOOD TEST: CPT

## 2020-08-14 PROCEDURE — 85025 COMPLETE CBC W/AUTO DIFF WBC: CPT | Performed by: NURSE PRACTITIONER

## 2020-08-14 PROCEDURE — 80053 COMPREHEN METABOLIC PANEL: CPT | Performed by: NURSE PRACTITIONER

## 2020-08-14 PROCEDURE — 63710000001 INSULIN LISPRO (HUMAN) PER 5 UNITS: Performed by: HOSPITALIST

## 2020-08-14 PROCEDURE — 25010000002 THIAMINE PER 100 MG: Performed by: HOSPITALIST

## 2020-08-14 RX ORDER — PAROXETINE 10 MG/1
10 TABLET, FILM COATED ORAL EVERY MORNING
Qty: 30 TABLET | Refills: 0 | Status: SHIPPED | OUTPATIENT
Start: 2020-08-14 | End: 2021-10-10

## 2020-08-14 RX ORDER — CHLORDIAZEPOXIDE HYDROCHLORIDE 10 MG/1
10 CAPSULE, GELATIN COATED ORAL EVERY 12 HOURS
Status: DISCONTINUED | OUTPATIENT
Start: 2020-08-14 | End: 2020-08-14 | Stop reason: HOSPADM

## 2020-08-14 RX ORDER — HYDROXYZINE HYDROCHLORIDE 25 MG/1
25 TABLET, FILM COATED ORAL EVERY 8 HOURS PRN
Qty: 30 TABLET | Refills: 0 | Status: SHIPPED | OUTPATIENT
Start: 2020-08-14 | End: 2021-10-10

## 2020-08-14 RX ADMIN — METOPROLOL SUCCINATE 50 MG: 50 TABLET, EXTENDED RELEASE ORAL at 08:35

## 2020-08-14 RX ADMIN — THIAMINE HYDROCHLORIDE 100 MG: 100 INJECTION, SOLUTION INTRAMUSCULAR; INTRAVENOUS at 08:36

## 2020-08-14 RX ADMIN — SODIUM CHLORIDE 100 ML/HR: 9 INJECTION, SOLUTION INTRAVENOUS at 03:36

## 2020-08-14 RX ADMIN — CHLORDIAZEPOXIDE HYDROCHLORIDE 10 MG: 10 CAPSULE ORAL at 05:34

## 2020-08-14 RX ADMIN — INSULIN LISPRO 2 UNITS: 100 INJECTION, SOLUTION INTRAVENOUS; SUBCUTANEOUS at 12:36

## 2020-08-14 RX ADMIN — SODIUM CHLORIDE, PRESERVATIVE FREE 10 ML: 5 INJECTION INTRAVENOUS at 08:36

## 2020-08-14 RX ADMIN — AMLODIPINE BESYLATE 10 MG: 10 TABLET ORAL at 08:36

## 2020-08-14 NOTE — PROGRESS NOTES
Continued Stay Note  [facility]     Patient Name: Wu Damon  MRN: [id number]  Today's Date: 8/14/2020    Admit Date: 8/10/2020    Discharge Plan     Row Name 08/14/20 0839       Plan    Plan  Plan home with family.  BRIANA Hassan RN    Patient/Family in Agreement with Plan  yes    Plan Comments  Spoke with pt at bedside.  Pt denies any needs.  Plan home with family.  BRIANA Hassan RN        Discharge Codes    No documentation.             Neena Hassan RN

## 2020-08-14 NOTE — NURSING NOTE
Access center follow up:    Pt asleep upon approach, awakens easily.  Pt states he will be seeing someone at [facility] recovery for his anxiety.  Pt is already in their care r/t suboxone.  Spoke with JAZ Dukes, who reports pt has been gamey about his medications, he refused wellbutrin, librium and suboxone this morning but then asked for ativan.  Access will follow.

## 2020-08-14 NOTE — PLAN OF CARE
Pt denies pain this shift. Medicated with Librium, with good results. HR now below 100. BP has been much better this shift, no PRN hydralazine needed. Pt did not request ativan this shift. Rest of VSS. No s/s of distress at this time. Will continue to monitor.       Problem: Suicide Risk (Adult)  Goal: Identify Related Risk Factors and Signs and Symptoms  Outcome: Ongoing (interventions implemented as appropriate)  Goal: Strength-Based Wellness/Recovery  Outcome: Ongoing (interventions implemented as appropriate)  Goal: Physical Safety  Outcome: Ongoing (interventions implemented as appropriate)     Problem: Patient Care Overview  Goal: Plan of Care Review  Outcome: Ongoing (interventions implemented as appropriate)  Flowsheets (Taken 8/14/2020 5717)  Progress: no change  Plan of Care Reviewed With: patient  Goal: Individualization and Mutuality  Outcome: Ongoing (interventions implemented as appropriate)  Goal: Discharge Needs Assessment  Outcome: Ongoing (interventions implemented as appropriate)  Goal: Interprofessional Rounds/Family Conf  Outcome: Ongoing (interventions implemented as appropriate)     Problem: Alcohol Withdrawal Acute, Risk/Actual (Adult)  Goal: Signs and Symptoms of Listed Potential Problems Will be Absent, Minimized or Managed (Alcohol Withdrawal Acute, Risk/Actual)  Outcome: Ongoing (interventions implemented as appropriate)     Problem: Fall Risk (Adult)  Goal: Identify Related Risk Factors and Signs and Symptoms  Outcome: Ongoing (interventions implemented as appropriate)  Goal: Absence of Fall  Outcome: Ongoing (interventions implemented as appropriate)

## 2020-08-14 NOTE — PROGRESS NOTES
Continued Stay Note  [facility]     Patient Name: Wu Damon  MRN: [id number]  Today's Date: 8/14/2020    Admit Date: 8/10/2020    Discharge Plan     Row Name 08/14/20 1347       Plan    Plan  Plan home with family.   BRIANA Hassan RN    Patient/Family in Agreement with Plan  yes    Plan Comments  Pt provided BMA list and referral number.  Plan home with family.   BRIANA Hassan RN        Discharge Codes    No documentation.       Expected Discharge Date and Time     Expected Discharge Date Expected Discharge Time    Aug 14, 2020             Neena Hassan, RN

## 2020-08-14 NOTE — DISCHARGE SUMMARY
Patient Name: Wu Damon  : 1975  MRN: [id number]    Date of Admission: 8/10/2020  Date of Discharge:  2020  Primary Care Physician: Juan Carlos Kwan APRN      Chief Complaint:   Psychiatric Evaluation (PT REPORTS INCREASED DEPRESSION AND NOT WANTING TO GET OUT OF BED ALONG WITH SUICIDAL THOUGHTS AND POSS. 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        Hospital Course     Mr. Damon is a 45 y.o. male with a history of depression, alcohol abuse, hypertension, opioid dependency (subaxone) and DM 2 who presented to [facility] initially complaining of depression, suicidal ideation and alcohol intoxication.  Please see the admitting history and physical for further details.  He was found to have alcoholic ketosis and was admitted to the hospital for further evaluation and treatment.  He was placed on CIWA protocol and required quite a bit of IV Ativan. Patient was advised to back off of the ativan as this would slow the detox process. He was placed on Librium to help with the detox process but per nursing declined the medication after two doses. He has also been refusing subaxone and wellbutrin though per med rec patient has not been taking wellbutrin. His blood pressure remained elevated  most of this admission and he was given clonopin x1 dose and was placed on PRN hydralazine. he should monitor his BP at  home as he will likely need HTN medication. at some point he was on lisinopril and metoprolol but states he is no longer taking.  MRI was ordered here and demonstrates 3 enhancing lesions within the right hepatic lobe as well as T2 hyperintense lesions within the pancreatic head without findings of additional aggressive features. This is likely benign and he will need to have a repeat MRI of the abdomen and pelvis with and without contrast in 6 months. The patient experienecd chest pain. Troponin and echo normal. He will be discharged home today in stable condition and should follow-up with his PCP in 1 to 2 weeks and with psychiatry at [facility] recovery. will start couples therapy as well. EtOH cessation was discussed ad nauseam.         Day of Discharge     No new complaints or events overnight.  Patient is ready to go home.    Denies chest pain, palpitations, SOA, edema, fever, chills, nausea, vomiting and diarrhea.    Physical Exam:  Temp:  [97 °F (36.1 °C)-99.3 °F (37.4 °C)] 99.3 °F (37.4 °C)  Heart Rate:  [] 94  Resp:  [18] 18  BP: (139-156)/() 156/110  Body mass index is 33.39 kg/m².  Physical Exam   Constitutional: He is oriented to person, place, and time. He appears well-developed and well-nourished.   HENT:   Head: Normocephalic and atraumatic.   Eyes: Conjunctivae and EOM are normal.   Neck: Neck supple. No JVD present.   Cardiovascular: Regular rhythm.   tachy at times   Pulmonary/Chest: Effort normal and breath sounds normal.   Abdominal: Soft. Bowel sounds are normal. He exhibits no distension. There is no tenderness.   Musculoskeletal: Normal range of motion. He exhibits no edema.   Neurological: He is alert and oriented to person, place, and time.   Skin: Skin is warm and dry.   Psychiatric: He has a normal mood and affect. His behavior is normal.   Nursing note and  vitals reviewed.      Consultants     Consult Orders (all) (From admission, onward)     Start     Ordered    08/10/20 1313  Psych / [facility]  Once     Provider:  (Not yet assigned)    08/10/20 1312              Procedures     Imaging Results (All)     Procedure Component Value Units Date/Time    MRI Abdomen With & Without Contrast [[id number]] Collected:  08/11/20 0919     Updated:  08/11/20 1713    Narrative:       MRI ABDOMEN WITH AND WITHOUT CONTRAST     HISTORY: Alcohol abuse with liver mass.      TECHNIQUE: Multiplanar multisequence MRI of the abdomen was performed  before and after the IV administration of 20 mL of Clariscan.     COMPARISON: None     FINDINGS:  The gallbladder is distended, measuring approximately 11.7 cm in length.     There is a 0.7 cm T2 hyperintense lesion arising off the anterior aspect  of the pancreatic head. Pancreatic duct is not dilated.     Trace amount of ascites is present within the left upper quadrant.     The adrenal glands have an unremarkable pre and postcontrast MR  appearance. Spleen is not enlarged. Subcentimeter nonenhancing T2  hyperintense lesion within the right kidney is too small to  characterize.     Marked hepatic steatosis is present. There are 3 T2 hyperintense lesions  within the right hepatic lobe. The largest lesion is located posteriorly  (subsegment VII) and measures 3.8 x 2.1 cm. It demonstrates  discontinuous peripheral nodular enhancement with progressive filling.     The smaller lesions are located more superiorly within subsegment VII  and anteriorly within subsegment VIII and measure 1.2 cm and 0.9 cm,  respectively. While there is a suggestion of peripheral enhancement with  suggestive filling, findings remain indeterminate at this time. There is  definitely no washout.     IMPRESSION  1.  Three enhancing lesions within the right hepatic lobe. The larger  lesion meets criteria for benign hemangioma. The 2 smaller lesions have  a suggestion of  hemangiomas; however, remain indeterminate at this time,  especially given patient history. If this patient has diagnosed  cirrhosis, follow-up with abdominal MRI with and without contrast in 3  months is recommended. If the patient does not have documented cirrhosis  at this time, follow-up with MRI of the abdomen with and without  contrast in 6 months is recommended to ensure stability.  2.  The gallbladder is distended, and while nonspecific can be seen in  the setting of early cholecystitis in the appropriate clinical context  and correlation with patient history is recommended with follow-up HIDA  scan if clinically indicated.  3.  Extensive hepatic steatosis.  4.  Subcentimeter T2 hyperintense lesion within the pancreatic head  without findings of additional aggressive features. Attention on above  mentioned follow-up is recommended.  5.  Other findings as above.     This report was finalized on 8/11/2020 5:10 PM by Dr. Samir Cardona M.D.             Pertinent Labs     Results from last 7 days   Lab Units 08/14/20 0658 08/13/20 0537 08/12/20 0713 08/11/20  0539   WBC 10*3/mm3 8.18 7.92 6.67 6.66   HEMOGLOBIN g/dL 14.8 14.4 14.2 13.7   PLATELETS 10*3/mm3 114* 107* 101* 119*     Results from last 7 days   Lab Units 08/14/20 0658 08/13/20 0918 08/12/20 2209 08/12/20 0713 08/11/20  0539   SODIUM mmol/L 135* 134*  --  133* 136   POTASSIUM mmol/L 3.5 3.8 3.9 3.1* 3.5   CHLORIDE mmol/L 101 95*  --  95* 97*   CO2 mmol/L 19.9* 24.6  --  26.1 26.3   BUN mg/dL 8 7  --  6 8   CREATININE mg/dL 0.85 1.00  --  0.69* 0.78   GLUCOSE mg/dL 157* 175*  --  193* 218*   Estimated Creatinine Clearance: 148.8 mL/min (by C-G formula based on SCr of 0.85 mg/dL).  Results from last 7 days   Lab Units 08/14/20 0658 08/13/20 0918 08/12/20 0713 08/11/20  0539   ALBUMIN g/dL 3.90 4.20 4.10 4.00   BILIRUBIN mg/dL 1.5* 1.5* 1.9* 2.3*   ALK PHOS U/L 92 110 123* 106   AST (SGOT) U/L 128* 132* 145* 205*   ALT (SGPT) U/L 136* 148*  144* 173*     Results from last 7 days   Lab Units 08/14/20  0658 08/13/20  0918 08/12/20  0713 08/11/20  0539   CALCIUM mg/dL 10.0 10.4 9.5 9.1   ALBUMIN g/dL 3.90 4.20 4.10 4.00     Results from last 7 days   Lab Units 08/10/20  1201   LIPASE U/L 58     Results from last 7 days   Lab Units 08/12/20  0713 08/11/20  0539 08/10/20  1201 08/08/20  1927 08/08/20  1734   CK TOTAL U/L 350* 609*  --   --  1,355*   TROPONIN I ng/mL  --   --   --  <0.012 <0.012   TROPONIN T ng/mL  --   --  <0.010  --   --            Invalid input(s): LDLCALC        Test Results Pending at Discharge       Discharge Details        Discharge Medications      New Medications      Instructions Start Date   hydrOXYzine 25 MG tablet  Commonly known as:  ATARAX   25 mg, Oral, Every 8 Hours PRN      PARoxetine 10 MG tablet  Commonly known as:  Paxil   10 mg, Oral, Every Morning         Changes to Medications      Instructions Start Date   insulin detemir 100 UNIT/ML injection  Commonly known as:  LEVEMIR  What changed:  how much to take   25 Units, Subcutaneous, 2 Times Daily         Continue These Medications      Instructions Start Date   Ambien CR 12.5 MG CR tablet  Generic drug:  zolpidem CR   12.5 mg, Oral, Nightly PRN      amLODIPine 10 MG tablet  Commonly known as:  NORVASC   10 mg, Oral, Daily      buprenorphine-naloxone 8-2 MG per SL tablet  Commonly known as:  SUBOXONE   1.5 tablets, Sublingual, Daily      fenofibrate 48 MG tablet  Commonly known as:  TRICOR   48 mg, Oral, Daily      insulin aspart 100 UNIT/ML injection  Commonly known as:  novoLOG   2 Units, Subcutaneous, 3 Times Daily Before Meals      linagliptin 5 MG tablet tablet  Commonly known as:  TRADJENTA   5 mg, Oral, Daily      LISINOPRIL PO   40 mg, Oral, Daily      metoprolol succinate XL 50 MG 24 hr tablet  Commonly known as:  TOPROL-XL   50 mg, Oral, Daily         Stop These Medications    buPROPion  MG 24 hr tablet  Commonly known as:  WELLBUTRIN XL        hydroCHLOROthiazide 25 MG tablet  Commonly known as:  HYDRODIURIL     Insulin Glargine 100 UNIT/ML injection pen  Commonly known as:  BASAGLAR KWIKPEN     metoprolol tartrate 50 MG tablet  Commonly known as:  LOPRESSOR            Allergies   Allergen Reactions   • Avelox [Moxifloxacin Hcl] Shortness Of Breath   • Amlodipine      HEART PALPITATIONS- ANXIETY   • Citalopram Hydrobromide    • Meloxicam    • Metformin And Related Diarrhea   • Quetiapine      Fatigue and nausea         Discharge Disposition:  Home or Self Care    Discharge Diet:  Diet Order   Procedures   • Diet Regular; Consistent Carbohydrate       Discharge Activity:   Activity Instructions     Activity as Tolerated            CODE STATUS:    Code Status and Medical Interventions:   Ordered at: 08/10/20 4170     Code Status:    CPR     Medical Interventions (Level of Support Prior to Arrest):    Full       No future appointments.  Additional Instructions for the Follow-ups that You Need to Schedule     Discharge Follow-up with PCP   As directed       Currently Documented PCP:    Juan Carlos Kwan APRN    PCP Phone Number:    [phone]     Follow Up Details:  1-2 weeks         Discharge Follow-up with Specified Provider: Psychiatrist at [facility]   As directed      To:  Psychiatrist at [facility]           Follow-up Information     Juan Carlos Kwan APRN .    Specialties:  Nurse Practitioner, Family Medicine, Urgent Care, Emergency Medicine  Why:  1-2 weeks  Contact information:  [address] [place] [address] [phone]                   Additional Instructions for the Follow-ups that You Need to Schedule     Discharge Follow-up with PCP   As directed       Currently Documented PCP:    Juan Carlos Kwan APRN    PCP Phone Number:    [phone]     Follow Up Details:  1-2 weeks         Discharge Follow-up with Specified Provider: Psychiatrist at [facility]   As directed      To:  Psychiatrist at [facility]            Time Spent on Discharge:  Greater than 30 minutes      ALEXIA Miller  [facility]  08/14/20  1:11 PM

## 2020-08-14 NOTE — PROGRESS NOTES
Continued Stay Note  [facility]     Patient Name: Wu Damon  MRN: [id number]  Today's Date: 8/14/2020    Admit Date: 8/10/2020    Discharge Plan     Row Name 08/14/20 1632       Plan    Plan  Plan home with family transport per [facility].  BRIANA Hassan RN    Patient/Family in Agreement with Plan  yes    Plan Comments  Spoke with pt at bedside.  Pt does not have ride home.  Pt is agreeable to [facility].   Release signed for [facility] and release of cell phone number.  Plan home with family transported per [facility].   RADHA Ambrosio    Row Name 08/14/20 1347       Plan    Plan  Plan home with family.   BRIANA Hassan RN    Patient/Family in Agreement with Plan  yes    Plan Comments  Pt provided BMA list and referral number.  Plan home with family.   BRIANA Hassan RN        Discharge Codes    No documentation.       Expected Discharge Date and Time     Expected Discharge Date Expected Discharge Time    Aug 14, 2020             Neena Hassan RN

## 2020-08-14 NOTE — PROGRESS NOTES
Case Management Discharge Note      Final Note: Pt discharged home.  BRIANA Hassan RN         Destination      No service has been selected for the patient.      Durable Medical Equipment      No service has been selected for the patient.      Dialysis/Infusion      No service has been selected for the patient.      Home Medical Care      No service has been selected for the patient.      Therapy      No service has been selected for the patient.      Community Resources      No service has been selected for the patient.        Transportation Services  Taxi: other([facility])    Final Discharge Disposition Code: 01 - home or self-care

## 2020-08-15 ENCOUNTER — READMISSION MANAGEMENT (OUTPATIENT)
Dept: CALL CENTER | Facility: HOSPITAL | Age: 45
End: 2020-08-15

## 2020-08-15 NOTE — OUTREACH NOTE
Prep Survey      Responses   [facility] patient discharged from?  [facility]   Is LACE score < 7 ?  No   Eligibility  Not Eligible   What are the reasons patient is not eligible?  Behavioral Health   COVID-19 Test Status  Negative   Does the patient have one of the following disease processes/diagnoses(primary or secondary)?  Other   Prep survey completed?  Yes          Pilar Rawls RN

## 2021-08-13 ENCOUNTER — APPOINTMENT (OUTPATIENT)
Dept: GENERAL RADIOLOGY | Facility: HOSPITAL | Age: 46
End: 2021-08-13

## 2021-08-13 ENCOUNTER — APPOINTMENT (OUTPATIENT)
Dept: CT IMAGING | Facility: HOSPITAL | Age: 46
End: 2021-08-13

## 2021-08-13 ENCOUNTER — HOSPITAL ENCOUNTER (INPATIENT)
Facility: HOSPITAL | Age: 46
LOS: 1 days | Discharge: LEFT BEFORE DISCHARGE | End: 2021-08-14
Attending: EMERGENCY MEDICINE | Admitting: INTERNAL MEDICINE

## 2021-08-13 DIAGNOSIS — E87.29 ALCOHOLIC KETOACIDOSIS: ICD-10-CM

## 2021-08-13 DIAGNOSIS — F10.930 ALCOHOL WITHDRAWAL SYNDROME WITHOUT COMPLICATION (HCC): Primary | ICD-10-CM

## 2021-08-13 DIAGNOSIS — R79.89 ELEVATED LFTS: ICD-10-CM

## 2021-08-13 DIAGNOSIS — E87.5 HYPERKALEMIA: ICD-10-CM

## 2021-08-13 DIAGNOSIS — I10 HYPERTENSION, UNSPECIFIED TYPE: ICD-10-CM

## 2021-08-13 DIAGNOSIS — G47.33 OSA (OBSTRUCTIVE SLEEP APNEA): ICD-10-CM

## 2021-08-13 LAB
ALBUMIN SERPL-MCNC: 4.9 G/DL (ref 3.5–5.2)
ALBUMIN SERPL-MCNC: 5.2 G/DL (ref 3.5–5.2)
ALBUMIN/GLOB SERPL: 1.5 G/DL
ALBUMIN/GLOB SERPL: 1.5 G/DL
ALP SERPL-CCNC: 76 U/L (ref 39–117)
ALP SERPL-CCNC: 77 U/L (ref 39–117)
ALT SERPL W P-5'-P-CCNC: 101 U/L (ref 1–41)
ALT SERPL W P-5'-P-CCNC: 121 U/L (ref 1–41)
AMYLASE SERPL-CCNC: 27 U/L (ref 28–100)
ANION GAP SERPL CALCULATED.3IONS-SCNC: 21 MMOL/L (ref 5–15)
ANION GAP SERPL CALCULATED.3IONS-SCNC: 24.9 MMOL/L (ref 5–15)
ANION GAP SERPL CALCULATED.3IONS-SCNC: 27.5 MMOL/L (ref 5–15)
ANION GAP SERPL CALCULATED.3IONS-SCNC: 32.5 MMOL/L (ref 5–15)
APAP SERPL-MCNC: <5 MCG/ML (ref 0–30)
APTT PPP: 25.9 SECONDS (ref 22.7–35.4)
ARTERIAL PATENCY WRIST A: POSITIVE
AST SERPL-CCNC: 117 U/L (ref 1–40)
AST SERPL-CCNC: 84 U/L (ref 1–40)
ATMOSPHERIC PRESS: 753.5 MMHG
B-OH-BUTYR SERPL-SCNC: 4.25 MMOL/L (ref 0.02–0.27)
B-OH-BUTYR SERPL-SCNC: 7.11 MMOL/L (ref 0.02–0.27)
BACTERIA UR QL AUTO: ABNORMAL /HPF
BASE EXCESS BLDA CALC-SCNC: -13.2 MMOL/L (ref 0–2)
BASOPHILS # BLD AUTO: 0.04 10*3/MM3 (ref 0–0.2)
BASOPHILS # BLD AUTO: 0.06 10*3/MM3 (ref 0–0.2)
BASOPHILS NFR BLD AUTO: 0.3 % (ref 0–1.5)
BASOPHILS NFR BLD AUTO: 0.4 % (ref 0–1.5)
BDY SITE: ABNORMAL
BILIRUB SERPL-MCNC: 1.3 MG/DL (ref 0–1.2)
BILIRUB SERPL-MCNC: 1.4 MG/DL (ref 0–1.2)
BILIRUB UR QL STRIP: NEGATIVE
BUN SERPL-MCNC: 10 MG/DL (ref 6–20)
BUN SERPL-MCNC: 11 MG/DL (ref 6–20)
BUN/CREAT SERPL: 8.8 (ref 7–25)
BUN/CREAT SERPL: 9.1 (ref 7–25)
BUN/CREAT SERPL: 9.3 (ref 7–25)
BUN/CREAT SERPL: 9.6 (ref 7–25)
CALCIUM SPEC-SCNC: 10.1 MG/DL (ref 8.6–10.5)
CALCIUM SPEC-SCNC: 8.8 MG/DL (ref 8.6–10.5)
CALCIUM SPEC-SCNC: 8.9 MG/DL (ref 8.6–10.5)
CALCIUM SPEC-SCNC: 9.2 MG/DL (ref 8.6–10.5)
CHLORIDE SERPL-SCNC: 101 MMOL/L (ref 98–107)
CHLORIDE SERPL-SCNC: 91 MMOL/L (ref 98–107)
CHLORIDE SERPL-SCNC: 97 MMOL/L (ref 98–107)
CHLORIDE SERPL-SCNC: 98 MMOL/L (ref 98–107)
CLARITY UR: CLEAR
CO2 SERPL-SCNC: 11.1 MMOL/L (ref 22–29)
CO2 SERPL-SCNC: 11.5 MMOL/L (ref 22–29)
CO2 SERPL-SCNC: 15 MMOL/L (ref 22–29)
CO2 SERPL-SCNC: 8.5 MMOL/L (ref 22–29)
COLOR UR: YELLOW
CREAT SERPL-MCNC: 1.08 MG/DL (ref 0.76–1.27)
CREAT SERPL-MCNC: 1.14 MG/DL (ref 0.76–1.27)
CREAT SERPL-MCNC: 1.21 MG/DL (ref 0.76–1.27)
CREAT SERPL-MCNC: 1.25 MG/DL (ref 0.76–1.27)
D-LACTATE SERPL-SCNC: 2.9 MMOL/L (ref 0.5–2)
D-LACTATE SERPL-SCNC: 6.5 MMOL/L (ref 0.5–2)
DEPRECATED RDW RBC AUTO: 38.4 FL (ref 37–54)
DEPRECATED RDW RBC AUTO: 40.3 FL (ref 37–54)
EOSINOPHIL # BLD AUTO: 0.01 10*3/MM3 (ref 0–0.4)
EOSINOPHIL # BLD AUTO: 0.08 10*3/MM3 (ref 0–0.4)
EOSINOPHIL NFR BLD AUTO: 0.1 % (ref 0.3–6.2)
EOSINOPHIL NFR BLD AUTO: 0.5 % (ref 0.3–6.2)
ERYTHROCYTE [DISTWIDTH] IN BLOOD BY AUTOMATED COUNT: 11.1 % (ref 12.3–15.4)
ERYTHROCYTE [DISTWIDTH] IN BLOOD BY AUTOMATED COUNT: 11.7 % (ref 12.3–15.4)
ETHANOL BLD-MCNC: <10 MG/DL (ref 0–10)
ETHANOL UR QL: <0.01 %
GFR SERPL CREATININE-BSD FRML MDRD: 76 ML/MIN/1.73
GFR SERPL CREATININE-BSD FRML MDRD: 79 ML/MIN/1.73
GFR SERPL CREATININE-BSD FRML MDRD: 85 ML/MIN/1.73
GFR SERPL CREATININE-BSD FRML MDRD: 90 ML/MIN/1.73
GLOBULIN UR ELPH-MCNC: 3.3 GM/DL
GLOBULIN UR ELPH-MCNC: 3.5 GM/DL
GLUCOSE BLDC GLUCOMTR-MCNC: 105 MG/DL (ref 70–130)
GLUCOSE BLDC GLUCOMTR-MCNC: 108 MG/DL (ref 70–130)
GLUCOSE BLDC GLUCOMTR-MCNC: 134 MG/DL (ref 70–130)
GLUCOSE BLDC GLUCOMTR-MCNC: 139 MG/DL (ref 70–130)
GLUCOSE BLDC GLUCOMTR-MCNC: 140 MG/DL (ref 70–130)
GLUCOSE BLDC GLUCOMTR-MCNC: 142 MG/DL (ref 70–130)
GLUCOSE BLDC GLUCOMTR-MCNC: 150 MG/DL (ref 70–130)
GLUCOSE BLDC GLUCOMTR-MCNC: 178 MG/DL (ref 70–130)
GLUCOSE BLDC GLUCOMTR-MCNC: 184 MG/DL (ref 70–130)
GLUCOSE BLDC GLUCOMTR-MCNC: 223 MG/DL (ref 70–130)
GLUCOSE BLDC GLUCOMTR-MCNC: 232 MG/DL (ref 70–130)
GLUCOSE BLDC GLUCOMTR-MCNC: 252 MG/DL (ref 70–130)
GLUCOSE BLDC GLUCOMTR-MCNC: 283 MG/DL (ref 70–130)
GLUCOSE BLDC GLUCOMTR-MCNC: 301 MG/DL (ref 70–130)
GLUCOSE BLDC GLUCOMTR-MCNC: 415 MG/DL (ref 70–130)
GLUCOSE BLDC GLUCOMTR-MCNC: 97 MG/DL (ref 70–130)
GLUCOSE SERPL-MCNC: 233 MG/DL (ref 65–99)
GLUCOSE SERPL-MCNC: 291 MG/DL (ref 65–99)
GLUCOSE SERPL-MCNC: 352 MG/DL (ref 65–99)
GLUCOSE SERPL-MCNC: 86 MG/DL (ref 65–99)
GLUCOSE UR STRIP-MCNC: ABNORMAL MG/DL
HBA1C MFR BLD: 7.8 % (ref 4.8–5.6)
HCO3 BLDA-SCNC: 12.2 MMOL/L (ref 22–28)
HCT VFR BLD AUTO: 43.2 % (ref 37.5–51)
HCT VFR BLD AUTO: 47.3 % (ref 37.5–51)
HGB BLD-MCNC: 14.3 G/DL (ref 13–17.7)
HGB BLD-MCNC: 15.9 G/DL (ref 13–17.7)
HGB UR QL STRIP.AUTO: ABNORMAL
HYALINE CASTS UR QL AUTO: ABNORMAL /LPF
IMM GRANULOCYTES # BLD AUTO: 0.07 10*3/MM3 (ref 0–0.05)
IMM GRANULOCYTES # BLD AUTO: 0.1 10*3/MM3 (ref 0–0.05)
IMM GRANULOCYTES NFR BLD AUTO: 0.5 % (ref 0–0.5)
IMM GRANULOCYTES NFR BLD AUTO: 0.7 % (ref 0–0.5)
INR PPP: 1.13 (ref 0.9–1.1)
KETONES UR QL STRIP: ABNORMAL
LEUKOCYTE ESTERASE UR QL STRIP.AUTO: NEGATIVE
LIPASE SERPL-CCNC: 37 U/L (ref 13–60)
LYMPHOCYTES # BLD AUTO: 0.5 10*3/MM3 (ref 0.7–3.1)
LYMPHOCYTES # BLD AUTO: 0.53 10*3/MM3 (ref 0.7–3.1)
LYMPHOCYTES NFR BLD AUTO: 3.4 % (ref 19.6–45.3)
LYMPHOCYTES NFR BLD AUTO: 3.8 % (ref 19.6–45.3)
MAGNESIUM SERPL-MCNC: 1.5 MG/DL (ref 1.6–2.6)
MAGNESIUM SERPL-MCNC: 1.5 MG/DL (ref 1.6–2.6)
MAGNESIUM SERPL-MCNC: 1.8 MG/DL (ref 1.6–2.6)
MAGNESIUM SERPL-MCNC: 2.5 MG/DL (ref 1.6–2.6)
MAGNESIUM SERPL-MCNC: 2.6 MG/DL (ref 1.6–2.6)
MCH RBC QN AUTO: 31.6 PG (ref 26.6–33)
MCH RBC QN AUTO: 32.1 PG (ref 26.6–33)
MCHC RBC AUTO-ENTMCNC: 33.1 G/DL (ref 31.5–35.7)
MCHC RBC AUTO-ENTMCNC: 33.6 G/DL (ref 31.5–35.7)
MCV RBC AUTO: 95.4 FL (ref 79–97)
MCV RBC AUTO: 95.6 FL (ref 79–97)
MODALITY: ABNORMAL
MONOCYTES # BLD AUTO: 0.55 10*3/MM3 (ref 0.1–0.9)
MONOCYTES # BLD AUTO: 0.65 10*3/MM3 (ref 0.1–0.9)
MONOCYTES NFR BLD AUTO: 3.7 % (ref 5–12)
MONOCYTES NFR BLD AUTO: 4.6 % (ref 5–12)
NEUTROPHILS NFR BLD AUTO: 12.69 10*3/MM3 (ref 1.7–7)
NEUTROPHILS NFR BLD AUTO: 13.42 10*3/MM3 (ref 1.7–7)
NEUTROPHILS NFR BLD AUTO: 90.5 % (ref 42.7–76)
NEUTROPHILS NFR BLD AUTO: 91.5 % (ref 42.7–76)
NITRITE UR QL STRIP: NEGATIVE
NRBC BLD AUTO-RTO: 0 /100 WBC (ref 0–0.2)
NRBC BLD AUTO-RTO: 0.1 /100 WBC (ref 0–0.2)
NT-PROBNP SERPL-MCNC: <5 PG/ML (ref 0–450)
OSMOLALITY SERPL: 302 MOSM/KG (ref 275–300)
PCO2 BLDA: 27.6 MM HG (ref 35–45)
PH BLDA: 7.25 PH UNITS (ref 7.35–7.45)
PH UR STRIP.AUTO: <=5 [PH] (ref 5–8)
PHOSPHATE SERPL-MCNC: 1.1 MG/DL (ref 2.5–4.5)
PHOSPHATE SERPL-MCNC: 1.4 MG/DL (ref 2.5–4.5)
PHOSPHATE SERPL-MCNC: 1.8 MG/DL (ref 2.5–4.5)
PHOSPHATE SERPL-MCNC: 2.3 MG/DL (ref 2.5–4.5)
PHOSPHATE SERPL-MCNC: 2.5 MG/DL (ref 2.5–4.5)
PLATELET # BLD AUTO: 175 10*3/MM3 (ref 140–450)
PLATELET # BLD AUTO: 234 10*3/MM3 (ref 140–450)
PMV BLD AUTO: 11.7 FL (ref 6–12)
PMV BLD AUTO: 12.2 FL (ref 6–12)
PO2 BLDA: 95.4 MM HG (ref 80–100)
POTASSIUM SERPL-SCNC: 4.9 MMOL/L (ref 3.5–5.2)
POTASSIUM SERPL-SCNC: 5 MMOL/L (ref 3.5–5.2)
POTASSIUM SERPL-SCNC: 5.3 MMOL/L (ref 3.5–5.2)
POTASSIUM SERPL-SCNC: 6.4 MMOL/L (ref 3.5–5.2)
PROCALCITONIN SERPL-MCNC: 0.23 NG/ML (ref 0–0.25)
PROT SERPL-MCNC: 8.2 G/DL (ref 6–8.5)
PROT SERPL-MCNC: 8.7 G/DL (ref 6–8.5)
PROT UR QL STRIP: ABNORMAL
PROTHROMBIN TIME: 14.3 SECONDS (ref 11.7–14.2)
QT INTERVAL: 312 MS
RBC # BLD AUTO: 4.53 10*6/MM3 (ref 4.14–5.8)
RBC # BLD AUTO: 4.95 10*6/MM3 (ref 4.14–5.8)
RBC # UR: ABNORMAL /HPF
REF LAB TEST METHOD: ABNORMAL
SAO2 % BLDCOA: 96.3 % (ref 92–99)
SARS-COV-2 RNA RESP QL NAA+PROBE: NOT DETECTED
SODIUM SERPL-SCNC: 133 MMOL/L (ref 136–145)
SODIUM SERPL-SCNC: 134 MMOL/L (ref 136–145)
SODIUM SERPL-SCNC: 135 MMOL/L (ref 136–145)
SODIUM SERPL-SCNC: 137 MMOL/L (ref 136–145)
SP GR UR STRIP: 1.02 (ref 1–1.03)
SQUAMOUS #/AREA URNS HPF: ABNORMAL /HPF
TOTAL RATE: 20 BREATHS/MINUTE
TROPONIN T SERPL-MCNC: <0.01 NG/ML (ref 0–0.03)
TROPONIN T SERPL-MCNC: <0.01 NG/ML (ref 0–0.03)
TSH SERPL DL<=0.05 MIU/L-ACNC: 2.73 UIU/ML (ref 0.27–4.2)
UROBILINOGEN UR QL STRIP: ABNORMAL
WBC # BLD AUTO: 14.02 10*3/MM3 (ref 3.4–10.8)
WBC # BLD AUTO: 14.68 10*3/MM3 (ref 3.4–10.8)
WBC UR QL AUTO: ABNORMAL /HPF

## 2021-08-13 PROCEDURE — 84484 ASSAY OF TROPONIN QUANT: CPT | Performed by: EMERGENCY MEDICINE

## 2021-08-13 PROCEDURE — 99285 EMERGENCY DEPT VISIT HI MDM: CPT

## 2021-08-13 PROCEDURE — 83605 ASSAY OF LACTIC ACID: CPT | Performed by: EMERGENCY MEDICINE

## 2021-08-13 PROCEDURE — 82962 GLUCOSE BLOOD TEST: CPT

## 2021-08-13 PROCEDURE — 83880 ASSAY OF NATRIURETIC PEPTIDE: CPT | Performed by: EMERGENCY MEDICINE

## 2021-08-13 PROCEDURE — 82803 BLOOD GASES ANY COMBINATION: CPT

## 2021-08-13 PROCEDURE — 83930 ASSAY OF BLOOD OSMOLALITY: CPT | Performed by: INTERNAL MEDICINE

## 2021-08-13 PROCEDURE — 87040 BLOOD CULTURE FOR BACTERIA: CPT | Performed by: INTERNAL MEDICINE

## 2021-08-13 PROCEDURE — 25010000003 INSULIN REGULAR HUMAN PER 5 UNITS: Performed by: INTERNAL MEDICINE

## 2021-08-13 PROCEDURE — 84100 ASSAY OF PHOSPHORUS: CPT | Performed by: INTERNAL MEDICINE

## 2021-08-13 PROCEDURE — 84443 ASSAY THYROID STIM HORMONE: CPT | Performed by: INTERNAL MEDICINE

## 2021-08-13 PROCEDURE — 82010 KETONE BODYS QUAN: CPT | Performed by: INTERNAL MEDICINE

## 2021-08-13 PROCEDURE — 25010000002 ONDANSETRON PER 1 MG: Performed by: INTERNAL MEDICINE

## 2021-08-13 PROCEDURE — 82077 ASSAY SPEC XCP UR&BREATH IA: CPT | Performed by: EMERGENCY MEDICINE

## 2021-08-13 PROCEDURE — 74176 CT ABD & PELVIS W/O CONTRAST: CPT

## 2021-08-13 PROCEDURE — 36600 WITHDRAWAL OF ARTERIAL BLOOD: CPT

## 2021-08-13 PROCEDURE — 25010000002 ENOXAPARIN PER 10 MG: Performed by: INTERNAL MEDICINE

## 2021-08-13 PROCEDURE — 85025 COMPLETE CBC W/AUTO DIFF WBC: CPT | Performed by: INTERNAL MEDICINE

## 2021-08-13 PROCEDURE — 25010000002 PIPERACILLIN SOD-TAZOBACTAM PER 1 G: Performed by: INTERNAL MEDICINE

## 2021-08-13 PROCEDURE — 80048 BASIC METABOLIC PNL TOTAL CA: CPT | Performed by: INTERNAL MEDICINE

## 2021-08-13 PROCEDURE — 82150 ASSAY OF AMYLASE: CPT | Performed by: INTERNAL MEDICINE

## 2021-08-13 PROCEDURE — 83036 HEMOGLOBIN GLYCOSYLATED A1C: CPT | Performed by: INTERNAL MEDICINE

## 2021-08-13 PROCEDURE — 80143 DRUG ASSAY ACETAMINOPHEN: CPT | Performed by: INTERNAL MEDICINE

## 2021-08-13 PROCEDURE — 25010000002 THIAMINE PER 100 MG: Performed by: INTERNAL MEDICINE

## 2021-08-13 PROCEDURE — 85730 THROMBOPLASTIN TIME PARTIAL: CPT | Performed by: EMERGENCY MEDICINE

## 2021-08-13 PROCEDURE — 84484 ASSAY OF TROPONIN QUANT: CPT | Performed by: INTERNAL MEDICINE

## 2021-08-13 PROCEDURE — 83690 ASSAY OF LIPASE: CPT | Performed by: INTERNAL MEDICINE

## 2021-08-13 PROCEDURE — 99284 EMERGENCY DEPT VISIT MOD MDM: CPT

## 2021-08-13 PROCEDURE — 83735 ASSAY OF MAGNESIUM: CPT | Performed by: INTERNAL MEDICINE

## 2021-08-13 PROCEDURE — 25010000002 LORAZEPAM PER 2 MG: Performed by: EMERGENCY MEDICINE

## 2021-08-13 PROCEDURE — 93005 ELECTROCARDIOGRAM TRACING: CPT | Performed by: EMERGENCY MEDICINE

## 2021-08-13 PROCEDURE — 36415 COLL VENOUS BLD VENIPUNCTURE: CPT | Performed by: INTERNAL MEDICINE

## 2021-08-13 PROCEDURE — 85610 PROTHROMBIN TIME: CPT | Performed by: EMERGENCY MEDICINE

## 2021-08-13 PROCEDURE — 84145 PROCALCITONIN (PCT): CPT | Performed by: INTERNAL MEDICINE

## 2021-08-13 PROCEDURE — U0003 INFECTIOUS AGENT DETECTION BY NUCLEIC ACID (DNA OR RNA); SEVERE ACUTE RESPIRATORY SYNDROME CORONAVIRUS 2 (SARS-COV-2) (CORONAVIRUS DISEASE [COVID-19]), AMPLIFIED PROBE TECHNIQUE, MAKING USE OF HIGH THROUGHPUT TECHNOLOGIES AS DESCRIBED BY CMS-2020-01-R: HCPCS | Performed by: EMERGENCY MEDICINE

## 2021-08-13 PROCEDURE — 80053 COMPREHEN METABOLIC PANEL: CPT | Performed by: EMERGENCY MEDICINE

## 2021-08-13 PROCEDURE — 25010000002 LORAZEPAM PER 2 MG: Performed by: INTERNAL MEDICINE

## 2021-08-13 PROCEDURE — 93010 ELECTROCARDIOGRAM REPORT: CPT | Performed by: INTERNAL MEDICINE

## 2021-08-13 PROCEDURE — 85025 COMPLETE CBC W/AUTO DIFF WBC: CPT | Performed by: EMERGENCY MEDICINE

## 2021-08-13 PROCEDURE — 81001 URINALYSIS AUTO W/SCOPE: CPT | Performed by: INTERNAL MEDICINE

## 2021-08-13 PROCEDURE — 25010000002 MAGNESIUM SULFATE 2 GM/50ML SOLUTION: Performed by: INTERNAL MEDICINE

## 2021-08-13 PROCEDURE — 71045 X-RAY EXAM CHEST 1 VIEW: CPT

## 2021-08-13 PROCEDURE — 63710000001 INSULIN REGULAR HUMAN PER 5 UNITS: Performed by: EMERGENCY MEDICINE

## 2021-08-13 PROCEDURE — 80053 COMPREHEN METABOLIC PANEL: CPT | Performed by: INTERNAL MEDICINE

## 2021-08-13 PROCEDURE — 90791 PSYCH DIAGNOSTIC EVALUATION: CPT

## 2021-08-13 RX ORDER — SODIUM POLYSTYRENE SULFONATE 15 G/60ML
15 SUSPENSION ORAL; RECTAL ONCE
Status: DISCONTINUED | OUTPATIENT
Start: 2021-08-13 | End: 2021-08-13

## 2021-08-13 RX ORDER — POTASSIUM CHLORIDE 7.45 MG/ML
10 INJECTION INTRAVENOUS
Status: DISCONTINUED | OUTPATIENT
Start: 2021-08-13 | End: 2021-08-14 | Stop reason: HOSPADM

## 2021-08-13 RX ORDER — ACETAMINOPHEN 325 MG/1
650 TABLET ORAL EVERY 6 HOURS PRN
Status: DISCONTINUED | OUTPATIENT
Start: 2021-08-13 | End: 2021-08-13 | Stop reason: SDUPTHER

## 2021-08-13 RX ORDER — FOLIC ACID 1 MG/1
1 TABLET ORAL DAILY
Status: DISCONTINUED | OUTPATIENT
Start: 2021-08-13 | End: 2021-08-14 | Stop reason: HOSPADM

## 2021-08-13 RX ORDER — SODIUM CHLORIDE 450 MG/100ML
250 INJECTION, SOLUTION INTRAVENOUS CONTINUOUS PRN
Status: DISCONTINUED | OUTPATIENT
Start: 2021-08-13 | End: 2021-08-14

## 2021-08-13 RX ORDER — DEXTROSE, SODIUM CHLORIDE, AND POTASSIUM CHLORIDE 5; .9; .15 G/100ML; G/100ML; G/100ML
150 INJECTION INTRAVENOUS CONTINUOUS PRN
Status: DISCONTINUED | OUTPATIENT
Start: 2021-08-13 | End: 2021-08-14

## 2021-08-13 RX ORDER — SODIUM CHLORIDE AND POTASSIUM CHLORIDE 300; 900 MG/100ML; MG/100ML
250 INJECTION, SOLUTION INTRAVENOUS CONTINUOUS PRN
Status: DISCONTINUED | OUTPATIENT
Start: 2021-08-13 | End: 2021-08-14

## 2021-08-13 RX ORDER — LORAZEPAM 1 MG/1
1 TABLET ORAL
Status: DISCONTINUED | OUTPATIENT
Start: 2021-08-13 | End: 2021-08-14 | Stop reason: HOSPADM

## 2021-08-13 RX ORDER — SODIUM CHLORIDE 9 MG/ML
10 INJECTION, SOLUTION INTRAVENOUS CONTINUOUS PRN
Status: DISCONTINUED | OUTPATIENT
Start: 2021-08-13 | End: 2021-08-14

## 2021-08-13 RX ORDER — SODIUM CHLORIDE 0.9 % (FLUSH) 0.9 %
10 SYRINGE (ML) INJECTION AS NEEDED
Status: DISCONTINUED | OUTPATIENT
Start: 2021-08-13 | End: 2021-08-14

## 2021-08-13 RX ORDER — FAMOTIDINE 10 MG/ML
20 INJECTION, SOLUTION INTRAVENOUS ONCE
Status: COMPLETED | OUTPATIENT
Start: 2021-08-13 | End: 2021-08-13

## 2021-08-13 RX ORDER — LORAZEPAM 2 MG/ML
1 INJECTION INTRAMUSCULAR
Status: DISCONTINUED | OUTPATIENT
Start: 2021-08-13 | End: 2021-08-14 | Stop reason: HOSPADM

## 2021-08-13 RX ORDER — ALUMINA, MAGNESIA, AND SIMETHICONE 2400; 2400; 240 MG/30ML; MG/30ML; MG/30ML
15 SUSPENSION ORAL EVERY 6 HOURS PRN
Status: DISCONTINUED | OUTPATIENT
Start: 2021-08-13 | End: 2021-08-14 | Stop reason: HOSPADM

## 2021-08-13 RX ORDER — DEXTROSE AND SODIUM CHLORIDE 5; .9 G/100ML; G/100ML
150 INJECTION, SOLUTION INTRAVENOUS CONTINUOUS PRN
Status: DISCONTINUED | OUTPATIENT
Start: 2021-08-13 | End: 2021-08-14

## 2021-08-13 RX ORDER — POTASSIUM CHLORIDE 1.5 G/1.77G
40 POWDER, FOR SOLUTION ORAL AS NEEDED
Status: DISCONTINUED | OUTPATIENT
Start: 2021-08-13 | End: 2021-08-14 | Stop reason: HOSPADM

## 2021-08-13 RX ORDER — SODIUM CHLORIDE 0.9 % (FLUSH) 0.9 %
10 SYRINGE (ML) INJECTION ONCE AS NEEDED
Status: DISCONTINUED | OUTPATIENT
Start: 2021-08-13 | End: 2021-08-14

## 2021-08-13 RX ORDER — POTASSIUM CHLORIDE, DEXTROSE MONOHYDRATE AND SODIUM CHLORIDE 300; 5; 900 MG/100ML; G/100ML; MG/100ML
150 INJECTION, SOLUTION INTRAVENOUS CONTINUOUS PRN
Status: DISCONTINUED | OUTPATIENT
Start: 2021-08-13 | End: 2021-08-14

## 2021-08-13 RX ORDER — SODIUM CHLORIDE 0.9 % (FLUSH) 0.9 %
10 SYRINGE (ML) INJECTION EVERY 12 HOURS SCHEDULED
Status: DISCONTINUED | OUTPATIENT
Start: 2021-08-13 | End: 2021-08-14 | Stop reason: HOSPADM

## 2021-08-13 RX ORDER — LORAZEPAM 1 MG/1
2 TABLET ORAL
Status: DISCONTINUED | OUTPATIENT
Start: 2021-08-13 | End: 2021-08-14 | Stop reason: HOSPADM

## 2021-08-13 RX ORDER — LORAZEPAM 2 MG/ML
4 INJECTION INTRAMUSCULAR
Status: DISCONTINUED | OUTPATIENT
Start: 2021-08-13 | End: 2021-08-14 | Stop reason: HOSPADM

## 2021-08-13 RX ORDER — CLONIDINE HYDROCHLORIDE 0.1 MG/1
0.1 TABLET ORAL EVERY 4 HOURS PRN
Status: DISCONTINUED | OUTPATIENT
Start: 2021-08-13 | End: 2021-08-14 | Stop reason: HOSPADM

## 2021-08-13 RX ORDER — ONDANSETRON 2 MG/ML
4 INJECTION INTRAMUSCULAR; INTRAVENOUS EVERY 6 HOURS PRN
Status: DISCONTINUED | OUTPATIENT
Start: 2021-08-13 | End: 2021-08-13 | Stop reason: SDUPTHER

## 2021-08-13 RX ORDER — SODIUM CHLORIDE AND POTASSIUM CHLORIDE 150; 900 MG/100ML; MG/100ML
250 INJECTION, SOLUTION INTRAVENOUS CONTINUOUS PRN
Status: DISCONTINUED | OUTPATIENT
Start: 2021-08-13 | End: 2021-08-14

## 2021-08-13 RX ORDER — BUPRENORPHINE AND NALOXONE 8; 2 MG/1; MG/1
1 FILM, SOLUBLE BUCCAL; SUBLINGUAL NIGHTLY
Status: DISCONTINUED | OUTPATIENT
Start: 2021-08-13 | End: 2021-08-14 | Stop reason: HOSPADM

## 2021-08-13 RX ORDER — POTASSIUM CHLORIDE 750 MG/1
40 TABLET, FILM COATED, EXTENDED RELEASE ORAL AS NEEDED
Status: DISCONTINUED | OUTPATIENT
Start: 2021-08-13 | End: 2021-08-14 | Stop reason: HOSPADM

## 2021-08-13 RX ORDER — ONDANSETRON 2 MG/ML
4 INJECTION INTRAMUSCULAR; INTRAVENOUS EVERY 6 HOURS PRN
Status: DISCONTINUED | OUTPATIENT
Start: 2021-08-13 | End: 2021-08-14 | Stop reason: HOSPADM

## 2021-08-13 RX ORDER — SODIUM CHLORIDE 9 MG/ML
250 INJECTION, SOLUTION INTRAVENOUS CONTINUOUS PRN
Status: DISCONTINUED | OUTPATIENT
Start: 2021-08-13 | End: 2021-08-14

## 2021-08-13 RX ORDER — MAGNESIUM SULFATE HEPTAHYDRATE 40 MG/ML
2 INJECTION, SOLUTION INTRAVENOUS AS NEEDED
Status: DISCONTINUED | OUTPATIENT
Start: 2021-08-13 | End: 2021-08-14 | Stop reason: HOSPADM

## 2021-08-13 RX ORDER — DEXTROSE AND SODIUM CHLORIDE 5; .45 G/100ML; G/100ML
150 INJECTION, SOLUTION INTRAVENOUS CONTINUOUS PRN
Status: DISCONTINUED | OUTPATIENT
Start: 2021-08-13 | End: 2021-08-14

## 2021-08-13 RX ORDER — ONDANSETRON 4 MG/1
4 TABLET, FILM COATED ORAL EVERY 6 HOURS PRN
Status: DISCONTINUED | OUTPATIENT
Start: 2021-08-13 | End: 2021-08-13 | Stop reason: SDUPTHER

## 2021-08-13 RX ORDER — LORAZEPAM 2 MG/ML
1 INJECTION INTRAMUSCULAR ONCE
Status: COMPLETED | OUTPATIENT
Start: 2021-08-13 | End: 2021-08-13

## 2021-08-13 RX ORDER — BUPRENORPHINE AND NALOXONE 8; 2 MG/1; MG/1
1 FILM, SOLUBLE BUCCAL; SUBLINGUAL DAILY
Status: DISCONTINUED | OUTPATIENT
Start: 2021-08-13 | End: 2021-08-13

## 2021-08-13 RX ORDER — ONDANSETRON 4 MG/1
4 TABLET, FILM COATED ORAL EVERY 6 HOURS PRN
Status: DISCONTINUED | OUTPATIENT
Start: 2021-08-13 | End: 2021-08-14 | Stop reason: HOSPADM

## 2021-08-13 RX ORDER — DEXTROSE MONOHYDRATE 25 G/50ML
12.5 INJECTION, SOLUTION INTRAVENOUS AS NEEDED
Status: DISCONTINUED | OUTPATIENT
Start: 2021-08-13 | End: 2021-08-14

## 2021-08-13 RX ORDER — LORAZEPAM 2 MG/ML
2 INJECTION INTRAMUSCULAR
Status: DISCONTINUED | OUTPATIENT
Start: 2021-08-13 | End: 2021-08-14 | Stop reason: HOSPADM

## 2021-08-13 RX ORDER — DEXTROSE, SODIUM CHLORIDE, AND POTASSIUM CHLORIDE 5; .45; .15 G/100ML; G/100ML; G/100ML
150 INJECTION INTRAVENOUS CONTINUOUS PRN
Status: DISCONTINUED | OUTPATIENT
Start: 2021-08-13 | End: 2021-08-14

## 2021-08-13 RX ORDER — HYDROMORPHONE HYDROCHLORIDE 1 MG/ML
0.5 INJECTION, SOLUTION INTRAMUSCULAR; INTRAVENOUS; SUBCUTANEOUS
Status: DISCONTINUED | OUTPATIENT
Start: 2021-08-13 | End: 2021-08-14 | Stop reason: HOSPADM

## 2021-08-13 RX ORDER — ACETAMINOPHEN 650 MG/1
650 SUPPOSITORY RECTAL EVERY 4 HOURS PRN
Status: DISCONTINUED | OUTPATIENT
Start: 2021-08-13 | End: 2021-08-14 | Stop reason: HOSPADM

## 2021-08-13 RX ORDER — ACETAMINOPHEN 325 MG/1
650 TABLET ORAL EVERY 4 HOURS PRN
Status: DISCONTINUED | OUTPATIENT
Start: 2021-08-13 | End: 2021-08-14 | Stop reason: HOSPADM

## 2021-08-13 RX ORDER — LORAZEPAM 1 MG/1
4 TABLET ORAL
Status: DISCONTINUED | OUTPATIENT
Start: 2021-08-13 | End: 2021-08-14 | Stop reason: HOSPADM

## 2021-08-13 RX ORDER — MAGNESIUM SULFATE HEPTAHYDRATE 40 MG/ML
4 INJECTION, SOLUTION INTRAVENOUS AS NEEDED
Status: DISCONTINUED | OUTPATIENT
Start: 2021-08-13 | End: 2021-08-14 | Stop reason: HOSPADM

## 2021-08-13 RX ORDER — SODIUM CHLORIDE AND POTASSIUM CHLORIDE 150; 450 MG/100ML; MG/100ML
250 INJECTION, SOLUTION INTRAVENOUS CONTINUOUS PRN
Status: DISCONTINUED | OUTPATIENT
Start: 2021-08-13 | End: 2021-08-14

## 2021-08-13 RX ORDER — DEXTROSE, SODIUM CHLORIDE, AND POTASSIUM CHLORIDE 5; .45; .3 G/100ML; G/100ML; G/100ML
150 INJECTION INTRAVENOUS CONTINUOUS PRN
Status: DISCONTINUED | OUTPATIENT
Start: 2021-08-13 | End: 2021-08-14

## 2021-08-13 RX ORDER — METOPROLOL SUCCINATE 50 MG/1
50 TABLET, EXTENDED RELEASE ORAL DAILY
Status: DISCONTINUED | OUTPATIENT
Start: 2021-08-13 | End: 2021-08-14 | Stop reason: HOSPADM

## 2021-08-13 RX ORDER — SODIUM CHLORIDE 0.9 % (FLUSH) 0.9 %
10 SYRINGE (ML) INJECTION AS NEEDED
Status: DISCONTINUED | OUTPATIENT
Start: 2021-08-13 | End: 2021-08-14 | Stop reason: HOSPADM

## 2021-08-13 RX ORDER — SODIUM CHLORIDE 0.9 % (FLUSH) 0.9 %
3 SYRINGE (ML) INJECTION EVERY 12 HOURS SCHEDULED
Status: DISCONTINUED | OUTPATIENT
Start: 2021-08-13 | End: 2021-08-14

## 2021-08-13 RX ADMIN — INSULIN HUMAN 10 UNITS: 100 INJECTION, SOLUTION PARENTERAL at 06:30

## 2021-08-13 RX ADMIN — ONDANSETRON 4 MG: 2 INJECTION INTRAMUSCULAR; INTRAVENOUS at 18:31

## 2021-08-13 RX ADMIN — ACETAMINOPHEN 650 MG: 325 TABLET, FILM COATED ORAL at 18:31

## 2021-08-13 RX ADMIN — POTASSIUM CHLORIDE, DEXTROSE MONOHYDRATE AND SODIUM CHLORIDE 150 ML/HR: 150; 5; 450 INJECTION, SOLUTION INTRAVENOUS at 14:00

## 2021-08-13 RX ADMIN — SODIUM CHLORIDE 16 UNITS/HR: 9 INJECTION, SOLUTION INTRAVENOUS at 14:19

## 2021-08-13 RX ADMIN — ACETAMINOPHEN 650 MG: 325 TABLET, FILM COATED ORAL at 08:57

## 2021-08-13 RX ADMIN — SODIUM CHLORIDE 250 ML/HR: 9 INJECTION, SOLUTION INTRAVENOUS at 08:23

## 2021-08-13 RX ADMIN — SODIUM PHOSPHATE, MONOBASIC, MONOHYDRATE 20 MMOL: 276; 142 INJECTION, SOLUTION INTRAVENOUS at 14:19

## 2021-08-13 RX ADMIN — POTASSIUM CHLORIDE, DEXTROSE MONOHYDRATE AND SODIUM CHLORIDE 150 ML/HR: 150; 5; 450 INJECTION, SOLUTION INTRAVENOUS at 22:29

## 2021-08-13 RX ADMIN — THIAMINE HYDROCHLORIDE 100 MG: 100 INJECTION, SOLUTION INTRAMUSCULAR; INTRAVENOUS at 08:36

## 2021-08-13 RX ADMIN — SODIUM CHLORIDE, PRESERVATIVE FREE 3 ML: 5 INJECTION INTRAVENOUS at 08:30

## 2021-08-13 RX ADMIN — LORAZEPAM 1 MG: 2 INJECTION INTRAMUSCULAR; INTRAVENOUS at 04:53

## 2021-08-13 RX ADMIN — MAGNESIUM SULFATE HEPTAHYDRATE 2 G: 40 INJECTION, SOLUTION INTRAVENOUS at 14:14

## 2021-08-13 RX ADMIN — SODIUM CHLORIDE 5 MG/HR: 9 INJECTION, SOLUTION INTRAVENOUS at 08:29

## 2021-08-13 RX ADMIN — METOPROLOL SUCCINATE 50 MG: 50 TABLET, EXTENDED RELEASE ORAL at 10:33

## 2021-08-13 RX ADMIN — ONDANSETRON 4 MG: 2 INJECTION INTRAMUSCULAR; INTRAVENOUS at 11:58

## 2021-08-13 RX ADMIN — SODIUM CHLORIDE, PRESERVATIVE FREE 10 ML: 5 INJECTION INTRAVENOUS at 10:17

## 2021-08-13 RX ADMIN — SODIUM CHLORIDE 1000 ML: 9 INJECTION, SOLUTION INTRAVENOUS at 04:55

## 2021-08-13 RX ADMIN — SODIUM CHLORIDE 1000 ML: 9 INJECTION, SOLUTION INTRAVENOUS at 06:27

## 2021-08-13 RX ADMIN — SODIUM CHLORIDE 1000 ML: 9 INJECTION, SOLUTION INTRAVENOUS at 07:41

## 2021-08-13 RX ADMIN — LORAZEPAM 1 MG: 2 INJECTION INTRAMUSCULAR; INTRAVENOUS at 05:59

## 2021-08-13 RX ADMIN — SODIUM CHLORIDE 5 MG/HR: 9 INJECTION, SOLUTION INTRAVENOUS at 12:11

## 2021-08-13 RX ADMIN — LORAZEPAM 2 MG: 2 INJECTION INTRAMUSCULAR; INTRAVENOUS at 08:44

## 2021-08-13 RX ADMIN — MAGNESIUM SULFATE HEPTAHYDRATE 2 G: 40 INJECTION, SOLUTION INTRAVENOUS at 16:22

## 2021-08-13 RX ADMIN — MAGNESIUM SULFATE HEPTAHYDRATE 2 G: 40 INJECTION, SOLUTION INTRAVENOUS at 11:59

## 2021-08-13 RX ADMIN — BUPRENORPHINE AND NALOXONE 1 FILM: 8; 2 FILM BUCCAL; SUBLINGUAL at 21:12

## 2021-08-13 RX ADMIN — DEXTROSE MONOHYDRATE 12.5 G: 25 INJECTION, SOLUTION INTRAVENOUS at 17:26

## 2021-08-13 RX ADMIN — SODIUM CHLORIDE 11 UNITS/HR: 9 INJECTION, SOLUTION INTRAVENOUS at 08:20

## 2021-08-13 RX ADMIN — METOROPROLOL TARTRATE 5 MG: 5 INJECTION, SOLUTION INTRAVENOUS at 05:14

## 2021-08-13 RX ADMIN — ENOXAPARIN SODIUM 40 MG: 40 INJECTION SUBCUTANEOUS at 21:12

## 2021-08-13 RX ADMIN — TAZOBACTAM SODIUM AND PIPERACILLIN SODIUM 3.38 G: 375; 3 INJECTION, SOLUTION INTRAVENOUS at 10:36

## 2021-08-13 RX ADMIN — FAMOTIDINE 20 MG: 10 INJECTION INTRAVENOUS at 05:22

## 2021-08-13 RX ADMIN — LORAZEPAM 2 MG: 2 INJECTION INTRAMUSCULAR; INTRAVENOUS at 18:31

## 2021-08-13 RX ADMIN — FOLIC ACID 1 MG: 1 TABLET ORAL at 12:44

## 2021-08-13 NOTE — CONSULTS
"[facility] consulted regarding alcohol use.  Patient is alert and oriented, pleasant and cooperative; is talkative.    He is a 45 yo AA/M/M, lives with his wife and their children.  He is currently unemployed-used to work in security; has hx of one DUI.      He reports an increase in drinking within the past week related to stress with his wife and family member health issues (his CASSANDRA recently started dialysis, his triplets had ear infections, and someone is hospitalized with COVID).  He reports he had \"two bottles\" of vodka over the past 4 days; usually drinks one bottle over a week.  His last drink was 2 days ago (BAL was 0).  He reports usually drinking a \"small amount\" of alcohol daily.  He denies hx of seizures and alcohol withdrawal symptoms.  He has a 3 month hx of sobriety last year following detox.  He relapsed around Thanksgiving of last year.  He reports he is going to try this time to stay sober adding he is going to \"take it out before it takes me out\".  But he also talks about wanting to be able to control the amount and drinks and wishes he could be \"that conchis\" that can drink only one during social events.      He tried to attend rehab through [facility] but stated he could not be compliant with their treatment plan so he did not go to either.  He is currently working with [facility] for hx of pain pill abuse and is on Suboxone.  He has been on this for 6 years with hopes of being able to taper and stop.  He initially started using pain pills in 2008/2009 following the death of his mother.      He denies past/current SI, denies HI and mental health issues but alluded to some depression since the death of his mother.  He is reports poor appetite for past few days and described sleep as \"so-so\".  He denies tobacco and illicit drug use.  There are no UDS results at time of this note.        AC will follow and provide resources as needed.           "

## 2021-08-13 NOTE — PROGRESS NOTES
[facility]  [phone]  Natanael Mcneil MD    Subjective:  LOS: 0    Chief Complaint: Weakness    Came up from ED.  Spoke with night call intensivist about his admission.  Drinks alcohol daily.  Does not take meds for his diabetes and apparently not for his hypertension either.  Has been very thirsty past few days.    Objective   Vital Signs past 24hrs    Temp range: Temp (24hrs), Av.1 °F (36.2 °C), Min:97.1 °F (36.2 °C), Max:97.1 °F (36.2 °C)    BP range: BP: (126-178)/() 126/76  Pulse range: Heart Rate:  [113-137] 126  Resp rate range: Resp:  [20-22] 20        Oxygen range:SpO2:  [95 %-98 %] 95 %      115 kg (253 lb 8.5 oz); Body mass index is 33.45 kg/m².    Intake/Output Summary (Last 24 hours) at 2021 1122  Last data filed at 2021 0741  Gross per 24 hour   Intake 2000 ml   Output --   Net 2000 ml       Physical Exam  Constitutional:       Appearance: He is obese. He is ill-appearing.   Eyes:      Pupils: Pupils are equal, round, and reactive to light.   Cardiovascular:      Rate and Rhythm: Regular rhythm. Tachycardia present.      Pulses: Normal pulses.      Heart sounds: Normal heart sounds.   Pulmonary:      Effort: Pulmonary effort is normal.      Breath sounds: Normal breath sounds.   Abdominal:      General: Bowel sounds are normal. There is no distension.      Palpations: Abdomen is soft. There is no mass.      Tenderness: There is no abdominal tenderness.   Musculoskeletal:         General: No swelling.   Skin:     General: Skin is warm.   Neurological:      General: No focal deficit present.      Mental Status: He is alert.       Results Review:    I have reviewed the laboratory and imaging data since the last note by [facility] physician.  My annotations are noted in assessment and plan.    Medication Review:  I have reviewed the current MAR.  My annotations are noted in assessment and plan.    dextrose 5 % and sodium chloride 0.45 %, 150 mL/hr  dextrose 5 % and sodium  chloride 0.45 % with KCl 20 mEq/L, 150 mL/hr  dextrose 5 % and sodium chloride 0.45 % with KCl 40 mEq/L, 150 mL/hr  dextrose 5 % and sodium chloride 0.9 %, 150 mL/hr  dextrose 5 % and sodium chloride 0.9 % with KCl 20 mEq, 150 mL/hr  dextrose 5% and sodium chloride 0.9% with KCl 40 mEq/L, 150 mL/hr  insulin, 11 Units/hr, Last Rate: 14 Units/hr (08/13/21 1014)  niCARdipine, 5-15 mg/hr, Last Rate: 5 mg/hr (08/13/21 1044)  custom IV KCl infusion builder, 250 mL/hr  sodium chloride, 250 mL/hr  sodium chloride 0.45 % with KCl 20 mEq, 250 mL/hr  sodium chloride, 250 mL/hr, Last Rate: 250 mL/hr (08/13/21 0823)  sodium chloride, 10 mL/hr  sodium chloride 0.9 % with KCl 20 mEq, 250 mL/hr  sodium chloride 0.9 % with KCl 40 mEq/L, 250 mL/hr      Plan   PCCM Problems  DKA  Acute metabolic acidosis  Hypertension emergency  Noncompliance with meds  Obesity  Suspected sleep apnea  Electrolyte abnormalities  History opioid abuse on Suboxone  Alcohol abuse      THESE ARE NEW MEDICAL PROBLEMS TO ME.    Plan of Treatment  Continue treatment for DKA.  Labs consistent with same.  Beta hydroxybutyric acid is high.    Acute metabolic acidosis due to DKA.  Lactic acidosis has improved.  No evidence infection.  Stop antibiotics.    Hypertension emergency.  Use Cardene.  Resume home metoprolol.  At some point he needs addition of ACE inhibitor given his underlying diabetes.  He should probably also be weaned off his beta-blocker for the same reason.    Noncompliance with meds and patient needs education about better management of diabetes and hypertension to prevent future morbidity.    Obese and suspected sleep apnea.  Recommend outpatient sleep study.    Replace electrolytes per protocol.    Patient is on Suboxone at home.  Will resume here.  Suspect due to opioid abuse but need to confirm.  We will get a drug screen as well - pending.    Alcohol abuse - watch for withdrawals. On CIWA. Add thiamine and folic acid. Check TSH.    I spent 45  mins critical care time in care of this patient outside of any procedures.      Natanael Mcneil MD  08/13/21  11:22 EDT    While in the room and during my examination of the patient I wore gloves, gown, mask, eye protection and followed enhanced droplet/contact isolation protocol and precautions.  I washed my hands before and after this patient encounter.    Part of this note may be an electronic transcription/translation of spoken language to printed text using the Dragon Dictation System.

## 2021-08-13 NOTE — PROGRESS NOTES
Discharge Planning Assessment  [facility]     Patient Name: Wu Damon  MRN: [id number]  Today's Date: 8/13/2021    Admit Date: 8/13/2021    Discharge Needs Assessment     Row Name 08/13/21 1327       Living Environment    Lives With  child(bharti), dependent;spouse    Current Living Arrangements  home/apartment/condo    Potentially Unsafe Housing Conditions  unable to assess    Primary Care Provided by  self    Provides Primary Care For  no one    Family Caregiver if Needed  spouse    Quality of Family Relationships  unable to assess    Able to Return to Prior Arrangements  yes       Resource/Environmental Concerns    Resource/Environmental Concerns  none    Transportation Concerns  car, none       Transition Planning    Patient/Family Anticipates Transition to  home with family    Patient/Family Anticipated Services at Transition  mental health services;rehabilitation services       Discharge Needs Assessment    Current Outpatient/Agency/Support Group  outpatient substance abuse treatment    Concerns to be Addressed  discharge planning    Anticipated Changes Related to Illness  none    Equipment Needed After Discharge  none        Discharge Plan     Row Name 08/13/21 1323       Plan    Plan  Home  declines referrals  Pt needs glucometer ordered at DC    Plan Comments  CCP spoke with patient at bedside.  CCP role explained and discharge planning discussed. Face sheet verified.   Pt PCP is Juan Carlos HALE.  His wife Minerva, [phone]  is his emergency contact.      Pt lives in a single story house with his wife and three dependent children.  Pt uses a no DME  to ambulate.  . He does not have a working glucometer.  He is independent with ADL’s. Pt obtains his medications from [facility] on [place] and [address] [place] [address].  Pt has no past rehab history.  He has not had Home Health in the past   CCP following for DC needs        Continued Care and Services - Admitted Since 8/13/2021    Coordination has not been  started for this encounter.         Demographic Summary    No documentation.       Functional Status    No documentation.       Psychosocial    No documentation.       Abuse/Neglect    No documentation.       Legal    No documentation.       Substance Abuse    No documentation.       Patient Forms    No documentation.           Bere Celis RN

## 2021-08-13 NOTE — H&P
.     Admission Note    Patient Identification:  Wu Damon  46 y.o.  male  1975  [id number]            CC: Short of breath    History of Present Illness:  Patient is a 46-year-old male who presented to the emergency room with what he complains of shortness of breath.  He is confused and a very poor historian.  He describes having to move and dealing with his triplets when asked questions about emesis chest pain or cough.  Very challenging historian limiting ability to obtain accurate history.  When asked about frequent urination he says he has done this significantly over the past 2 days.  They are to urine canisters in the room full of urine.  Patient states he has been very thirsty over the past day.  He does consume alcohol daily.  He tells me last drink was about 24 hours ago  Of note in ER 3 days ago with etoh level in 300s    Upon arrival he was significantly tachycardic hypertensive.  Improved with benzodiazepine.  Laboratory investigations revealed anion gap metabolic acidosis with some lactic acidosis.  Hyperglycemia.  Potassium was elevated however specimen was hemolyzed.      Past Medical History:   Diagnosis Date   • Diabetes mellitus (CMS/HCC)    • Hypertension        Past Surgical History:   Procedure Laterality Date   • EYE SURGERY     • HERNIA REPAIR          Social History     Socioeconomic History   • Marital status:      Spouse name: Not on file   • Number of children: Not on file   • Years of education: Not on file   • Highest education level: Not on file   Tobacco Use   • Smoking status: Former Smoker     Packs/day: 2.00     Types: Cigarettes, Cigars     Start date: 9/4/2003     Quit date: 1/4/2006     Years since quitting: 15.6   • Smokeless tobacco: Former User     Types: Chew   Substance and Sexual Activity   • Alcohol use: Yes     Alcohol/week: 21.0 standard drinks     Types: 21 Shots of liquor per week     Comment: one pint a night   • Drug use: No   • Sexual  "activity: Yes     Partners: Female       Family History   Problem Relation Age of Onset   • Alcohol abuse Mother    • Asthma Mother    • Cancer Mother    • Drug abuse Mother    • Early death Mother    • Alcohol abuse Father    • Alcohol abuse Sister    • Drug abuse Sister    • Asthma Daughter    • Diabetes Daughter    • Alcohol abuse Maternal Aunt    • Alcohol abuse Maternal Uncle    • Alcohol abuse Paternal Aunt    • Cancer Paternal Aunt    • Diabetes Paternal Aunt    • Alcohol abuse Paternal Uncle    • Alcohol abuse Maternal Grandmother    • Asthma Maternal Grandmother    • Cancer Maternal Grandmother    • Drug abuse Maternal Grandmother    • Hypertension Maternal Grandmother    • Alcohol abuse Maternal Grandfather    • Alcohol abuse Paternal Grandmother    • Alcohol abuse Paternal Grandfather        (Not in a hospital admission)      Allergies   Allergen Reactions   • Avelox [Moxifloxacin Hcl] Shortness Of Breath   • Amlodipine      HEART PALPITATIONS- ANXIETY   • Citalopram Hydrobromide    • Meloxicam    • Metformin And Related Diarrhea   • Quetiapine      Fatigue and nausea       Review of Systems:  History limited as patient will not answer questions appropriately when asked  Does endorse polyuria polydipsia and shortness of breath denies abdominal pain    Physical Exam:  Vitals:  Vitals:    08/13/21 0333 08/13/21 0512   BP: (!) 167/115    Pulse: (!) 137    Resp: 22    Temp: 97.1 °F (36.2 °C)    TempSrc: Temporal    SpO2: 98%    Weight:  115 kg (253 lb 8.5 oz)   Height: 185.4 cm (73\")            Body mass index is 33.45 kg/m².  No intake or output data in the 24 hours ending 08/13/21 0610    Exam:  General appearance: Confused, conversant   Eyes: anicteric sclerae, moist conjunctivae; no lid-lag;   HENT: Atraumatic; oropharynx clear with dry mucous membranes and no mucosal ulcerations; normal hard and soft palate  Neck: Trachea midline; FROM, supple, large neck circumference  Lungs: CTA no rhonchi wheezes, " with normal respiratory effort and no intercostal retractions  CV: Tachycardic regular no rub  Abdomen: Soft, non-tender obese nonrigid  Extremities: No peripheral edema or extremity lymphadenopathy  Skin: Warm well perfused no diffuse rash  Psych: Confused affect, alert  Neuro cranial 2 through 12 intact speech intact moves all extremities        Scheduled meds:  sodium chloride, 1,000 mL, Intravenous, Once        Data Review:   I reviewed the patient's medications and new clinical results.  Lab Results   Component Value Date    CALCIUM 10.1 08/13/2021     Results from last 7 days   Lab Units 08/13/21  0452   AST (SGOT) U/L 117*   SODIUM mmol/L 135*   POTASSIUM mmol/L 6.4*   CHLORIDE mmol/L 91*   CO2 mmol/L 11.5*   BUN mg/dL 11   CREATININE mg/dL 1.25   GLUCOSE mg/dL 352*   CALCIUM mg/dL 10.1   WBC 10*3/mm3 14.68*   HEMOGLOBIN g/dL 15.9   PLATELETS 10*3/mm3 234   ALT (SGPT) U/L 121*     Results from last 7 days   Lab Units 08/13/21 0452   TROPONIN T ng/mL <0.010         Estimated Creatinine Clearance: 100.2 mL/min (by C-G formula based on SCr of 1.25 mg/dL).    IMAGING:  I have reviewed all imaging studies since my last documentation.  Imaging Results (Most Recent)     Procedure Component Value Units Date/Time    XR Chest 1 View [[id number]] Collected: 08/13/21 0447     Updated: 08/13/21 0451    Narrative:      SINGLE VIEW OF THE CHEST     HISTORY: Shortness of air     COMPARISON: 10/17/2016     FINDINGS:  Heart size is within normal limits. Lungs appear clear. No pneumothorax,  pleural effusion, or acute infiltrate is seen.       Impression:      No acute findings.     This report was finalized on 8/13/2021 4:48 AM by Dr. Pam Padgett M.D.             ASSESSMENT /   PLAN:  DMII  Lactic acidosis  Alcohol withdrawal  HAVEN  Hyperkalemia however specimen is hemolyzed we will need to redraw this  AGMA - dka, etoh ketoacidosis lactic acidosis  Transaminits - suspect alcoholic  hepatitis  Sepsis  Leukocytosis  Hyponatremia      ciwa  IVFS  Send of betahydroxy  Polyuria, polydypsia, hyperglycemia to [address] [place] [address]  IV insulin, dka protocol     Multiple likely causes for acidosis - alcoholic ketoacidosis, dka,  lactic acidosis in setting of renal failure.   Send ua for ketones, ua  Lipase amylase procal  Tylenol level  u tox  Stat abg    Zosyn   blood cultures  Cant rule out infectious etiology  procal   ct abd/pelvis - given unreliable history to investigate lactic acidosis   ivfs bolus    Admit to icu    SEPTIC SHOCK FOCUSED EXAM ATTESTATION    I attest that I have reassessed tissue perfusion after the fluid bolus given.    Chapo Gillette MD  08/13/21  06:26 EDT    Total critical care time was 50 minutes, excluding any separately billable procedure time.    Chapo Gillette MD  [facility]  08/13/21  06:53 EDT

## 2021-08-13 NOTE — ED PROVIDER NOTES
EMERGENCY DEPARTMENT ENCOUNTER    CHIEF COMPLAINT  Chief Complaint: Shortness of breath/thirst  History given by: Patient   History limited by: None  Room Number: 11/11  PMD: Juan Carlos Kwan, ALEXIA      HPI:  Pt is a 46 y.o. male who presents complaining of gradual in onset and progressive worsening of shortness of breath as well as tremors and excess thirst that have been present now since awakening this morning a few hours prior to ED arrival.  He does have a history of type 2 diabetes.  The patient also states that he drinks pretty heavy alcohol yesterday and has stopped just over 12 hours ago.  He denies chest pain, abdominal pain, nausea/vomiting, or fevers and chills.  He has had similar symptoms previously when he has been in alcohol withdrawal.    Duration:  Just PTA  Quality: dyspnea/thirst  Intensity/Severity: moderate to severe   Progression: worsening  Associated Symptoms:   Aggravating Factors: alcohol cessation  Alleviating Factors: none  Previous Episodes: yes, previous alcohol withdrawal  Treatment before arrival: none    PAST MEDICAL HISTORY  Active Ambulatory Problems     Diagnosis Date Noted   • Acute renal failure (CMS/HCC) 01/04/2017   • Type 2 diabetes mellitus with hyperglycemia (CMS/HCC) 01/04/2017   • HTN (hypertension) 01/04/2017   • HLD (hyperlipidemia) 01/04/2017   • Hyponatremia 01/06/2017   • Alcohol withdrawal syndrome, with delirium (CMS/HCC) 08/10/2020   • Alcoholic hepatitis 08/10/2020   • Alcohol abuse 08/10/2020   • Suicidal ideations 08/10/2020   • 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   • Chest pain 08/10/2020   • Alcoholic ketosis 08/10/2020   • Liver lesion 08/10/2020   • Hypokalemia 08/12/2020     Resolved Ambulatory Problems     Diagnosis Date Noted   • No Resolved Ambulatory Problems     Past Medical History:   Diagnosis Date   • Diabetes mellitus (CMS/HCC)    • Hypertension        PAST SURGICAL HISTORY  Past Surgical History:   Procedure Laterality Date   • EYE SURGERY     •  HERNIA REPAIR         FAMILY HISTORY  Family History   Problem Relation Age of Onset   • Alcohol abuse Mother    • Asthma Mother    • Cancer Mother    • Drug abuse Mother    • Early death Mother    • Alcohol abuse Father    • Alcohol abuse Sister    • Drug abuse Sister    • Asthma Daughter    • Diabetes Daughter    • Alcohol abuse Maternal Aunt    • Alcohol abuse Maternal Uncle    • Alcohol abuse Paternal Aunt    • Cancer Paternal Aunt    • Diabetes Paternal Aunt    • Alcohol abuse Paternal Uncle    • Alcohol abuse Maternal Grandmother    • Asthma Maternal Grandmother    • Cancer Maternal Grandmother    • Drug abuse Maternal Grandmother    • Hypertension Maternal Grandmother    • Alcohol abuse Maternal Grandfather    • Alcohol abuse Paternal Grandmother    • Alcohol abuse Paternal Grandfather        SOCIAL HISTORY  Social History     Socioeconomic History   • Marital status:      Spouse name: Not on file   • Number of children: Not on file   • Years of education: Not on file   • Highest education level: Not on file   Tobacco Use   • Smoking status: Former Smoker     Packs/day: 2.00     Types: Cigarettes, Cigars     Start date: 9/4/2003     Quit date: 1/4/2006     Years since quitting: 15.6   • Smokeless tobacco: Former User     Types: Chew   Substance and Sexual Activity   • Alcohol use: Yes     Alcohol/week: 21.0 standard drinks     Types: 21 Shots of liquor per week     Comment: one pint a night   • Drug use: No   • Sexual activity: Yes     Partners: Female       ALLERGIES  Avelox [moxifloxacin hcl], Amlodipine, Citalopram hydrobromide, Meloxicam, Metformin and related, and Quetiapine    REVIEW OF SYSTEMS  Review of Systems   Constitutional: Negative for activity change, appetite change and fever.   HENT: Negative for congestion and sore throat.    Eyes: Negative.    Respiratory: Positive for shortness of breath. Negative for cough.    Cardiovascular: Negative for chest pain and leg swelling.    Gastrointestinal: Negative for abdominal pain, diarrhea and vomiting.   Endocrine: Negative.    Genitourinary: Negative for decreased urine volume and dysuria.   Musculoskeletal: Negative for neck pain.   Skin: Negative for rash and wound.   Allergic/Immunologic: Negative.    Neurological: Positive for weakness. Negative for numbness and headaches.   Hematological: Negative.    Psychiatric/Behavioral: Negative.    All other systems reviewed and are negative.      PHYSICAL EXAM  ED Triage Vitals [08/13/21 0333]   Temp Heart Rate Resp BP SpO2   97.1 °F (36.2 °C) (!) 137 22 (!) 167/115 98 %      Temp src Heart Rate Source Patient Position BP Location FiO2 (%)   Temporal Monitor -- -- --       Physical Exam  Vitals and nursing note reviewed.   Constitutional:       General: He is not in acute distress.     Appearance: He is diaphoretic.   HENT:      Head: Normocephalic and atraumatic.   Eyes:      Pupils: Pupils are equal, round, and reactive to light.   Cardiovascular:      Rate and Rhythm: Regular rhythm. Tachycardia present.      Heart sounds: Normal heart sounds.   Pulmonary:      Effort: Pulmonary effort is normal. No respiratory distress.      Breath sounds: Normal breath sounds.   Abdominal:      Palpations: Abdomen is soft.      Tenderness: There is no abdominal tenderness. There is no guarding or rebound.   Musculoskeletal:         General: Normal range of motion.      Cervical back: Normal range of motion and neck supple.   Skin:     General: Skin is warm.   Neurological:      Mental Status: He is alert and oriented to person, place, and time.      Sensory: Sensation is intact.      Comments: Patient mildly tremulous throughout extremities   Psychiatric:         Mood and Affect: Mood and affect normal.         LAB RESULTS  Lab Results (last 24 hours)     Procedure Component Value Units Date/Time    CBC & Differential [[id number]]  (Abnormal) Collected: 08/13/21 0452    Specimen: Blood Updated: 08/13/21 0522     Narrative:      The following orders were created for panel order CBC & Differential.  Procedure                               Abnormality         Status                     ---------                               -----------         ------                     CBC Auto Differential[[id number]]        Abnormal            Final result                 Please view results for these tests on the individual orders.    Comprehensive Metabolic Panel [[id number]]  (Abnormal) Collected: 08/13/21 0452    Specimen: Blood Updated: 08/13/21 0550     Glucose 352 mg/dL      BUN 11 mg/dL      Creatinine 1.25 mg/dL      Sodium 135 mmol/L      Potassium 6.4 mmol/L      Comment: Specimen hemolyzed.  Results may be affected.        Chloride 91 mmol/L      CO2 11.5 mmol/L      Calcium 10.1 mg/dL      Total Protein 8.7 g/dL      Albumin 5.20 g/dL      ALT (SGPT) 121 U/L      Comment: Specimen hemolyzed.  Results may be affected.        AST (SGOT) 117 U/L      Comment: Specimen hemolyzed.  Results may be affected.        Alkaline Phosphatase 77 U/L      Total Bilirubin 1.3 mg/dL      eGFR  African Amer 76 mL/min/1.73      Globulin 3.5 gm/dL      A/G Ratio 1.5 g/dL      BUN/Creatinine Ratio 8.8     Anion Gap 32.5 mmol/L     Narrative:      GFR Normal >60  Chronic Kidney Disease <60  Kidney Failure <15      BNP [[id number]]  (Normal) Collected: 08/13/21 0452    Specimen: Blood Updated: 08/13/21 0544     proBNP <5.0 pg/mL     Narrative:      Among patients with dyspnea, NT-proBNP is highly sensitive for the detection of acute congestive heart failure. In addition NT-proBNP of <300 pg/ml effectively rules out acute congestive heart failure with 99% negative predictive value.    Results may be falsely decreased if patient taking Biotin.      Troponin [[id number]]  (Normal) Collected: 08/13/21 0452    Specimen: Blood Updated: 08/13/21 0530     Troponin T <0.010 ng/mL      Comment: Specimen hemolyzed.  Results may be affected.       Narrative:       Troponin T Reference Range:  <= 0.03 ng/mL-   Negative for AMI  >0.03 ng/mL-     Abnormal for myocardial necrosis.  Clinicians would have to utilize clinical acumen, EKG, Troponin and serial changes to determine if it is an Acute Myocardial Infarction or myocardial injury due to an underlying chronic condition.       Results may be falsely decreased if patient taking Biotin.      Ethanol [[id number]] Collected: 08/13/21 0452    Specimen: Blood Updated: 08/13/21 0544     Ethanol <10 mg/dL      Comment: Specimen hemolyzed.  Results may be affected.        Ethanol % <0.010 %     CBC Auto Differential [[id number]]  (Abnormal) Collected: 08/13/21 0452    Specimen: Blood Updated: 08/13/21 0522     WBC 14.68 10*3/mm3      RBC 4.95 10*6/mm3      Hemoglobin 15.9 g/dL      Hematocrit 47.3 %      MCV 95.6 fL      MCH 32.1 pg      MCHC 33.6 g/dL      RDW 11.7 %      RDW-SD 40.3 fl      MPV 12.2 fL      Platelets 234 10*3/mm3      Neutrophil % 91.5 %      Lymphocyte % 3.4 %      Monocyte % 3.7 %      Eosinophil % 0.5 %      Basophil % 0.4 %      Immature Grans % 0.5 %      Neutrophils, Absolute 13.42 10*3/mm3      Lymphocytes, Absolute 0.50 10*3/mm3      Monocytes, Absolute 0.55 10*3/mm3      Eosinophils, Absolute 0.08 10*3/mm3      Basophils, Absolute 0.06 10*3/mm3      Immature Grans, Absolute 0.07 10*3/mm3      nRBC 0.1 /100 WBC     Protime-INR [[id number]]  (Abnormal) Collected: 08/13/21 0453    Specimen: Blood Updated: 08/13/21 0532     Protime 20.5 Seconds      INR 1.78    aPTT [[id number]]  (Normal) Collected: 08/13/21 0453    Specimen: Blood Updated: 08/13/21 0532     PTT 25.9 seconds     Lactic Acid, Plasma [[id number]]  (Abnormal) Collected: 08/13/21 0453    Specimen: Blood Updated: 08/13/21 0548     Lactate 6.5 mmol/L     COVID-19,[facility]SE CEPHEID/TORIE NP SWAB IN TRANSPORT MEDIA 8-12 HR TAT - Swab, Nasopharynx [[id number]]  (Normal) Collected: 08/13/21 0500    Specimen: Swab from Nasopharynx Updated: 08/13/21 0633      COVID19 Not Detected    Narrative:      Fact sheet for providers: https://www.fda.gov/media/016087/download     Fact sheet for patients: https://www.fda.gov/media/089806/download    POC Glucose Once [[id number]]  (Abnormal) Collected: 08/13/21 0630    Specimen: Blood Updated: 08/13/21 0631     Glucose 415 mg/dL      Comment: Repeat Test Confirmed by Repeat Meter: [id number] : [id number] Kory Hamilton I ordered the above labs and reviewed the results    RADIOLOGY  XR Chest 1 View   Final Result   No acute findings.       This report was finalized on 8/13/2021 4:48 AM by Dr. Pam Padgett M.D.               I ordered the above noted radiological studies. Interpreted by radiologist. Reviewed by me in PACS.       PROCEDURES  Critical Care  Performed by: Fidencio Gomez MD  Authorized by: Fidencio Gomez MD     Critical care provider statement:     Critical care time (minutes):  35    Critical care time was exclusive of:  Separately billable procedures and treating other patients    Critical care was necessary to treat or prevent imminent or life-threatening deterioration of the following conditions:  Metabolic crisis (Alcoholic ketoacidosis)    Critical care was time spent personally by me on the following activities:  Development of treatment plan with patient or surrogate, blood draw for specimens, discussions with consultants, evaluation of patient's response to treatment, examination of patient, obtaining history from patient or surrogate, ordering and performing treatments and interventions, ordering and review of laboratory studies, ordering and review of radiographic studies, pulse oximetry, re-evaluation of patient's condition and review of old charts      EKG          EKG time: 0415  Rhythm/Rate: sinus tachycardia, 133  P waves and HI: nml  QRS, axis: nml, nml   ST and T waves: nml     Interpreted Contemporaneously by me, independently viewed  changed compared to prior  1/23/19      PROGRESS AND CONSULTS     The patient was wearing a facemask upon entrance into the room and remained in such throughout their visit.  I was wearing PPE including a facemask, eye protection, as well as gloves at any point entering the room and throughout the visit    0505  Following IV Ativan administration, the patient continues to be quite tachycardic and significantly hypertensive.  We will treat with 5 mg of IV Lopressor and reassess following.    0555  On reevaluation, the patient remains significantly anxious with heart rate that remains at 130.  I will treat with another dose of IV Ativan as well as another liter of normal saline.  We will continue to treat monitor accordingly.    0605  We will give the patient more IV fluids as well as insulin and Kayexalate to treat the patient's probable alcoholic ketoacidosis as well as the significant hyperkalemia.  I believe given the patient's lactic acidosis and the alcohol withdrawal that he is currently in, he will need admission to the intensive care unit for close monitoring and management.    0610  Case discussed with Dr. Gillette, pulmonary/ICU, who agrees to admit the patient to the intensive care unit.          MEDICAL DECISION MAKING  Results were reviewed/discussed with the patient and they were also made aware of online access. Pt also made aware that some labs, such as cultures, will not be resulted during ER visit and follow up with PMD is necessary.     MDM  Number of Diagnoses or Management Options     Amount and/or Complexity of Data Reviewed  Clinical lab tests: ordered and reviewed  Tests in the radiology section of CPT®: ordered and reviewed  Tests in the medicine section of CPT®: ordered and reviewed  Review and summarize past medical records: yes (Upon medical records review, the patient was last seen and evaluated on 8/10/2020 and admitted to the hospital for alcohol withdrawal syndrome.)  Discuss the patient with other providers: yes  (Dr. Gillette, pulmonary/ICU, who will admit the patient to the intensive care unit.)  Independent visualization of images, tracings, or specimens: yes (Unremarkable chest x-ray)           DIAGNOSIS  Final diagnoses:   Alcohol withdrawal syndrome without complication (CMS/HCC)   Alcoholic ketoacidosis   Hyperkalemia   Elevated LFTs   Hypertension, unspecified type       DISPOSITION  ADMISSION    Discussed treatment plan and reason for admission with pt/family and admitting physician.  Pt/family voiced understanding of the plan for admission for further testing/treatment as needed.         Latest Documented Vital Signs:  As of 06:52 EDT  BP- (!) 178/115 HR- 113 Temp- 97.1 °F (36.2 °C) (Temporal) O2 sat- 95%         Fidencio Gomez MD  08/13/21 0652

## 2021-08-13 NOTE — PLAN OF CARE
Goal Outcome Evaluation: improving; discussed with pt and spouse; insulin gtt decreasing;  anion gap improving; ativan only once early am for anxiety; no s/s withdraw; electrolytes replaced per orders; axo and coop with staff

## 2021-08-13 NOTE — CONSULTS
"Adult Nutrition  Assessment/PES    Patient Name:  Wu Damon  YOB: 1975  MRN: [id number]  Admit Date:  8/13/2021    Assessment Date:  8/13/2021    Comments:  Nutrition Consult from DKA protocol.  Pt is not a new diabetic. ETOH abuse noted.  Currently NPO, on insulin drip.  Labs reviewed, hgba1c 7.2, K+ 5.3,  Glu 252-415 today.  Obese with BMI 33.5,  Will follow clinical course and offer education when more appropriate.    Reason for Assessment     Row Name 08/13/21 1045          Reason for Assessment    Reason For Assessment  physician consult     Diagnosis  metabolic state;diabetes diagnosis/complications;substance use/abuse;infection/sepsis dka, etoh ketoacidosis lactic acidosis. Sepsis, DM,         Nutrition/Diet History     Row Name 08/13/21 1049          Nutrition/Diet History    Factors Affecting Nutritional Intake  impaired cognitive status/motor control         Anthropometrics     Row Name 08/13/21 1050 08/13/21 0512       Anthropometrics    Height  185.4 cm (73\")  --    Weight  --  115 kg (253 lb 8.5 oz)       Admit Weight    Admit Weight  115 kg (253 lb 8.5 oz)  --       Ideal Body Weight (IBW)    Ideal Body Weight (IBW) (kg)  84.86  --    % of Ideal Body Weight Assessment  -- 135% IBW  --       Body Mass Index (BMI)    BMI Assessment  BMI 30-34.9: obesity grade I BMI 33  --    Row Name 08/13/21 0333          Anthropometrics    Height  185.4 cm (73\")        Ideal Body Weight (IBW)    Ideal Body Weight (IBW) (kg)  84.86         Labs/Tests/Procedures/Meds     Row Name 08/13/21 1051          Labs/Procedures/Meds    Lab Results Reviewed  reviewed, pertinent     Lab Results Comments  Glu, Hgba1c, Phos, Mg, K+, alt, ast, amylase, lactate, wbc        Diagnostic Tests/Procedures    Diagnostic Test/Procedure Reviewed  reviewed, pertinent     Diagnostic Test/Procedures Comments  CT Abd - hepatic steatosis.        Medications    Pertinent Medications Reviewed  reviewed, pertinent     " Pertinent Medications Comments  zosyn, thiamine, insulin drip, cardene         Physical Findings     Row Name 08/13/21 1053          Physical Findings    Overall Physical Appearance  obese         Nutrition Prescription Ordered     Row Name 08/13/21 1054          Nutrition Prescription PO    Current PO Diet  NPO         Evaluation of Received Nutrient/Fluid Intake     Row Name 08/13/21 1054          Fluid Intake Evaluation    IV Fluid (mL)  6000         Problem/Interventions:  Problem 1     Row Name 08/13/21 1055          Nutrition Diagnoses Problem 1    Problem 1  Limited Adherence to Diet Modifications     Etiology (related to)  Medical Diagnosis     Endocrine  DM     Metabolic/ICU  Ketoacidosis;Lactic acidosis     Substance Use  ETOH         Intervention Goal     Row Name 08/13/21 1056          Intervention Goal    General  Maintain nutrition;Disease management/therapy;Reduce/improve symptoms;Improved nutrition related lab(s);Meet nutritional needs for age/condition;Provide information regarding MNT for treatment/condition     PO  Tolerate PO;Initiate feeding     Weight  Appropriate weight loss         Nutrition Intervention     Row Name 08/13/21 1056          Nutrition Intervention    RD/Tech Action  Follow Tx progress;Care plan reviewd;Await begin PO           Education/Evaluation     Row Name 08/13/21 1056          Education    Education  Education not appropriate at this time     Please explain  Defer until post ICU        Monitor/Evaluation    Monitor  Per protocol     Education Follow-up  Reinforce PRN           Electronically signed by:  Pina Gomez RD  08/13/21 10:56 EDT

## 2021-08-13 NOTE — PROGRESS NOTES
"                                              LOS: 1 day   Patient Care Team:  Juan Carlos Kwan APRN as PCP - General (Family Medicine)    Chief Complaint:  F/up DKA, electrolytes disturbance and medical problems listed below.    Subjective   Interval History  I reviewed the admission note, progress notes, PMH, PSH, Family hx, social history, imagings and prior records on this admission, summarized the finding in my note and formulated a transition of care plan.      Patient is supposed to be on Lantus 37 units twice a day and Trulicity which was started recently but he stopped taking the Lantus.    Patient is currently on insulin drip at 5 units/H.  He has received a total of 179 units over the last 24 hours.  He denies nausea or vomiting.  He reported feeling better.  He wants to eat.    REVIEW OF SYSTEMS:   CARDIOVASCULAR: No chest pain, chest pressure or chest discomfort. No palpitations or edema.   RESPIRATORY: No shortness of breath, cough or sputum.   GASTROINTESTINAL: No anorexia, nausea, vomiting or diarrhea. No abdominal pain or blood.   Constitutional: No fever or chills    Ventilator/Non-Invasive Ventilation Settings (From admission, onward)    None                Physical Exam:     Vital Signs  Temp:  [97.8 °F (36.6 °C)-98.3 °F (36.8 °C)] 97.8 °F (36.6 °C)  Heart Rate:  [] 102  BP: ()/() 113/83    Intake/Output Summary (Last 24 hours) at 8/14/2021 0756  Last data filed at 8/14/2021 0639  Gross per 24 hour   Intake 3527 ml   Output 1525 ml   Net 2002 ml     Flowsheet Rows      First Filed Value   Admission Height  185.4 cm (73\") Documented at 08/13/2021 0333   Admission Weight  115 kg (253 lb 8.5 oz) Documented at 08/13/2021 0512          PPE used per hospital policy    General Appearance:   Alert, cooperative, in no acute distress   ENMT:  Mallampati score 4, moist mucous membrane   Eyes:  Pupils equal and reactive to light. EOMI   Neck:   Large. Trachea midline. No thyromegaly. "   Lungs:    Clear to auscultation,respirations regular, even and nonlabored    Heart:   Regular rhythm and normal rate, normal S1 and S2, no         murmur   Skin:   No abnormalities observed   Abdomen:    Obese. Soft. No tenderness. No HSM.   Neuro:  Conscious, alert, oriented x3. Strength 5/5 in upper and lower  ext   Extremities:  No cyanosis, clubbing or edema.  Warm extremities and well-perfused          Results Review:        Results from last 7 days   Lab Units 08/14/21  0501 08/14/21  0228 08/14/21 0228 08/13/21  1601 08/13/21  1601   SODIUM mmol/L 134*  --  133*  --  137   POTASSIUM mmol/L 4.0  --  3.8  --  5.0   CHLORIDE mmol/L 101  --  98  --  101   CO2 mmol/L 21.5*  --  22.2  --  15.0*   BUN mg/dL 8  --  9  --  11   CREATININE mg/dL 0.98  --  1.05  --  1.14   GLUCOSE mg/dL 147*   < > 178*   < > 86   CALCIUM mg/dL 8.4*  --  8.5*  --  9.2    < > = values in this interval not displayed.     Results from last 7 days   Lab Units 08/13/21  0940 08/13/21  0452   TROPONIN T ng/mL <0.010 <0.010     Results from last 7 days   Lab Units 08/14/21  0501 08/14/21  0228 08/13/21  0940   WBC 10*3/mm3 9.26 8.69 14.02*   HEMOGLOBIN g/dL 14.3 14.3 14.3   HEMATOCRIT % 41.5 42.1 43.2   PLATELETS 10*3/mm3 179 185 175     Results from last 7 days   Lab Units 08/13/21  0453   INR  1.13*   APTT seconds 25.9     Results from last 7 days   Lab Units 08/13/21  0452   PROBNP pg/mL <5.0       I reviewed the patient's new clinical results.        Medication Review:   buprenorphine-naloxone, 1 film, Sublingual, Nightly  enoxaparin, 40 mg, Subcutaneous, Nightly  folic acid, 1 mg, Oral, Daily  metoprolol succinate XL, 50 mg, Oral, Daily  sodium chloride, 2,000 mL, Intravenous, Once  sodium chloride, 10 mL, Intravenous, Q12H  sodium chloride, 3 mL, Intravenous, Q12H  thiamine (VITAMIN B1) IVPB, 100 mg, Intravenous, Daily        dextrose 5 % and sodium chloride 0.45 %, 150 mL/hr  dextrose 5 % and sodium chloride 0.45 % with KCl 20 mEq/L,  150 mL/hr, Last Rate: 150 mL/hr (08/14/21 0639)  dextrose 5 % and sodium chloride 0.45 % with KCl 40 mEq/L, 150 mL/hr  dextrose 5 % and sodium chloride 0.9 %, 150 mL/hr  dextrose 5 % and sodium chloride 0.9 % with KCl 20 mEq, 150 mL/hr  dextrose 5% and sodium chloride 0.9% with KCl 40 mEq/L, 150 mL/hr  insulin, 11 Units/hr, Last Rate: 5 Units/hr (08/14/21 0043)  niCARdipine, 5-15 mg/hr, Last Rate: Stopped (08/13/21 1236)  custom IV KCl infusion builder, 250 mL/hr  sodium chloride, 250 mL/hr  sodium chloride 0.45 % with KCl 20 mEq, 250 mL/hr  sodium chloride, 250 mL/hr, Last Rate: 250 mL/hr (08/13/21 0823)  sodium chloride, 10 mL/hr  sodium chloride 0.9 % with KCl 20 mEq, 250 mL/hr  sodium chloride 0.9 % with KCl 40 mEq/L, 250 mL/hr        Diagnostic imaging:  I personally and independently reviewed the following images:  CT abdomen 8/13/2021:  Liver lesions as described by the radiologist.      Assessment   1. DKA  2. Hypertensive urgency, resolved  3. Noncompliance with meds  4. Obesity, BMI 33  5. JATIN, diagnosed 10 years ago.  Not on Pap therapy  6. Electrolyte abnormalities: Mild hyponatremia.  Severe hypophosphatemia  7. Hepatic steatosis  8. Enhancing liver lesions on CT abdomen 8/13/2021 and MRI 8/11/2020  9. Leukocytosis, likely stress-induced  10. History opioid abuse on Suboxone  11. Alcohol abuse      All problems new to me    Plan     · Stop insulin drip.  Start Lantus 50 units twice a day and high-dose insulin sliding scale  · Initiate diet  · Replace phosphorus  · Lovenox for DVT prophylaxis  · Metoprolol 50 mg daily for HTN  · DC IV thiamine and switch to oral.  Continue folic acid orally  · Outpatient follow-up for the enhancing liver lesions      Betito Arellano MD  08/14/21  07:56 EDT            This note was dictated utilizing Dragon dictation

## 2021-08-13 NOTE — ED NOTES
Pt to triage from home via [facility] [id number] with c/o difficulty breathing x 1 hour and weakness x 2 weeks. Pt states soa all day.  Gets very SANCHEZ.  Pt wearing mask in triage.  Triage personnel wore appropriate PPE       Rachel Meier RN  08/13/21 5597

## 2021-08-14 VITALS
BODY MASS INDEX: 33.6 KG/M2 | DIASTOLIC BLOOD PRESSURE: 64 MMHG | HEART RATE: 96 BPM | WEIGHT: 253.53 LBS | TEMPERATURE: 98.4 F | HEIGHT: 73 IN | OXYGEN SATURATION: 100 % | RESPIRATION RATE: 20 BRPM | SYSTOLIC BLOOD PRESSURE: 115 MMHG

## 2021-08-14 LAB
ALBUMIN SERPL-MCNC: 4.2 G/DL (ref 3.5–5.2)
ALBUMIN/GLOB SERPL: 1.4 G/DL
ALP SERPL-CCNC: 60 U/L (ref 39–117)
ALT SERPL W P-5'-P-CCNC: 79 U/L (ref 1–41)
ANION GAP SERPL CALCULATED.3IONS-SCNC: 11.5 MMOL/L (ref 5–15)
ANION GAP SERPL CALCULATED.3IONS-SCNC: 12.8 MMOL/L (ref 5–15)
AST SERPL-CCNC: 56 U/L (ref 1–40)
BASOPHILS # BLD AUTO: 0.05 10*3/MM3 (ref 0–0.2)
BASOPHILS NFR BLD AUTO: 0.6 % (ref 0–1.5)
BILIRUB SERPL-MCNC: 1.6 MG/DL (ref 0–1.2)
BUN SERPL-MCNC: 8 MG/DL (ref 6–20)
BUN SERPL-MCNC: 9 MG/DL (ref 6–20)
BUN/CREAT SERPL: 8.2 (ref 7–25)
BUN/CREAT SERPL: 8.6 (ref 7–25)
CALCIUM SPEC-SCNC: 8.4 MG/DL (ref 8.6–10.5)
CALCIUM SPEC-SCNC: 8.5 MG/DL (ref 8.6–10.5)
CHLORIDE SERPL-SCNC: 101 MMOL/L (ref 98–107)
CHLORIDE SERPL-SCNC: 98 MMOL/L (ref 98–107)
CO2 SERPL-SCNC: 21.5 MMOL/L (ref 22–29)
CO2 SERPL-SCNC: 22.2 MMOL/L (ref 22–29)
CREAT SERPL-MCNC: 0.98 MG/DL (ref 0.76–1.27)
CREAT SERPL-MCNC: 1.05 MG/DL (ref 0.76–1.27)
DEPRECATED RDW RBC AUTO: 37.6 FL (ref 37–54)
DEPRECATED RDW RBC AUTO: 37.8 FL (ref 37–54)
EOSINOPHIL # BLD AUTO: 0.1 10*3/MM3 (ref 0–0.4)
EOSINOPHIL NFR BLD AUTO: 1.2 % (ref 0.3–6.2)
ERYTHROCYTE [DISTWIDTH] IN BLOOD BY AUTOMATED COUNT: 11.1 % (ref 12.3–15.4)
ERYTHROCYTE [DISTWIDTH] IN BLOOD BY AUTOMATED COUNT: 11.3 % (ref 12.3–15.4)
GFR SERPL CREATININE-BSD FRML MDRD: 101 ML/MIN/1.73
GFR SERPL CREATININE-BSD FRML MDRD: 93 ML/MIN/1.73
GLOBULIN UR ELPH-MCNC: 3.1 GM/DL
GLUCOSE BLDC GLUCOMTR-MCNC: 122 MG/DL (ref 70–130)
GLUCOSE BLDC GLUCOMTR-MCNC: 126 MG/DL (ref 70–130)
GLUCOSE BLDC GLUCOMTR-MCNC: 200 MG/DL (ref 70–130)
GLUCOSE BLDC GLUCOMTR-MCNC: 205 MG/DL (ref 70–130)
GLUCOSE BLDC GLUCOMTR-MCNC: 207 MG/DL (ref 70–130)
GLUCOSE BLDC GLUCOMTR-MCNC: 238 MG/DL (ref 70–130)
GLUCOSE BLDC GLUCOMTR-MCNC: 277 MG/DL (ref 70–130)
GLUCOSE SERPL-MCNC: 147 MG/DL (ref 65–99)
GLUCOSE SERPL-MCNC: 178 MG/DL (ref 65–99)
HCT VFR BLD AUTO: 41.5 % (ref 37.5–51)
HCT VFR BLD AUTO: 42.1 % (ref 37.5–51)
HGB BLD-MCNC: 14.3 G/DL (ref 13–17.7)
HGB BLD-MCNC: 14.3 G/DL (ref 13–17.7)
IMM GRANULOCYTES # BLD AUTO: 0.06 10*3/MM3 (ref 0–0.05)
IMM GRANULOCYTES NFR BLD AUTO: 0.7 % (ref 0–0.5)
LYMPHOCYTES # BLD AUTO: 1.51 10*3/MM3 (ref 0.7–3.1)
LYMPHOCYTES NFR BLD AUTO: 17.4 % (ref 19.6–45.3)
MAGNESIUM SERPL-MCNC: 2.1 MG/DL (ref 1.6–2.6)
MAGNESIUM SERPL-MCNC: 2.3 MG/DL (ref 1.6–2.6)
MAGNESIUM SERPL-MCNC: 2.4 MG/DL (ref 1.6–2.6)
MCH RBC QN AUTO: 31.4 PG (ref 26.6–33)
MCH RBC QN AUTO: 31.6 PG (ref 26.6–33)
MCHC RBC AUTO-ENTMCNC: 34 G/DL (ref 31.5–35.7)
MCHC RBC AUTO-ENTMCNC: 34.5 G/DL (ref 31.5–35.7)
MCV RBC AUTO: 91.6 FL (ref 79–97)
MCV RBC AUTO: 92.5 FL (ref 79–97)
MONOCYTES # BLD AUTO: 0.61 10*3/MM3 (ref 0.1–0.9)
MONOCYTES NFR BLD AUTO: 7 % (ref 5–12)
NEUTROPHILS NFR BLD AUTO: 6.36 10*3/MM3 (ref 1.7–7)
NEUTROPHILS NFR BLD AUTO: 73.1 % (ref 42.7–76)
NRBC BLD AUTO-RTO: 0 /100 WBC (ref 0–0.2)
PHOSPHATE SERPL-MCNC: 0.6 MG/DL (ref 2.5–4.5)
PHOSPHATE SERPL-MCNC: 1.3 MG/DL (ref 2.5–4.5)
PLATELET # BLD AUTO: 179 10*3/MM3 (ref 140–450)
PLATELET # BLD AUTO: 185 10*3/MM3 (ref 140–450)
PMV BLD AUTO: 11.4 FL (ref 6–12)
PMV BLD AUTO: 11.9 FL (ref 6–12)
POTASSIUM SERPL-SCNC: 3.8 MMOL/L (ref 3.5–5.2)
POTASSIUM SERPL-SCNC: 4 MMOL/L (ref 3.5–5.2)
PROT SERPL-MCNC: 7.3 G/DL (ref 6–8.5)
RBC # BLD AUTO: 4.53 10*6/MM3 (ref 4.14–5.8)
RBC # BLD AUTO: 4.55 10*6/MM3 (ref 4.14–5.8)
SODIUM SERPL-SCNC: 133 MMOL/L (ref 136–145)
SODIUM SERPL-SCNC: 134 MMOL/L (ref 136–145)
WBC # BLD AUTO: 8.69 10*3/MM3 (ref 3.4–10.8)
WBC # BLD AUTO: 9.26 10*3/MM3 (ref 3.4–10.8)

## 2021-08-14 PROCEDURE — 63710000001 INSULIN GLARGINE PER 5 UNITS: Performed by: INTERNAL MEDICINE

## 2021-08-14 PROCEDURE — 83735 ASSAY OF MAGNESIUM: CPT | Performed by: INTERNAL MEDICINE

## 2021-08-14 PROCEDURE — 84100 ASSAY OF PHOSPHORUS: CPT | Performed by: INTERNAL MEDICINE

## 2021-08-14 PROCEDURE — 82962 GLUCOSE BLOOD TEST: CPT

## 2021-08-14 PROCEDURE — 63710000001 INSULIN LISPRO (HUMAN) PER 5 UNITS: Performed by: INTERNAL MEDICINE

## 2021-08-14 PROCEDURE — 85027 COMPLETE CBC AUTOMATED: CPT | Performed by: INTERNAL MEDICINE

## 2021-08-14 PROCEDURE — 80053 COMPREHEN METABOLIC PANEL: CPT | Performed by: INTERNAL MEDICINE

## 2021-08-14 PROCEDURE — 25010000002 LORAZEPAM PER 2 MG: Performed by: INTERNAL MEDICINE

## 2021-08-14 PROCEDURE — 80048 BASIC METABOLIC PNL TOTAL CA: CPT | Performed by: INTERNAL MEDICINE

## 2021-08-14 PROCEDURE — 85025 COMPLETE CBC W/AUTO DIFF WBC: CPT | Performed by: INTERNAL MEDICINE

## 2021-08-14 RX ORDER — NICOTINE POLACRILEX 4 MG
15 LOZENGE BUCCAL
Status: DISCONTINUED | OUTPATIENT
Start: 2021-08-14 | End: 2021-08-14 | Stop reason: HOSPADM

## 2021-08-14 RX ORDER — INSULIN LISPRO 100 [IU]/ML
0-24 INJECTION, SOLUTION INTRAVENOUS; SUBCUTANEOUS
Status: DISCONTINUED | OUTPATIENT
Start: 2021-08-14 | End: 2021-08-14 | Stop reason: HOSPADM

## 2021-08-14 RX ORDER — DEXTROSE MONOHYDRATE 25 G/50ML
25 INJECTION, SOLUTION INTRAVENOUS
Status: DISCONTINUED | OUTPATIENT
Start: 2021-08-14 | End: 2021-08-14 | Stop reason: HOSPADM

## 2021-08-14 RX ORDER — INSULIN GLARGINE 100 [IU]/ML
50 INJECTION, SOLUTION SUBCUTANEOUS NIGHTLY
Status: DISCONTINUED | OUTPATIENT
Start: 2021-08-14 | End: 2021-08-14 | Stop reason: HOSPADM

## 2021-08-14 RX ADMIN — SODIUM CHLORIDE, PRESERVATIVE FREE 10 ML: 5 INJECTION INTRAVENOUS at 08:21

## 2021-08-14 RX ADMIN — INSULIN GLARGINE 50 UNITS: 100 INJECTION, SOLUTION SUBCUTANEOUS at 08:20

## 2021-08-14 RX ADMIN — Medication 100 MG: at 11:42

## 2021-08-14 RX ADMIN — LORAZEPAM 2 MG: 2 INJECTION INTRAMUSCULAR; INTRAVENOUS at 00:00

## 2021-08-14 RX ADMIN — METOPROLOL SUCCINATE 50 MG: 50 TABLET, EXTENDED RELEASE ORAL at 08:20

## 2021-08-14 RX ADMIN — LORAZEPAM 2 MG: 2 INJECTION INTRAMUSCULAR; INTRAVENOUS at 06:34

## 2021-08-14 RX ADMIN — FOLIC ACID 1 MG: 1 TABLET ORAL at 08:20

## 2021-08-14 RX ADMIN — INSULIN LISPRO 8 UNITS: 100 INJECTION, SOLUTION INTRAVENOUS; SUBCUTANEOUS at 17:25

## 2021-08-14 RX ADMIN — INSULIN LISPRO 12 UNITS: 100 INJECTION, SOLUTION INTRAVENOUS; SUBCUTANEOUS at 12:05

## 2021-08-14 RX ADMIN — POTASSIUM CHLORIDE, DEXTROSE MONOHYDRATE AND SODIUM CHLORIDE 150 ML/HR: 150; 5; 450 INJECTION, SOLUTION INTRAVENOUS at 06:39

## 2021-08-14 NOTE — PLAN OF CARE
Goal Outcome Evaluation:  Plan of Care Reviewed With: patient        Progress: improving  Outcome Summary: DKA resolved. Still monitoring patient's phos levels. Patient up ad patrice in room. Inquired with doctor as to his ability to be discharged today; MD refused to discharge. Patient considering leaving AMA. This RN counselled the patient regarding benefits to staying and the need to comtinue to monitor his BG levels in order to ensure adequte control. Patient stated he will consider this.

## 2021-08-14 NOTE — NURSING NOTE
2000 labs were delayed, pt difficult stick. 2nd phlebotomist attempted and limited blood obtained. Partial results only, Phos showed 1.8, however bmp not completed due to lack of sample size     0000 Labs were hemolized, recollect attempt at 0230 yielded questionable results.    Rn collected 0400 labs by sheer luck and just a tad of skill.    MD notified, pending results

## 2021-08-15 NOTE — NURSING NOTE
Pt stated wishes to leave AMA. MD (Audrey) called for evaluation. Pt A/o x4 has had no rx to imapair judgement. Education provided to pt r/t possible worsening of condition associated with leaving before cleared by medical provider, AMA paper signed

## 2021-08-15 NOTE — DISCHARGE SUMMARY
Discharge date: 8/14/21  Discharge status: Patient left AMA.    Admission date 8/13/2021    Discharge diagnosis:     1. DKA  2. Hypertensive urgency, resolved  3. Noncompliance with meds  4. Obesity, BMI 33  5. JATIN, diagnosed 10 years ago.  Not on Pap therapy  6. Electrolyte abnormalities: Mild hyponatremia.  Severe hypophosphatemia  7. Hepatic steatosis  8. Enhancing liver lesions on CT abdomen 8/13/2021 and MRI 8/11/2020  9. Leukocytosis, likely stress-induced  10. History opioid abuse on Suboxone  11. Alcohol abuse    HPI per Dr. Gillette:  Patient is a 46-year-old male who presented to the emergency room with what he complains of shortness of breath.  He is confused and a very poor historian.  He describes having to move and dealing with his triplets when asked questions about emesis chest pain or cough.  Very challenging historian limiting ability to obtain accurate history.  When asked about frequent urination he says he has done this significantly over the past 2 days.  They are to urine canisters in the room full of urine.  Patient states he has been very thirsty over the past day.  He does consume alcohol daily.  He tells me last drink was about 24 hours ago  Of note in ER 3 days ago with etoh level in 300s     Upon arrival he was significantly tachycardic hypertensive.  Improved with benzodiazepine.  Laboratory investigations revealed anion gap metabolic acidosis with some lactic acidosis.  Hyperglycemia.  Potassium was elevated however specimen was hemolyzed.    Course in the hospital:  Patient received insulin drip.  He required about 150 units over 24 hours.  He was switched to Lantus 50 units twice daily.  Plan was to observe him for 24 hours on Lantus but unfortunately he decided to leave AGAINST MEDICAL ADVICE.  He was capable to make decision and was alert and oriented x4 at the time he left.

## 2021-08-18 LAB
BACTERIA SPEC AEROBE CULT: NORMAL
BACTERIA SPEC AEROBE CULT: NORMAL

## 2021-10-10 ENCOUNTER — APPOINTMENT (OUTPATIENT)
Dept: CT IMAGING | Facility: HOSPITAL | Age: 46
End: 2021-10-10

## 2021-10-10 ENCOUNTER — HOSPITAL ENCOUNTER (INPATIENT)
Facility: HOSPITAL | Age: 46
LOS: 2 days | Discharge: HOME | End: 2021-10-12
Attending: HOSPITALIST | Admitting: STUDENT IN AN ORGANIZED HEALTH CARE EDUCATION/TRAINING PROGRAM

## 2021-10-10 DIAGNOSIS — E87.29 ALCOHOLIC KETOACIDOSIS: Primary | ICD-10-CM

## 2021-10-10 DIAGNOSIS — F10.931 ALCOHOL WITHDRAWAL DELIRIUM (HCC): ICD-10-CM

## 2021-10-10 LAB
ALBUMIN SERPL-MCNC: 4.2 G/DL (ref 3.5–5.2)
ALBUMIN SERPL-MCNC: 4.7 G/DL (ref 3.5–5.2)
ALBUMIN/GLOB SERPL: 1.5 G/DL
ALBUMIN/GLOB SERPL: 1.6 G/DL
ALP SERPL-CCNC: 59 U/L (ref 39–117)
ALP SERPL-CCNC: 71 U/L (ref 39–117)
ALT SERPL W P-5'-P-CCNC: 74 U/L (ref 1–41)
ALT SERPL W P-5'-P-CCNC: 84 U/L (ref 1–41)
AMPHET+METHAMPHET UR QL: NEGATIVE
ANION GAP SERPL CALCULATED.3IONS-SCNC: 19.8 MMOL/L (ref 5–15)
ANION GAP SERPL CALCULATED.3IONS-SCNC: 26.9 MMOL/L (ref 5–15)
AST SERPL-CCNC: 109 U/L (ref 1–40)
AST SERPL-CCNC: 97 U/L (ref 1–40)
B PARAPERT DNA SPEC QL NAA+PROBE: NOT DETECTED
B PERT DNA SPEC QL NAA+PROBE: NOT DETECTED
BACTERIA UR QL AUTO: NORMAL /HPF
BARBITURATES UR QL SCN: NEGATIVE
BASOPHILS # BLD AUTO: 0.06 10*3/MM3 (ref 0–0.2)
BASOPHILS NFR BLD AUTO: 1 % (ref 0–1.5)
BENZODIAZ UR QL SCN: NEGATIVE
BILIRUB SERPL-MCNC: 1.8 MG/DL (ref 0–1.2)
BILIRUB SERPL-MCNC: 1.8 MG/DL (ref 0–1.2)
BILIRUB UR QL STRIP: NEGATIVE
BUN SERPL-MCNC: 12 MG/DL (ref 6–20)
BUN SERPL-MCNC: 14 MG/DL (ref 6–20)
BUN/CREAT SERPL: 8.2 (ref 7–25)
BUN/CREAT SERPL: 9.2 (ref 7–25)
C PNEUM DNA NPH QL NAA+NON-PROBE: NOT DETECTED
CALCIUM SPEC-SCNC: 8.6 MG/DL (ref 8.6–10.5)
CALCIUM SPEC-SCNC: 9.7 MG/DL (ref 8.6–10.5)
CANNABINOIDS SERPL QL: NEGATIVE
CHLORIDE SERPL-SCNC: 102 MMOL/L (ref 98–107)
CHLORIDE SERPL-SCNC: 95 MMOL/L (ref 98–107)
CLARITY UR: CLEAR
CO2 SERPL-SCNC: 13.1 MMOL/L (ref 22–29)
CO2 SERPL-SCNC: 15.2 MMOL/L (ref 22–29)
COCAINE UR QL: NEGATIVE
COLOR UR: YELLOW
CREAT SERPL-MCNC: 1.47 MG/DL (ref 0.76–1.27)
CREAT SERPL-MCNC: 1.52 MG/DL (ref 0.76–1.27)
DEPRECATED RDW RBC AUTO: 38.5 FL (ref 37–54)
DEPRECATED RDW RBC AUTO: 42.2 FL (ref 37–54)
EOSINOPHIL # BLD AUTO: 0.15 10*3/MM3 (ref 0–0.4)
EOSINOPHIL NFR BLD AUTO: 2.4 % (ref 0.3–6.2)
ERYTHROCYTE [DISTWIDTH] IN BLOOD BY AUTOMATED COUNT: 11.4 % (ref 12.3–15.4)
ERYTHROCYTE [DISTWIDTH] IN BLOOD BY AUTOMATED COUNT: 11.8 % (ref 12.3–15.4)
ETHANOL BLD-MCNC: <10 MG/DL (ref 0–10)
ETHANOL UR QL: <0.01 %
FLUAV SUBTYP SPEC NAA+PROBE: NOT DETECTED
FLUBV RNA ISLT QL NAA+PROBE: NOT DETECTED
GFR SERPL CREATININE-BSD FRML MDRD: 61 ML/MIN/1.73
GFR SERPL CREATININE-BSD FRML MDRD: 63 ML/MIN/1.73
GLOBULIN UR ELPH-MCNC: 2.6 GM/DL
GLOBULIN UR ELPH-MCNC: 3.1 GM/DL
GLUCOSE BLDC GLUCOMTR-MCNC: 208 MG/DL (ref 70–130)
GLUCOSE BLDC GLUCOMTR-MCNC: 230 MG/DL (ref 70–130)
GLUCOSE SERPL-MCNC: 163 MG/DL (ref 65–99)
GLUCOSE SERPL-MCNC: 204 MG/DL (ref 65–99)
GLUCOSE UR STRIP-MCNC: ABNORMAL MG/DL
HADV DNA SPEC NAA+PROBE: NOT DETECTED
HCOV 229E RNA SPEC QL NAA+PROBE: NOT DETECTED
HCOV HKU1 RNA SPEC QL NAA+PROBE: NOT DETECTED
HCOV NL63 RNA SPEC QL NAA+PROBE: NOT DETECTED
HCOV OC43 RNA SPEC QL NAA+PROBE: NOT DETECTED
HCT VFR BLD AUTO: 37.1 % (ref 37.5–51)
HCT VFR BLD AUTO: 39.9 % (ref 37.5–51)
HGB BLD-MCNC: 12.8 G/DL (ref 13–17.7)
HGB BLD-MCNC: 13.5 G/DL (ref 13–17.7)
HGB UR QL STRIP.AUTO: NEGATIVE
HMPV RNA NPH QL NAA+NON-PROBE: NOT DETECTED
HOLD SPECIMEN: NORMAL
HOLD SPECIMEN: NORMAL
HPIV1 RNA SPEC QL NAA+PROBE: NOT DETECTED
HPIV2 RNA SPEC QL NAA+PROBE: NOT DETECTED
HPIV3 RNA NPH QL NAA+PROBE: NOT DETECTED
HPIV4 P GENE NPH QL NAA+PROBE: NOT DETECTED
HYALINE CASTS UR QL AUTO: NORMAL /LPF
IMM GRANULOCYTES # BLD AUTO: 0.02 10*3/MM3 (ref 0–0.05)
IMM GRANULOCYTES NFR BLD AUTO: 0.3 % (ref 0–0.5)
KETONES UR QL STRIP: ABNORMAL
LEUKOCYTE ESTERASE UR QL STRIP.AUTO: NEGATIVE
LIPASE SERPL-CCNC: 76 U/L (ref 13–60)
LYMPHOCYTES # BLD AUTO: 1.02 10*3/MM3 (ref 0.7–3.1)
LYMPHOCYTES NFR BLD AUTO: 16.5 % (ref 19.6–45.3)
M PNEUMO IGG SER IA-ACNC: NOT DETECTED
MCH RBC QN AUTO: 32.1 PG (ref 26.6–33)
MCH RBC QN AUTO: 32.7 PG (ref 26.6–33)
MCHC RBC AUTO-ENTMCNC: 33.8 G/DL (ref 31.5–35.7)
MCHC RBC AUTO-ENTMCNC: 34.5 G/DL (ref 31.5–35.7)
MCV RBC AUTO: 93 FL (ref 79–97)
MCV RBC AUTO: 96.6 FL (ref 79–97)
METHADONE UR QL SCN: NEGATIVE
MONOCYTES # BLD AUTO: 0.55 10*3/MM3 (ref 0.1–0.9)
MONOCYTES NFR BLD AUTO: 8.9 % (ref 5–12)
NEUTROPHILS NFR BLD AUTO: 4.39 10*3/MM3 (ref 1.7–7)
NEUTROPHILS NFR BLD AUTO: 70.9 % (ref 42.7–76)
NITRITE UR QL STRIP: NEGATIVE
NRBC BLD AUTO-RTO: 0 /100 WBC (ref 0–0.2)
OPIATES UR QL: NEGATIVE
OXYCODONE UR QL SCN: NEGATIVE
PH UR STRIP.AUTO: <=5 [PH] (ref 5–8)
PLATELET # BLD AUTO: 148 10*3/MM3 (ref 140–450)
PLATELET # BLD AUTO: 154 10*3/MM3 (ref 140–450)
PMV BLD AUTO: 11.5 FL (ref 6–12)
PMV BLD AUTO: 11.5 FL (ref 6–12)
POTASSIUM SERPL-SCNC: 4.1 MMOL/L (ref 3.5–5.2)
POTASSIUM SERPL-SCNC: 4.2 MMOL/L (ref 3.5–5.2)
PROT SERPL-MCNC: 6.8 G/DL (ref 6–8.5)
PROT SERPL-MCNC: 7.8 G/DL (ref 6–8.5)
PROT UR QL STRIP: ABNORMAL
RBC # BLD AUTO: 3.99 10*6/MM3 (ref 4.14–5.8)
RBC # BLD AUTO: 4.13 10*6/MM3 (ref 4.14–5.8)
RBC # UR: NORMAL /HPF
REF LAB TEST METHOD: NORMAL
RHINOVIRUS RNA SPEC NAA+PROBE: NOT DETECTED
RSV RNA NPH QL NAA+NON-PROBE: NOT DETECTED
SARS-COV-2 RNA NPH QL NAA+NON-PROBE: NOT DETECTED
SODIUM SERPL-SCNC: 135 MMOL/L (ref 136–145)
SODIUM SERPL-SCNC: 137 MMOL/L (ref 136–145)
SP GR UR STRIP: 1.01 (ref 1–1.03)
SQUAMOUS #/AREA URNS HPF: NORMAL /HPF
UROBILINOGEN UR QL STRIP: ABNORMAL
WBC # BLD AUTO: 6.01 10*3/MM3 (ref 3.4–10.8)
WBC # BLD AUTO: 6.19 10*3/MM3 (ref 3.4–10.8)
WBC UR QL AUTO: NORMAL /HPF
WHOLE BLOOD HOLD SPECIMEN: NORMAL
WHOLE BLOOD HOLD SPECIMEN: NORMAL

## 2021-10-10 PROCEDURE — 80053 COMPREHEN METABOLIC PANEL: CPT

## 2021-10-10 PROCEDURE — 80307 DRUG TEST PRSMV CHEM ANLYZR: CPT | Performed by: NURSE PRACTITIONER

## 2021-10-10 PROCEDURE — 83690 ASSAY OF LIPASE: CPT

## 2021-10-10 PROCEDURE — 25010000002 LORAZEPAM PER 2 MG: Performed by: NURSE PRACTITIONER

## 2021-10-10 PROCEDURE — 74177 CT ABD & PELVIS W/CONTRAST: CPT

## 2021-10-10 PROCEDURE — 0202U NFCT DS 22 TRGT SARS-COV-2: CPT | Performed by: NURSE PRACTITIONER

## 2021-10-10 PROCEDURE — 82077 ASSAY SPEC XCP UR&BREATH IA: CPT | Performed by: NURSE PRACTITIONER

## 2021-10-10 PROCEDURE — 85027 COMPLETE CBC AUTOMATED: CPT | Performed by: NURSE PRACTITIONER

## 2021-10-10 PROCEDURE — 80053 COMPREHEN METABOLIC PANEL: CPT | Performed by: NURSE PRACTITIONER

## 2021-10-10 PROCEDURE — 25010000002 IOPAMIDOL 61 % SOLUTION: Performed by: NURSE PRACTITIONER

## 2021-10-10 PROCEDURE — 99285 EMERGENCY DEPT VISIT HI MDM: CPT

## 2021-10-10 PROCEDURE — 81001 URINALYSIS AUTO W/SCOPE: CPT

## 2021-10-10 PROCEDURE — 63710000001 INSULIN GLARGINE PER 5 UNITS: Performed by: STUDENT IN AN ORGANIZED HEALTH CARE EDUCATION/TRAINING PROGRAM

## 2021-10-10 PROCEDURE — 63710000001 INSULIN LISPRO (HUMAN) PER 5 UNITS: Performed by: STUDENT IN AN ORGANIZED HEALTH CARE EDUCATION/TRAINING PROGRAM

## 2021-10-10 PROCEDURE — 82962 GLUCOSE BLOOD TEST: CPT

## 2021-10-10 PROCEDURE — 90791 PSYCH DIAGNOSTIC EVALUATION: CPT

## 2021-10-10 PROCEDURE — 85025 COMPLETE CBC W/AUTO DIFF WBC: CPT

## 2021-10-10 RX ORDER — LORAZEPAM 2 MG/ML
2 INJECTION INTRAMUSCULAR ONCE
Status: COMPLETED | OUTPATIENT
Start: 2021-10-10 | End: 2021-10-10

## 2021-10-10 RX ORDER — NICOTINE POLACRILEX 4 MG
15 LOZENGE BUCCAL
Status: DISCONTINUED | OUTPATIENT
Start: 2021-10-10 | End: 2021-10-12 | Stop reason: HOSPADM

## 2021-10-10 RX ORDER — ONDANSETRON 4 MG/1
4 TABLET, FILM COATED ORAL EVERY 6 HOURS PRN
Status: DISCONTINUED | OUTPATIENT
Start: 2021-10-10 | End: 2021-10-12 | Stop reason: HOSPADM

## 2021-10-10 RX ORDER — FOLIC ACID 1 MG/1
1 TABLET ORAL DAILY
Status: DISCONTINUED | OUTPATIENT
Start: 2021-10-11 | End: 2021-10-12 | Stop reason: HOSPADM

## 2021-10-10 RX ORDER — LORAZEPAM 2 MG/ML
2 INJECTION INTRAMUSCULAR
Status: DISCONTINUED | OUTPATIENT
Start: 2021-10-10 | End: 2021-10-12 | Stop reason: HOSPADM

## 2021-10-10 RX ORDER — AMLODIPINE BESYLATE 5 MG/1
10 TABLET ORAL DAILY
Status: DISCONTINUED | OUTPATIENT
Start: 2021-10-10 | End: 2021-10-10

## 2021-10-10 RX ORDER — LORAZEPAM 1 MG/1
2 TABLET ORAL
Status: DISCONTINUED | OUTPATIENT
Start: 2021-10-10 | End: 2021-10-12 | Stop reason: HOSPADM

## 2021-10-10 RX ORDER — LOSARTAN POTASSIUM 50 MG/1
50 TABLET ORAL
Status: DISCONTINUED | OUTPATIENT
Start: 2021-10-10 | End: 2021-10-12

## 2021-10-10 RX ORDER — INSULIN GLARGINE 100 [IU]/ML
20 INJECTION, SOLUTION SUBCUTANEOUS EVERY 12 HOURS SCHEDULED
Status: DISCONTINUED | OUTPATIENT
Start: 2021-10-10 | End: 2021-10-12 | Stop reason: HOSPADM

## 2021-10-10 RX ORDER — DEXTROSE MONOHYDRATE 25 G/50ML
25 INJECTION, SOLUTION INTRAVENOUS
Status: DISCONTINUED | OUTPATIENT
Start: 2021-10-10 | End: 2021-10-12 | Stop reason: HOSPADM

## 2021-10-10 RX ORDER — INSULIN GLARGINE 100 [IU]/ML
20 INJECTION, SOLUTION SUBCUTANEOUS EVERY 12 HOURS
COMMUNITY
End: 2022-02-01 | Stop reason: HOSPADM

## 2021-10-10 RX ORDER — TESTOSTERONE CYPIONATE 200 MG/ML
0.5 VIAL (ML) INTRAMUSCULAR WEEKLY
COMMUNITY

## 2021-10-10 RX ORDER — HYDRALAZINE HYDROCHLORIDE 25 MG/1
25 TABLET, FILM COATED ORAL ONCE
Status: COMPLETED | OUTPATIENT
Start: 2021-10-10 | End: 2021-10-10

## 2021-10-10 RX ORDER — NITROGLYCERIN 0.4 MG/1
0.4 TABLET SUBLINGUAL
Status: DISCONTINUED | OUTPATIENT
Start: 2021-10-10 | End: 2021-10-12 | Stop reason: HOSPADM

## 2021-10-10 RX ORDER — PAROXETINE 10 MG/1
10 TABLET, FILM COATED ORAL EVERY MORNING
Status: DISCONTINUED | OUTPATIENT
Start: 2021-10-10 | End: 2021-10-10

## 2021-10-10 RX ORDER — LORAZEPAM 2 MG/ML
1 INJECTION INTRAMUSCULAR
Status: DISCONTINUED | OUTPATIENT
Start: 2021-10-10 | End: 2021-10-12 | Stop reason: HOSPADM

## 2021-10-10 RX ORDER — LISINOPRIL 40 MG/1
40 TABLET ORAL DAILY
Status: DISCONTINUED | OUTPATIENT
Start: 2021-10-10 | End: 2021-10-10

## 2021-10-10 RX ORDER — INSULIN LISPRO 100 [IU]/ML
0-9 INJECTION, SOLUTION INTRAVENOUS; SUBCUTANEOUS
Status: DISCONTINUED | OUTPATIENT
Start: 2021-10-10 | End: 2021-10-12 | Stop reason: HOSPADM

## 2021-10-10 RX ORDER — HYDROXYZINE HYDROCHLORIDE 25 MG/1
25 TABLET, FILM COATED ORAL EVERY 8 HOURS PRN
Status: DISCONTINUED | OUTPATIENT
Start: 2021-10-10 | End: 2021-10-12 | Stop reason: HOSPADM

## 2021-10-10 RX ORDER — DIPHENOXYLATE HYDROCHLORIDE AND ATROPINE SULFATE 2.5; .025 MG/1; MG/1
1 TABLET ORAL DAILY
Status: DISCONTINUED | OUTPATIENT
Start: 2021-10-11 | End: 2021-10-12 | Stop reason: HOSPADM

## 2021-10-10 RX ORDER — ONDANSETRON 2 MG/ML
4 INJECTION INTRAMUSCULAR; INTRAVENOUS EVERY 6 HOURS PRN
Status: DISCONTINUED | OUTPATIENT
Start: 2021-10-10 | End: 2021-10-12 | Stop reason: HOSPADM

## 2021-10-10 RX ORDER — SODIUM CHLORIDE 0.9 % (FLUSH) 0.9 %
10 SYRINGE (ML) INJECTION AS NEEDED
Status: DISCONTINUED | OUTPATIENT
Start: 2021-10-10 | End: 2021-10-12 | Stop reason: HOSPADM

## 2021-10-10 RX ORDER — METOPROLOL SUCCINATE 50 MG/1
50 TABLET, EXTENDED RELEASE ORAL DAILY
Status: DISCONTINUED | OUTPATIENT
Start: 2021-10-10 | End: 2021-10-10

## 2021-10-10 RX ORDER — ZOLPIDEM TARTRATE 5 MG/1
5 TABLET ORAL NIGHTLY PRN
Status: DISCONTINUED | OUTPATIENT
Start: 2021-10-10 | End: 2021-10-11

## 2021-10-10 RX ORDER — LORAZEPAM 1 MG/1
1 TABLET ORAL
Status: DISCONTINUED | OUTPATIENT
Start: 2021-10-10 | End: 2021-10-12 | Stop reason: HOSPADM

## 2021-10-10 RX ORDER — LOSARTAN POTASSIUM 50 MG/1
50 TABLET ORAL DAILY
COMMUNITY

## 2021-10-10 RX ORDER — CHLORDIAZEPOXIDE HYDROCHLORIDE AND CLIDINIUM BROMIDE 5; 2.5 MG/1; MG/1
1 CAPSULE ORAL 3 TIMES DAILY
COMMUNITY
Start: 2021-10-08 | End: 2021-10-12 | Stop reason: HOSPADM

## 2021-10-10 RX ORDER — SODIUM CHLORIDE 9 MG/ML
100 INJECTION, SOLUTION INTRAVENOUS CONTINUOUS
Status: DISCONTINUED | OUTPATIENT
Start: 2021-10-10 | End: 2021-10-12 | Stop reason: HOSPADM

## 2021-10-10 RX ADMIN — SODIUM CHLORIDE 100 ML/HR: 9 INJECTION, SOLUTION INTRAVENOUS at 08:00

## 2021-10-10 RX ADMIN — LORAZEPAM 2 MG: 2 INJECTION INTRAMUSCULAR; INTRAVENOUS at 03:34

## 2021-10-10 RX ADMIN — HYDROXYZINE HYDROCHLORIDE 25 MG: 25 TABLET ORAL at 23:33

## 2021-10-10 RX ADMIN — LORAZEPAM 1 MG: 1 TABLET ORAL at 08:20

## 2021-10-10 RX ADMIN — HYDRALAZINE HYDROCHLORIDE 25 MG: 25 TABLET, FILM COATED ORAL at 22:08

## 2021-10-10 RX ADMIN — INSULIN LISPRO 4 UNITS: 100 INJECTION, SOLUTION INTRAVENOUS; SUBCUTANEOUS at 18:32

## 2021-10-10 RX ADMIN — INSULIN GLARGINE 20 UNITS: 100 INJECTION, SOLUTION SUBCUTANEOUS at 22:08

## 2021-10-10 RX ADMIN — LORAZEPAM 1 MG: 2 INJECTION INTRAMUSCULAR; INTRAVENOUS at 05:33

## 2021-10-10 RX ADMIN — LORAZEPAM 2 MG: 2 INJECTION INTRAMUSCULAR; INTRAVENOUS at 05:19

## 2021-10-10 RX ADMIN — Medication 100 MG: at 05:19

## 2021-10-10 RX ADMIN — IOPAMIDOL 85 ML: 612 INJECTION, SOLUTION INTRAVENOUS at 03:10

## 2021-10-10 RX ADMIN — LOSARTAN POTASSIUM 50 MG: 50 TABLET, FILM COATED ORAL at 18:29

## 2021-10-10 RX ADMIN — SODIUM CHLORIDE 100 ML/HR: 9 INJECTION, SOLUTION INTRAVENOUS at 18:41

## 2021-10-10 RX ADMIN — SODIUM CHLORIDE 1000 ML: 9 INJECTION, SOLUTION INTRAVENOUS at 03:37

## 2021-10-10 NOTE — ED NOTES
"Pt reports he is here to \"detox\" off of beer. He reports his last drink was about 3 hours ago. He reports he drank two beers at that time      Behringer, Catherine, RN  10/10/21 0222    "

## 2021-10-10 NOTE — H&P
"    Patient Name:  Wu Damon  YOB: 1975  MRN:  [id number]  Admit Date:  10/10/2021  Patient Care Team:  Juan Carlos Kwan APRN as PCP - General (Family Medicine)      Subjective   History Present Illness     Chief Complaint   Patient presents with   • Flank Pain     Right side   • Alcohol Problem       Mr. Damon is a 46 y.o. former smoker with a history of hypertension, diabetes, depression/anxiety that presents to [facility] complaining of \"I want to detox.\"    Alcohol withdrawal-last drank 1.5 days ago  C/o flank pain, fatigue     Edited by: Janie Garner APRN at 10/10/2021 0451  46-year-old gentleman presenting complaining of \"I want to detox.\"  Patient states he was in his usual state of health up until Friday when he decided that he needed to detox. He states he usually drinks 1/2 gallon of vodka every 2 days and he knew he could not just stop drinking safely. He went to the store and picked up \"a couple of pints of liquor and some beers \"and was going to attempt to wean himself down at home however he started having audio visual hallucinations and did not feel safe at home attempting to detox by himself. He has had withdrawal symptoms before including feeling shaky/tremulous and anxiety but he denies ever having seizures. He is also complaining of chills, fatigue, and muscle spasms. Of note he also said he stopped his Suboxone 12 days ago. He has a history of opioid use disorder. He denies current audiovisual hallucinations.    Review of Systems   Constitutional: Positive for chills, diaphoresis and fatigue.   HENT: Negative for rhinorrhea and sinus pain.    Eyes: Negative for pain and itching.   Respiratory: Negative for cough, shortness of breath and wheezing.    Cardiovascular: Negative for chest pain, palpitations and leg swelling.   Gastrointestinal: Positive for abdominal pain. Negative for abdominal distention, diarrhea and nausea.   Genitourinary: " Negative for difficulty urinating and dysuria.   Musculoskeletal: Positive for back pain.        Muscle spasms   Skin: Negative for color change and rash.   Neurological: Positive for tremors. Negative for dizziness, seizures, syncope and headaches.   Psychiatric/Behavioral: Positive for hallucinations. Negative for agitation, confusion and sleep disturbance. The patient is nervous/anxious.         Personal History     Past Medical History:   Diagnosis Date   • Diabetes mellitus (CMS/HCC)    • Hypertension      Past Surgical History:   Procedure Laterality Date   • EYE SURGERY     • HERNIA REPAIR       Family History   Problem Relation Age of Onset   • Alcohol abuse Mother    • Asthma Mother    • Cancer Mother    • Drug abuse Mother    • Early death Mother    • Alcohol abuse Father    • Alcohol abuse Sister    • Drug abuse Sister    • Asthma Daughter    • Diabetes Daughter    • Alcohol abuse Maternal Aunt    • Alcohol abuse Maternal Uncle    • Alcohol abuse Paternal Aunt    • Cancer Paternal Aunt    • Diabetes Paternal Aunt    • Alcohol abuse Paternal Uncle    • Alcohol abuse Maternal Grandmother    • Asthma Maternal Grandmother    • Cancer Maternal Grandmother    • Drug abuse Maternal Grandmother    • Hypertension Maternal Grandmother    • Alcohol abuse Maternal Grandfather    • Alcohol abuse Paternal Grandmother    • Alcohol abuse Paternal Grandfather      Social History     Tobacco Use   • Smoking status: Former Smoker     Packs/day: 2.00     Types: Cigarettes, Cigars     Start date: 9/4/2003     Quit date: 1/4/2006     Years since quitting: 15.7   • Smokeless tobacco: Former User     Types: Chew   Vaping Use   • Vaping Use: Unknown   Substance Use Topics   • Alcohol use: Yes     Alcohol/week: 21.0 standard drinks     Types: 21 Shots of liquor per week     Comment: one pint a night   • Drug use: No     Comment: suboxone     No current facility-administered medications on file prior to encounter.     Current  Outpatient Medications on File Prior to Encounter   Medication Sig Dispense Refill   • buprenorphine-naloxone (SUBOXONE) 8-2 MG per SL tablet Place 1.5 tablets under the tongue Daily.     • clidinium-chlordiazePOXIDE (Librax) 5-2.5 MG per capsule Take 1 capsule by mouth 3 (Three) Times a Day.     • insulin glargine (LANTUS, SEMGLEE) 100 UNIT/ML injection Inject 20 Units under the skin into the appropriate area as directed Every 12 (Twelve) Hours.     • losartan (COZAAR) 50 MG tablet Take 100 mg by mouth Daily.     • Testosterone Cypionate 200 MG/ML solution Inject 0.5 mL as directed 1 (One) Time Per Week.     • zolpidem CR (Ambien CR) 12.5 MG CR tablet Take 12.5 mg by mouth At Night As Needed for Sleep.     • fenofibrate (TRICOR) 48 MG tablet Take 1 tablet by mouth Daily. 30 tablet 0   • [DISCONTINUED] amLODIPine (NORVASC) 10 MG tablet Take 10 mg by mouth Daily.     • [DISCONTINUED] hydrOXYzine (ATARAX) 25 MG tablet Take 1 tablet by mouth Every 8 (Eight) Hours As Needed for Anxiety. 30 tablet 0   • [DISCONTINUED] insulin aspart (novoLOG) 100 UNIT/ML injection Inject 2 Units under the skin into the appropriate area as directed 3 (Three) Times a Day Before Meals.     • [DISCONTINUED] insulin detemir (LEVEMIR) 100 UNIT/ML injection Inject 25 Units under the skin 2 (Two) Times a Day. (Patient taking differently: Inject 36 Units under the skin into the appropriate area as directed 2 (Two) Times a Day.) 5 pen 0   • [DISCONTINUED] linagliptin (TRADJENTA) 5 MG tablet tablet Take 1 tablet by mouth Daily. (Patient not taking: Reported on 8/10/2020) 30 tablet 0   • [DISCONTINUED] LISINOPRIL PO Take 40 mg by mouth Daily.     • [DISCONTINUED] metoprolol succinate XL (TOPROL-XL) 50 MG 24 hr tablet Take 50 mg by mouth Daily.     • [DISCONTINUED] PARoxetine (Paxil) 10 MG tablet Take 1 tablet by mouth Every Morning. 30 tablet 0     Allergies   Allergen Reactions   • Avelox [Moxifloxacin Hcl] Shortness Of Breath   • Citalopram  Hydrobromide    • Meloxicam    • Metformin And Related Diarrhea   • Quetiapine      Fatigue and nausea   • Amlodipine Palpitations     HEART PALPITATIONS- ANXIETY       Objective    Objective     Vital Signs  Temp:  [98 °F (36.7 °C)] 98 °F (36.7 °C)  Heart Rate:  [107-132] 129  Resp:  [16-20] 20  BP: (146-186)/(109-121) 177/121  SpO2:  [92 %-100 %] 97 %  on   ;   Device (Oxygen Therapy): room air  Body mass index is 32.98 kg/m².    Physical Exam  Constitutional:       General: He is not in acute distress.     Appearance: He is not toxic-appearing.   HENT:      Head: Normocephalic and atraumatic.      Mouth/Throat:      Mouth: Mucous membranes are moist.   Eyes:      Extraocular Movements: Extraocular movements intact.      Conjunctiva/sclera: Conjunctivae normal.      Pupils: Pupils are equal, round, and reactive to light.   Cardiovascular:      Rate and Rhythm: Regular rhythm. Tachycardia present.      Pulses: Normal pulses.      Heart sounds: No murmur heard.      Pulmonary:      Effort: Pulmonary effort is normal. No respiratory distress.      Breath sounds: Normal breath sounds. No wheezing.   Abdominal:      General: Bowel sounds are normal. There is no distension.      Palpations: Abdomen is soft.      Tenderness: There is no abdominal tenderness.   Musculoskeletal:         General: No swelling or tenderness. Normal range of motion.      Cervical back: Normal range of motion and neck supple.      Right lower leg: No edema.      Left lower leg: No edema.   Skin:     General: Skin is warm and dry.   Neurological:      General: No focal deficit present.      Mental Status: He is alert and oriented to person, place, and time. Mental status is at baseline.      Motor: No weakness.      Comments: Mild tremulousness   Psychiatric:         Behavior: Behavior normal.         Thought Content: Thought content normal.         Judgment: Judgment normal.         Results Review:  I reviewed the patient's new clinical  results.  I reviewed the patient's new imaging results and agree with the interpretation.  I reviewed the patient's other test results and agree with the interpretation  I personally viewed and interpreted the patient's EKG/Telemetry data  Discussed with ED provider.    Lab Results (last 24 hours)     Procedure Component Value Units Date/Time    Urinalysis With Microscopic If Indicated (No Culture) - Urine, Clean Catch [[id number]]  (Abnormal) Collected: 10/10/21 0130    Specimen: Urine, Clean Catch Updated: 10/10/21 0209     Color, UA Yellow     Appearance, UA Clear     pH, UA <=5.0     Specific Gravity, UA 1.013     Glucose,  mg/dL (Trace)     Ketones, UA 80 mg/dL (3+)     Bilirubin, UA Negative     Blood, UA Negative     Protein, UA 30 mg/dL (1+)     Leuk Esterase, UA Negative     Nitrite, UA Negative     Urobilinogen, UA 1.0 E.U./dL    Urine Drug Screen - Urine, Clean Catch [[id number]]  (Normal) Collected: 10/10/21 0130    Specimen: Urine, Clean Catch Updated: 10/10/21 0230     Amphet/Methamphet, Screen Negative     Barbiturates Screen, Urine Negative     Benzodiazepine Screen, Urine Negative     Cocaine Screen, Urine Negative     Opiate Screen Negative     THC, Screen, Urine Negative     Methadone Screen, Urine Negative     Oxycodone Screen, Urine Negative    Narrative:      Negative Thresholds Per Drugs Screened:    Amphetamines                 500 ng/ml  Barbiturates                 200 ng/ml  Benzodiazepines              100 ng/ml  Cocaine                      300 ng/ml  Methadone                    300 ng/ml  Opiates                      300 ng/ml  Oxycodone                    100 ng/ml  THC                           50 ng/ml    The Normal Value for all drugs tested is negative. This report includes final unconfirmed screening results to be used for medical treatment purposes only. Unconfirmed results must not be used for non-medical purposes such as employment or legal testing. Clinical consideration  should be applied to any drug of abuse test, particularly when unconfirmed results are used.            Urinalysis, Microscopic Only - Urine, Clean Catch [[id number]] Collected: 10/10/21 0130    Specimen: Urine, Clean Catch Updated: 10/10/21 0209     RBC, UA 0-2 /HPF      WBC, UA 0-2 /HPF      Bacteria, UA None Seen /HPF      Squamous Epithelial Cells, UA 0-2 /HPF      Hyaline Casts, UA 0-2 /LPF      Methodology Automated Microscopy    CBC & Differential [[id number]]  (Abnormal) Collected: 10/10/21 0143    Specimen: Blood Updated: 10/10/21 0203    Narrative:      The following orders were created for panel order CBC & Differential.  Procedure                               Abnormality         Status                     ---------                               -----------         ------                     CBC Auto Differential[[id number]]        Abnormal            Final result                 Please view results for these tests on the individual orders.    Comprehensive Metabolic Panel [[id number]]  (Abnormal) Collected: 10/10/21 0143    Specimen: Blood Updated: 10/10/21 0225     Glucose 204 mg/dL      BUN 14 mg/dL      Creatinine 1.52 mg/dL      Sodium 135 mmol/L      Potassium 4.2 mmol/L      Chloride 95 mmol/L      CO2 13.1 mmol/L      Calcium 9.7 mg/dL      Total Protein 7.8 g/dL      Albumin 4.70 g/dL      ALT (SGPT) 84 U/L      AST (SGOT) 109 U/L      Alkaline Phosphatase 71 U/L      Total Bilirubin 1.8 mg/dL      eGFR   Amer 61 mL/min/1.73      Globulin 3.1 gm/dL      A/G Ratio 1.5 g/dL      BUN/Creatinine Ratio 9.2     Anion Gap 26.9 mmol/L     Narrative:      GFR Normal >60  Chronic Kidney Disease <60  Kidney Failure <15      Lipase [[id number]]  (Abnormal) Collected: 10/10/21 0143    Specimen: Blood Updated: 10/10/21 0225     Lipase 76 U/L     CBC Auto Differential [[id number]]  (Abnormal) Collected: 10/10/21 0143    Specimen: Blood Updated: 10/10/21 0203     WBC 6.19 10*3/mm3      RBC 4.13 10*6/mm3       Hemoglobin 13.5 g/dL      Hematocrit 39.9 %      MCV 96.6 fL      MCH 32.7 pg      MCHC 33.8 g/dL      RDW 11.8 %      RDW-SD 42.2 fl      MPV 11.5 fL      Platelets 154 10*3/mm3      Neutrophil % 70.9 %      Lymphocyte % 16.5 %      Monocyte % 8.9 %      Eosinophil % 2.4 %      Basophil % 1.0 %      Immature Grans % 0.3 %      Neutrophils, Absolute 4.39 10*3/mm3      Lymphocytes, Absolute 1.02 10*3/mm3      Monocytes, Absolute 0.55 10*3/mm3      Eosinophils, Absolute 0.15 10*3/mm3      Basophils, Absolute 0.06 10*3/mm3      Immature Grans, Absolute 0.02 10*3/mm3      nRBC 0.0 /100 WBC     Ethanol [[id number]] Collected: 10/10/21 0143    Specimen: Blood Updated: 10/10/21 0225     Ethanol <10 mg/dL      Ethanol % <0.010 %     Comprehensive Metabolic Panel [[id number]]  (Abnormal) Collected: 10/10/21 0646    Specimen: Blood Updated: 10/10/21 0722     Glucose 163 mg/dL      BUN 12 mg/dL      Creatinine 1.47 mg/dL      Sodium 137 mmol/L      Potassium 4.1 mmol/L      Chloride 102 mmol/L      CO2 15.2 mmol/L      Calcium 8.6 mg/dL      Total Protein 6.8 g/dL      Albumin 4.20 g/dL      ALT (SGPT) 74 U/L      AST (SGOT) 97 U/L      Alkaline Phosphatase 59 U/L      Total Bilirubin 1.8 mg/dL      eGFR  African Amer 63 mL/min/1.73      Globulin 2.6 gm/dL      A/G Ratio 1.6 g/dL      BUN/Creatinine Ratio 8.2     Anion Gap 19.8 mmol/L     Narrative:      GFR Normal >60  Chronic Kidney Disease <60  Kidney Failure <15      COVID PRE-OP / PRE-PROCEDURE SCREENING ORDER (NO ISOLATION) - Swab, Nasopharynx [[id number]]  (Normal) Collected: 10/10/21 0648    Specimen: Swab from Nasopharynx Updated: 10/10/21 0746    Narrative:      The following orders were created for panel order COVID PRE-OP / PRE-PROCEDURE SCREENING ORDER (NO ISOLATION) - Swab, Nasopharynx.  Procedure                               Abnormality         Status                     ---------                               -----------         ------                      Respiratory Panel PCR w/...[[id number]]  Normal              Final result                 Please view results for these tests on the individual orders.    Respiratory Panel PCR w/COVID-19(SARS-CoV-2) ROSEMARIE/ELIZABETH/RODERICK/PAD/COR/MAD/CELINE In-House, NP Swab in UTM/VTM, 3-4 HR TAT - Swab, Nasopharynx [[id number]]  (Normal) Collected: 10/10/21 0648    Specimen: Swab from Nasopharynx Updated: 10/10/21 0746     ADENOVIRUS, PCR Not Detected     Coronavirus 229E Not Detected     Coronavirus HKU1 Not Detected     Coronavirus NL63 Not Detected     Coronavirus OC43 Not Detected     COVID19 Not Detected     Human Metapneumovirus Not Detected     Human Rhinovirus/Enterovirus Not Detected     Influenza A PCR Not Detected     Influenza B PCR Not Detected     Parainfluenza Virus 1 Not Detected     Parainfluenza Virus 2 Not Detected     Parainfluenza Virus 3 Not Detected     Parainfluenza Virus 4 Not Detected     RSV, PCR Not Detected     Bordetella pertussis pcr Not Detected     Bordetella parapertussis PCR Not Detected     Chlamydophila pneumoniae PCR Not Detected     Mycoplasma pneumo by PCR Not Detected    Narrative:      In the setting of a positive respiratory panel with a viral infection PLUS a negative procalcitonin without other underlying concern for bacterial infection, consider observing off antibiotics or discontinuation of antibiotics and continue supportive care. If the respiratory panel is positive for atypical bacterial infection (Bordetella pertussis, Chlamydophila pneumoniae, or Mycoplasma pneumoniae), consider antibiotic de-escalation to target atypical bacterial infection.    CBC (No Diff) [[id number]]  (Abnormal) Collected: 10/10/21 0753    Specimen: Blood Updated: 10/10/21 0802     WBC 6.01 10*3/mm3      RBC 3.99 10*6/mm3      Hemoglobin 12.8 g/dL      Hematocrit 37.1 %      MCV 93.0 fL      MCH 32.1 pg      MCHC 34.5 g/dL      RDW 11.4 %      RDW-SD 38.5 fl      MPV 11.5 fL      Platelets 148 10*3/mm3           Imaging  Results (Last 24 Hours)     Procedure Component Value Units Date/Time    CT Abdomen Pelvis With Contrast [[id number]] Collected: 10/10/21 0424     Updated: 10/10/21 0424    Narrative:        Patient: YOLI PULLIAM  Time Out: 04:24  Exam(s): CT ABDOMEN + PELVIS With Contrast     EXAM:    CT Abdomen and Pelvis With Intravenous Contrast    CLINICAL HISTORY:     Reason for exam: flank pain.    TECHNIQUE:    Axial computed tomography images of the abdomen and pelvis with   intravenous contrast.  CTDI is 28.6 mGy and DLP is 1632.5 mGy-cm.  This   CT exam was performed according to the principle of ALARA (As Low As   Reasonably Achievable) by using one or more of the following dose   reduction techniques: automated exposure control, adjustment of the mA   and or kV according to patient size, and or use of iterative   reconstruction technique.    COMPARISON:    8 13 2021.    FINDINGS:    Lung bases:  Unremarkable.  No mass.  No consolidation.    Pleural space:  No pleural effusions.    Heart:  Heart is normal in size.     ABDOMEN:    Liver:  Diffuse fatty infiltration of the liver is noted.  Previously   noted 3 enhancing lesions within the hepatic parenchyma largest of which   flow is located in the posterior segment of the right lobe of liver are   again noted.  MRI imaging of the abdomen with contrast administration   will provide additional information, as deemed clinically necessary.  The   adrenal glands, the head, body, tail of the pancreas are unremarkable.    Gallbladder and bile ducts:  Probable vicarious excretion of contrast   within the gallbladder.  No calcified stones.  No ductal dilation.    Pancreas:  See above.    Spleen:  Unremarkable.  No splenomegaly.    Adrenals:  See above.    Kidneys and ureters:  No renal calculus or hydronephrosis.  No ureteral   calculi are noted.    Stomach and bowel:  Moderate quantity of stool throughout the colon   without bowel obstruction.  No mucosal thickening.      PELVIS:    Appendix:  The appendix is seen on axial image 135 and is unremarkable.    Bladder:  The bladder is underdistended.    Reproductive:  The prostate gland is unremarkable.     ABDOMEN and PELVIS:    Intraperitoneal space:  Unremarkable.  No free air.  No significant   fluid collection.    Bones joints:  No acute fracture.  No dislocation.    Soft tissues:  2 cm umbilical hernia containing mesenteric fat only.    Vasculature:  Flow is demonstrated within the celiac, SMA, the renal   arteries, and CHECO.  Portal vein is patent.  No abdominal aortic aneurysm.    Lymph nodes:  Unremarkable.  No enlarged lymph nodes.    IMPRESSION:       1.  Fatty infiltration of the liver.  2.  Previously described enhancing hepatic lesions are again noted, of   uncertain etiology.  MRI imaging will provide additional information as   deemed necessary.  3.  Probable vicarious excretion of contrast within the gallbladder.  4.  Nonspecific stranding about the perinephric spaces bilaterally.  5.  No hydronephrosis.  6.  No ureteral calculi.  7.  Moderate quantity of stool throughout the colon without bowel   obstruction.  8.  The appendix is unremarkable.      Impression:          Electronically signed by Roman Nugent MD on 10-10-21 at 0424          Results for orders placed during the hospital encounter of 08/10/20    Transthoracic Echo Complete With Contrast if Necessary Per Protocol    Interpretation Summary  · Left ventricular systolic function is normal. Calculated EF = 63.0%. Estimated EF was in agreement with the calculated EF. Normal left ventricular cavity size noted. All left ventricular wall segments contract normally. Left ventricular wall thickness is consistent with mild concentric hypertrophy. Left ventricular diastolic function is normal.      No orders to display        Assessment/Plan     Active Hospital Problems    Diagnosis  POA   • Alcoholic ketoacidosis [E87.2]  Yes      Resolved Hospital Problems   No  resolved problems to display.       Mr. Damon is a 46 y.o. former smoker with a history of alcohol use disorder who presents in alcohol withdrawal complicated by perceptual disturbances    Alcohol withdrawal complicated by perceptual disturbance  -Start CIWA, multivitamin, folate, thiamine, will consult access center  -EtOH negative on admission, last drink yesterday/Saturday; anticipate withdrawal symptoms to worsen within the next 24 hours    Diabetes mellitus type 2 complicated by hyperglycemia  -Patient reported 36 units Lantus twice daily, will start 20 Lantus twice daily plus sliding scale  -A1c 7.8  8/21    Hypertension  -Home losartan 50 mg    Depression/anxiety  -Previously on paroxetine, no current meds    Insomnia  -Continue home Ambien    Opioid use disorder  -Patient stopped his own Suboxone 12 days ago, will hold      · I discussed the patient's findings and my recommendations with patient and nursing staff.    VTE Prophylaxis - SCDs.  Code Status - Full code.       Nesha Castillo MD  [facility]  10/10/21  14:16 EDT

## 2021-10-10 NOTE — PROGRESS NOTES
Clinical Pharmacy Services: Medication History    uW Damon is a 46 y.o. male presenting to [facility] for   Chief Complaint   Patient presents with   • Flank Pain     Right side   • Alcohol Problem       He  has a past medical history of Diabetes mellitus (CMS/[facility]) and Hypertension.    Allergies as of 10/10/2021 - Reviewed 10/10/2021   Allergen Reaction Noted   • Avelox [moxifloxacin hcl] Shortness Of Breath 01/23/2019   • Citalopram hydrobromide  01/02/2017   • Meloxicam  01/02/2017   • Metformin and related Diarrhea 01/02/2017   • Quetiapine  01/02/2017   • Amlodipine Palpitations 01/02/2017       Medication information was obtained from: Patient  Pharmacy and Phone Number:     Prior to Admission Medications     Prescriptions Last Dose Informant Patient Reported? Taking?    buprenorphine-naloxone (SUBOXONE) 8-2 MG per SL tablet  Self Yes Yes    Place 1.5 tablets under the tongue Daily.    clidinium-chlordiazePOXIDE (Librax) 5-2.5 MG per capsule   Yes Yes    Take 1 capsule by mouth 3 (Three) Times a Day.    insulin glargine (LANTUS, SEMGLEE) 100 UNIT/ML injection  Self Yes Yes    Inject 20 Units under the skin into the appropriate area as directed Every 12 (Twelve) Hours.    losartan (COZAAR) 50 MG tablet  Self Yes Yes    Take 100 mg by mouth Daily.    Testosterone Cypionate 200 MG/ML solution  Self Yes Yes    Inject 0.5 mL as directed 1 (One) Time Per Week.    zolpidem CR (Ambien CR) 12.5 MG CR tablet  Self Yes Yes    Take 12.5 mg by mouth At Night As Needed for Sleep.    fenofibrate (TRICOR) 48 MG tablet  Self No No    Take 1 tablet by mouth Daily.            Medication notes:     This medication list is complete to the best of my knowledge as of 10/10/2021    Please call if questions.    Avtar Kate CPhT.  Medication History Technician  [phone]      10/10/2021 13:59 EDT

## 2021-10-10 NOTE — ED TRIAGE NOTES
Pt arrived via [facility] from home with complaints of right flank pain for the past several months intermittently. Pt was told that he might need Dialysis months ago. Pt reports to EMS that he has problems with ETOH abuse and we have helped him before so he wants detox again.    Pt reports he is drinking beer a day.  Pt has a mask in place as well as this RN with N95/goggles.

## 2021-10-10 NOTE — ED PROVIDER NOTES
EMERGENCY DEPARTMENT ENCOUNTER    Room Number:  04/04  Date of encounter:  10/10/2021  PCP: Juan Carlos Kwan APRN  Historian: patient   Full history not obtainable due to: none     HPI:  Chief Complaint: fatigue    Context: Wu Damon is a 46 y.o. male who presents to the ED c/o fatigue onset 2 days prior. Fatigue is constant. Generalized. Moderate in severity. Reports heavy alcohol use , about 1/5-1/2 gallon of hard liquor per day. He states yesterday he thought he could start to detox and tried to slowly drink beer in order to do so. He started hearing voices, having twitching, and decided to come to the ER. He does endorse vomiting 2 days ago and right flank pain. Those symptoms have since resolved. No prior hx of withdrawal seizure.   last ETOH drink was 1-2 days ago.     MEDICAL RECORD REVIEW: Reviewed notation from [facility] physician stated 2 days prior.  The patient presented for palpitations and fatigue.  His symptoms are vague.  He endorsed alcohol abuse.  Admission was offered as the patient was hypertensive and tachycardic but the patient declined.      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   • Chest pain 08/10/2020   • Alcoholic ketosis (HCC) 08/10/2020   • Liver lesion 08/10/2020   • Hypokalemia 08/12/2020   • Alcohol withdrawal syndrome without complication (HCC) 08/13/2021     Resolved Ambulatory Problems     Diagnosis Date Noted   • No Resolved Ambulatory Problems     Past Medical History:   Diagnosis Date   • Diabetes mellitus (CMS/HCC)    • Hypertension           PAST SURGICAL HISTORY  Past Surgical History:   Procedure Laterality Date   • EYE SURGERY     • HERNIA REPAIR           FAMILY HISTORY  Family History   Problem Relation Age of Onset   • Alcohol abuse Mother    • Asthma Mother    • Cancer Mother    • Drug abuse Mother    • Early death Mother    • Alcohol abuse Father    • Alcohol abuse Sister    • Drug abuse Sister    • Asthma Daughter    • Diabetes Daughter    • Alcohol abuse Maternal Aunt    • Alcohol abuse Maternal Uncle    • Alcohol abuse Paternal Aunt    • Cancer Paternal Aunt    • Diabetes Paternal Aunt    • Alcohol abuse Paternal Uncle    • Alcohol abuse Maternal Grandmother    • Asthma Maternal Grandmother    • Cancer Maternal Grandmother    • Drug abuse Maternal Grandmother    • Hypertension Maternal Grandmother    • Alcohol abuse Maternal Grandfather    • Alcohol abuse Paternal Grandmother    • Alcohol abuse Paternal Grandfather          SOCIAL HISTORY  Social History     Socioeconomic History   • Marital status:    Tobacco Use   • Smoking status: Former Smoker     Packs/day: 2.00     Types: Cigarettes, Cigars     Start date: 9/4/2003     Quit date: 1/4/2006     Years since quitting: 15.7   • Smokeless tobacco: Former User     Types: Chew   Vaping Use   • Vaping Use: Unknown   Substance and Sexual Activity   • Alcohol use: Yes     Alcohol/week: 21.0 standard drinks     Types: 21 Shots of liquor per week     Comment: one pint a night   • Drug use: No     Comment: suboxone   • Sexual activity: Defer     Partners: Female         ALLERGIES  Avelox [moxifloxacin hcl], Citalopram hydrobromide, Meloxicam, Metformin and related, Quetiapine, and Amlodipine        REVIEW OF SYSTEMS  Review of Systems   All systems reviewed and marked as negative except as listed in HPI       PHYSICAL EXAM    I have reviewed the triage vital signs and nursing notes.    ED Triage Vitals [10/10/21 0102]   Temp Heart Rate Resp BP SpO2   98 °F (36.7 °C) 118 16 (!) 186/111  98 %      Temp src Heart Rate Source Patient Position BP Location FiO2 (%)   Temporal Monitor -- -- --       GENERAL: alert well developed, well nourished in no distress  HENT: NCAT, neck supple, trachea midline. MM dry  EYES: no scleral icterus, PERRL, normal conjunctivae  CV: regular rhythm, tachycardia, no murmur  RESPIRATORY: unlabored effort, CTAB  ABDOMEN: soft, nontender, nondistended, bowel sounds present  MUSCULOSKELETAL: no gross deformity  NEURO: alert,  sensory and motor function of extremities grossly intact, speech clear, mental status normal/baseline  SKIN: warm, dry, no rash  PSYCH:  Appropriate mood and affect    Vital signs and nursing notes reviewed.          LAB RESULTS  Recent Results (from the past 24 hour(s))   Urinalysis With Microscopic If Indicated (No Culture) - Urine, Clean Catch    Collection Time: 10/10/21  1:30 AM    Specimen: Urine, Clean Catch   Result Value Ref Range    Color, UA Yellow Yellow, Straw    Appearance, UA Clear Clear    pH, UA <=5.0 5.0 - 8.0    Specific Gravity, UA 1.013 1.005 - 1.030    Glucose,  mg/dL (Trace) (A) Negative    Ketones, UA 80 mg/dL (3+) (A) Negative    Bilirubin, UA Negative Negative    Blood, UA Negative Negative    Protein, UA 30 mg/dL (1+) (A) Negative    Leuk Esterase, UA Negative Negative    Nitrite, UA Negative Negative    Urobilinogen, UA 1.0 E.U./dL 0.2 - 1.0 E.U./dL   Urine Drug Screen - Urine, Clean Catch    Collection Time: 10/10/21  1:30 AM    Specimen: Urine, Clean Catch   Result Value Ref Range    Amphet/Methamphet, Screen Negative Negative    Barbiturates Screen, Urine Negative Negative    Benzodiazepine Screen, Urine Negative Negative    Cocaine Screen, Urine Negative Negative    Opiate Screen Negative Negative    THC, Screen, Urine Negative Negative    Methadone Screen, Urine Negative Negative    Oxycodone Screen, Urine Negative Negative   Urinalysis, Microscopic Only - Urine, Clean Catch    Collection Time: 10/10/21  1:30 AM     Specimen: Urine, Clean Catch   Result Value Ref Range    RBC, UA 0-2 None Seen, 0-2 /HPF    WBC, UA 0-2 None Seen, 0-2 /HPF    Bacteria, UA None Seen None Seen /HPF    Squamous Epithelial Cells, UA 0-2 None Seen, 0-2 /HPF    Hyaline Casts, UA 0-2 None Seen /LPF    Methodology Automated Microscopy    Comprehensive Metabolic Panel    Collection Time: 10/10/21  1:43 AM    Specimen: Blood   Result Value Ref Range    Glucose 204 (H) 65 - 99 mg/dL    BUN 14 6 - 20 mg/dL    Creatinine 1.52 (H) 0.76 - 1.27 mg/dL    Sodium 135 (L) 136 - 145 mmol/L    Potassium 4.2 3.5 - 5.2 mmol/L    Chloride 95 (L) 98 - 107 mmol/L    CO2 13.1 (L) 22.0 - 29.0 mmol/L    Calcium 9.7 8.6 - 10.5 mg/dL    Total Protein 7.8 6.0 - 8.5 g/dL    Albumin 4.70 3.50 - 5.20 g/dL    ALT (SGPT) 84 (H) 1 - 41 U/L    AST (SGOT) 109 (H) 1 - 40 U/L    Alkaline Phosphatase 71 39 - 117 U/L    Total Bilirubin 1.8 (H) 0.0 - 1.2 mg/dL    eGFR  African Amer 61 >60 mL/min/1.73    Globulin 3.1 gm/dL    A/G Ratio 1.5 g/dL    BUN/Creatinine Ratio 9.2 7.0 - 25.0    Anion Gap 26.9 (H) 5.0 - 15.0 mmol/L   Lipase    Collection Time: 10/10/21  1:43 AM    Specimen: Blood   Result Value Ref Range    Lipase 76 (H) 13 - 60 U/L   Green Top (Gel)    Collection Time: 10/10/21  1:43 AM   Result Value Ref Range    Extra Tube Hold for add-ons.    Lavender Top    Collection Time: 10/10/21  1:43 AM   Result Value Ref Range    Extra Tube hold for add-on    Gold Top - SST    Collection Time: 10/10/21  1:43 AM   Result Value Ref Range    Extra Tube Hold for add-ons.    Light Blue Top    Collection Time: 10/10/21  1:43 AM   Result Value Ref Range    Extra Tube hold for add-on    CBC Auto Differential    Collection Time: 10/10/21  1:43 AM    Specimen: Blood   Result Value Ref Range    WBC 6.19 3.40 - 10.80 10*3/mm3    RBC 4.13 (L) 4.14 - 5.80 10*6/mm3    Hemoglobin 13.5 13.0 - 17.7 g/dL    Hematocrit 39.9 37.5 - 51.0 %    MCV 96.6 79.0 - 97.0 fL    MCH 32.7 26.6 - 33.0 pg    MCHC 33.8 31.5 -  35.7 g/dL    RDW 11.8 (L) 12.3 - 15.4 %    RDW-SD 42.2 37.0 - 54.0 fl    MPV 11.5 6.0 - 12.0 fL    Platelets 154 140 - 450 10*3/mm3    Neutrophil % 70.9 42.7 - 76.0 %    Lymphocyte % 16.5 (L) 19.6 - 45.3 %    Monocyte % 8.9 5.0 - 12.0 %    Eosinophil % 2.4 0.3 - 6.2 %    Basophil % 1.0 0.0 - 1.5 %    Immature Grans % 0.3 0.0 - 0.5 %    Neutrophils, Absolute 4.39 1.70 - 7.00 10*3/mm3    Lymphocytes, Absolute 1.02 0.70 - 3.10 10*3/mm3    Monocytes, Absolute 0.55 0.10 - 0.90 10*3/mm3    Eosinophils, Absolute 0.15 0.00 - 0.40 10*3/mm3    Basophils, Absolute 0.06 0.00 - 0.20 10*3/mm3    Immature Grans, Absolute 0.02 0.00 - 0.05 10*3/mm3    nRBC 0.0 0.0 - 0.2 /100 WBC   Ethanol    Collection Time: 10/10/21  1:43 AM    Specimen: Blood   Result Value Ref Range    Ethanol <10 0 - 10 mg/dL    Ethanol % <0.010 %       Ordered the above labs and independently reviewed the results.        RADIOLOGY  CT Abdomen Pelvis With Contrast    Result Date: 10/10/2021  Patient: YOLI PULLIAM  Time Out: 04:24 Exam(s): CT ABDOMEN + PELVIS With Contrast EXAM:   CT Abdomen and Pelvis With Intravenous Contrast CLINICAL HISTORY:    Reason for exam: flank pain. TECHNIQUE:   Axial computed tomography images of the abdomen and pelvis with intravenous contrast.  CTDI is 28.6 mGy and DLP is 1632.5 mGy-cm.  This CT exam was performed according to the principle of ALARA (As Low As Reasonably Achievable) by using one or more of the following dose reduction techniques: automated exposure control, adjustment of the mA and or kV according to patient size, and or use of iterative reconstruction technique. COMPARISON:   8 13 2021. FINDINGS:   Lung bases:  Unremarkable.  No mass.  No consolidation.   Pleural space:  No pleural effusions.   Heart:  Heart is normal in size.  ABDOMEN:   Liver:  Diffuse fatty infiltration of the liver is noted.  Previously noted 3 enhancing lesions within the hepatic parenchyma largest of which flow is located in the  posterior segment of the right lobe of liver are again noted.  MRI imaging of the abdomen with contrast administration will provide additional information, as deemed clinically necessary.  The adrenal glands, the head, body, tail of the pancreas are unremarkable.   Gallbladder and bile ducts:  Probable vicarious excretion of contrast within the gallbladder.  No calcified stones.  No ductal dilation.   Pancreas:  See above.   Spleen:  Unremarkable.  No splenomegaly.   Adrenals:  See above.   Kidneys and ureters:  No renal calculus or hydronephrosis.  No ureteral calculi are noted.   Stomach and bowel:  Moderate quantity of stool throughout the colon without bowel obstruction.  No mucosal thickening.  PELVIS:   Appendix:  The appendix is seen on axial image 135 and is unremarkable.   Bladder:  The bladder is underdistended.   Reproductive:  The prostate gland is unremarkable.  ABDOMEN and PELVIS:   Intraperitoneal space:  Unremarkable.  No free air.  No significant fluid collection.   Bones joints:  No acute fracture.  No dislocation.   Soft tissues:  2 cm umbilical hernia containing mesenteric fat only.   Vasculature:  Flow is demonstrated within the celiac, SMA, the renal arteries, and CHECO.  Portal vein is patent.  No abdominal aortic aneurysm.   Lymph nodes:  Unremarkable.  No enlarged lymph nodes. IMPRESSION:     1.  Fatty infiltration of the liver. 2.  Previously described enhancing hepatic lesions are again noted, of uncertain etiology.  MRI imaging will provide additional information as deemed necessary. 3.  Probable vicarious excretion of contrast within the gallbladder. 4.  Nonspecific stranding about the perinephric spaces bilaterally. 5.  No hydronephrosis. 6.  No ureteral calculi. 7.  Moderate quantity of stool throughout the colon without bowel obstruction. 8.  The appendix is unremarkable.     Electronically signed by Roman Nugent MD on 10-10-21 at 0424      I ordered the above noted radiological  studies. Independently reviewed by me and discussed with radiologist.  See dictation above for official radiology interpretation.      PROCEDURES    Procedures        MEDICATIONS GIVEN IN ER    Medications   sodium chloride 0.9 % flush 10 mL (has no administration in time range)   sodium chloride 0.9 % flush 10 mL (has no administration in time range)   nitroglycerin (NITROSTAT) SL tablet 0.4 mg (has no administration in time range)   ondansetron (ZOFRAN) tablet 4 mg (has no administration in time range)     Or   ondansetron (ZOFRAN) injection 4 mg (has no administration in time range)   LORazepam (ATIVAN) tablet 1 mg (has no administration in time range)     Or   LORazepam (ATIVAN) injection 1 mg (has no administration in time range)     Or   LORazepam (ATIVAN) tablet 2 mg (has no administration in time range)     Or   LORazepam (ATIVAN) injection 2 mg (has no administration in time range)     Or   LORazepam (ATIVAN) injection 2 mg (has no administration in time range)     Or   LORazepam (ATIVAN) injection 2 mg (has no administration in time range)   thiamine (B-1) 100 mg in sodium chloride 0.9 % 100 mL IVPB (has no administration in time range)     Or   thiamine (VITAMIN B-1) tablet 100 mg (has no administration in time range)   thiamine (VITAMIN B-1) tablet 100 mg (has no administration in time range)     And   multivitamin (THERAGRAN) tablet 1 tablet (has no administration in time range)     And   folic acid (FOLVITE) tablet 1 mg (has no administration in time range)   sodium chloride 0.9 % infusion (has no administration in time range)   iopamidol (ISOVUE-300) 61 % injection 100 mL (85 mL Intravenous Given 10/10/21 0310)   sodium chloride 0.9 % bolus 1,000 mL (1,000 mL Intravenous New Bag 10/10/21 9837)   LORazepam (ATIVAN) injection 2 mg (2 mg Intravenous Given 10/10/21 8934)   LORazepam (ATIVAN) injection 2 mg (2 mg Intravenous Given 10/10/21 3593)         PROGRESS, DATA ANALYSIS, CONSULTS, AND MEDICAL  DECISION MAKING    All labs have been independently reviewed by me.  All radiology studies have been reviewed by me.   EKG's independently reviewed by me.  Discussion below represents my analysis of pertinent findings related to patient's condition, differential diagnosis, treatment plan and final disposition.    DIFFERENTIAL DIAGNOSIS INCLUDE BUT NOT LIMITED TO: Alcohol withdrawal, delirium, polysubstance abuse, intoxication, dehydration, electrolyte abnormality, alcoholic ketoacidosis, DKA    ED Course as of 10/10/21 0519   Sun Oct 10, 2021   0244 AST (SGOT)(!): 109 [JS]   0244 ALT (SGPT)(!): 84 [JS]   0244 Creatinine(!): 1.52 [JS]   0244 WBC: 6.19 [JS]   0246 Ketones, UA(!): 80 mg/dL (3+) [JS]   0450 Discussed pt with Ebony HALE for [facility] who will admit the pt to telemetry [JS]      ED Course User Index  [JS] Alejandra Benavides APRN         BP - (!) 165/109  HR - 118  TEMP - 98 °F (36.7 °C) (Temporal)  O2 SATS - 98%        DIAGNOSIS  Final diagnoses:   Alcoholic ketoacidosis   Alcohol withdrawal delirium (HCC)         DISPOSITION  Admission     Pt masked in first look. I wore appropriate PPE throughout my encounters with the pt. I performed hand hygiene on entry into the pt room and upon exit.     Dictated utilizing Dragon dictation:  Much of this encounter note is an electronic transcription/translation of spoken language to printed text. The electronic translation of spoken language may permit erroneous, or at times, nonsensical words or phrases to be inadvertently transcribed; Although I have reviewed the note for such errors, some may still exist.     Alejandra Benavides APRN  10/10/21 0522

## 2021-10-10 NOTE — ED PROVIDER NOTES
MD ATTESTATION NOTE    The [place] and I have discussed this patient's history, physical exam, and treatment plan.  I have reviewed the documentation and personally had a face to face interaction with the patient. I affirm the documentation and agree with the treatment and plan.  The attached note describes my personal findings.      History  46-year-old male with history of alcohol abuse presents with fatigue and feeling poorly.    Physical Exam  Vital Signs reviewed  Chronically ill-appearing male in mild distress.    Heart-tachycardic  Lungs-clear to auscultation bilaterally  Abdomen-mild diffuse tenderness    Disposition  I discussed treatment and evaluation this patient with NP Molly Benavides.  Patient with apparent alcoholic ketoacidosis, recurrent.  Will admit to the hospital for IV fluids and supportive care.     Junior Dumont MD  10/10/21 0580

## 2021-10-10 NOTE — ED NOTES
RN wore appropriate PPE during entire encounter with patient. Patient compliant with wearing mask at this time. Proper hand hygiene performed before encounter, as deemed necessary during encounter and after completion of encounter with patient.      Tamara Villalobos RN  10/10/21 0533

## 2021-10-10 NOTE — ED NOTES
.RN wearing all appropriate PPE during entire encounter with patient.        Behringer, Catherine, RN  10/10/21 0147

## 2021-10-11 LAB
ALBUMIN SERPL-MCNC: 4.2 G/DL (ref 3.5–5.2)
ALBUMIN/GLOB SERPL: 1.4 G/DL
ALP SERPL-CCNC: 62 U/L (ref 39–117)
ALT SERPL W P-5'-P-CCNC: 92 U/L (ref 1–41)
ANION GAP SERPL CALCULATED.3IONS-SCNC: 16.3 MMOL/L (ref 5–15)
AST SERPL-CCNC: 112 U/L (ref 1–40)
BASOPHILS # BLD AUTO: 0.07 10*3/MM3 (ref 0–0.2)
BASOPHILS NFR BLD AUTO: 1 % (ref 0–1.5)
BILIRUB SERPL-MCNC: 1.8 MG/DL (ref 0–1.2)
BUN SERPL-MCNC: 10 MG/DL (ref 6–20)
BUN/CREAT SERPL: 7.5 (ref 7–25)
CALCIUM SPEC-SCNC: 8.7 MG/DL (ref 8.6–10.5)
CHLORIDE SERPL-SCNC: 98 MMOL/L (ref 98–107)
CO2 SERPL-SCNC: 20.7 MMOL/L (ref 22–29)
CREAT SERPL-MCNC: 1.33 MG/DL (ref 0.76–1.27)
DEPRECATED RDW RBC AUTO: 40.3 FL (ref 37–54)
EOSINOPHIL # BLD AUTO: 0.19 10*3/MM3 (ref 0–0.4)
EOSINOPHIL NFR BLD AUTO: 2.8 % (ref 0.3–6.2)
ERYTHROCYTE [DISTWIDTH] IN BLOOD BY AUTOMATED COUNT: 11.7 % (ref 12.3–15.4)
GFR SERPL CREATININE-BSD FRML MDRD: 71 ML/MIN/1.73
GLOBULIN UR ELPH-MCNC: 2.9 GM/DL
GLUCOSE BLDC GLUCOMTR-MCNC: 211 MG/DL (ref 70–130)
GLUCOSE BLDC GLUCOMTR-MCNC: 232 MG/DL (ref 70–130)
GLUCOSE BLDC GLUCOMTR-MCNC: 258 MG/DL (ref 70–130)
GLUCOSE BLDC GLUCOMTR-MCNC: 328 MG/DL (ref 70–130)
GLUCOSE SERPL-MCNC: 204 MG/DL (ref 65–99)
HCT VFR BLD AUTO: 38.5 % (ref 37.5–51)
HGB BLD-MCNC: 13.1 G/DL (ref 13–17.7)
IMM GRANULOCYTES # BLD AUTO: 0.04 10*3/MM3 (ref 0–0.05)
IMM GRANULOCYTES NFR BLD AUTO: 0.6 % (ref 0–0.5)
LYMPHOCYTES # BLD AUTO: 1.51 10*3/MM3 (ref 0.7–3.1)
LYMPHOCYTES NFR BLD AUTO: 22.4 % (ref 19.6–45.3)
MAGNESIUM SERPL-MCNC: 0.8 MG/DL (ref 1.6–2.6)
MCH RBC QN AUTO: 32.2 PG (ref 26.6–33)
MCHC RBC AUTO-ENTMCNC: 34 G/DL (ref 31.5–35.7)
MCV RBC AUTO: 94.6 FL (ref 79–97)
MONOCYTES # BLD AUTO: 0.69 10*3/MM3 (ref 0.1–0.9)
MONOCYTES NFR BLD AUTO: 10.2 % (ref 5–12)
NEUTROPHILS NFR BLD AUTO: 4.24 10*3/MM3 (ref 1.7–7)
NEUTROPHILS NFR BLD AUTO: 63 % (ref 42.7–76)
NRBC BLD AUTO-RTO: 0 /100 WBC (ref 0–0.2)
PHOSPHATE SERPL-MCNC: 1 MG/DL (ref 2.5–4.5)
PLATELET # BLD AUTO: 169 10*3/MM3 (ref 140–450)
PMV BLD AUTO: 12 FL (ref 6–12)
POTASSIUM SERPL-SCNC: 3.6 MMOL/L (ref 3.5–5.2)
PROT SERPL-MCNC: 7.1 G/DL (ref 6–8.5)
RBC # BLD AUTO: 4.07 10*6/MM3 (ref 4.14–5.8)
SODIUM SERPL-SCNC: 135 MMOL/L (ref 136–145)
WBC # BLD AUTO: 6.74 10*3/MM3 (ref 3.4–10.8)

## 2021-10-11 PROCEDURE — 85025 COMPLETE CBC W/AUTO DIFF WBC: CPT | Performed by: STUDENT IN AN ORGANIZED HEALTH CARE EDUCATION/TRAINING PROGRAM

## 2021-10-11 PROCEDURE — 36415 COLL VENOUS BLD VENIPUNCTURE: CPT | Performed by: STUDENT IN AN ORGANIZED HEALTH CARE EDUCATION/TRAINING PROGRAM

## 2021-10-11 PROCEDURE — 80053 COMPREHEN METABOLIC PANEL: CPT | Performed by: STUDENT IN AN ORGANIZED HEALTH CARE EDUCATION/TRAINING PROGRAM

## 2021-10-11 PROCEDURE — 83735 ASSAY OF MAGNESIUM: CPT | Performed by: STUDENT IN AN ORGANIZED HEALTH CARE EDUCATION/TRAINING PROGRAM

## 2021-10-11 PROCEDURE — 63710000001 INSULIN LISPRO (HUMAN) PER 5 UNITS: Performed by: STUDENT IN AN ORGANIZED HEALTH CARE EDUCATION/TRAINING PROGRAM

## 2021-10-11 PROCEDURE — 63710000001 INSULIN GLARGINE PER 5 UNITS: Performed by: STUDENT IN AN ORGANIZED HEALTH CARE EDUCATION/TRAINING PROGRAM

## 2021-10-11 PROCEDURE — 25010000002 MAGNESIUM SULFATE 2 GM/50ML SOLUTION: Performed by: STUDENT IN AN ORGANIZED HEALTH CARE EDUCATION/TRAINING PROGRAM

## 2021-10-11 PROCEDURE — 82962 GLUCOSE BLOOD TEST: CPT

## 2021-10-11 PROCEDURE — 84100 ASSAY OF PHOSPHORUS: CPT | Performed by: STUDENT IN AN ORGANIZED HEALTH CARE EDUCATION/TRAINING PROGRAM

## 2021-10-11 RX ORDER — MAGNESIUM SULFATE HEPTAHYDRATE 40 MG/ML
4 INJECTION, SOLUTION INTRAVENOUS AS NEEDED
Status: DISCONTINUED | OUTPATIENT
Start: 2021-10-11 | End: 2021-10-12 | Stop reason: HOSPADM

## 2021-10-11 RX ORDER — MAGNESIUM SULFATE HEPTAHYDRATE 40 MG/ML
2 INJECTION, SOLUTION INTRAVENOUS AS NEEDED
Status: DISCONTINUED | OUTPATIENT
Start: 2021-10-11 | End: 2021-10-12 | Stop reason: HOSPADM

## 2021-10-11 RX ORDER — ZOLPIDEM TARTRATE 12.5 MG/1
12.5 TABLET, FILM COATED, EXTENDED RELEASE ORAL NIGHTLY PRN
Status: DISCONTINUED | OUTPATIENT
Start: 2021-10-11 | End: 2021-10-12 | Stop reason: HOSPADM

## 2021-10-11 RX ORDER — LORAZEPAM 1 MG/1
1 TABLET ORAL ONCE
Status: COMPLETED | OUTPATIENT
Start: 2021-10-11 | End: 2021-10-11

## 2021-10-11 RX ORDER — CLONIDINE HYDROCHLORIDE 0.1 MG/1
0.1 TABLET ORAL ONCE
Status: COMPLETED | OUTPATIENT
Start: 2021-10-11 | End: 2021-10-11

## 2021-10-11 RX ORDER — POTASSIUM CHLORIDE 7.45 MG/ML
10 INJECTION INTRAVENOUS
Status: DISCONTINUED | OUTPATIENT
Start: 2021-10-11 | End: 2021-10-12 | Stop reason: HOSPADM

## 2021-10-11 RX ADMIN — LORAZEPAM 1 MG: 1 TABLET ORAL at 06:50

## 2021-10-11 RX ADMIN — INSULIN GLARGINE 20 UNITS: 100 INJECTION, SOLUTION SUBCUTANEOUS at 21:36

## 2021-10-11 RX ADMIN — MAGNESIUM SULFATE HEPTAHYDRATE 2 G: 40 INJECTION, SOLUTION INTRAVENOUS at 12:56

## 2021-10-11 RX ADMIN — LORAZEPAM 1 MG: 1 TABLET ORAL at 02:15

## 2021-10-11 RX ADMIN — SODIUM PHOSPHATE, MONOBASIC, MONOHYDRATE AND SODIUM PHOSPHATE, DIBASIC, ANHYDROUS 40 MMOL: 276; 142 INJECTION, SOLUTION INTRAVENOUS at 18:13

## 2021-10-11 RX ADMIN — LOSARTAN POTASSIUM 50 MG: 50 TABLET, FILM COATED ORAL at 08:38

## 2021-10-11 RX ADMIN — MAGNESIUM SULFATE HEPTAHYDRATE 2 G: 40 INJECTION, SOLUTION INTRAVENOUS at 14:56

## 2021-10-11 RX ADMIN — INSULIN LISPRO 6 UNITS: 100 INJECTION, SOLUTION INTRAVENOUS; SUBCUTANEOUS at 16:33

## 2021-10-11 RX ADMIN — INSULIN GLARGINE 20 UNITS: 100 INJECTION, SOLUTION SUBCUTANEOUS at 08:38

## 2021-10-11 RX ADMIN — MAGNESIUM SULFATE HEPTAHYDRATE 2 G: 40 INJECTION, SOLUTION INTRAVENOUS at 10:05

## 2021-10-11 RX ADMIN — FOLIC ACID 1 MG: 1 TABLET ORAL at 08:38

## 2021-10-11 RX ADMIN — HYDROXYZINE HYDROCHLORIDE 25 MG: 25 TABLET ORAL at 12:56

## 2021-10-11 RX ADMIN — INSULIN LISPRO 4 UNITS: 100 INJECTION, SOLUTION INTRAVENOUS; SUBCUTANEOUS at 08:38

## 2021-10-11 RX ADMIN — MAGNESIUM SULFATE HEPTAHYDRATE 2 G: 40 INJECTION, SOLUTION INTRAVENOUS at 09:21

## 2021-10-11 RX ADMIN — Medication 100 MG: at 08:38

## 2021-10-11 RX ADMIN — Medication 1 TABLET: at 08:38

## 2021-10-11 RX ADMIN — MAGNESIUM SULFATE HEPTAHYDRATE 2 G: 40 INJECTION, SOLUTION INTRAVENOUS at 16:34

## 2021-10-11 RX ADMIN — CLONIDINE HYDROCHLORIDE 0.1 MG: 0.1 TABLET ORAL at 06:19

## 2021-10-11 RX ADMIN — INSULIN LISPRO 4 UNITS: 100 INJECTION, SOLUTION INTRAVENOUS; SUBCUTANEOUS at 12:56

## 2021-10-11 RX ADMIN — HYDROXYZINE HYDROCHLORIDE 25 MG: 25 TABLET ORAL at 21:37

## 2021-10-11 NOTE — CASE MANAGEMENT/SOCIAL WORK
Discharge Planning Assessment  [facility]     Patient Name: Wu Damon  MRN: [id number]  Today's Date: 10/11/2021    Admit Date: 10/10/2021     Discharge Needs Assessment     Row Name 10/11/21 1008       Living Environment    Lives With child(bharti), dependent; spouse    Name(s) of Who Lives With Patient Spouse  ( Minerva Damon  [phone]) and three daughters  (Triplets 1 y/o)    Current Living Arrangements home/apartment/condo    Primary Care Provided by self    Provides Primary Care For child(bharti)    Family Caregiver if Needed spouse    Family Caregiver Names Spouse  ( Minerva Damon  [phone])    Quality of Family Relationships involved; helpful; supportive    Able to Return to Prior Arrangements yes    Living Arrangement Comments Pt lives in a single story house with spouse  ( Minerva Damon  [phone]) and three daughters  (Triplets 1 y/o).       Resource/Environmental Concerns    Resource/Environmental Concerns none    Transportation Concerns car, none       Transition Planning    Patient/Family Anticipates Transition to home with family    Patient/Family Anticipated Services at Transition none    Transportation Anticipated car, drives self; family or friend will provide       Discharge Needs Assessment    Readmission Within the Last 30 Days no previous admission in last 30 days    Equipment Currently Used at Home glucometer    Concerns to be Addressed no discharge needs identified; denies needs/concerns at this time    Anticipated Changes Related to Illness none    Equipment Needed After Discharge none               Discharge Plan     Row Name 10/11/21 1012       Plan    Plan Plan home with family.   BRIANA Hassan RN    Patient/Family in Agreement with Plan yes    Plan Comments FACE SHEET VERIFIED.  Spoke with pt at bedside.  Pt's PCP is ALEXIA Liu.  Pt lives in a single story house with spouse  ( Minerva Damon  [phone]) and three daughters  (Triplets 1 y/o).  Pt  is independent with ADLs.  Pt has a glucometer for home use.  [facility]  ([address]  Pt denies any issues affording medications.  Pt is not current with HH.  Pt has not been in SNF.  Pt states his spouse can assist him and home and transport him home.  Plan home with family.  BRIANA Hassan RN              Continued Care and Services - Admitted Since 10/10/2021    Coordination has not been started for this encounter.          Demographic Summary     Row Name 10/11/21 1007       General Information    Admission Type inpatient    Arrived From emergency department    Referral Source admission list    Reason for Consult discharge planning    Preferred Language English     Used During This Interaction no               Functional Status     Row Name 10/11/21 1007       Functional Status    Usual Activity Tolerance good    Current Activity Tolerance good       Functional Status, IADL    Medications independent    Meal Preparation independent    Housekeeping assistive person    Laundry assistive person    Shopping assistive person       Mental Status    General Appearance WDL WDL               Psychosocial    No documentation.                Abuse/Neglect    No documentation.                Legal    No documentation.                Substance Abuse    No documentation.                Patient Forms    No documentation.                   Neena Hassan, RN

## 2021-10-11 NOTE — NURSING NOTE
Patient's BP and HR remain elevated. Alejandra HALE ordered 1 time dose of PO ativan since patient's CIWA scores not high enough as patient is only scoring for anxiety. Ativan given and then patient called out shortly after and asked if it was safe to take with ambien because he just took his home dose. I asked him if he had any other medications with him and he admitted only to having his Ambien and suboxone. He gave them to me and they were placed in a security bag and locked in med room.

## 2021-10-11 NOTE — PLAN OF CARE
Goal Outcome Evaluation:  Plan of Care Reviewed With: patient           Outcome Summary: Admitted to room 649 from ED. Alert and oriented, cooperative. Telemetry .

## 2021-10-11 NOTE — PROGRESS NOTES
Name: Wu Damon ADMIT: 10/10/2021   : 1975  PCP: Aquiles ALEXIA Palfaox    MRN: [id number] LOS: 1 days   AGE/SEX: 46 y.o. male  ROOM: [facility]     Subjective   Subjective   Patient doing well, resting in bed.  He denies any more audiovisual hallucination.  Has not required much Ativan.      Review of Systems   As above  Objective   Objective   Vital Signs  Temp:  [98.3 °F (36.8 °C)-98.8 °F (37.1 °C)] 98.3 °F (36.8 °C)  Heart Rate:  [106-135] 114  Resp:  [18-20] 18  BP: (156-189)/(104-116) 166/106  SpO2:  [92 %-99 %] 97 %  on   ;   Device (Oxygen Therapy): room air  Body mass index is 33.29 kg/m².  Physical Exam  Constitutional:       General: He is not in acute distress.     Appearance: Normal appearance. He is obese. He is not toxic-appearing.   HENT:      Head: Normocephalic and atraumatic.      Mouth/Throat:      Mouth: Mucous membranes are moist.   Eyes:      Extraocular Movements: Extraocular movements intact.      Conjunctiva/sclera: Conjunctivae normal.      Pupils: Pupils are equal, round, and reactive to light.   Cardiovascular:      Rate and Rhythm: Regular rhythm. Tachycardia present.      Pulses: Normal pulses.      Heart sounds: No murmur heard.      Pulmonary:      Effort: Pulmonary effort is normal. No respiratory distress.      Breath sounds: Normal breath sounds. No wheezing.   Abdominal:      General: Abdomen is flat. Bowel sounds are normal. There is no distension.      Palpations: Abdomen is soft.      Tenderness: There is no abdominal tenderness.   Musculoskeletal:         General: No swelling or tenderness. Normal range of motion.      Cervical back: Normal range of motion and neck supple.      Right lower leg: No edema.      Left lower leg: No edema.   Skin:     General: Skin is warm and dry.   Neurological:      General: No focal deficit present.      Mental Status: He is alert and oriented to person, place, and time. Mental status is at baseline.      Motor: No weakness.    Psychiatric:         Mood and Affect: Mood normal.         Behavior: Behavior normal.         Thought Content: Thought content normal.         Judgment: Judgment normal.         Results Review     I reviewed the patient's new clinical results.  Results from last 7 days   Lab Units 10/11/21  0709 10/10/21  0753 10/10/21  0143   WBC 10*3/mm3 6.74 6.01 6.19   HEMOGLOBIN g/dL 13.1 12.8* 13.5   PLATELETS 10*3/mm3 169 148 154     Results from last 7 days   Lab Units 10/11/21  0709 10/10/21  0646 10/10/21  0143   SODIUM mmol/L 135* 137 135*   POTASSIUM mmol/L 3.6 4.1 4.2   CHLORIDE mmol/L 98 102 95*   CO2 mmol/L 20.7* 15.2* 13.1*   BUN mg/dL 10 12 14   CREATININE mg/dL 1.33* 1.47* 1.52*   GLUCOSE mg/dL 204* 163* 204*   Estimated Creatinine Clearance: 93.7 mL/min (A) (by C-G formula based on SCr of 1.33 mg/dL (H)).  Results from last 7 days   Lab Units 10/11/21  0709 10/10/21  0646 10/10/21  0143   ALBUMIN g/dL 4.20 4.20 4.70   BILIRUBIN mg/dL 1.8* 1.8* 1.8*   ALK PHOS U/L 62 59 71   AST (SGOT) U/L 112* 97* 109*   ALT (SGPT) U/L 92* 74* 84*     Results from last 7 days   Lab Units 10/11/21  0709 10/10/21  0646 10/10/21  0143   CALCIUM mg/dL 8.7 8.6 9.7   ALBUMIN g/dL 4.20 4.20 4.70   MAGNESIUM mg/dL 0.8*  --   --    PHOSPHORUS mg/dL 1.0*  --   --        COVID19   Date Value Ref Range Status   10/10/2021 Not Detected Not Detected - Ref. Range Final   08/13/2021 Not Detected Not Detected - Ref. Range Final     Glucose   Date/Time Value Ref Range Status   10/11/2021 1614 258 (H) 70 - 130 mg/dL Final     Comment:     Meter: [id number] : [id number] Federico OLIVEROS   10/11/2021 1100 232 (H) 70 - 130 mg/dL Final     Comment:     Meter: [id number] : [id number] Federico OLIVEROS   10/11/2021 0636 211 (H) 70 - 130 mg/dL Final     Comment:     Meter: [id number] : [id number] Alicanleilani OLIVEROS   10/10/2021 2056 208 (H) 70 - 130 mg/dL Final     Comment:     Meter: [id number] : [id number] Alicante  Angelica RYAN NA   10/10/2021 1659 230 (H) 70 - 130 mg/dL Final     Comment:     Meter: [id number] : [id number] Sophy Cruz RN       CT Abdomen Pelvis With Contrast  Narrative: Patient: YOLI PULLIAM  Time Out: 04:24  Exam(s): CT ABDOMEN + PELVIS With Contrast     EXAM:    CT Abdomen and Pelvis With Intravenous Contrast    CLINICAL HISTORY:     Reason for exam: flank pain.    TECHNIQUE:    Axial computed tomography images of the abdomen and pelvis with   intravenous contrast.  CTDI is 28.6 mGy and DLP is 1632.5 mGy-cm.  This   CT exam was performed according to the principle of ALARA (As Low As   Reasonably Achievable) by using one or more of the following dose   reduction techniques: automated exposure control, adjustment of the mA   and or kV according to patient size, and or use of iterative   reconstruction technique.    COMPARISON:    8 13 2021.    FINDINGS:    Lung bases:  Unremarkable.  No mass.  No consolidation.    Pleural space:  No pleural effusions.    Heart:  Heart is normal in size.     ABDOMEN:    Liver:  Diffuse fatty infiltration of the liver is noted.  Previously   noted 3 enhancing lesions within the hepatic parenchyma largest of which   flow is located in the posterior segment of the right lobe of liver are   again noted.  MRI imaging of the abdomen with contrast administration   will provide additional information, as deemed clinically necessary.  The   adrenal glands, the head, body, tail of the pancreas are unremarkable.    Gallbladder and bile ducts:  Probable vicarious excretion of contrast   within the gallbladder.  No calcified stones.  No ductal dilation.    Pancreas:  See above.    Spleen:  Unremarkable.  No splenomegaly.    Adrenals:  See above.    Kidneys and ureters:  No renal calculus or hydronephrosis.  No ureteral   calculi are noted.    Stomach and bowel:  Moderate quantity of stool throughout the colon   without bowel obstruction.  No mucosal thickening.     PELVIS:     Appendix:  The appendix is seen on axial image 135 and is unremarkable.    Bladder:  The bladder is underdistended.    Reproductive:  The prostate gland is unremarkable.     ABDOMEN and PELVIS:    Intraperitoneal space:  Unremarkable.  No free air.  No significant   fluid collection.    Bones joints:  No acute fracture.  No dislocation.    Soft tissues:  2 cm umbilical hernia containing mesenteric fat only.    Vasculature:  Flow is demonstrated within the celiac, SMA, the renal   arteries, and CHECO.  Portal vein is patent.  No abdominal aortic aneurysm.    Lymph nodes:  Unremarkable.  No enlarged lymph nodes.    IMPRESSION:       1.  Fatty infiltration of the liver.  2.  Previously described enhancing hepatic lesions are again noted, of   uncertain etiology.  MRI imaging will provide additional information as   deemed necessary.  3.  Probable vicarious excretion of contrast within the gallbladder.  4.  Nonspecific stranding about the perinephric spaces bilaterally.  5.  No hydronephrosis.  6.  No ureteral calculi.  7.  Moderate quantity of stool throughout the colon without bowel   obstruction.  8.  The appendix is unremarkable.  Impression: Electronically signed by Roman Nugent MD on 10-10-21 at 0424    Scheduled Medications  thiamine, 100 mg, Oral, Daily   And  multivitamin, 1 tablet, Oral, Daily   And  folic acid, 1 mg, Oral, Daily  insulin glargine, 20 Units, Subcutaneous, Q12H  insulin lispro, 0-9 Units, Subcutaneous, TID AC  losartan, 50 mg, Oral, Q24H    Infusions  sodium chloride, 100 mL/hr, Last Rate: 100 mL/hr (10/10/21 1841)    Diet  Diet Regular; Consistent Carbohydrate       Assessment/Plan     Active Hospital Problems    Diagnosis  POA   • **Alcoholic ketoacidosis [E87.2]  Yes   • Alcohol withdrawal syndrome, with delirium (HCC) [F10.231]  Yes   • Type 2 diabetes mellitus with hyperglycemia (HCC) [E11.65]  Yes   • HTN (hypertension) [I10]  Yes   • HLD (hyperlipidemia) [E78.5]  Yes      Resolved  Hospital Problems   No resolved problems to display.       46 y.o. male admitted with Alcoholic ketoacidosis.    Mr. Damon is a 46 y.o. former smoker with a history of alcohol use disorder who presents in alcohol withdrawal complicated by perceptual disturbances     Alcohol withdrawal complicated by perceptual disturbance  -Start CIWA, multivitamin, folate, thiamine, will consult access center  -EtOH negative on admission, last drink yesterday/Saturday; anticipate withdrawal symptoms to worsen within the next 24 hours     Diabetes mellitus type 2 complicated by hyperglycemia  -Patient reported 36 units Lantus twice daily, will start 20 Lantus twice daily plus sliding scale  -A1c 7.8  8/21     Hypertension  -Home losartan 50 mg     Depression/anxiety  -Previously on paroxetine, no current meds     Insomnia  -Continue home Ambien     Opioid use disorder  -Patient stopped his own Suboxone 12 days ago, will hold    · SCDs for DVT prophylaxis.  · Full code.  · Discussed with patient and nursing staff.  · Anticipate discharge home in 2-3 days.      Nesha Castillo MD  [facility]  10/11/21  16:45 EDT    Patient was wearing facemask when I entered the room and throughout our encounter.  I wore protective equipment throughout this patient encounter including a face mask, gloves and protective eyewear.  Hand hygiene was performed before donning protective equipment and after removal when leaving the room.

## 2021-10-11 NOTE — CONSULTS
Patient is a 45 y/o, Black male that presented to [facility] because of alcohol withdrawal. Patient denies a a history of any other psychiatric condition or substance use. Patient denies and history of, or current feelings of SI/HI. Patient denies history or current feelings of depression.    Patient states that he has been abusing alcohol for 10 years. Reports that he drinks a fifth of vodka a day. Denies a history of withdrawal seizures. Reports being inpatient 3 years ago at [facility] for alcohol treatment. Denies any other mental health or addiction treatment. States he is interested in outpatient services, but does not want to do inpatient again.   Reports that his stressors are medical and financial. T.C. states he has a history of Diabetes, HTN, and Hyperlipidemia. Reports he is currently unemployed, and will look for employment after he is discharged. Reports that his wife is his major support system. Access is going to provide patient with list of outpatient providers. Patient does not want to be followed by access.

## 2021-10-11 NOTE — NURSING NOTE
Called A and spoke with Alejandra HALE regarding patient's BP and HR. Systolic BP improved after one time dose of hydralazine, but diastolic BP still 110. HR now 110s-140. Patient is resting is bed, asymptomatic. No new orders at this time. Continue to monitor and recheck BP in 1 hour, call back if DBP still >100.

## 2021-10-11 NOTE — PLAN OF CARE
Problem: Adult Inpatient Plan of Care  Goal: Plan of Care Review  Outcome: Ongoing, Progressing  Flowsheets (Taken 10/11/2021 1445)  Progress: improving  Plan of Care Reviewed With: patient  Outcome Summary: Patient has been pleasant and cooperative during shift. No complaints of nausea or SOA> Patient has had a mild headache. Patient on room air and stand-by assist in room. Patient is still hypertensive and tachy. Access ordered. Replacing phosphorus and magnesium. Will continue to monitor and assist patient as needed  Goal: Patient-Specific Goal (Individualized)  Outcome: Ongoing, Progressing  Goal: Absence of Hospital-Acquired Illness or Injury  Outcome: Ongoing, Progressing  Intervention: Identify and Manage Fall Risk  Recent Flowsheet Documentation  Taken 10/11/2021 1315 by Jean Carrasco RN  Safety Promotion/Fall Prevention:   assistive device/personal items within reach   fall prevention program maintained   nonskid shoes/slippers when out of bed   safety round/check completed  Taken 10/11/2021 1200 by Jean Carrasco RN  Safety Promotion/Fall Prevention:   assistive device/personal items within reach   fall prevention program maintained   nonskid shoes/slippers when out of bed   safety round/check completed  Taken 10/11/2021 1002 by Jean Carrasco RN  Safety Promotion/Fall Prevention:   assistive device/personal items within reach   fall prevention program maintained   nonskid shoes/slippers when out of bed   safety round/check completed  Taken 10/11/2021 0840 by Jean Carrasco RN  Safety Promotion/Fall Prevention:   assistive device/personal items within reach   fall prevention program maintained   nonskid shoes/slippers when out of bed   safety round/check completed  Intervention: Prevent Skin Injury  Recent Flowsheet Documentation  Taken 10/11/2021 1315 by Jean Carrasco RN  Body Position: side-lying, right  Taken 10/11/2021 1200 by Jean Carrasco RN  Body Position: side-lying,  right  Taken 10/11/2021 1002 by Jean Carrasco RN  Body Position: side-lying, right  Taken 10/11/2021 0840 by Jean Carrasco RN  Body Position:   side-lying, right   position changed independently  Goal: Optimal Comfort and Wellbeing  Outcome: Ongoing, Progressing  Goal: Readiness for Transition of Care  Outcome: Ongoing, Progressing     Problem: Fall Injury Risk  Goal: Absence of Fall and Fall-Related Injury  Outcome: Ongoing, Progressing  Intervention: Promote Injury-Free Environment  Recent Flowsheet Documentation  Taken 10/11/2021 1315 by Jean Carrasco RN  Safety Promotion/Fall Prevention:   assistive device/personal items within reach   fall prevention program maintained   nonskid shoes/slippers when out of bed   safety round/check completed  Taken 10/11/2021 1200 by Jean Carrasco RN  Safety Promotion/Fall Prevention:   assistive device/personal items within reach   fall prevention program maintained   nonskid shoes/slippers when out of bed   safety round/check completed  Taken 10/11/2021 1002 by Jean Carrasco RN  Safety Promotion/Fall Prevention:   assistive device/personal items within reach   fall prevention program maintained   nonskid shoes/slippers when out of bed   safety round/check completed  Taken 10/11/2021 0840 by Jean Carrasco RN  Safety Promotion/Fall Prevention:   assistive device/personal items within reach   fall prevention program maintained   nonskid shoes/slippers when out of bed   safety round/check completed     Problem: Activity and Energy Impairment (Excessive Substance Use)  Goal: Optimized Energy Level (Excessive Substance Use)  Outcome: Ongoing, Progressing     Problem: Behavior Regulation Impairment (Excessive Substance Use)  Goal: Improved Behavioral Control (Excessive Substance Use)  Outcome: Ongoing, Progressing     Problem: Physiologic Impairment (Excessive Substance Use)  Goal: Improved Physiologic Symptoms (Excessive Substance Use)  Outcome: Ongoing,  Progressing     Problem: Alcohol Withdrawal  Goal: Alcohol Withdrawal Symptom Control  Outcome: Ongoing, Progressing   Goal Outcome Evaluation:  Plan of Care Reviewed With: patient        Progress: improving  Outcome Summary: Patient has been pleasant and cooperative during shift. No complaints of nausea or SOA> Patient has had a mild headache. Patient on room air and stand-by assist in room. Patient is still hypertensive and tachy. Access ordered. Replacing phosphorus and magnesium. Will continue to monitor and assist patient as needed

## 2021-10-11 NOTE — CASE MANAGEMENT/SOCIAL WORK
Continued Stay Note  [facility]     Patient Name: Wu Damon  MRN: [id number]  Today's Date: 10/11/2021    Admit Date: 10/10/2021     Discharge Plan     Row Name 10/11/21 1012       Plan    Plan Plan home with family.   BRIANA Hassan RN    Patient/Family in Agreement with Plan yes    Plan Comments FACE SHEET VERIFIED.  Spoke with pt at bedside.  Pt's PCP is ALEXIA Liu.  Pt lives in a single story house with spouse  ( Minerva Damon  [phone]) and three daughters  (Triplets 1 y/o).  Pt is independent with ADLs.  Pt has a glucometer for home use.  [facility] [address] [facility]).  Pt denies any issues affording medications.  Pt is not current with HH.  Pt has not been in SNF.  Pt states his spouse can assist him and home and transport him home.  Plan home with family.  BRIANA Hassan RN               Discharge Codes    No documentation.                     Neena Hassan RN

## 2021-10-11 NOTE — PLAN OF CARE
Goal Outcome Evaluation:  Plan of Care Reviewed With: patient        Progress: no change  Outcome Summary: Patient is alert and oriented. BP and HR elevated all night, see previous notes. 1 time doses of hydralazine and clonidine given. CIWA scores being done, still low at this time. Up to BR several times overnight to have BM. Urinating without difficulty. IV fluids infusing. Denies pain. Bed alarm in place. Will continue to monitor closely.

## 2021-10-12 VITALS
WEIGHT: 252.3 LBS | HEART RATE: 116 BPM | RESPIRATION RATE: 16 BRPM | SYSTOLIC BLOOD PRESSURE: 144 MMHG | BODY MASS INDEX: 33.44 KG/M2 | TEMPERATURE: 98.3 F | DIASTOLIC BLOOD PRESSURE: 108 MMHG | HEIGHT: 73 IN | OXYGEN SATURATION: 98 %

## 2021-10-12 LAB
ALBUMIN SERPL-MCNC: 4 G/DL (ref 3.5–5.2)
ALBUMIN/GLOB SERPL: 1.4 G/DL
ALP SERPL-CCNC: 64 U/L (ref 39–117)
ALT SERPL W P-5'-P-CCNC: 106 U/L (ref 1–41)
ANION GAP SERPL CALCULATED.3IONS-SCNC: 11.6 MMOL/L (ref 5–15)
AST SERPL-CCNC: 126 U/L (ref 1–40)
BASOPHILS # BLD AUTO: 0.05 10*3/MM3 (ref 0–0.2)
BASOPHILS NFR BLD AUTO: 0.9 % (ref 0–1.5)
BILIRUB SERPL-MCNC: 1.3 MG/DL (ref 0–1.2)
BUN SERPL-MCNC: 8 MG/DL (ref 6–20)
BUN/CREAT SERPL: 6.4 (ref 7–25)
CALCIUM SPEC-SCNC: 8.3 MG/DL (ref 8.6–10.5)
CHLORIDE SERPL-SCNC: 101 MMOL/L (ref 98–107)
CO2 SERPL-SCNC: 24.4 MMOL/L (ref 22–29)
CREAT SERPL-MCNC: 1.25 MG/DL (ref 0.76–1.27)
DEPRECATED RDW RBC AUTO: 41.4 FL (ref 37–54)
EOSINOPHIL # BLD AUTO: 0.22 10*3/MM3 (ref 0–0.4)
EOSINOPHIL NFR BLD AUTO: 4 % (ref 0.3–6.2)
ERYTHROCYTE [DISTWIDTH] IN BLOOD BY AUTOMATED COUNT: 11.9 % (ref 12.3–15.4)
GFR SERPL CREATININE-BSD FRML MDRD: 76 ML/MIN/1.73
GLOBULIN UR ELPH-MCNC: 2.9 GM/DL
GLUCOSE BLDC GLUCOMTR-MCNC: 201 MG/DL (ref 70–130)
GLUCOSE BLDC GLUCOMTR-MCNC: 225 MG/DL (ref 70–130)
GLUCOSE SERPL-MCNC: 242 MG/DL (ref 65–99)
HCT VFR BLD AUTO: 36.1 % (ref 37.5–51)
HGB BLD-MCNC: 12.1 G/DL (ref 13–17.7)
IMM GRANULOCYTES # BLD AUTO: 0.02 10*3/MM3 (ref 0–0.05)
IMM GRANULOCYTES NFR BLD AUTO: 0.4 % (ref 0–0.5)
LYMPHOCYTES # BLD AUTO: 1.12 10*3/MM3 (ref 0.7–3.1)
LYMPHOCYTES NFR BLD AUTO: 20.2 % (ref 19.6–45.3)
MAGNESIUM SERPL-MCNC: 1.5 MG/DL (ref 1.6–2.6)
MCH RBC QN AUTO: 31.9 PG (ref 26.6–33)
MCHC RBC AUTO-ENTMCNC: 33.5 G/DL (ref 31.5–35.7)
MCV RBC AUTO: 95.3 FL (ref 79–97)
MONOCYTES # BLD AUTO: 0.49 10*3/MM3 (ref 0.1–0.9)
MONOCYTES NFR BLD AUTO: 8.8 % (ref 5–12)
NEUTROPHILS NFR BLD AUTO: 3.65 10*3/MM3 (ref 1.7–7)
NEUTROPHILS NFR BLD AUTO: 65.7 % (ref 42.7–76)
NRBC BLD AUTO-RTO: 0.2 /100 WBC (ref 0–0.2)
PHOSPHATE SERPL-MCNC: 1.3 MG/DL (ref 2.5–4.5)
PLATELET # BLD AUTO: 159 10*3/MM3 (ref 140–450)
PMV BLD AUTO: 11.5 FL (ref 6–12)
POTASSIUM SERPL-SCNC: 3.4 MMOL/L (ref 3.5–5.2)
PROT SERPL-MCNC: 6.9 G/DL (ref 6–8.5)
RBC # BLD AUTO: 3.79 10*6/MM3 (ref 4.14–5.8)
SODIUM SERPL-SCNC: 137 MMOL/L (ref 136–145)
WBC # BLD AUTO: 5.55 10*3/MM3 (ref 3.4–10.8)

## 2021-10-12 PROCEDURE — 83735 ASSAY OF MAGNESIUM: CPT | Performed by: STUDENT IN AN ORGANIZED HEALTH CARE EDUCATION/TRAINING PROGRAM

## 2021-10-12 PROCEDURE — 85025 COMPLETE CBC W/AUTO DIFF WBC: CPT | Performed by: STUDENT IN AN ORGANIZED HEALTH CARE EDUCATION/TRAINING PROGRAM

## 2021-10-12 PROCEDURE — 84100 ASSAY OF PHOSPHORUS: CPT | Performed by: STUDENT IN AN ORGANIZED HEALTH CARE EDUCATION/TRAINING PROGRAM

## 2021-10-12 PROCEDURE — 63710000001 INSULIN GLARGINE PER 5 UNITS: Performed by: STUDENT IN AN ORGANIZED HEALTH CARE EDUCATION/TRAINING PROGRAM

## 2021-10-12 PROCEDURE — 36415 COLL VENOUS BLD VENIPUNCTURE: CPT | Performed by: STUDENT IN AN ORGANIZED HEALTH CARE EDUCATION/TRAINING PROGRAM

## 2021-10-12 PROCEDURE — 63710000001 INSULIN LISPRO (HUMAN) PER 5 UNITS: Performed by: STUDENT IN AN ORGANIZED HEALTH CARE EDUCATION/TRAINING PROGRAM

## 2021-10-12 PROCEDURE — 80053 COMPREHEN METABOLIC PANEL: CPT | Performed by: STUDENT IN AN ORGANIZED HEALTH CARE EDUCATION/TRAINING PROGRAM

## 2021-10-12 PROCEDURE — 82962 GLUCOSE BLOOD TEST: CPT

## 2021-10-12 RX ORDER — BUPRENORPHINE AND NALOXONE 8; 2 MG/1; MG/1
1.5 FILM, SOLUBLE BUCCAL; SUBLINGUAL DAILY
Status: DISCONTINUED | OUTPATIENT
Start: 2021-10-12 | End: 2021-10-12 | Stop reason: HOSPADM

## 2021-10-12 RX ORDER — CARVEDILOL 3.12 MG/1
3.12 TABLET ORAL 2 TIMES DAILY WITH MEALS
Qty: 60 TABLET | Refills: 0 | Status: SHIPPED | OUTPATIENT
Start: 2021-10-12

## 2021-10-12 RX ORDER — FOLIC ACID 1 MG/1
1 TABLET ORAL DAILY
Qty: 30 TABLET | Refills: 0 | Status: SHIPPED | OUTPATIENT
Start: 2021-10-12

## 2021-10-12 RX ORDER — AMLODIPINE BESYLATE 5 MG/1
5 TABLET ORAL
Status: DISCONTINUED | OUTPATIENT
Start: 2021-10-12 | End: 2021-10-12 | Stop reason: HOSPADM

## 2021-10-12 RX ORDER — METHION/INOS/CHOL BT/B COM/LIV 110MG-86MG
100 CAPSULE ORAL DAILY
Qty: 30 TABLET | Refills: 0 | Status: SHIPPED | OUTPATIENT
Start: 2021-10-12

## 2021-10-12 RX ORDER — LOSARTAN POTASSIUM 50 MG/1
50 TABLET ORAL
Status: DISCONTINUED | OUTPATIENT
Start: 2021-10-12 | End: 2021-10-12 | Stop reason: HOSPADM

## 2021-10-12 RX ORDER — AMLODIPINE BESYLATE 5 MG/1
5 TABLET ORAL
Qty: 30 TABLET | Refills: 0 | Status: SHIPPED | OUTPATIENT
Start: 2021-10-13 | End: 2021-10-12 | Stop reason: HOSPADM

## 2021-10-12 RX ORDER — HYDROXYZINE HYDROCHLORIDE 25 MG/1
25 TABLET, FILM COATED ORAL 3 TIMES DAILY PRN
Qty: 42 TABLET | Refills: 0 | Status: SHIPPED | OUTPATIENT
Start: 2021-10-12 | End: 2021-10-26

## 2021-10-12 RX ORDER — LOSARTAN POTASSIUM 100 MG/1
100 TABLET ORAL
Status: DISCONTINUED | OUTPATIENT
Start: 2021-10-12 | End: 2021-10-12

## 2021-10-12 RX ORDER — SODIUM,POTASSIUM PHOSPHATES 280-250MG
2 POWDER IN PACKET (EA) ORAL
Qty: 30 PACKET | Refills: 0 | Status: SHIPPED | OUTPATIENT
Start: 2021-10-12 | End: 2021-10-17

## 2021-10-12 RX ORDER — DIPHENOXYLATE HYDROCHLORIDE AND ATROPINE SULFATE 2.5; .025 MG/1; MG/1
1 TABLET ORAL DAILY
Qty: 30 TABLET | Refills: 0 | Status: SHIPPED | OUTPATIENT
Start: 2021-10-12

## 2021-10-12 RX ADMIN — Medication 1 TABLET: at 08:30

## 2021-10-12 RX ADMIN — FOLIC ACID 1 MG: 1 TABLET ORAL at 08:30

## 2021-10-12 RX ADMIN — INSULIN LISPRO 4 UNITS: 100 INJECTION, SOLUTION INTRAVENOUS; SUBCUTANEOUS at 13:08

## 2021-10-12 RX ADMIN — AMLODIPINE BESYLATE 5 MG: 5 TABLET ORAL at 10:22

## 2021-10-12 RX ADMIN — Medication 100 MG: at 08:30

## 2021-10-12 RX ADMIN — INSULIN GLARGINE 20 UNITS: 100 INJECTION, SOLUTION SUBCUTANEOUS at 08:29

## 2021-10-12 RX ADMIN — Medication 2 PACKET: at 15:00

## 2021-10-12 RX ADMIN — LOSARTAN POTASSIUM 50 MG: 50 TABLET, FILM COATED ORAL at 08:30

## 2021-10-12 RX ADMIN — HYDROXYZINE HYDROCHLORIDE 25 MG: 25 TABLET ORAL at 06:06

## 2021-10-12 RX ADMIN — INSULIN LISPRO 4 UNITS: 100 INJECTION, SOLUTION INTRAVENOUS; SUBCUTANEOUS at 08:30

## 2021-10-12 RX ADMIN — BUPRENORPHINE AND NALOXONE 1.5 FILM: 8; 2 FILM BUCCAL; SUBLINGUAL at 10:22

## 2021-10-12 RX ADMIN — ZOLPIDEM TARTRATE 12.5 MG: 12.5 TABLET, EXTENDED RELEASE ORAL at 00:33

## 2021-10-12 NOTE — PLAN OF CARE
Goal Outcome Evaluation:  Plan of Care Reviewed With: patient           Outcome Summary: Patient alert and oriented. BP elevated, MD aware. Tachy with ambulation but 110s at rest which patient states is his normal. CIWA scares have been less than 8. Phos replaced. Patient eager to be discharged. Educated on importance of following ulp with PCP or coming to ER if symptoms persist. D/C home today.

## 2021-10-12 NOTE — CONSULTS
Pt seen by [facility] on 10/10 and was given [facility] resources to follow up. Pt did not want to be followed by [facility].

## 2021-10-12 NOTE — PLAN OF CARE
Goal Outcome Evaluation:  Plan of Care Reviewed With: patient        Progress: improving  Outcome Summary: Patient is alert and oriented. BPs still elevated, but improving. Max SBP in the 160s and Max . Still tachy with rate 90s-110s, 120s-130 when ambulating. CIWA scores all less than 8 this shift. Atarax given prn for anxiety. Phos replaced, recheck in for 9 am. Mag to be rechecked this AM as well. Blood sugar elevated last night- lantus given as ordered. IV fluids infusing. Good urine output this shift. Intermittent headache, no other complaints. Access to see. Will continue to monitor.

## 2021-10-12 NOTE — DISCHARGE SUMMARY
"    Patient Name: Wu Damon  : 1975  MRN: [id number]    Date of Admission: 10/10/2021  Date of Discharge:  10/12/2021  Primary Care Physician: Juan Carlos Kwan APRN      Chief Complaint:   Flank Pain (Right side) and Alcohol Problem      Discharge Diagnoses     Active Hospital Problems    Diagnosis  POA   • **Alcoholic ketoacidosis [E87.2]  Yes   • Alcohol withdrawal syndrome, with delirium (HCC) [F10.231]  Yes   • Type 2 diabetes mellitus with hyperglycemia (HCC) [E11.65]  Yes   • HTN (hypertension) [I10]  Yes   • HLD (hyperlipidemia) [E78.5]  Yes      Resolved Hospital Problems   No resolved problems to display.        Hospital Course     Mr. Damon is a 46 y.o. male with a history of hypertension, diabetes, depression/anxiety, alcohol use disorder complicated by withdrawal who presented to [facility] initially complaining of \"needing to detox.\"  Please see the admitting history and physical for further details.  He was found to have alcohol withdrawal and was admitted to the hospital for further evaluation and treatment.  Patient had a relatively mild course of withdrawal, he had already stopped drinking for about a day and a half at home prior to arrival in the ER.  He was started on CIWA protocol/multivitamin, thiamine, folate.  He did require a small amount of IV Ativan while he was admitted but has been doing well without Ativan for greater than 24 hours.  Throughout his hospital stay he had mild tachycardia at rest in the low 100s which would increase to the 120s to 130s while he was standing up in his room.  He also had elevated blood pressures.  Ideally I would like Mr. Damon to stay for 1 more day to help get his blood pressure under better control and for his tachycardia to resolve however he states that he is very interested in going home and feels that he can complete his withdrawal at home without trouble.  I instructed the patient that he will need to return to " the ER if he feels poorly, and that we are starting him on a second antihypertensive, amlodipine.  He needs to follow-up with his primary care doctor for hypertension, on chart review it appears as if he is hypertensive often which puts him at a higher risk of developing heart failure, stroke.  Patient acknowledges these risks and is still interested in discharge.  He was also evaluated by access center in the hospital and declined resources from them.    Day of Discharge     Subjective:  She is very interested in going home today, at risk his heart rate is in the high 100s, when he gets up it goes into the 120s.  He has been doing well off of Ativan for the last 24 hours.  He still has some mild anxiety which is treated well with hydroxyzine.  He understands he will have to return to the ER if he continues to feel poorly/worse.    Review of Systems   Constitutional: Negative for fatigue and fever.   HENT: Negative for sinus pressure and sneezing.    Eyes: Negative for pain and redness.   Respiratory: Negative for cough and shortness of breath.    Cardiovascular: Negative for chest pain and leg swelling.   Gastrointestinal: Negative for abdominal pain, nausea and vomiting.   Skin: Negative for rash and wound.   Neurological: Negative for seizures and headaches.   Anxiety    Physical Exam:  Temp:  [98.3 °F (36.8 °C)-98.7 °F (37.1 °C)] 98.3 °F (36.8 °C)  Heart Rate:  [100-116] 116  Resp:  [16-18] 16  BP: (144-181)/(101-132) 144/108  Body mass index is 33.29 kg/m².  Physical Exam  Constitutional:       General: He is not in acute distress.     Appearance: Normal appearance. He is obese. He is not toxic-appearing.   HENT:      Head: Normocephalic and atraumatic.      Mouth/Throat:      Mouth: Mucous membranes are moist.   Eyes:      Extraocular Movements: Extraocular movements intact.      Conjunctiva/sclera: Conjunctivae normal.      Pupils: Pupils are equal, round, and reactive to light.   Cardiovascular:      Rate  and Rhythm: Regular rhythm. Tachycardia present.      Pulses: Normal pulses.      Heart sounds: No murmur heard.      Pulmonary:      Effort: Pulmonary effort is normal. No respiratory distress.      Breath sounds: Normal breath sounds. No wheezing.   Abdominal:      General: Abdomen is flat. Bowel sounds are normal. There is no distension.      Palpations: Abdomen is soft.      Tenderness: There is no abdominal tenderness.   Musculoskeletal:         General: No swelling or tenderness. Normal range of motion.      Cervical back: Normal range of motion and neck supple.      Right lower leg: No edema.      Left lower leg: No edema.   Skin:     General: Skin is warm and dry.   Neurological:      General: No focal deficit present.      Mental Status: He is alert and oriented to person, place, and time. Mental status is at baseline.      Motor: No weakness.   Psychiatric:         Mood and Affect: Mood normal.         Behavior: Behavior normal.         Thought Content: Thought content normal.         Judgment: Judgment normal.         Consultants     Consult Orders (all) (From admission, onward)     Start     Ordered    10/11/21 1929  Inpatient Access Center Consult  Once        Provider:  (Not yet assigned)    10/11/21 1929    10/10/21 2120  Inpatient Case Management  Consult  Once        Provider:  (Not yet assigned)    10/10/21 2119    10/10/21 1428  Inpatient Access Center Consult  Once        Provider:  (Not yet assigned)    10/10/21 1427    10/10/21 0437  [facility] (on-call MD unless specified) Details  Once        Specialty:  Hospitalist  Provider:  (Not yet assigned)    10/10/21 0436              Procedures     Imaging Results (All)     Procedure Component Value Units Date/Time    CT Abdomen Pelvis With Contrast [[id number]] Collected: 10/10/21 0424     Updated: 10/10/21 0424    Narrative:        Patient: YOLI PULLIAM  Time Out: 04:24  Exam(s): CT ABDOMEN + PELVIS With Contrast     EXAM:    CT  Abdomen and Pelvis With Intravenous Contrast    CLINICAL HISTORY:     Reason for exam: flank pain.    TECHNIQUE:    Axial computed tomography images of the abdomen and pelvis with   intravenous contrast.  CTDI is 28.6 mGy and DLP is 1632.5 mGy-cm.  This   CT exam was performed according to the principle of ALARA (As Low As   Reasonably Achievable) by using one or more of the following dose   reduction techniques: automated exposure control, adjustment of the mA   and or kV according to patient size, and or use of iterative   reconstruction technique.    COMPARISON:    8 13 2021.    FINDINGS:    Lung bases:  Unremarkable.  No mass.  No consolidation.    Pleural space:  No pleural effusions.    Heart:  Heart is normal in size.     ABDOMEN:    Liver:  Diffuse fatty infiltration of the liver is noted.  Previously   noted 3 enhancing lesions within the hepatic parenchyma largest of which   flow is located in the posterior segment of the right lobe of liver are   again noted.  MRI imaging of the abdomen with contrast administration   will provide additional information, as deemed clinically necessary.  The   adrenal glands, the head, body, tail of the pancreas are unremarkable.    Gallbladder and bile ducts:  Probable vicarious excretion of contrast   within the gallbladder.  No calcified stones.  No ductal dilation.    Pancreas:  See above.    Spleen:  Unremarkable.  No splenomegaly.    Adrenals:  See above.    Kidneys and ureters:  No renal calculus or hydronephrosis.  No ureteral   calculi are noted.    Stomach and bowel:  Moderate quantity of stool throughout the colon   without bowel obstruction.  No mucosal thickening.     PELVIS:    Appendix:  The appendix is seen on axial image 135 and is unremarkable.    Bladder:  The bladder is underdistended.    Reproductive:  The prostate gland is unremarkable.     ABDOMEN and PELVIS:    Intraperitoneal space:  Unremarkable.  No free air.  No significant   fluid collection.     Bones joints:  No acute fracture.  No dislocation.    Soft tissues:  2 cm umbilical hernia containing mesenteric fat only.    Vasculature:  Flow is demonstrated within the celiac, SMA, the renal   arteries, and CHECO.  Portal vein is patent.  No abdominal aortic aneurysm.    Lymph nodes:  Unremarkable.  No enlarged lymph nodes.    IMPRESSION:       1.  Fatty infiltration of the liver.  2.  Previously described enhancing hepatic lesions are again noted, of   uncertain etiology.  MRI imaging will provide additional information as   deemed necessary.  3.  Probable vicarious excretion of contrast within the gallbladder.  4.  Nonspecific stranding about the perinephric spaces bilaterally.  5.  No hydronephrosis.  6.  No ureteral calculi.  7.  Moderate quantity of stool throughout the colon without bowel   obstruction.  8.  The appendix is unremarkable.      Impression:          Electronically signed by Roman Nugent MD on 10-10-21 at 0424          Pertinent Labs     Results from last 7 days   Lab Units 10/12/21  0918 10/11/21  0709 10/10/21  0753 10/10/21  0143   WBC 10*3/mm3 5.55 6.74 6.01 6.19   HEMOGLOBIN g/dL 12.1* 13.1 12.8* 13.5   PLATELETS 10*3/mm3 159 169 148 154     Results from last 7 days   Lab Units 10/12/21  0918 10/11/21  0709 10/10/21  0646 10/10/21  0143   SODIUM mmol/L 137 135* 137 135*   POTASSIUM mmol/L 3.4* 3.6 4.1 4.2   CHLORIDE mmol/L 101 98 102 95*   CO2 mmol/L 24.4 20.7* 15.2* 13.1*   BUN mg/dL 8 10 12 14   CREATININE mg/dL 1.25 1.33* 1.47* 1.52*   GLUCOSE mg/dL 242* 204* 163* 204*   Estimated Creatinine Clearance: 99.7 mL/min (by C-G formula based on SCr of 1.25 mg/dL).  Results from last 7 days   Lab Units 10/12/21  0918 10/11/21  0709 10/10/21  0646 10/10/21  0143   ALBUMIN g/dL 4.00 4.20 4.20 4.70   BILIRUBIN mg/dL 1.3* 1.8* 1.8* 1.8*   ALK PHOS U/L 64 62 59 71   AST (SGOT) U/L 126* 112* 97* 109*   ALT (SGPT) U/L 106* 92* 74* 84*     Results from last 7 days   Lab Units 10/12/21  0950  10/11/21  0709 10/10/21  0646 10/10/21  0143   CALCIUM mg/dL 8.3* 8.7 8.6 9.7   ALBUMIN g/dL 4.00 4.20 4.20 4.70   MAGNESIUM mg/dL 1.5* 0.8*  --   --    PHOSPHORUS mg/dL 1.3* 1.0*  --   --      Results from last 7 days   Lab Units 10/10/21  0143   LIPASE U/L 76*             Invalid input(s): LDLCALC        Test Results Pending at Discharge       Discharge Details        Discharge Medications      New Medications      Instructions Start Date   carvedilol 3.125 MG tablet  Commonly known as: Coreg   3.125 mg, Oral, 2 Times Daily With Meals      folic acid 1 MG tablet  Commonly known as: FOLVITE   1 mg, Oral, Daily      hydrOXYzine 25 MG tablet  Commonly known as: ATARAX   25 mg, Oral, 3 Times Daily PRN      One-Daily Multi-Vitamin tablet tablet  Generic drug: multivitamin   1 tablet, Oral, Daily      potassium & sodium phosphates 280-160-250 MG pack packet  Commonly known as: PHOS-NAK   2 packets, Oral, 3 Times Daily Before Meals      thiamine 100 MG tablet tablet  Commonly known as: VITAMIN B-1   100 mg, Oral, Daily         Continue These Medications      Instructions Start Date   Ambien CR 12.5 MG CR tablet  Generic drug: zolpidem CR   12.5 mg, Oral, Nightly PRN      buprenorphine-naloxone 8-2 MG per SL tablet  Commonly known as: SUBOXONE   1.5 tablets, Sublingual, Daily      fenofibrate 48 MG tablet  Commonly known as: TRICOR   48 mg, Oral, Daily      insulin glargine 100 UNIT/ML injection  Commonly known as: LANTUS, SEMGLEE   20 Units, Subcutaneous, Every 12 Hours      losartan 50 MG tablet  Commonly known as: COZAAR   100 mg, Oral, Daily      Testosterone Cypionate 200 MG/ML solution   0.5 mL, Injection, Weekly         Stop These Medications    Librax 5-2.5 MG per capsule  Generic drug: clidinium-chlordiazePOXIDE            Allergies   Allergen Reactions   • Avelox [Moxifloxacin Hcl] Shortness Of Breath   • Citalopram Hydrobromide Unknown - Low Severity   • Meloxicam Unknown - Low Severity   • Metformin And  Related Diarrhea   • Quetiapine      Fatigue and nausea   • Amlodipine Palpitations     HEART PALPITATIONS- ANXIETY         Discharge Disposition:  Home or Self Care    Discharge Diet:  Diet Order   Procedures   • Diet Regular; Consistent Carbohydrate       Discharge Activity:   Activity Instructions     Activity as Tolerated            CODE STATUS:    Code Status and Medical Interventions:   Ordered at: 10/10/21 0452     Code Status:    CPR     Medical Interventions (Level of Support Prior to Arrest):    Full       No future appointments.   Follow-up Information     Juan Carlos Kwan, APRN .    Specialties: Nurse Practitioner, Family Medicine, Urgent Care, Emergency Medicine  Contact information:  [address]  [place] [address] [phone]                         Time Spent on Discharge:  Greater than 30 minutes      Nesha Castillo MD  [facility]  10/12/21  15:24 EDT

## 2021-10-13 ENCOUNTER — READMISSION MANAGEMENT (OUTPATIENT)
Dept: CALL CENTER | Facility: HOSPITAL | Age: 46
End: 2021-10-13

## 2021-10-13 NOTE — CASE MANAGEMENT/SOCIAL WORK
Case Management Discharge Note      Final Note: Pt discharged home on 10/12.   BRIANA Hassan RN         Selected Continued Care - Discharged on 10/12/2021 Admission date: 10/10/2021 - Discharge disposition: Home or Self Care    Destination    No services have been selected for the patient.              Durable Medical Equipment    No services have been selected for the patient.              Dialysis/Infusion    No services have been selected for the patient.              Home Medical Care    No services have been selected for the patient.              Therapy    No services have been selected for the patient.              Community Resources    No services have been selected for the patient.              Community & DME    No services have been selected for the patient.                  Transportation Services  Private: Car    Final Discharge Disposition Code: 01 - home or self-care

## 2021-10-13 NOTE — OUTREACH NOTE
Prep Survey      Responses   [facility] patient discharged from? [place]   Is LACE score < 7 ? No   Emergency Room discharge w/ pulse ox? No   Eligibility Not Eligible   What are the reasons patient is not eligible? Other   Does the patient have one of the following disease processes/diagnoses(primary or secondary)? Other   Prep survey completed? Yes          Pebbles Patino RN

## 2022-01-30 ENCOUNTER — HOSPITAL ENCOUNTER (INPATIENT)
Facility: HOSPITAL | Age: 47
LOS: 2 days | Discharge: HOME | End: 2022-02-01
Attending: EMERGENCY MEDICINE | Admitting: INTERNAL MEDICINE

## 2022-01-30 ENCOUNTER — APPOINTMENT (OUTPATIENT)
Dept: GENERAL RADIOLOGY | Facility: HOSPITAL | Age: 47
End: 2022-01-30

## 2022-01-30 ENCOUNTER — APPOINTMENT (OUTPATIENT)
Dept: ULTRASOUND IMAGING | Facility: HOSPITAL | Age: 47
End: 2022-01-30

## 2022-01-30 DIAGNOSIS — R79.89 ELEVATED LFTS: ICD-10-CM

## 2022-01-30 DIAGNOSIS — R73.9 HYPERGLYCEMIA: ICD-10-CM

## 2022-01-30 DIAGNOSIS — R06.09 DYSPNEA ON EXERTION: ICD-10-CM

## 2022-01-30 DIAGNOSIS — N28.9 ACUTE RENAL INSUFFICIENCY: ICD-10-CM

## 2022-01-30 DIAGNOSIS — E87.5 ACUTE HYPERKALEMIA: ICD-10-CM

## 2022-01-30 DIAGNOSIS — F10.930 ALCOHOL WITHDRAWAL SYNDROME WITHOUT COMPLICATION: Primary | ICD-10-CM

## 2022-01-30 PROBLEM — R74.01 TRANSAMINITIS: Status: ACTIVE | Noted: 2022-01-30

## 2022-01-30 LAB
ALBUMIN SERPL-MCNC: 4.1 G/DL (ref 3.5–5.2)
ALBUMIN/GLOB SERPL: 1.2 G/DL
ALP SERPL-CCNC: 114 U/L (ref 39–117)
ALT SERPL W P-5'-P-CCNC: 253 U/L (ref 1–41)
AMYLASE SERPL-CCNC: 39 U/L (ref 28–100)
ANION GAP SERPL CALCULATED.3IONS-SCNC: 13 MMOL/L (ref 5–15)
ANION GAP SERPL CALCULATED.3IONS-SCNC: 16 MMOL/L (ref 5–15)
APTT PPP: 29.4 SECONDS (ref 22.7–35.4)
AST SERPL-CCNC: 209 U/L (ref 1–40)
BACTERIA UR QL AUTO: NORMAL /HPF
BASOPHILS # BLD AUTO: 0.1 10*3/MM3 (ref 0–0.2)
BASOPHILS NFR BLD AUTO: 0.6 % (ref 0–1.5)
BILIRUB SERPL-MCNC: 1.4 MG/DL (ref 0–1.2)
BILIRUB UR QL STRIP: NEGATIVE
BUN SERPL-MCNC: 27 MG/DL (ref 6–20)
BUN SERPL-MCNC: 34 MG/DL (ref 6–20)
BUN/CREAT SERPL: 16.6 (ref 7–25)
BUN/CREAT SERPL: 16.7 (ref 7–25)
CALCIUM SPEC-SCNC: 8.5 MG/DL (ref 8.6–10.5)
CALCIUM SPEC-SCNC: 8.7 MG/DL (ref 8.6–10.5)
CHLORIDE SERPL-SCNC: 91 MMOL/L (ref 98–107)
CHLORIDE SERPL-SCNC: 98 MMOL/L (ref 98–107)
CHLORIDE UR-SCNC: 40 MMOL/L
CLARITY UR: CLEAR
CO2 SERPL-SCNC: 19 MMOL/L (ref 22–29)
CO2 SERPL-SCNC: 22 MMOL/L (ref 22–29)
COLOR UR: YELLOW
CREAT SERPL-MCNC: 1.63 MG/DL (ref 0.76–1.27)
CREAT SERPL-MCNC: 2.03 MG/DL (ref 0.76–1.27)
CREAT UR-MCNC: 111.5 MG/DL
D-LACTATE SERPL-SCNC: 3 MMOL/L (ref 0.5–2)
D-LACTATE SERPL-SCNC: 3.7 MMOL/L (ref 0.5–2)
DEPRECATED RDW RBC AUTO: 37.1 FL (ref 37–54)
EOSINOPHIL # BLD AUTO: 0.09 10*3/MM3 (ref 0–0.4)
EOSINOPHIL NFR BLD AUTO: 0.5 % (ref 0.3–6.2)
ERYTHROCYTE [DISTWIDTH] IN BLOOD BY AUTOMATED COUNT: 11.5 % (ref 12.3–15.4)
ETHANOL BLD-MCNC: <10 MG/DL (ref 0–10)
ETHANOL UR QL: <0.01 %
GFR SERPL CREATININE-BSD FRML MDRD: 43 ML/MIN/1.73
GFR SERPL CREATININE-BSD FRML MDRD: 56 ML/MIN/1.73
GLOBULIN UR ELPH-MCNC: 3.3 GM/DL
GLUCOSE BLDC GLUCOMTR-MCNC: 279 MG/DL (ref 70–130)
GLUCOSE BLDC GLUCOMTR-MCNC: 295 MG/DL (ref 70–130)
GLUCOSE BLDC GLUCOMTR-MCNC: 312 MG/DL (ref 70–130)
GLUCOSE BLDC GLUCOMTR-MCNC: 324 MG/DL (ref 70–130)
GLUCOSE SERPL-MCNC: 229 MG/DL (ref 65–99)
GLUCOSE SERPL-MCNC: 459 MG/DL (ref 65–99)
GLUCOSE UR STRIP-MCNC: ABNORMAL MG/DL
HCT VFR BLD AUTO: 50.1 % (ref 37.5–51)
HGB BLD-MCNC: 16.7 G/DL (ref 13–17.7)
HGB UR QL STRIP.AUTO: ABNORMAL
HYALINE CASTS UR QL AUTO: NORMAL /LPF
IMM GRANULOCYTES # BLD AUTO: 0.13 10*3/MM3 (ref 0–0.05)
IMM GRANULOCYTES NFR BLD AUTO: 0.7 % (ref 0–0.5)
INR PPP: 1.12 (ref 0.9–1.1)
KETONES UR QL STRIP: NEGATIVE
LEUKOCYTE ESTERASE UR QL STRIP.AUTO: NEGATIVE
LIPASE SERPL-CCNC: 39 U/L (ref 13–60)
LYMPHOCYTES # BLD AUTO: 0.88 10*3/MM3 (ref 0.7–3.1)
LYMPHOCYTES NFR BLD AUTO: 5 % (ref 19.6–45.3)
MAGNESIUM SERPL-MCNC: 1.6 MG/DL (ref 1.6–2.6)
MCH RBC QN AUTO: 29.6 PG (ref 26.6–33)
MCHC RBC AUTO-ENTMCNC: 33.3 G/DL (ref 31.5–35.7)
MCV RBC AUTO: 88.8 FL (ref 79–97)
MONOCYTES # BLD AUTO: 0.7 10*3/MM3 (ref 0.1–0.9)
MONOCYTES NFR BLD AUTO: 4 % (ref 5–12)
NEUTROPHILS NFR BLD AUTO: 15.81 10*3/MM3 (ref 1.7–7)
NEUTROPHILS NFR BLD AUTO: 89.2 % (ref 42.7–76)
NITRITE UR QL STRIP: NEGATIVE
NRBC BLD AUTO-RTO: 0.1 /100 WBC (ref 0–0.2)
PH UR STRIP.AUTO: 5.5 [PH] (ref 5–8)
PLATELET # BLD AUTO: 262 10*3/MM3 (ref 140–450)
PMV BLD AUTO: 11.2 FL (ref 6–12)
POTASSIUM SERPL-SCNC: 4.9 MMOL/L (ref 3.5–5.2)
POTASSIUM SERPL-SCNC: 6.5 MMOL/L (ref 3.5–5.2)
PROCALCITONIN SERPL-MCNC: 0.75 NG/ML (ref 0–0.25)
PROT ?TM UR-MCNC: 21.2 MG/DL
PROT SERPL-MCNC: 7.4 G/DL (ref 6–8.5)
PROT UR QL STRIP: NEGATIVE
PROT/CREAT UR: 190.1 MG/G CREA (ref 0–200)
PROTHROMBIN TIME: 14.2 SECONDS (ref 11.7–14.2)
QT INTERVAL: 313 MS
RBC # BLD AUTO: 5.64 10*6/MM3 (ref 4.14–5.8)
RBC # UR STRIP: NORMAL /HPF
REF LAB TEST METHOD: NORMAL
SARS-COV-2 RNA PNL SPEC NAA+PROBE: NOT DETECTED
SODIUM SERPL-SCNC: 126 MMOL/L (ref 136–145)
SODIUM SERPL-SCNC: 133 MMOL/L (ref 136–145)
SODIUM UR-SCNC: 31 MMOL/L
SP GR UR STRIP: 1.02 (ref 1–1.03)
SQUAMOUS #/AREA URNS HPF: NORMAL /HPF
UROBILINOGEN UR QL STRIP: ABNORMAL
WBC # UR STRIP: NORMAL /HPF
WBC NRBC COR # BLD: 17.71 10*3/MM3 (ref 3.4–10.8)

## 2022-01-30 PROCEDURE — 25010000002 THIAMINE PER 100 MG: Performed by: INTERNAL MEDICINE

## 2022-01-30 PROCEDURE — 87040 BLOOD CULTURE FOR BACTERIA: CPT | Performed by: NURSE PRACTITIONER

## 2022-01-30 PROCEDURE — 93005 ELECTROCARDIOGRAM TRACING: CPT | Performed by: EMERGENCY MEDICINE

## 2022-01-30 PROCEDURE — 25010000002 ENOXAPARIN PER 10 MG

## 2022-01-30 PROCEDURE — 81001 URINALYSIS AUTO W/SCOPE: CPT | Performed by: EMERGENCY MEDICINE

## 2022-01-30 PROCEDURE — 90791 PSYCH DIAGNOSTIC EVALUATION: CPT

## 2022-01-30 PROCEDURE — 83690 ASSAY OF LIPASE: CPT | Performed by: EMERGENCY MEDICINE

## 2022-01-30 PROCEDURE — 63710000001 INSULIN LISPRO (HUMAN) PER 5 UNITS

## 2022-01-30 PROCEDURE — 63710000001 INSULIN LISPRO (HUMAN) PER 5 UNITS: Performed by: NURSE PRACTITIONER

## 2022-01-30 PROCEDURE — 25010000002 LORAZEPAM PER 2 MG: Performed by: EMERGENCY MEDICINE

## 2022-01-30 PROCEDURE — 82570 ASSAY OF URINE CREATININE: CPT | Performed by: NURSE PRACTITIONER

## 2022-01-30 PROCEDURE — 82077 ASSAY SPEC XCP UR&BREATH IA: CPT | Performed by: EMERGENCY MEDICINE

## 2022-01-30 PROCEDURE — 63710000001 INSULIN REGULAR HUMAN PER 5 UNITS: Performed by: EMERGENCY MEDICINE

## 2022-01-30 PROCEDURE — 71045 X-RAY EXAM CHEST 1 VIEW: CPT

## 2022-01-30 PROCEDURE — 84156 ASSAY OF PROTEIN URINE: CPT | Performed by: NURSE PRACTITIONER

## 2022-01-30 PROCEDURE — 83735 ASSAY OF MAGNESIUM: CPT | Performed by: NURSE PRACTITIONER

## 2022-01-30 PROCEDURE — 25010000002 LORAZEPAM PER 2 MG: Performed by: INTERNAL MEDICINE

## 2022-01-30 PROCEDURE — 25010000002 LORAZEPAM PER 2 MG: Performed by: NURSE PRACTITIONER

## 2022-01-30 PROCEDURE — 87635 SARS-COV-2 COVID-19 AMP PRB: CPT | Performed by: EMERGENCY MEDICINE

## 2022-01-30 PROCEDURE — 85730 THROMBOPLASTIN TIME PARTIAL: CPT | Performed by: EMERGENCY MEDICINE

## 2022-01-30 PROCEDURE — 82436 ASSAY OF URINE CHLORIDE: CPT | Performed by: NURSE PRACTITIONER

## 2022-01-30 PROCEDURE — 76775 US EXAM ABDO BACK WALL LIM: CPT

## 2022-01-30 PROCEDURE — 82962 GLUCOSE BLOOD TEST: CPT

## 2022-01-30 PROCEDURE — 93010 ELECTROCARDIOGRAM REPORT: CPT | Performed by: INTERNAL MEDICINE

## 2022-01-30 PROCEDURE — 85025 COMPLETE CBC W/AUTO DIFF WBC: CPT | Performed by: EMERGENCY MEDICINE

## 2022-01-30 PROCEDURE — 85610 PROTHROMBIN TIME: CPT | Performed by: EMERGENCY MEDICINE

## 2022-01-30 PROCEDURE — 83605 ASSAY OF LACTIC ACID: CPT | Performed by: NURSE PRACTITIONER

## 2022-01-30 PROCEDURE — 80053 COMPREHEN METABOLIC PANEL: CPT | Performed by: EMERGENCY MEDICINE

## 2022-01-30 PROCEDURE — 84145 PROCALCITONIN (PCT): CPT | Performed by: NURSE PRACTITIONER

## 2022-01-30 PROCEDURE — 84300 ASSAY OF URINE SODIUM: CPT | Performed by: NURSE PRACTITIONER

## 2022-01-30 PROCEDURE — 99285 EMERGENCY DEPT VISIT HI MDM: CPT

## 2022-01-30 PROCEDURE — 36415 COLL VENOUS BLD VENIPUNCTURE: CPT | Performed by: NURSE PRACTITIONER

## 2022-01-30 PROCEDURE — 82150 ASSAY OF AMYLASE: CPT | Performed by: NURSE PRACTITIONER

## 2022-01-30 RX ORDER — LORAZEPAM 2 MG/ML
2 INJECTION INTRAMUSCULAR
Status: DISCONTINUED | OUTPATIENT
Start: 2022-01-30 | End: 2022-02-01 | Stop reason: HOSPADM

## 2022-01-30 RX ORDER — ONDANSETRON 4 MG/1
4 TABLET, FILM COATED ORAL EVERY 6 HOURS PRN
Status: DISCONTINUED | OUTPATIENT
Start: 2022-01-30 | End: 2022-02-01 | Stop reason: HOSPADM

## 2022-01-30 RX ORDER — UREA 10 %
3 LOTION (ML) TOPICAL NIGHTLY PRN
Status: DISCONTINUED | OUTPATIENT
Start: 2022-01-30 | End: 2022-02-01 | Stop reason: HOSPADM

## 2022-01-30 RX ORDER — LORAZEPAM 1 MG/1
2 TABLET ORAL
Status: DISCONTINUED | OUTPATIENT
Start: 2022-01-30 | End: 2022-02-01 | Stop reason: HOSPADM

## 2022-01-30 RX ORDER — LORAZEPAM 2 MG/ML
2 INJECTION INTRAMUSCULAR ONCE
Status: COMPLETED | OUTPATIENT
Start: 2022-01-30 | End: 2022-01-30

## 2022-01-30 RX ORDER — BUPRENORPHINE AND NALOXONE 8; 2 MG/1; MG/1
1 FILM, SOLUBLE BUCCAL; SUBLINGUAL DAILY
Status: DISCONTINUED | OUTPATIENT
Start: 2022-01-30 | End: 2022-02-01 | Stop reason: HOSPADM

## 2022-01-30 RX ORDER — DIPHENOXYLATE HYDROCHLORIDE AND ATROPINE SULFATE 2.5; .025 MG/1; MG/1
1 TABLET ORAL DAILY
Status: DISCONTINUED | OUTPATIENT
Start: 2022-01-31 | End: 2022-02-01 | Stop reason: HOSPADM

## 2022-01-30 RX ORDER — NICOTINE POLACRILEX 4 MG
15 LOZENGE BUCCAL
Status: DISCONTINUED | OUTPATIENT
Start: 2022-01-30 | End: 2022-02-01 | Stop reason: HOSPADM

## 2022-01-30 RX ORDER — CARVEDILOL 3.12 MG/1
3.12 TABLET ORAL 2 TIMES DAILY WITH MEALS
Status: DISCONTINUED | OUTPATIENT
Start: 2022-01-30 | End: 2022-01-30

## 2022-01-30 RX ORDER — CLONIDINE HYDROCHLORIDE 0.1 MG/1
0.1 TABLET ORAL EVERY 4 HOURS PRN
Status: DISCONTINUED | OUTPATIENT
Start: 2022-01-30 | End: 2022-02-01 | Stop reason: HOSPADM

## 2022-01-30 RX ORDER — LORAZEPAM 1 MG/1
1 TABLET ORAL
Status: DISCONTINUED | OUTPATIENT
Start: 2022-01-30 | End: 2022-02-01 | Stop reason: HOSPADM

## 2022-01-30 RX ORDER — ACETAMINOPHEN 325 MG/1
650 TABLET ORAL EVERY 4 HOURS PRN
Status: DISCONTINUED | OUTPATIENT
Start: 2022-01-30 | End: 2022-02-01 | Stop reason: HOSPADM

## 2022-01-30 RX ORDER — SODIUM POLYSTYRENE SULFONATE 15 G/60ML
15 SUSPENSION ORAL; RECTAL ONCE
Status: COMPLETED | OUTPATIENT
Start: 2022-01-30 | End: 2022-01-30

## 2022-01-30 RX ORDER — LORAZEPAM 1 MG/1
1 TABLET ORAL EVERY 8 HOURS SCHEDULED
Status: DISCONTINUED | OUTPATIENT
Start: 2022-01-30 | End: 2022-02-01 | Stop reason: HOSPADM

## 2022-01-30 RX ORDER — NITROGLYCERIN 0.4 MG/1
0.4 TABLET SUBLINGUAL
Status: DISCONTINUED | OUTPATIENT
Start: 2022-01-30 | End: 2022-02-01 | Stop reason: HOSPADM

## 2022-01-30 RX ORDER — LORAZEPAM 2 MG/ML
1 INJECTION INTRAMUSCULAR
Status: DISCONTINUED | OUTPATIENT
Start: 2022-01-30 | End: 2022-02-01 | Stop reason: HOSPADM

## 2022-01-30 RX ORDER — ONDANSETRON 2 MG/ML
4 INJECTION INTRAMUSCULAR; INTRAVENOUS EVERY 6 HOURS PRN
Status: DISCONTINUED | OUTPATIENT
Start: 2022-01-30 | End: 2022-02-01 | Stop reason: HOSPADM

## 2022-01-30 RX ORDER — DEXTROSE MONOHYDRATE 25 G/50ML
25 INJECTION, SOLUTION INTRAVENOUS
Status: DISCONTINUED | OUTPATIENT
Start: 2022-01-30 | End: 2022-02-01 | Stop reason: HOSPADM

## 2022-01-30 RX ORDER — FOLIC ACID 1 MG/1
1 TABLET ORAL DAILY
Status: DISCONTINUED | OUTPATIENT
Start: 2022-01-31 | End: 2022-02-01 | Stop reason: HOSPADM

## 2022-01-30 RX ORDER — CARVEDILOL 12.5 MG/1
12.5 TABLET ORAL 2 TIMES DAILY WITH MEALS
Status: DISCONTINUED | OUTPATIENT
Start: 2022-01-30 | End: 2022-02-01 | Stop reason: HOSPADM

## 2022-01-30 RX ORDER — INSULIN LISPRO 100 [IU]/ML
0-9 INJECTION, SOLUTION INTRAVENOUS; SUBCUTANEOUS
Status: DISCONTINUED | OUTPATIENT
Start: 2022-01-30 | End: 2022-01-30

## 2022-01-30 RX ORDER — INSULIN LISPRO 100 [IU]/ML
0-14 INJECTION, SOLUTION INTRAVENOUS; SUBCUTANEOUS
Status: DISCONTINUED | OUTPATIENT
Start: 2022-01-30 | End: 2022-02-01 | Stop reason: HOSPADM

## 2022-01-30 RX ORDER — DIPHENOXYLATE HYDROCHLORIDE AND ATROPINE SULFATE 2.5; .025 MG/1; MG/1
1 TABLET ORAL DAILY
Status: DISCONTINUED | OUTPATIENT
Start: 2022-01-30 | End: 2022-01-30 | Stop reason: SDUPTHER

## 2022-01-30 RX ORDER — SODIUM CHLORIDE 0.9 % (FLUSH) 0.9 %
10 SYRINGE (ML) INJECTION AS NEEDED
Status: DISCONTINUED | OUTPATIENT
Start: 2022-01-30 | End: 2022-02-01 | Stop reason: HOSPADM

## 2022-01-30 RX ORDER — SODIUM CHLORIDE 9 MG/ML
100 INJECTION, SOLUTION INTRAVENOUS CONTINUOUS
Status: DISCONTINUED | OUTPATIENT
Start: 2022-01-30 | End: 2022-02-01 | Stop reason: HOSPADM

## 2022-01-30 RX ADMIN — LORAZEPAM 2 MG: 2 INJECTION INTRAMUSCULAR; INTRAVENOUS at 04:45

## 2022-01-30 RX ADMIN — SODIUM CHLORIDE 1000 ML: 9 INJECTION, SOLUTION INTRAVENOUS at 04:44

## 2022-01-30 RX ADMIN — CARVEDILOL 12.5 MG: 12.5 TABLET, FILM COATED ORAL at 17:47

## 2022-01-30 RX ADMIN — INSULIN LISPRO 3 UNITS: 100 INJECTION, SOLUTION INTRAVENOUS; SUBCUTANEOUS at 21:16

## 2022-01-30 RX ADMIN — SODIUM CHLORIDE 100 ML/HR: 9 INJECTION, SOLUTION INTRAVENOUS at 22:35

## 2022-01-30 RX ADMIN — LORAZEPAM 2 MG: 2 INJECTION INTRAMUSCULAR; INTRAVENOUS at 06:58

## 2022-01-30 RX ADMIN — INSULIN GLARGINE-YFGN 20 UNITS: 100 INJECTION, SOLUTION SUBCUTANEOUS at 21:16

## 2022-01-30 RX ADMIN — SODIUM CHLORIDE 1000 ML: 9 INJECTION, SOLUTION INTRAVENOUS at 06:49

## 2022-01-30 RX ADMIN — INSULIN HUMAN 10 UNITS: 100 INJECTION, SOLUTION PARENTERAL at 06:49

## 2022-01-30 RX ADMIN — ENOXAPARIN SODIUM 40 MG: 100 INJECTION SUBCUTANEOUS at 09:43

## 2022-01-30 RX ADMIN — THIAMINE HYDROCHLORIDE 100 MG: 100 INJECTION, SOLUTION INTRAMUSCULAR; INTRAVENOUS at 08:32

## 2022-01-30 RX ADMIN — INSULIN LISPRO 8 UNITS: 100 INJECTION, SOLUTION INTRAVENOUS; SUBCUTANEOUS at 17:47

## 2022-01-30 RX ADMIN — SODIUM POLYSTYRENE SULFONATE 15 G: 15 SUSPENSION ORAL; RECTAL at 06:49

## 2022-01-30 RX ADMIN — LORAZEPAM 1 MG: 1 TABLET ORAL at 21:16

## 2022-01-30 RX ADMIN — INSULIN LISPRO 6 UNITS: 100 INJECTION, SOLUTION INTRAVENOUS; SUBCUTANEOUS at 08:07

## 2022-01-30 RX ADMIN — SODIUM CHLORIDE 100 ML/HR: 9 INJECTION, SOLUTION INTRAVENOUS at 10:10

## 2022-01-30 RX ADMIN — BUPRENORPHINE AND NALOXONE 1 FILM: 8; 2 FILM BUCCAL; SUBLINGUAL at 16:44

## 2022-01-30 RX ADMIN — INSULIN LISPRO 7 UNITS: 100 INJECTION, SOLUTION INTRAVENOUS; SUBCUTANEOUS at 12:06

## 2022-01-30 RX ADMIN — LORAZEPAM 1 MG: 2 INJECTION INTRAMUSCULAR; INTRAVENOUS at 23:39

## 2022-01-30 RX ADMIN — LORAZEPAM 2 MG: 2 INJECTION INTRAMUSCULAR; INTRAVENOUS at 05:37

## 2022-01-31 LAB
ALBUMIN SERPL-MCNC: 3.3 G/DL (ref 3.5–5.2)
ALBUMIN/GLOB SERPL: 1 G/DL
ALP SERPL-CCNC: 98 U/L (ref 39–117)
ALT SERPL W P-5'-P-CCNC: 162 U/L (ref 1–41)
ANION GAP SERPL CALCULATED.3IONS-SCNC: 10.1 MMOL/L (ref 5–15)
AST SERPL-CCNC: 124 U/L (ref 1–40)
BILIRUB SERPL-MCNC: 1.6 MG/DL (ref 0–1.2)
BUN SERPL-MCNC: 15 MG/DL (ref 6–20)
BUN/CREAT SERPL: 12 (ref 7–25)
CALCIUM SPEC-SCNC: 8.2 MG/DL (ref 8.6–10.5)
CHLORIDE SERPL-SCNC: 100 MMOL/L (ref 98–107)
CO2 SERPL-SCNC: 22.9 MMOL/L (ref 22–29)
CREAT SERPL-MCNC: 1.25 MG/DL (ref 0.76–1.27)
D-LACTATE SERPL-SCNC: 1.4 MMOL/L (ref 0.5–2)
DEPRECATED RDW RBC AUTO: 38.5 FL (ref 37–54)
ERYTHROCYTE [DISTWIDTH] IN BLOOD BY AUTOMATED COUNT: 12 % (ref 12.3–15.4)
GFR SERPL CREATININE-BSD FRML MDRD: 76 ML/MIN/1.73
GLOBULIN UR ELPH-MCNC: 3.2 GM/DL
GLUCOSE BLDC GLUCOMTR-MCNC: 234 MG/DL (ref 70–130)
GLUCOSE BLDC GLUCOMTR-MCNC: 248 MG/DL (ref 70–130)
GLUCOSE BLDC GLUCOMTR-MCNC: 258 MG/DL (ref 70–130)
GLUCOSE BLDC GLUCOMTR-MCNC: 317 MG/DL (ref 70–130)
GLUCOSE SERPL-MCNC: 277 MG/DL (ref 65–99)
HBA1C MFR BLD: 8.43 % (ref 4.8–5.6)
HCT VFR BLD AUTO: 40.7 % (ref 37.5–51)
HGB BLD-MCNC: 13.7 G/DL (ref 13–17.7)
MCH RBC QN AUTO: 29.8 PG (ref 26.6–33)
MCHC RBC AUTO-ENTMCNC: 33.7 G/DL (ref 31.5–35.7)
MCV RBC AUTO: 88.5 FL (ref 79–97)
PLATELET # BLD AUTO: 154 10*3/MM3 (ref 140–450)
PMV BLD AUTO: 11.5 FL (ref 6–12)
POTASSIUM SERPL-SCNC: 3.9 MMOL/L (ref 3.5–5.2)
PROT SERPL-MCNC: 6.5 G/DL (ref 6–8.5)
RBC # BLD AUTO: 4.6 10*6/MM3 (ref 4.14–5.8)
SODIUM SERPL-SCNC: 133 MMOL/L (ref 136–145)
WBC NRBC COR # BLD: 6.64 10*3/MM3 (ref 3.4–10.8)

## 2022-01-31 PROCEDURE — 25010000002 LORAZEPAM PER 2 MG: Performed by: NURSE PRACTITIONER

## 2022-01-31 PROCEDURE — 97162 PT EVAL MOD COMPLEX 30 MIN: CPT

## 2022-01-31 PROCEDURE — 82962 GLUCOSE BLOOD TEST: CPT

## 2022-01-31 PROCEDURE — 97530 THERAPEUTIC ACTIVITIES: CPT

## 2022-01-31 PROCEDURE — 87040 BLOOD CULTURE FOR BACTERIA: CPT | Performed by: NURSE PRACTITIONER

## 2022-01-31 PROCEDURE — 63710000001 INSULIN LISPRO (HUMAN) PER 5 UNITS: Performed by: NURSE PRACTITIONER

## 2022-01-31 PROCEDURE — 80053 COMPREHEN METABOLIC PANEL: CPT | Performed by: NURSE PRACTITIONER

## 2022-01-31 PROCEDURE — 83036 HEMOGLOBIN GLYCOSYLATED A1C: CPT | Performed by: NURSE PRACTITIONER

## 2022-01-31 PROCEDURE — 36415 COLL VENOUS BLD VENIPUNCTURE: CPT | Performed by: NURSE PRACTITIONER

## 2022-01-31 PROCEDURE — 85027 COMPLETE CBC AUTOMATED: CPT | Performed by: NURSE PRACTITIONER

## 2022-01-31 PROCEDURE — 25010000002 ENOXAPARIN PER 10 MG: Performed by: NURSE PRACTITIONER

## 2022-01-31 PROCEDURE — 83605 ASSAY OF LACTIC ACID: CPT | Performed by: NURSE PRACTITIONER

## 2022-01-31 PROCEDURE — 97116 GAIT TRAINING THERAPY: CPT

## 2022-01-31 RX ADMIN — LORAZEPAM 1 MG: 1 TABLET ORAL at 13:43

## 2022-01-31 RX ADMIN — CARVEDILOL 12.5 MG: 12.5 TABLET, FILM COATED ORAL at 17:11

## 2022-01-31 RX ADMIN — ENOXAPARIN SODIUM 40 MG: 100 INJECTION SUBCUTANEOUS at 08:37

## 2022-01-31 RX ADMIN — Medication 100 MG: at 08:37

## 2022-01-31 RX ADMIN — LORAZEPAM 1 MG: 1 TABLET ORAL at 06:53

## 2022-01-31 RX ADMIN — INSULIN LISPRO 10 UNITS: 100 INJECTION, SOLUTION INTRAVENOUS; SUBCUTANEOUS at 17:11

## 2022-01-31 RX ADMIN — LORAZEPAM 1 MG: 1 TABLET ORAL at 20:52

## 2022-01-31 RX ADMIN — CARVEDILOL 12.5 MG: 12.5 TABLET, FILM COATED ORAL at 08:37

## 2022-01-31 RX ADMIN — SODIUM CHLORIDE 100 ML/HR: 9 INJECTION, SOLUTION INTRAVENOUS at 12:19

## 2022-01-31 RX ADMIN — INSULIN GLARGINE-YFGN 25 UNITS: 100 INJECTION, SOLUTION SUBCUTANEOUS at 20:52

## 2022-01-31 RX ADMIN — SODIUM CHLORIDE 100 ML/HR: 9 INJECTION, SOLUTION INTRAVENOUS at 22:20

## 2022-01-31 RX ADMIN — Medication 1 TABLET: at 08:37

## 2022-01-31 RX ADMIN — BUPRENORPHINE AND NALOXONE 1 FILM: 8; 2 FILM BUCCAL; SUBLINGUAL at 17:11

## 2022-01-31 RX ADMIN — LORAZEPAM 1 MG: 2 INJECTION INTRAMUSCULAR; INTRAVENOUS at 21:00

## 2022-01-31 RX ADMIN — INSULIN LISPRO 8 UNITS: 100 INJECTION, SOLUTION INTRAVENOUS; SUBCUTANEOUS at 06:53

## 2022-01-31 RX ADMIN — INSULIN LISPRO 5 UNITS: 100 INJECTION, SOLUTION INTRAVENOUS; SUBCUTANEOUS at 12:18

## 2022-01-31 RX ADMIN — INSULIN GLARGINE-YFGN 20 UNITS: 100 INJECTION, SOLUTION SUBCUTANEOUS at 08:36

## 2022-01-31 RX ADMIN — FOLIC ACID 1 MG: 1 TABLET ORAL at 08:37

## 2022-02-01 ENCOUNTER — READMISSION MANAGEMENT (OUTPATIENT)
Dept: CALL CENTER | Facility: HOSPITAL | Age: 47
End: 2022-02-01

## 2022-02-01 VITALS
TEMPERATURE: 98.3 F | RESPIRATION RATE: 18 BRPM | SYSTOLIC BLOOD PRESSURE: 144 MMHG | OXYGEN SATURATION: 98 % | DIASTOLIC BLOOD PRESSURE: 97 MMHG | BODY MASS INDEX: 36.33 KG/M2 | HEART RATE: 96 BPM | HEIGHT: 73 IN | WEIGHT: 274.1 LBS

## 2022-02-01 LAB
ALBUMIN SERPL-MCNC: 3.2 G/DL (ref 3.5–5.2)
ALBUMIN/GLOB SERPL: 1.1 G/DL
ALP SERPL-CCNC: 85 U/L (ref 39–117)
ALT SERPL W P-5'-P-CCNC: 114 U/L (ref 1–41)
ANION GAP SERPL CALCULATED.3IONS-SCNC: 7 MMOL/L (ref 5–15)
AST SERPL-CCNC: 90 U/L (ref 1–40)
BASOPHILS # BLD AUTO: 0.03 10*3/MM3 (ref 0–0.2)
BASOPHILS NFR BLD AUTO: 0.6 % (ref 0–1.5)
BILIRUB SERPL-MCNC: 0.8 MG/DL (ref 0–1.2)
BUN SERPL-MCNC: 10 MG/DL (ref 6–20)
BUN/CREAT SERPL: 10.3 (ref 7–25)
CALCIUM SPEC-SCNC: 8.4 MG/DL (ref 8.6–10.5)
CHLORIDE SERPL-SCNC: 103 MMOL/L (ref 98–107)
CO2 SERPL-SCNC: 25 MMOL/L (ref 22–29)
CREAT SERPL-MCNC: 0.97 MG/DL (ref 0.76–1.27)
DEPRECATED RDW RBC AUTO: 35.9 FL (ref 37–54)
EOSINOPHIL # BLD AUTO: 0.2 10*3/MM3 (ref 0–0.4)
EOSINOPHIL NFR BLD AUTO: 3.9 % (ref 0.3–6.2)
ERYTHROCYTE [DISTWIDTH] IN BLOOD BY AUTOMATED COUNT: 11.3 % (ref 12.3–15.4)
GFR SERPL CREATININE-BSD FRML MDRD: 102 ML/MIN/1.73
GLOBULIN UR ELPH-MCNC: 2.9 GM/DL
GLUCOSE BLDC GLUCOMTR-MCNC: 277 MG/DL (ref 70–130)
GLUCOSE BLDC GLUCOMTR-MCNC: 323 MG/DL (ref 70–130)
GLUCOSE SERPL-MCNC: 249 MG/DL (ref 65–99)
HCT VFR BLD AUTO: 35.7 % (ref 37.5–51)
HGB BLD-MCNC: 11.9 G/DL (ref 13–17.7)
LYMPHOCYTES # BLD AUTO: 2.09 10*3/MM3 (ref 0.7–3.1)
LYMPHOCYTES NFR BLD AUTO: 40.5 % (ref 19.6–45.3)
MCH RBC QN AUTO: 29.3 PG (ref 26.6–33)
MCHC RBC AUTO-ENTMCNC: 33.3 G/DL (ref 31.5–35.7)
MCV RBC AUTO: 87.9 FL (ref 79–97)
MONOCYTES # BLD AUTO: 0.45 10*3/MM3 (ref 0.1–0.9)
MONOCYTES NFR BLD AUTO: 8.7 % (ref 5–12)
NEUTROPHILS NFR BLD AUTO: 2.38 10*3/MM3 (ref 1.7–7)
NEUTROPHILS NFR BLD AUTO: 46.1 % (ref 42.7–76)
PLATELET # BLD AUTO: 136 10*3/MM3 (ref 140–450)
PMV BLD AUTO: 11.3 FL (ref 6–12)
POTASSIUM SERPL-SCNC: 3.8 MMOL/L (ref 3.5–5.2)
PROT SERPL-MCNC: 6.1 G/DL (ref 6–8.5)
RBC # BLD AUTO: 4.06 10*6/MM3 (ref 4.14–5.8)
SODIUM SERPL-SCNC: 135 MMOL/L (ref 136–145)
WBC NRBC COR # BLD: 5.16 10*3/MM3 (ref 3.4–10.8)

## 2022-02-01 PROCEDURE — 25010000002 ENOXAPARIN PER 10 MG: Performed by: NURSE PRACTITIONER

## 2022-02-01 PROCEDURE — 36415 COLL VENOUS BLD VENIPUNCTURE: CPT | Performed by: NURSE PRACTITIONER

## 2022-02-01 PROCEDURE — 82962 GLUCOSE BLOOD TEST: CPT

## 2022-02-01 PROCEDURE — 85025 COMPLETE CBC W/AUTO DIFF WBC: CPT | Performed by: NURSE PRACTITIONER

## 2022-02-01 PROCEDURE — 80053 COMPREHEN METABOLIC PANEL: CPT | Performed by: NURSE PRACTITIONER

## 2022-02-01 PROCEDURE — 63710000001 INSULIN LISPRO (HUMAN) PER 5 UNITS: Performed by: NURSE PRACTITIONER

## 2022-02-01 RX ADMIN — Medication 100 MG: at 08:02

## 2022-02-01 RX ADMIN — CARVEDILOL 12.5 MG: 12.5 TABLET, FILM COATED ORAL at 08:02

## 2022-02-01 RX ADMIN — INSULIN LISPRO 5 UNITS: 100 INJECTION, SOLUTION INTRAVENOUS; SUBCUTANEOUS at 08:02

## 2022-02-01 RX ADMIN — FOLIC ACID 1 MG: 1 TABLET ORAL at 08:02

## 2022-02-01 RX ADMIN — INSULIN LISPRO 10 UNITS: 100 INJECTION, SOLUTION INTRAVENOUS; SUBCUTANEOUS at 12:30

## 2022-02-01 RX ADMIN — SODIUM CHLORIDE 100 ML/HR: 9 INJECTION, SOLUTION INTRAVENOUS at 08:07

## 2022-02-01 RX ADMIN — ENOXAPARIN SODIUM 40 MG: 100 INJECTION SUBCUTANEOUS at 08:02

## 2022-02-01 RX ADMIN — INSULIN GLARGINE-YFGN 25 UNITS: 100 INJECTION, SOLUTION SUBCUTANEOUS at 08:03

## 2022-02-01 RX ADMIN — LORAZEPAM 1 MG: 1 TABLET ORAL at 06:31

## 2022-02-01 NOTE — PROGRESS NOTES
[facility] Progress Note      Patient Name: Wu Damon  : 1975  MRN: [id number]  Primary Care Physician:  Juan Carlos Kwan APRN  Date of admission: 2022    Subjective     Interval History:     No event overnight  Patient denies any chest pain, shortness of repetitions  Tolerating oral intake  Urinating spontaneously  No fevers or chills  Glycemia with dramatic improvement    Review of Systems:   As noted above    Objective     Vitals:   Temp:  [97.5 °F (36.4 °C)-98.3 °F (36.8 °C)] 98.3 °F (36.8 °C)  Heart Rate:  [] 96  Resp:  [18] 18  BP: (141-165)/() 144/97    Intake/Output Summary (Last 24 hours) at 2022 0800  Last data filed at 2022 0634  Gross per 24 hour   Intake 1980 ml   Output 1200 ml   Net 780 ml       Physical Exam:    General Appearance: alert, oriented x 3, no acute distress obese with BMI of 35  Skin: warm and dry  HEENT: oral mucosa normal, nonicteric sclera  Neck: supple, no JVD  Lungs: CTA  Heart: RRR, normal S1 and S2  Abdomen: soft, nontender, nondistended  : no palpable bladder  Extremities: no edema, cyanosis or clubbing  Neuro: normal speech and mental status     Scheduled Meds:     buprenorphine-naloxone, 1 film, Sublingual, Daily  carvedilol, 12.5 mg, Oral, BID With Meals  enoxaparin, 40 mg, Subcutaneous, Q24H  thiamine, 100 mg, Oral, Daily   And  multivitamin, 1 tablet, Oral, Daily   And  folic acid, 1 mg, Oral, Daily  insulin glargine, 25 Units, Subcutaneous, Q12H  insulin lispro, 0-14 Units, Subcutaneous, TID AC  LORazepam, 1 mg, Oral, Q8H      IV Meds:   sodium chloride, 100 mL/hr, Last Rate: 100 mL/hr (22)        Results Reviewed:   I have personally reviewed the results from the time of this admission to 2022 08:00 EST     Results from last 7 days   Lab Units 22  0611 22  0515 22  1007 22  0523 22  0523   SODIUM mmol/L 135* 133* 133*   < > 126*   POTASSIUM mmol/L 3.8  3.9 4.9   < > 6.5*   CHLORIDE mmol/L 103 100 98   < > 91*   CO2 mmol/L 25.0 22.9 22.0   < > 19.0*   BUN mg/dL 10 15 27*   < > 34*   CREATININE mg/dL 0.97 1.25 1.63*   < > 2.03*   CALCIUM mg/dL 8.4* 8.2* 8.5*   < > 8.7   BILIRUBIN mg/dL 0.8 1.6*  --   --  1.4*   ALK PHOS U/L 85 98  --   --  114   ALT (SGPT) U/L 114* 162*  --   --  253*   AST (SGOT) U/L 90* 124*  --   --  209*   GLUCOSE mg/dL 249* 277* 229*   < > 459*    < > = values in this interval not displayed.       Estimated Creatinine Clearance: 134 mL/min (by C-G formula based on SCr of 0.97 mg/dL).    Results from last 7 days   Lab Units 01/30/22  0523   MAGNESIUM mg/dL 1.6             Results from last 7 days   Lab Units 02/01/22  0611 01/31/22  0446 01/30/22  0442   WBC 10*3/mm3 5.16 6.64 17.71*   HEMOGLOBIN g/dL 11.9* 13.7 16.7   PLATELETS 10*3/mm3 136* 154 262       Results from last 7 days   Lab Units 01/30/22  0442   INR  1.12*       Lab Results   Lab Value Date/Time    CREATININE 0.97 02/01/2022 0611    CREATININE 1.25 01/31/2022 0515    CREATININE 1.63 (H) 01/30/2022 1007    CREATININE 2.03 (H) 01/30/2022 0523    CREATININE 1.25 10/12/2021 0918    CREATININE 1.33 (H) 10/11/2021 0709    CREATININE 1.47 (H) 10/10/2021 0646    CREATININE 1.52 (H) 10/10/2021 0143    CREATININE 0.98 08/14/2021 0501    CREATININE 1.05 08/14/2021 0228    CREATININE 1.14 08/13/2021 1601    CREATININE 1.21 08/13/2021 1206    CREATININE 1.08 08/13/2021 0940    CREATININE 1.25 08/13/2021 0452    CREATININE 0.85 08/14/2020 0658    CREATININE 1.00 08/13/2020 0918    CREATININE 0.69 (L) 08/12/2020 0713    CREATININE 0.78 08/11/2020 0539    CREATININE 1.03 08/10/2020 1201    CREATININE 1.1 08/08/2020 1734    CREATININE 1.5 02/02/2019 2311    CREATININE 2.68 (H) 01/23/2019 0122    CREATININE 1.87 (H) 01/06/2017 0326    CREATININE 2.18 (H) 01/05/2017 0327    CREATININE 3.39 (H) 01/04/2017 0958    CREATININE 4.35 (H) 01/03/2017 2251    CREATININE 1.27 10/17/2016 1834         Assessment  / Plan     ASSESSMENT:    -HAVEN on chronic kidney disease stage III >creatinine baseline 1.0-1.2 ,peaked at 2.03 today down to 0.97  Secondary to intravascular volume depletion due to poorly controlled diabetes mellitus type 2 in the setting of 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  His blood sugar was 459 time of admission.  Creatinine is improving with IV hydration.  Urinalysis bland. Renal ultrasound nonrevealing Continue to avoid nephrotoxic medications.   -Pseudo Hyponatremia related to hyperglycemia  - Hyperkalemia: Sample was slightly hemolyzed ,  HAVEN and     poorly controlled diabetes mellitus type 2 and HAVEN.  Repeat sodium is 3.9   - Metabolic acidosis anuric acute kidney injury resolving  - History of alcohol abuse.  Counseling provided  - Morbid obesity.  Healthy lifestyle encouraged  - Diabetes Mellitus type 2 (poorly controlled ) .Continue insulin regimen / hypoglycemic regimen .Hypoglycemic protocol . POC glucose 4 x day .Diabetic diet  -Hypertension.  Continue carvedilol      . We will continue to follow closely. Heart healthy diet       PLAN:  -Follow renal function closely  -I will arrange for renal clinic follow-up as an outpatient, can resume losartan upon discharge  -Avoid nephrotoxins  -Adjust medications to GFR      Thank you for involving us in the care of Wu Damon.  Please feel free to call with any questions.    Itz Kitchen MD  02/01/22  08:00 [facility]    [facility]  [phone]      The electronic translation of spoken language may permit erroneous, or at times, nonsensical words or phrases to be inadvertently transcribed

## 2022-02-01 NOTE — CONSULTS
"Diabetes Education  Assessment/Teaching    Patient Name:  Wu Damon  YOB: 1975  MRN: [id number]  Admit Date:  1/30/2022      Assessment Date:  2/1/2022    Most Recent Value   General Information     Referral From: MD denson   Height 185.4 cm (73\")   Height Method Stated   Weight 124 kg (274 lb 1.6 oz)   Weight Method Bed scale   Pregnancy Assessment    Diabetes History    What type of diabetes do you have? Type 2   Length of Diabetes Diagnosis 10 + years  [Pt states dx at 26-28 years old]   Current DM knowledge good   Have you had diabetes education/teaching in the past? no   Do you test your blood sugar at home? no  [Pt states he has a meter at home, but has not been testing]   Have you had low blood sugar? (<70mg/dl) yes  [Pt recalls one episode of symptoms of low BG in past which improved with \"candy\"]   How often do you have low blood sugar? rare   Have you had high blood sugar? (>140mg/dl) yes  [lethargy, blurry vision]   How often do you have high blood sugar? unknown   How often do you check your feet? --  [Pt reports history of neuropathy for which he used to take Lyrica.  The imporrtance of foot health and daily foot checks discussed.]   Has diabetes caused a problem in your life (work/school/family/friends, etc.)? yes   Do you have any diabetes complications? glaucoma,  neuropathy   Education Preferences    What areas of diabetes would you like to learn about? diabetes complications,  resources on diabetes   Barriers to Learning --  [No barriers noted]   Nutrition Information    Assessment Topics    Being Active - Assessment Competent   Taking Medication - Assessment Competent   Problem Solving - Assessment Needs education   Reducing Risk - Assessment Needs education   Healthy Coping - Assessment Needs education   Monitoring - Assessment Needs education   DM Goals    Healthy Eating - Goal Today   Being Active - Goal 0-7 days from discharge   Taking Medication - Goal Today   Problem " Solving - Goal Today   Reducing Risk - Goal 0-30 days from discharge   Healthy Coping - Goal 0-30 days from discharge   Monitoring - Goal Tomorrow   Contact Plan Follow-up medical care,  Outpatient DM education referral,  0-30 days from discharge            Most Recent Value   DM Education Needs    Meter Has own   Meter Type --  [OTC brand from [employer] or [employer]]   Frequency of Testing 2 times a day   Blood Glucose Target Range Pre-Meal  mg/dL,  Post-Meal Less than 180 mg/dL   Medication Insulin,  Other injectables,  Pen  [Pt states competence in injections]   Problem Solving Hypoglycemia,  Hyperglycemia,  Sick days,  Signs,  Symptoms,  Treatment   Reducing Risks A1C testing,  Eye exam,  Foot care,  Neuropathy   Healthy Eating RD consult   Physical Activity --  [Importance of daily activity and effects of blood glucose management reivewed.]   Physical Activity Frequency Discussed exercise importance   Healthy Coping Frustrated   Discharge Plan Home,  Follow-up with MD   Motivation Moderate   Teaching Method Explanation,  Discussion,  Handouts,  Teach back   Patient Response Verbalized understanding,  Demonstrates adequately            Other Comments:  Patient seen in consultation.  Agreeable to discussion.  Very pleasant and conversant.  Recalls longstanding history of diabetes.  Patient states adherence to medications, but admits to lack of BGM stating financial reasons as biggest challenge.  Affordable BGM discussed and patient verbalizes understanding.  Diabetes self-management including BGM, standard goals, excursions in blood glucose, current diabetes medications, sick days, foot care, eye examinations, psychosocial support, and the importance of follow-up for continued monitoring and management of diabetes discussed.  Patient participates well in discussion and verbalizes understanding.  Written education given for reinforcment as well as resources.  Patient does report some barriers to medications due to  financial issues.  Patient assisted with obtaining a Lantus Savings Card to assist with the cost of Lantus.  Patient states his Trulicity is manageable at $25 per fill.  Patient denies additional questions or needs at this time.  Patient encouraged to follow-up with OP diabetes services.  Patient states intent to discuss with provider.      Electronically signed by:  Yumiko Curiel RN, [facility]  02/01/22 13:54 EST

## 2022-02-01 NOTE — NURSING NOTE
[facility] follow up note regarding ETOH use. Pt saw Mima Nguyen yesterday and got information about [facility] program, and other local programs available as well. Pt is very interested in starting the [facility] program, and states he will call to schedule appointment after discharge. Educated pt more about our program, and answered questions. Pt's mood today is good, and reports that he had a job interview this morning online. Spoke with primary RN, who states pt will be discharged today.   Access will sign off.

## 2022-02-01 NOTE — DISCHARGE SUMMARY
Patient Name: Wu Damon  : 1975  MRN: [id number]    Date of Admission: 2022  Date of Discharge:  2022  Primary Care Physician: Juan Carlos Kwan APRN      Chief Complaint:   Shortness of Breath      Discharge Diagnoses     Active Hospital Problems    Diagnosis  POA   • Transaminitis [R74.01]  Unknown   • Alcohol withdrawal syndrome without complication (HCC) [F10.230]  Yes   • Alcoholic hepatitis [K70.10]  Yes   • Alcohol abuse [F10.10]  Yes   • Liver lesion [K76.9]  Yes   • Hyponatremia [E87.1]  Yes   • Acute renal failure (HCC) [N17.9]  Yes   • Type 2 diabetes mellitus with hyperglycemia (HCC) [E11.65]  Yes   • HTN (hypertension) [I10]  Yes   • HLD (hyperlipidemia) [E78.5]  Yes      Resolved Hospital Problems   No resolved problems to display.        Hospital Course     Mr. Damon is a 46 y.o. male with a history of abuse, DM 2, hypertension, JATIN opioid abuse now on Suboxone therapy, liver lesions and obesity who presented to [facility] initially complaining of shortness of breath, tremors and nausea.  Please see the admitting history and physical for further details.  He was found to be in alcohol withdrawal and was admitted to the hospital for further evaluation and treatment.  He was initiated on CIWA protocol with scheduled Ativan.  He fortunately never went into full-blown withdrawal.  He was consulted by access and voices the need for wanting to achieve alcohol cessation.  He was given IVFs, thiamine, folate and multivitamin.  He was found to be in HAVEN with hyponatremia and hyperkalemia.  Nephrology saw in consultation.  Potassium was thought to be hemolyzed therefore was not accurate though was still given a dose of Kayexalate in the ED.  His Cozaar was held.  Labs corrected and he will resume Cozaar at discharge.  The diabetes educator saw for history of diabetes and hyperglycemia.  His A1c was noted to be 8.4, his Lantus was increased to 25 units nightly.  He  has remained stable and is ready for discharge home today.  He should follow-up with his PCP in 1 to 2 weeks and keep all other scheduled follow-up appointments with specialist.    Day of Discharge     Subjective:  No complaints or events overnight.  He is more than ready to go home.    Denies chest pain, palpitations, SOA, edema, fever, chills, nausea, vomiting, abdominal pain, tremors and diarrhea.    Physical Exam:  Temp:  [97.5 °F (36.4 °C)-98.3 °F (36.8 °C)] 98.3 °F (36.8 °C)  Heart Rate:  [] 96  Resp:  [18] 18  BP: (141-165)/() 144/97  Body mass index is 36.16 kg/m².  Physical Exam  Vitals and nursing note reviewed.   Constitutional:       Appearance: Normal appearance. He is obese.   HENT:      Head: Normocephalic and atraumatic.   Eyes:      Extraocular Movements: Extraocular movements intact.      Conjunctiva/sclera: Conjunctivae normal.   Cardiovascular:      Rate and Rhythm: Normal rate and regular rhythm.      Comments: Tachycardic at times  Pulmonary:      Effort: Pulmonary effort is normal. No respiratory distress.      Breath sounds: Normal breath sounds.   Abdominal:      General: Bowel sounds are normal. There is no distension.      Palpations: Abdomen is soft.      Tenderness: There is no abdominal tenderness.   Musculoskeletal:         General: Swelling (1+ BLE) present. Normal range of motion.      Cervical back: Normal range of motion and neck supple.   Skin:     General: Skin is warm and dry.   Neurological:      Mental Status: He is alert and oriented to person, place, and time. Mental status is at baseline.   Psychiatric:         Behavior: Behavior normal.         Consultants     Consult Orders (all) (From admission, onward)     Start     Ordered    01/30/22 1006  Inpatient Nephrology Consult  Once        Specialty:  Nephrology  Provider:  Eddie Cazares MD    01/30/22 1006    01/30/22 0957  Inpatient [facility] Consult  Once        Provider:  (Not yet assigned)     01/30/22 0957              Procedures     * Surgery not found *      Imaging Results (All)     Procedure Component Value Units Date/Time    US Renal Bilateral [[id number]] Collected: 01/30/22 1503     Updated: 01/30/22 1508    Narrative:      BILATERAL RENAL ULTRASOUND     CLINICAL HISTORY: Acute renal insufficiency     Transverse and longitudinal images of both kidneys were obtained. Both  kidneys appear normal in size and shape and show no hydronephrosis. No  cystic or solid renal masses are demonstrated on these images. The right  kidney measures 11.1 x 6.0 x 5.5 cm. The left kidney measures 11.7 x 5.6  x 5.4 cm. Several images of the urinary bladder were obtained and  demonstrate no abnormalities.     IMPRESSIONS: Normal renal ultrasound.     This report was finalized on 1/30/2022 3:05 PM by Dr. Junior Faulkner M.D.       XR Chest 1 View [[id number]] Collected: 01/30/22 0556     Updated: 01/30/22 0556    Narrative:        Patient: [place] YOLI  Time Out: 05:55  Exam(s): FILM CXR 1 VIEW     EXAM:    XR Chest, 1 View    CLINICAL HISTORY:     Reason for exam: soa.    TECHNIQUE:    Frontal view of the chest.    COMPARISON:    8 13 21.    FINDINGS:    Lungs:  Unremarkable.  No consolidation.    Pleural space:  Unremarkable.  No pneumothorax.    Heart:  Unremarkable.  No cardiomegaly.    Mediastinum:  Unremarkable.    Bones joints:  Unremarkable.    IMPRESSION:         No acute cardiopulmonary disease.      Impression:          Electronically signed by Rhys Infante M.D. on 01-30-22 at 0555          Results for orders placed during the hospital encounter of 08/10/20    Transthoracic Echo Complete With Contrast if Necessary Per Protocol    Interpretation Summary  · Left ventricular systolic function is normal. Calculated EF = 63.0%. Estimated EF was in agreement with the calculated EF. Normal left ventricular cavity size noted. All left ventricular wall segments contract normally. Left ventricular wall thickness is  consistent with mild concentric hypertrophy. Left ventricular diastolic function is normal.    Pertinent Labs     Results from last 7 days   Lab Units 02/01/22  0611 01/31/22  0446 01/30/22  0442   WBC 10*3/mm3 5.16 6.64 17.71*   HEMOGLOBIN g/dL 11.9* 13.7 16.7   PLATELETS 10*3/mm3 136* 154 262     Results from last 7 days   Lab Units 02/01/22  0611 01/31/22  0515 01/30/22  1007 01/30/22  0523   SODIUM mmol/L 135* 133* 133* 126*   POTASSIUM mmol/L 3.8 3.9 4.9 6.5*   CHLORIDE mmol/L 103 100 98 91*   CO2 mmol/L 25.0 22.9 22.0 19.0*   BUN mg/dL 10 15 27* 34*   CREATININE mg/dL 0.97 1.25 1.63* 2.03*   GLUCOSE mg/dL 249* 277* 229* 459*   EGFR IF AFRICN AM mL/min/1.73 102 76 56* 43*     Results from last 7 days   Lab Units 02/01/22  0611 01/31/22  0515 01/30/22  0523   ALBUMIN g/dL 3.20* 3.30* 4.10   BILIRUBIN mg/dL 0.8 1.6* 1.4*   ALK PHOS U/L 85 98 114   AST (SGOT) U/L 90* 124* 209*   ALT (SGPT) U/L 114* 162* 253*     Results from last 7 days   Lab Units 02/01/22  0611 01/31/22  0515 01/30/22  1007 01/30/22  0523   CALCIUM mg/dL 8.4* 8.2* 8.5* 8.7   ALBUMIN g/dL 3.20* 3.30*  --  4.10   MAGNESIUM mg/dL  --   --   --  1.6     Results from last 7 days   Lab Units 01/30/22  1007 01/30/22  0442   AMYLASE U/L 39  --    LIPASE U/L  --  39       Results from last 7 days   Lab Units 01/30/22  1850 01/30/22  1849   SODIUM UR mmol/L  --  31   CREATININE UR mg/dL 111.5  --    CHLORIDE UR mmol/L  --  40   PROTEIN TOTAL URINE mg/dL 21.2  --    PROT/CREAT RATIO UR mg/G Crea 190.1  --          Invalid input(s): LDLCALC  Results from last 7 days   Lab Units 01/31/22  0515 01/30/22  2328   BLOODCX  No growth at 24 hours No growth at 24 hours     Results from last 7 days   Lab Units 01/30/22  0600   COVID19  Not Detected       Test Results Pending at Discharge     Pending Labs     Order Current Status    Blood Culture - Blood, Arm, Left Preliminary result    Blood Culture - Blood, Arm, Right Preliminary result          Discharge Details         Discharge Medications      Changes to Medications      Instructions Start Date   insulin glargine 100 UNIT/ML injection  Commonly known as: LANTUS, SEMGLEE  What changed:   · medication strength  · how much to take   25 Units, Subcutaneous, Every 12 Hours         Continue These Medications      Instructions Start Date   Ambien CR 12.5 MG CR tablet  Generic drug: zolpidem CR   12.5 mg, Oral, Nightly PRN      buprenorphine-naloxone 8-2 MG per SL tablet  Commonly known as: SUBOXONE   1.5 tablets, Sublingual, Daily      carvedilol 3.125 MG tablet  Commonly known as: Coreg   3.125 mg, Oral, 2 Times Daily With Meals      fenofibrate 48 MG tablet  Commonly known as: TRICOR   48 mg, Oral, Daily      folic acid 1 MG tablet  Commonly known as: FOLVITE   1 mg, Oral, Daily      losartan 50 MG tablet  Commonly known as: COZAAR   50 mg, Oral, Daily      One-Daily Multi-Vitamin tablet tablet  Generic drug: multivitamin   1 tablet, Oral, Daily      Testosterone Cypionate 200 MG/ML solution   0.5 mL, Injection, Weekly      thiamine 100 MG tablet tablet  Commonly known as: VITAMIN B-1   100 mg, Oral, Daily             Allergies   Allergen Reactions   • Avelox [Moxifloxacin Hcl] Shortness Of Breath   • Citalopram Hydrobromide Unknown - Low Severity   • Meloxicam Unknown - Low Severity   • Metformin And Related Diarrhea   • Quetiapine      Fatigue and nausea   • Amlodipine Palpitations     HEART PALPITATIONS- ANXIETY       Discharge Disposition:  Home or Self Care      Discharge Diet:  Diet Order   Procedures   • Diet Regular; Cardiac, Consistent Carbohydrate       Discharge Activity:   Activity Instructions     Activity as Tolerated            CODE STATUS:    Code Status and Medical Interventions:   Ordered at: 01/30/22 0603     Level Of Support Discussed With:    Patient     Code Status (Patient has no pulse and is not breathing):    CPR (Attempt to Resuscitate)     Medical Interventions (Patient has pulse or is breathing):     Full       No future appointments.  Additional Instructions for the Follow-ups that You Need to Schedule     Discharge Follow-up with PCP   As directed       Currently Documented PCP:    Juan Carlos Kwan APRN    PCP Phone Number:    None     Follow Up Details: 1-2 weeks            Follow-up Information     Itz Kitchen MD Follow up.    Specialty: Nephrology  Why: THe office will arrange a follow up appointment  Contact information:  [address]  [place] [address] [place] [address]  [phone]             Juan Carlos Kwan APRN .    Specialties: Nurse Practitioner, Family Medicine  Why: 1-2 weeks                       Additional Instructions for the Follow-ups that You Need to Schedule     Discharge Follow-up with PCP   As directed       Currently Documented PCP:    Juan Carlos Kwan APRN    PCP Phone Number:    None     Follow Up Details: 1-2 weeks           Time Spent on Discharge:  Greater than 30 minutes      ALEXIA Miller  [facility]  02/01/22  14:32 EST

## 2022-02-01 NOTE — PLAN OF CARE
Goal Outcome Evaluation:  Plan of Care Reviewed With: patient           Outcome Summary: VSS, BP elevated x1 while up and performing ADL's. CIWA from 2-10, pt stated he did not want the pill Ativan, he had a bad dream the night before and he thinks its from the oral ativan. Educated pt that the medicine was the same as the IV version, verb understanding. Pt has a job interview at 0900, spouse brought tablet from home to do interview. Multiple times, pt has stated he wants to go home tomorrow. Will continue to monitor.

## 2022-02-01 NOTE — CASE MANAGEMENT/SOCIAL WORK
Case Management Discharge Note      Final Note: Home with spouse. Pt to call and setup appointment for [facility]. Transport by private auto. Preston Reyes RN-BC    Provided Post Acute Provider List?: N/A  N/A Provider List Comment: No needs identified  Provided Post Acute Provider Quality & Resource List?: N/A    Selected Continued Care - Admitted Since 1/30/2022     Destination    No services have been selected for the patient.              Durable Medical Equipment    No services have been selected for the patient.              Dialysis/Infusion    No services have been selected for the patient.              Home Medical Care    No services have been selected for the patient.              Therapy    No services have been selected for the patient.              Community Resources    No services have been selected for the patient.              Community & DME    No services have been selected for the patient.                  Transportation Services  Private: Car    Final Discharge Disposition Code: 01 - home or self-care

## 2022-02-02 NOTE — OUTREACH NOTE
Prep Survey      Responses   [facility] [place] patient discharged from? [place]   Is LACE score < 7 ? No   Emergency Room discharge w/ pulse ox? No   Eligibility Not Eligible   What are the reasons patient is not eligible? Other  [etoh w/d]   Does the patient have one of the following disease processes/diagnoses(primary or secondary)? Other   Prep survey completed? Yes          Brooke Solis RN

## 2022-02-04 LAB — BACTERIA SPEC AEROBE CULT: NORMAL

## 2022-02-05 LAB — BACTERIA SPEC AEROBE CULT: NORMAL

## 2022-03-15 ENCOUNTER — TELEPHONE (OUTPATIENT)
Dept: GASTROENTEROLOGY | Facility: CLINIC | Age: 47
End: 2022-03-15

## 2024-03-21 ENCOUNTER — HOSPITAL ENCOUNTER (INPATIENT)
Facility: HOSPITAL | Age: 49
LOS: 4 days | Discharge: HOME | DRG: 641 | End: 2024-03-25
Attending: EMERGENCY MEDICINE | Admitting: STUDENT IN AN ORGANIZED HEALTH CARE EDUCATION/TRAINING PROGRAM
Payer: COMMERCIAL

## 2024-03-21 ENCOUNTER — APPOINTMENT (OUTPATIENT)
Dept: CT IMAGING | Facility: HOSPITAL | Age: 49
DRG: 641 | End: 2024-03-21
Payer: COMMERCIAL

## 2024-03-21 DIAGNOSIS — N17.9 ACUTE RENAL FAILURE, UNSPECIFIED ACUTE RENAL FAILURE TYPE: Primary | ICD-10-CM

## 2024-03-21 DIAGNOSIS — R10.9 ABDOMINAL PAIN, UNSPECIFIED ABDOMINAL LOCATION: ICD-10-CM

## 2024-03-21 DIAGNOSIS — R11.2 NAUSEA AND VOMITING, UNSPECIFIED VOMITING TYPE: ICD-10-CM

## 2024-03-21 PROBLEM — E86.0 DEHYDRATION: Status: ACTIVE | Noted: 2024-03-21

## 2024-03-21 PROBLEM — Z87.898 HISTORY OF ALCOHOL USE: Status: ACTIVE | Noted: 2024-03-21

## 2024-03-21 PROBLEM — K76.0 HEPATIC STEATOSIS: Status: ACTIVE | Noted: 2024-03-21

## 2024-03-21 LAB
ALBUMIN SERPL-MCNC: 4.7 G/DL (ref 3.5–5.2)
ALBUMIN/GLOB SERPL: 1.4 G/DL
ALP SERPL-CCNC: 112 U/L (ref 39–117)
ALT SERPL W P-5'-P-CCNC: 115 U/L (ref 1–41)
ANION GAP SERPL CALCULATED.3IONS-SCNC: 20 MMOL/L (ref 5–15)
AST SERPL-CCNC: 139 U/L (ref 1–40)
BASOPHILS # BLD AUTO: 0.04 10*3/MM3 (ref 0–0.2)
BASOPHILS NFR BLD AUTO: 0.5 % (ref 0–1.5)
BILIRUB SERPL-MCNC: 2.2 MG/DL (ref 0–1.2)
BILIRUB UR QL STRIP: NEGATIVE
BUN SERPL-MCNC: 17 MG/DL (ref 6–20)
BUN/CREAT SERPL: 4.5 (ref 7–25)
CALCIUM SPEC-SCNC: 9.4 MG/DL (ref 8.6–10.5)
CHLORIDE SERPL-SCNC: 86 MMOL/L (ref 98–107)
CLARITY UR: CLEAR
CO2 SERPL-SCNC: 23 MMOL/L (ref 22–29)
COLOR UR: YELLOW
CREAT SERPL-MCNC: 3.81 MG/DL (ref 0.76–1.27)
DEPRECATED RDW RBC AUTO: 39.8 FL (ref 37–54)
EGFRCR SERPLBLD CKD-EPI 2021: 18.9 ML/MIN/1.73
EOSINOPHIL # BLD AUTO: 0.1 10*3/MM3 (ref 0–0.4)
EOSINOPHIL NFR BLD AUTO: 1.2 % (ref 0.3–6.2)
ERYTHROCYTE [DISTWIDTH] IN BLOOD BY AUTOMATED COUNT: 11.7 % (ref 12.3–15.4)
GLOBULIN UR ELPH-MCNC: 3.4 GM/DL
GLUCOSE BLDC GLUCOMTR-MCNC: 275 MG/DL (ref 70–130)
GLUCOSE BLDC GLUCOMTR-MCNC: 288 MG/DL (ref 70–130)
GLUCOSE SERPL-MCNC: 272 MG/DL (ref 65–99)
GLUCOSE UR STRIP-MCNC: ABNORMAL MG/DL
HCT VFR BLD AUTO: 38.5 % (ref 37.5–51)
HGB BLD-MCNC: 13.6 G/DL (ref 13–17.7)
HGB UR QL STRIP.AUTO: NEGATIVE
HOLD SPECIMEN: NORMAL
HOLD SPECIMEN: NORMAL
IMM GRANULOCYTES # BLD AUTO: 0.05 10*3/MM3 (ref 0–0.05)
IMM GRANULOCYTES NFR BLD AUTO: 0.6 % (ref 0–0.5)
KETONES UR QL STRIP: NEGATIVE
LEUKOCYTE ESTERASE UR QL STRIP.AUTO: NEGATIVE
LIPASE SERPL-CCNC: 32 U/L (ref 13–60)
LYMPHOCYTES # BLD AUTO: 1.15 10*3/MM3 (ref 0.7–3.1)
LYMPHOCYTES NFR BLD AUTO: 13.3 % (ref 19.6–45.3)
MCH RBC QN AUTO: 33.7 PG (ref 26.6–33)
MCHC RBC AUTO-ENTMCNC: 35.3 G/DL (ref 31.5–35.7)
MCV RBC AUTO: 95.3 FL (ref 79–97)
MONOCYTES # BLD AUTO: 0.81 10*3/MM3 (ref 0.1–0.9)
MONOCYTES NFR BLD AUTO: 9.4 % (ref 5–12)
NEUTROPHILS NFR BLD AUTO: 6.51 10*3/MM3 (ref 1.7–7)
NEUTROPHILS NFR BLD AUTO: 75 % (ref 42.7–76)
NITRITE UR QL STRIP: NEGATIVE
NRBC BLD AUTO-RTO: 0 /100 WBC (ref 0–0.2)
PH UR STRIP.AUTO: 5.5 [PH] (ref 5–8)
PLATELET # BLD AUTO: 148 10*3/MM3 (ref 140–450)
PMV BLD AUTO: 12.5 FL (ref 6–12)
POTASSIUM SERPL-SCNC: 3.3 MMOL/L (ref 3.5–5.2)
PROT SERPL-MCNC: 8.1 G/DL (ref 6–8.5)
PROT UR QL STRIP: NEGATIVE
RBC # BLD AUTO: 4.04 10*6/MM3 (ref 4.14–5.8)
SODIUM SERPL-SCNC: 129 MMOL/L (ref 136–145)
SP GR UR STRIP: 1.01 (ref 1–1.03)
UROBILINOGEN UR QL STRIP: ABNORMAL
WBC NRBC COR # BLD AUTO: 8.66 10*3/MM3 (ref 3.4–10.8)
WHOLE BLOOD HOLD COAG: NORMAL
WHOLE BLOOD HOLD SPECIMEN: NORMAL

## 2024-03-21 PROCEDURE — 25010000002 ONDANSETRON PER 1 MG: Performed by: EMERGENCY MEDICINE

## 2024-03-21 PROCEDURE — 82948 REAGENT STRIP/BLOOD GLUCOSE: CPT

## 2024-03-21 PROCEDURE — 82570 ASSAY OF URINE CREATININE: CPT | Performed by: INTERNAL MEDICINE

## 2024-03-21 PROCEDURE — 84156 ASSAY OF PROTEIN URINE: CPT | Performed by: INTERNAL MEDICINE

## 2024-03-21 PROCEDURE — 80074 ACUTE HEPATITIS PANEL: CPT | Performed by: NURSE PRACTITIONER

## 2024-03-21 PROCEDURE — 63710000001 INSULIN GLARGINE PER 5 UNITS: Performed by: STUDENT IN AN ORGANIZED HEALTH CARE EDUCATION/TRAINING PROGRAM

## 2024-03-21 PROCEDURE — 36415 COLL VENOUS BLD VENIPUNCTURE: CPT | Performed by: EMERGENCY MEDICINE

## 2024-03-21 PROCEDURE — 80053 COMPREHEN METABOLIC PANEL: CPT | Performed by: EMERGENCY MEDICINE

## 2024-03-21 PROCEDURE — 81003 URINALYSIS AUTO W/O SCOPE: CPT | Performed by: EMERGENCY MEDICINE

## 2024-03-21 PROCEDURE — 36415 COLL VENOUS BLD VENIPUNCTURE: CPT

## 2024-03-21 PROCEDURE — 25810000003 SODIUM CHLORIDE 0.9 % SOLUTION: Performed by: EMERGENCY MEDICINE

## 2024-03-21 PROCEDURE — 63710000001 INSULIN LISPRO (HUMAN) PER 5 UNITS: Performed by: STUDENT IN AN ORGANIZED HEALTH CARE EDUCATION/TRAINING PROGRAM

## 2024-03-21 PROCEDURE — 83690 ASSAY OF LIPASE: CPT | Performed by: EMERGENCY MEDICINE

## 2024-03-21 PROCEDURE — 85025 COMPLETE CBC W/AUTO DIFF WBC: CPT | Performed by: EMERGENCY MEDICINE

## 2024-03-21 PROCEDURE — 74176 CT ABD & PELVIS W/O CONTRAST: CPT

## 2024-03-21 PROCEDURE — 99285 EMERGENCY DEPT VISIT HI MDM: CPT

## 2024-03-21 RX ORDER — INSULIN LISPRO 100 [IU]/ML
2-9 INJECTION, SOLUTION INTRAVENOUS; SUBCUTANEOUS
Status: DISCONTINUED | OUTPATIENT
Start: 2024-03-21 | End: 2024-03-25 | Stop reason: HOSPADM

## 2024-03-21 RX ORDER — FOLIC ACID 1 MG/1
1 TABLET ORAL DAILY
Status: DISCONTINUED | OUTPATIENT
Start: 2024-03-21 | End: 2024-03-25 | Stop reason: HOSPADM

## 2024-03-21 RX ORDER — POLYETHYLENE GLYCOL 3350 17 G/17G
17 POWDER, FOR SOLUTION ORAL DAILY PRN
Status: DISCONTINUED | OUTPATIENT
Start: 2024-03-21 | End: 2024-03-25 | Stop reason: HOSPADM

## 2024-03-21 RX ORDER — LORAZEPAM 1 MG/1
2 TABLET ORAL
Status: DISCONTINUED | OUTPATIENT
Start: 2024-03-21 | End: 2024-03-25 | Stop reason: HOSPADM

## 2024-03-21 RX ORDER — LORAZEPAM 1 MG/1
1 TABLET ORAL
Status: DISCONTINUED | OUTPATIENT
Start: 2024-03-21 | End: 2024-03-25 | Stop reason: HOSPADM

## 2024-03-21 RX ORDER — SODIUM CHLORIDE 0.9 % (FLUSH) 0.9 %
10 SYRINGE (ML) INJECTION AS NEEDED
Status: DISCONTINUED | OUTPATIENT
Start: 2024-03-21 | End: 2024-03-25 | Stop reason: HOSPADM

## 2024-03-21 RX ORDER — DEXTROSE MONOHYDRATE 25 G/50ML
25 INJECTION, SOLUTION INTRAVENOUS
Status: DISCONTINUED | OUTPATIENT
Start: 2024-03-21 | End: 2024-03-25 | Stop reason: HOSPADM

## 2024-03-21 RX ORDER — THIAMINE HYDROCHLORIDE 100 MG/ML
200 INJECTION, SOLUTION INTRAMUSCULAR; INTRAVENOUS EVERY 8 HOURS SCHEDULED
Status: DISCONTINUED | OUTPATIENT
Start: 2024-03-22 | End: 2024-03-23

## 2024-03-21 RX ORDER — SODIUM CHLORIDE 0.9 % (FLUSH) 0.9 %
10 SYRINGE (ML) INJECTION EVERY 12 HOURS SCHEDULED
Status: DISCONTINUED | OUTPATIENT
Start: 2024-03-21 | End: 2024-03-25 | Stop reason: HOSPADM

## 2024-03-21 RX ORDER — SODIUM CHLORIDE 9 MG/ML
125 INJECTION, SOLUTION INTRAVENOUS CONTINUOUS
Status: DISCONTINUED | OUTPATIENT
Start: 2024-03-21 | End: 2024-03-24

## 2024-03-21 RX ORDER — SODIUM CHLORIDE 9 MG/ML
40 INJECTION, SOLUTION INTRAVENOUS AS NEEDED
Status: DISCONTINUED | OUTPATIENT
Start: 2024-03-21 | End: 2024-03-25 | Stop reason: HOSPADM

## 2024-03-21 RX ORDER — LORAZEPAM 2 MG/ML
1 INJECTION INTRAMUSCULAR
Status: DISCONTINUED | OUTPATIENT
Start: 2024-03-21 | End: 2024-03-25 | Stop reason: HOSPADM

## 2024-03-21 RX ORDER — LORAZEPAM 2 MG/ML
2 INJECTION INTRAMUSCULAR
Status: DISCONTINUED | OUTPATIENT
Start: 2024-03-21 | End: 2024-03-25 | Stop reason: HOSPADM

## 2024-03-21 RX ORDER — BISACODYL 5 MG/1
5 TABLET, DELAYED RELEASE ORAL DAILY PRN
Status: DISCONTINUED | OUTPATIENT
Start: 2024-03-21 | End: 2024-03-25 | Stop reason: HOSPADM

## 2024-03-21 RX ORDER — IBUPROFEN 600 MG/1
1 TABLET ORAL
Status: DISCONTINUED | OUTPATIENT
Start: 2024-03-21 | End: 2024-03-25 | Stop reason: HOSPADM

## 2024-03-21 RX ORDER — CARVEDILOL 3.12 MG/1
3.12 TABLET ORAL 2 TIMES DAILY WITH MEALS
Status: DISCONTINUED | OUTPATIENT
Start: 2024-03-22 | End: 2024-03-25 | Stop reason: HOSPADM

## 2024-03-21 RX ORDER — BISACODYL 10 MG
10 SUPPOSITORY, RECTAL RECTAL DAILY PRN
Status: DISCONTINUED | OUTPATIENT
Start: 2024-03-21 | End: 2024-03-25 | Stop reason: HOSPADM

## 2024-03-21 RX ORDER — NICOTINE POLACRILEX 4 MG
15 LOZENGE BUCCAL
Status: DISCONTINUED | OUTPATIENT
Start: 2024-03-21 | End: 2024-03-25 | Stop reason: HOSPADM

## 2024-03-21 RX ORDER — AMOXICILLIN 250 MG
2 CAPSULE ORAL 2 TIMES DAILY PRN
Status: DISCONTINUED | OUTPATIENT
Start: 2024-03-21 | End: 2024-03-25 | Stop reason: HOSPADM

## 2024-03-21 RX ORDER — NITROGLYCERIN 0.4 MG/1
0.4 TABLET SUBLINGUAL
Status: DISCONTINUED | OUTPATIENT
Start: 2024-03-21 | End: 2024-03-25 | Stop reason: HOSPADM

## 2024-03-21 RX ORDER — ONDANSETRON 2 MG/ML
4 INJECTION INTRAMUSCULAR; INTRAVENOUS ONCE
Status: COMPLETED | OUTPATIENT
Start: 2024-03-21 | End: 2024-03-21

## 2024-03-21 RX ADMIN — SODIUM CHLORIDE 1000 ML: 9 INJECTION, SOLUTION INTRAVENOUS at 18:56

## 2024-03-21 RX ADMIN — Medication 10 ML: at 20:41

## 2024-03-21 RX ADMIN — ONDANSETRON 4 MG: 2 INJECTION INTRAMUSCULAR; INTRAVENOUS at 18:54

## 2024-03-21 RX ADMIN — SODIUM CHLORIDE 150 ML/HR: 9 INJECTION, SOLUTION INTRAVENOUS at 20:32

## 2024-03-21 RX ADMIN — INSULIN LISPRO 6 UNITS: 100 INJECTION, SOLUTION INTRAVENOUS; SUBCUTANEOUS at 20:36

## 2024-03-21 RX ADMIN — INSULIN GLARGINE 25 UNITS: 100 INJECTION, SOLUTION SUBCUTANEOUS at 20:36

## 2024-03-21 NOTE — ED PROVIDER NOTES
EMERGENCY DEPARTMENT ENCOUNTER  Room Number:  31/31  PCP: Juan Carlos Kwan APRN  Independent Historians: Patient,      HPI:  Chief Complaint: had concerns including Abdominal Pain and Hypertension.     A complete HPI/ROS/PMH/PSH/SH/FH are unobtainable due to:   Chronic or social conditions impacting patient care (Social Determinants of Health):       Context: Wu Damon is a 48 y.o. male with a medical history of diabetes, hypertension who presents to the ED c/o acute nausea and vomiting, abdominal pain.  Patient states has had nausea and vomiting ongoing for about 2 or 3 weeks.  Today he developed some abdominal pain which is mostly in the upper abdomen.  There is a constant aching pain but occasionally gets increasing sharp pain in the upper abdomen.  This sharp pain is often brought on by movements or deep breath.  Denies chest pain or trouble breathing.  No recent fever.  No diarrhea or blood in the stool.  Denies dysuria.  Patient denies chronic medical problems.  Currently unemployed.  Drinks occasional alcohol, no tobacco  No prior abdominal surgeries  Primary care provider-ALEXIA Liu  Patient states that he takes 60 units of Lantus insulin every morning.  He has been out of Trulicity for several days.      Review of prior external notes (non-ED) -and- Review of prior external test results outside of this encounter:   I reviewed prior medical records note patient was last hospitalized here in 2022 with alcohol withdrawal.  Other chronic conditions include hypertension and hyperlipidemia.      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    Liver lesion 08/10/2020    Hypokalemia 2020    Alcohol withdrawal syndrome without complication 2021    Alcoholic ketoacidosis 10/10/2021    Transaminitis 2022     Resolved Ambulatory Problems     Diagnosis Date Noted    No Resolved Ambulatory Problems     Past Medical History:   Diagnosis Date    Diabetes mellitus     Hypertension     Nausea with vomiting 3/21/2024         PAST SURGICAL HISTORY  Past Surgical History:   Procedure Laterality Date    EYE SURGERY      HERNIA REPAIR           FAMILY HISTORY  Family History   Problem Relation Age of Onset    Alcohol abuse Mother     Asthma Mother     Cancer Mother     Drug abuse Mother     Early death Mother     Alcohol abuse Father     Alcohol abuse Sister     Drug abuse Sister     Asthma Daughter     Diabetes Daughter     Alcohol abuse Maternal Aunt     Alcohol abuse Maternal Uncle     Alcohol abuse Paternal Aunt     Cancer Paternal Aunt     Diabetes Paternal Aunt     Alcohol abuse Paternal Uncle     Alcohol abuse Maternal Grandmother     Asthma Maternal Grandmother     Cancer Maternal Grandmother     Drug abuse Maternal Grandmother     Hypertension Maternal Grandmother     Alcohol abuse Maternal Grandfather     Alcohol abuse Paternal Grandmother     Alcohol abuse Paternal Grandfather          SOCIAL HISTORY  Social History     Socioeconomic History    Marital status:    Tobacco Use    Smoking status: Former     Current packs/day: 0.00     Average packs/day: 2.0 packs/day for 2.3 years (4.7 ttl pk-yrs)     Types: Cigarettes, Cigars     Start date: 2003     Quit date: 2006     Years since quittin.2    Smokeless tobacco: Former     Types: Chew   Vaping Use    Vaping status: Never Used   Substance and Sexual Activity    Alcohol use: Yes     Alcohol/week: 21.0 standard drinks of  alcohol     Types: 21 Shots of liquor per week     Comment: fifth volka daily    Drug use: Yes     Comment: suboxone 12 days ago    Sexual activity: Defer     Partners: Female         ALLERGIES  Avelox [moxifloxacin hcl], Citalopram hydrobromide, Meloxicam, Metformin and related, Quetiapine, and Amlodipine      REVIEW OF SYSTEMS  Review of Systems   Constitutional:  Negative for fever.   Respiratory:  Negative for shortness of breath.    Cardiovascular:  Negative for chest pain.   Gastrointestinal:  Positive for abdominal pain, nausea and vomiting.   All other systems reviewed and are negative.    Included in HPI  All systems reviewed and negative except for those discussed in HPI.      PHYSICAL EXAM    I have reviewed the triage vital signs and nursing notes.    ED Triage Vitals   Temp Heart Rate Resp BP SpO2   03/21/24 1653 03/21/24 1653 03/21/24 1653 03/21/24 1709 03/21/24 1653   97.6 °F (36.4 °C) (!) 123 16 147/86 96 %      Temp src Heart Rate Source Patient Position BP Location FiO2 (%)   03/21/24 1653 03/21/24 1653 -- -- --   Tympanic Monitor          Physical Exam  GENERAL: Alert male in mild distress.  Triage vitals notable for elevated pulse of 123.  Temperature, blood pressure and O2 sats are as noted  SKIN: Warm, dry  HENT: Normocephalic, atraumatic  EYES: no scleral icterus  CV: regular rhythm, regular rate  RESPIRATORY: normal effort, lungs clear  ABDOMEN: soft, moderate epigastric tenderness without rebound or guarding, nondistended  MUSCULOSKELETAL: no deformity  NEURO: alert, moves all extremities, follows commands      LAB RESULTS  Recent Results (from the past 24 hour(s))   POC Glucose Once    Collection Time: 03/21/24  5:01 PM    Specimen: Blood   Result Value Ref Range    Glucose 275 (H) 70 - 130 mg/dL   Comprehensive Metabolic Panel    Collection Time: 03/21/24  5:21 PM    Specimen: Blood   Result Value Ref Range    Glucose 272 (H) 65 - 99 mg/dL    BUN 17 6 - 20 mg/dL    Creatinine 3.81 (H) 0.76  - 1.27 mg/dL    Sodium 129 (L) 136 - 145 mmol/L    Potassium 3.3 (L) 3.5 - 5.2 mmol/L    Chloride 86 (L) 98 - 107 mmol/L    CO2 23.0 22.0 - 29.0 mmol/L    Calcium 9.4 8.6 - 10.5 mg/dL    Total Protein 8.1 6.0 - 8.5 g/dL    Albumin 4.7 3.5 - 5.2 g/dL    ALT (SGPT) 115 (H) 1 - 41 U/L    AST (SGOT) 139 (H) 1 - 40 U/L    Alkaline Phosphatase 112 39 - 117 U/L    Total Bilirubin 2.2 (H) 0.0 - 1.2 mg/dL    Globulin 3.4 gm/dL    A/G Ratio 1.4 g/dL    BUN/Creatinine Ratio 4.5 (L) 7.0 - 25.0    Anion Gap 20.0 (H) 5.0 - 15.0 mmol/L    eGFR 18.9 (L) >60.0 mL/min/1.73   Lipase    Collection Time: 03/21/24  5:21 PM    Specimen: Blood   Result Value Ref Range    Lipase 32 13 - 60 U/L   Green Top (Gel)    Collection Time: 03/21/24  5:21 PM   Result Value Ref Range    Extra Tube Hold for add-ons.    Lavender Top    Collection Time: 03/21/24  5:21 PM   Result Value Ref Range    Extra Tube hold for add-on    Gold Top - SST    Collection Time: 03/21/24  5:21 PM   Result Value Ref Range    Extra Tube Hold for add-ons.    Light Blue Top    Collection Time: 03/21/24  5:21 PM   Result Value Ref Range    Extra Tube Hold for add-ons.    CBC Auto Differential    Collection Time: 03/21/24  5:21 PM    Specimen: Blood   Result Value Ref Range    WBC 8.66 3.40 - 10.80 10*3/mm3    RBC 4.04 (L) 4.14 - 5.80 10*6/mm3    Hemoglobin 13.6 13.0 - 17.7 g/dL    Hematocrit 38.5 37.5 - 51.0 %    MCV 95.3 79.0 - 97.0 fL    MCH 33.7 (H) 26.6 - 33.0 pg    MCHC 35.3 31.5 - 35.7 g/dL    RDW 11.7 (L) 12.3 - 15.4 %    RDW-SD 39.8 37.0 - 54.0 fl    MPV 12.5 (H) 6.0 - 12.0 fL    Platelets 148 140 - 450 10*3/mm3    Neutrophil % 75.0 42.7 - 76.0 %    Lymphocyte % 13.3 (L) 19.6 - 45.3 %    Monocyte % 9.4 5.0 - 12.0 %    Eosinophil % 1.2 0.3 - 6.2 %    Basophil % 0.5 0.0 - 1.5 %    Immature Grans % 0.6 (H) 0.0 - 0.5 %    Neutrophils, Absolute 6.51 1.70 - 7.00 10*3/mm3    Lymphocytes, Absolute 1.15 0.70 - 3.10 10*3/mm3    Monocytes, Absolute 0.81 0.10 - 0.90 10*3/mm3     Eosinophils, Absolute 0.10 0.00 - 0.40 10*3/mm3    Basophils, Absolute 0.04 0.00 - 0.20 10*3/mm3    Immature Grans, Absolute 0.05 0.00 - 0.05 10*3/mm3    nRBC 0.0 0.0 - 0.2 /100 WBC   Urinalysis With Microscopic If Indicated (No Culture) - Urine, Clean Catch    Collection Time: 03/21/24  6:31 PM    Specimen: Urine, Clean Catch   Result Value Ref Range    Color, UA Yellow Yellow, Straw    Appearance, UA Clear Clear    pH, UA 5.5 5.0 - 8.0    Specific Gravity, UA 1.008 1.005 - 1.030    Glucose,  mg/dL (2+) (A) Negative    Ketones, UA Negative Negative    Bilirubin, UA Negative Negative    Blood, UA Negative Negative    Protein, UA Negative Negative    Leuk Esterase, UA Negative Negative    Nitrite, UA Negative Negative    Urobilinogen, UA 0.2 E.U./dL 0.2 - 1.0 E.U./dL         RADIOLOGY  CT Abdomen Pelvis Without Contrast    Result Date: 3/21/2024  CT ABDOMEN PELVIS WO CONTRAST-  INDICATIONS: Abdominal pain  TECHNIQUE: Radiation dose reduction techniques were utilized, including automated exposure control and exposure modulation based on body size. Unenhanced ABDOMEN AND PELVIS CT  COMPARISON: 10/10/2021  FINDINGS:  Diffuse low-density of the liver is noted, compatible with severe steatosis, similar to prior exam. 3 enhancing liver lesions are again demonstrated, do not appear significant change, largest measuring 3.3 cm, stable at similar level.  Poorly distinguished dependent density in the gallbladder could represent cholelithiasis; if indicated, ultrasound correlation could be obtained.  The urinary bladder is only partly filled, limits assessment.  Otherwise unremarkable unenhanced appearance of the liver, spleen, adrenal glands, pancreas, kidneys, bladder.  No bowel obstruction or abnormal bowel thickening is identified. The appendix does not appear inflamed.  No free intraperitoneal gas or free fluid. Umbilical hernia of fat is present.  Scattered small mesenteric and para-aortic lymph nodes are seen that  are not significant by size criteria.  Abdominal aorta is not aneurysmal.  The lung bases are clear.  Degenerative changes are seen in the spine. No acute fracture is identified.          1. No acute inflammatory process of bowel is identified, follow-up as indications persist.  2. No urolithiasis or hydronephrosis.  3. Redemonstration of severe hepatic steatosis with 3 indeterminate stable liver lesions. Possible cholelithiasis.  This report was finalized on 3/21/2024 7:00 PM by Dr. Chinedu Vizcarra M.D on Workstation: [id number]         MEDICATIONS GIVEN IN ER  Medications   sodium chloride 0.9 % flush 10 mL (has no administration in time range)   sodium chloride 0.9 % flush 10 mL (has no administration in time range)   sodium chloride 0.9 % flush 10 mL (has no administration in time range)   sodium chloride 0.9 % infusion 40 mL (has no administration in time range)   nitroglycerin (NITROSTAT) SL tablet 0.4 mg (has no administration in time range)   sennosides-docusate (PERICOLACE) 8.6-50 MG per tablet 2 tablet (has no administration in time range)     And   polyethylene glycol (MIRALAX) packet 17 g (has no administration in time range)     And   bisacodyl (DULCOLAX) EC tablet 5 mg (has no administration in time range)     And   bisacodyl (DULCOLAX) suppository 10 mg (has no administration in time range)   sodium chloride 0.9 % infusion (has no administration in time range)   dextrose (GLUTOSE) oral gel 15 g (has no administration in time range)   dextrose (D50W) (25 g/50 mL) IV injection 25 g (has no administration in time range)   glucagon (GLUCAGEN) injection 1 mg (has no administration in time range)   insulin lispro (HUMALOG/ADMELOG) injection 2-9 Units (has no administration in time range)   Potassium Replacement - Follow Nurse / BPA Driven Protocol (has no administration in time range)   Magnesium Standard Dose Replacement - Follow Nurse / BPA Driven Protocol (has no administration in time range)    Phosphorus Replacement - Follow Nurse / BPA Driven Protocol (has no administration in time range)   Calcium Replacement - Follow Nurse / BPA Driven Protocol (has no administration in time range)   carvedilol (COREG) tablet 3.125 mg (has no administration in time range)   folic acid (FOLVITE) tablet 1 mg (has no administration in time range)   insulin glargine (LANTUS, SEMGLEE) injection 25 Units (has no administration in time range)   thiamine (VITAMIN B-1) tablet 100 mg (has no administration in time range)   sodium chloride 0.9 % bolus 1,000 mL ( Intravenous Currently Infusing 3/21/24 1926)   ondansetron (ZOFRAN) injection 4 mg (4 mg Intravenous Given 3/21/24 1854)         ORDERS PLACED DURING THIS VISIT:  Orders Placed This Encounter   Procedures    CT Abdomen Pelvis Without Contrast    [place] Draw    Comprehensive Metabolic Panel    Lipase    Urinalysis With Microscopic If Indicated (No Culture) - Urine, Clean Catch    CBC Auto Differential    Comprehensive Metabolic Panel    Hemoglobin A1c    Diet: Liquid; Clear Liquid; Fluid Consistency: Thin (IDDSI 0)    Undress & Gown    Vital Signs    Intake & Output    Weigh Patient    Oral Care    Maintain IV Access    Telemetry - Place Orders & Notify Provider of Results When Patient Experiences Acute Chest Pain, Dysrhythmia or Respiratory Distress    May Be Off Telemetry for Tests    Place Sequential Compression Device    Maintain Sequential Compression Device    Continuous Pulse Oximetry    Up With Assistance    Daily Weights    Neuro Checks    Neurovascular Checks    Elevate HOB    Strict Intake & Output    Assess Patient For Urinary Retention    Utilize Voiding Measures if Retention is Suspected    Bladder Scan for Suspected Urinary Retention    Monitor Every 1-2 Hours for Spontaneous Void if Bladder Scan Volume is Less Than 500mL & Patient is Without Symptoms of Bladder Discomfort / Distention    Straight Cath For Acute Retention if Bladder Scan Volume is Greater  Than 500mL or Patient Has Symptoms of Bladder Discomfort / Distention (Unless Contraindicated)    Notify Provider Acute Urinary Retention    [facility] (on-call MD unless specified) Details    OT Consult: Eval & Treat ADL Performance Below Baseline, Discharge Placement Assessment    PT Consult: Eval & Treat Discharge Placement Assessment, Functional Mobility Below Baseline    Incentive Spirometry    Oxygen Therapy- Nasal Cannula; Titrate 1-6 LPM Per SpO2; 90 - 95%    POC Glucose Once    POC Glucose 4x Daily Before Meals & at Bedtime    Insert Peripheral IV    Insert Peripheral IV    Inpatient Admission    Aspiration Precautions    Fall Precautions    CBC & Differential    Green Top (Gel)    Lavender Top    Gold Top - SST    Light Blue Top    CBC & Differential         OUTPATIENT MEDICATION MANAGEMENT:  Current Facility-Administered Medications Ordered in Epic   Medication Dose Route Frequency Provider Last Rate Last Admin    sennosides-docusate (PERICOLACE) 8.6-50 MG per tablet 2 tablet  2 tablet Oral BID PRN Orville Espinoza DO        And    polyethylene glycol (MIRALAX) packet 17 g  17 g Oral Daily PRN Orville Espinoza, DO        And    bisacodyl (DULCOLAX) EC tablet 5 mg  5 mg Oral Daily PRN Orville Espinoza DO        And    bisacodyl (DULCOLAX) suppository 10 mg  10 mg Rectal Daily PRN Orville Espinoza, DO        Calcium Replacement - Follow Nurse / BPA Driven Protocol   Does not apply PRN Orville Espinoza DO        [START ON 3/22/2024] carvedilol (COREG) tablet 3.125 mg  3.125 mg Oral BID With Meals Orville Espinoza,         dextrose (D50W) (25 g/50 mL) IV injection 25 g  25 g Intravenous Q15 Min PRN Orville Espinoza,         dextrose (GLUTOSE) oral gel 15 g  15 g Oral Q15 Min PRN Orville Espinoza, DO        folic acid (FOLVITE) tablet 1 mg  1 mg Oral Daily Orville Espinoza, DO        glucagon (GLUCAGEN) injection 1 mg  1 mg Intramuscular Q15 Min PRN Orville Espinoza,         insulin glargine (LANTUS, SEMGLEE) injection 25 Units  25 Units  Subcutaneous Q12H Orville Espinoza DO        insulin lispro (HUMALOG/ADMELOG) injection 2-9 Units  2-9 Units Subcutaneous 4x Daily AC & at Bedtime Orville Espinoza DO        Magnesium Standard Dose Replacement - Follow Nurse / BPA Driven Protocol   Does not apply PRN Orville Espinoza DO        nitroglycerin (NITROSTAT) SL tablet 0.4 mg  0.4 mg Sublingual Q5 Min PRN Orville Espinoza DO        Phosphorus Replacement - Follow Nurse / BPA Driven Protocol   Does not apply PRN Orville Espinoza DO        Potassium Replacement - Follow Nurse / BPA Driven Protocol   Does not apply PRN Orville Espinoza DO        sodium chloride 0.9 % flush 10 mL  10 mL Intravenous PRN Orville Espinoza,         sodium chloride 0.9 % flush 10 mL  10 mL Intravenous Q12H Orville Espinoza DO        sodium chloride 0.9 % flush 10 mL  10 mL Intravenous PRN Orville Espinoza DO        sodium chloride 0.9 % infusion 40 mL  40 mL Intravenous PRN Orville Espinoza DO        sodium chloride 0.9 % infusion  150 mL/hr Intravenous Continuous Orville Espinoza DO        thiamine (VITAMIN B-1) tablet 100 mg  100 mg Oral Daily Orville Espinoza DO         Current Outpatient Medications Ordered in Epic   Medication Sig Dispense Refill    buprenorphine-naloxone (SUBOXONE) 8-2 MG per SL tablet Place 1.5 tablets under the tongue Daily.      carvedilol (Coreg) 3.125 MG tablet Take 1 tablet by mouth 2 (Two) Times a Day With Meals. 60 tablet 0    fenofibrate (TRICOR) 48 MG tablet Take 1 tablet by mouth Daily. 30 tablet 0    folic acid (FOLVITE) 1 MG tablet Take 1 tablet by mouth Daily. 30 tablet 0    insulin glargine (LANTUS, SEMGLEE) 100 UNIT/ML injection Inject 25 Units under the skin into the appropriate area as directed Every 12 (Twelve) Hours. 10 mL 0    losartan (COZAAR) 50 MG tablet Take 50 mg by mouth Daily.      multivitamin (One-Daily Multi-Vitamin) tablet tablet Take 1 tablet by mouth Daily. 30 tablet 0    Testosterone Cypionate 200 MG/ML solution Inject 0.5 mL as directed 1 (One) Time Per  Week.      thiamine (thiamine) 100 MG tablet tablet Take 1 tablet by mouth Daily. 30 tablet 0    zolpidem CR (Ambien CR) 12.5 MG CR tablet Take 12.5 mg by mouth At Night As Needed for Sleep.           PROCEDURES  Procedures            PROGRESS, DATA ANALYSIS, CONSULTS, AND MEDICAL DECISION MAKING  All labs have been independently interpreted by me.  All radiology studies have been reviewed by me. All EKG's have been independently viewed and interpreted by me.  Discussion below represents my analysis of pertinent findings related to patient's condition, differential diagnosis, treatment plan and final disposition.    Differential diagnosis includes but is not limited to gastritis, hepatitis, pancreatitis, bowel obstruction, bowel infection, diabetic complications including hyperglycemia or renal failure.      ED Course as of 03/21/24 2004   Thu Mar 21, 2024   1751 Patient does look dry and is somewhat tachycardic.  Will go ahead and give IV fluid bolus as well as IV Zofran. [DB]   1751 CBC shows normal white count which would go against infection.  Hemoglobin reassuring at 13.6. [DB]   1751 Glucose 275 and known diabetic. [DB]   1815 Lab work notable for elevated creatinine of 3.81 consistent with acute renal failure. [DB]   1844 I discussed evaluation and treatment of this patient with Relga Lindsey from [facility] who will admit on behalf of Dr. Orville Espinoza. [DB]   1845 CT scan of the abdomen independently interpreted by me shows no obvious perforation or obstruction.  Kidneys look grossly normal.  Patient does have pretty significant obesity. [DB]   1908 Urinalysis reviewed is fairly benign. [DB]   1908 Official reading of CT by Dr. Vizcarra is fairly unremarkable. [DB]      ED Course User Index  [DB] Junior Dumont MD             AS OF 20:04 EDT VITALS:    BP - 125/79  HR - 116  TEMP - 97.6 °F (36.4 °C) (Tympanic)  O2 SATS - 94%    COMPLEXITY OF CARE  Complicated patient with history of recurrent pancreatitis presents  from GI clinic with severe abdominal pain.  Patient in significant distress upon arrival.    He was treated with IV fluids, IV antiemetics and IV Dilaudid with much improvement.    I did review ED workup as noted above including labs and CT scan which were independently interpreted by me.    Patient felt to have acute exacerbation of acute pancreatitis.  Plan admission to the hospital for workup and treatment with likely GI consultation.      DIAGNOSIS  Final diagnoses:   Acute renal failure, unspecified acute renal failure type   Nausea and vomiting, unspecified vomiting type   Abdominal pain, unspecified abdominal location         DISPOSITION  ED Disposition       ED Disposition   Decision to Admit    Condition   --    Comment   Level of Care: Telemetry [5]   Diagnosis: Acute renal failure [237969]   Admitting Physician: JESSE COLLINS [[id number]]   Attending Physician: JESSE COLLINS [[id number]]   Certification: I Certify That Inpatient Hospital Services Are Medically Necessary For Greater Than 2 Midnights                  Please note that portions of this document were completed with a voice recognition program.    Note Disclaimer: At [facility], we believe that sharing information builds trust and better relationships. You are receiving this note because you recently visited [facility]. It is possible you will see health information before a provider has talked with you about it. This kind of information can be easy to misunderstand. To help you fully understand what it means for your health, we urge you to discuss this note with your provider.         Junior Dumont MD  03/21/24 2005

## 2024-03-21 NOTE — ED NOTES
Nursing report ED to floor  Wu Damon  48 y.o.  male    HPI :  HPI (Adult)  Stated Reason for Visit: htn, vomitning  History Obtained From: patient    Chief Complaint  Chief Complaint   Patient presents with    Abdominal Pain    Hypertension       Admitting doctor:   Orville Espinoza DO    Admitting diagnosis:   The primary encounter diagnosis was Acute renal failure, unspecified acute renal failure type. Diagnoses of Nausea and vomiting, unspecified vomiting type and Abdominal pain, unspecified abdominal location were also pertinent to this visit.    Code status:   Current Code Status       Date Active Code Status Order ID Comments User Context       Prior            Allergies:   Avelox [moxifloxacin hcl], Citalopram hydrobromide, Meloxicam, Metformin and related, Quetiapine, and Amlodipine    Isolation:   No active isolations    Intake and Output  No intake or output data in the 24 hours ending 03/21/24 1928    Weight:   There were no vitals filed for this visit.    Most recent vitals:   Vitals:    03/21/24 1653 03/21/24 1709 03/21/24 1831 03/21/24 1832   BP:  147/86 125/79    Pulse: (!) 123 116 115 116   Resp: 16  16    Temp: 97.6 °F (36.4 °C)      TempSrc: Tympanic      SpO2: 96%  95% 94%       Active LDAs/IV Access:   Lines, Drains & Airways       Active LDAs       Name Placement date Placement time Site Days    Peripheral IV 03/21/24 1853 Right Hand 03/21/24  1853  Hand  less than 1                    Labs (abnormal labs have a star):   Labs Reviewed   COMPREHENSIVE METABOLIC PANEL - Abnormal; Notable for the following components:       Result Value    Glucose 272 (*)     Creatinine 3.81 (*)     Sodium 129 (*)     Potassium 3.3 (*)     Chloride 86 (*)     ALT (SGPT) 115 (*)     AST (SGOT) 139 (*)     Total Bilirubin 2.2 (*)     BUN/Creatinine Ratio 4.5 (*)     Anion Gap 20.0 (*)     eGFR 18.9 (*)     All other components within normal limits    Narrative:     GFR Normal >60  Chronic Kidney Disease  <60  Kidney Failure <15     URINALYSIS W/ MICROSCOPIC IF INDICATED (NO CULTURE) - Abnormal; Notable for the following components:    Glucose,  mg/dL (2+) (*)     All other components within normal limits    Narrative:     Urine microscopic not indicated.   CBC WITH AUTO DIFFERENTIAL - Abnormal; Notable for the following components:    RBC 4.04 (*)     MCH 33.7 (*)     RDW 11.7 (*)     MPV 12.5 (*)     Lymphocyte % 13.3 (*)     Immature Grans % 0.6 (*)     All other components within normal limits   POCT GLUCOSE FINGERSTICK - Abnormal; Notable for the following components:    Glucose 275 (*)     All other components within normal limits   LIPASE - Normal   RAINBOW DRAW    Narrative:     The following orders were created for panel order [place] Draw.  Procedure                               Abnormality         Status                     ---------                               -----------         ------                     Green Top (Gel)[[id number]]                                  Final result               Lavender Top[[id number]]                                     Final result               Gold Top - SST[[id number]]                                   Final result               Light Blue Top[[id number]]                                   Final result                 Please view results for these tests on the individual orders.   POCT GLUCOSE FINGERSTICK   POCT GLUCOSE FINGERSTICK   POCT GLUCOSE FINGERSTICK   POCT GLUCOSE FINGERSTICK   CBC AND DIFFERENTIAL    Narrative:     The following orders were created for panel order CBC & Differential.  Procedure                               Abnormality         Status                     ---------                               -----------         ------                     CBC Auto Differential[[id number]]        Abnormal            Final result                 Please view results for these tests on the individual orders.   GREEN TOP   LAVENDER TOP   GOLD TOP - SST   LIGHT  BLUE TOP       EKG:   No orders to display       Meds given in ED:   Medications   sodium chloride 0.9 % flush 10 mL (has no administration in time range)   sodium chloride 0.9 % flush 10 mL (has no administration in time range)   sodium chloride 0.9 % flush 10 mL (has no administration in time range)   sodium chloride 0.9 % infusion 40 mL (has no administration in time range)   nitroglycerin (NITROSTAT) SL tablet 0.4 mg (has no administration in time range)   sennosides-docusate (PERICOLACE) 8.6-50 MG per tablet 2 tablet (has no administration in time range)     And   polyethylene glycol (MIRALAX) packet 17 g (has no administration in time range)     And   bisacodyl (DULCOLAX) EC tablet 5 mg (has no administration in time range)     And   bisacodyl (DULCOLAX) suppository 10 mg (has no administration in time range)   sodium chloride 0.9 % infusion (has no administration in time range)   dextrose (GLUTOSE) oral gel 15 g (has no administration in time range)   dextrose (D50W) (25 g/50 mL) IV injection 25 g (has no administration in time range)   glucagon (GLUCAGEN) injection 1 mg (has no administration in time range)   insulin lispro (HUMALOG/ADMELOG) injection 2-9 Units (has no administration in time range)   sodium chloride 0.9 % bolus 1,000 mL (1,000 mL Intravenous New Bag 3/21/24 1856)   ondansetron (ZOFRAN) injection 4 mg (4 mg Intravenous Given 3/21/24 1854)       Imaging results:  CT Abdomen Pelvis Without Contrast    Result Date: 3/21/2024    1. No acute inflammatory process of bowel is identified, follow-up as indications persist.  2. No urolithiasis or hydronephrosis.  3. Redemonstration of severe hepatic steatosis with 3 indeterminate stable liver lesions. Possible cholelithiasis.  This report was finalized on 3/21/2024 7:00 PM by Dr. Chinedu Vizcarra M.D on Workstation: [id number]       Ambulatory status:   Ad patrice    Social issues:   Social History     Socioeconomic History    Marital status:     Tobacco Use    Smoking status: Former     Current packs/day: 0.00     Average packs/day: 2.0 packs/day for 2.3 years (4.7 ttl pk-yrs)     Types: Cigarettes, Cigars     Start date: 2003     Quit date: 2006     Years since quittin.2    Smokeless tobacco: Former     Types: Chew   Vaping Use    Vaping status: Never Used   Substance and Sexual Activity    Alcohol use: Yes     Alcohol/week: 21.0 standard drinks of alcohol     Types: 21 Shots of liquor per week     Comment: fifth volka daily    Drug use: Yes     Comment: suboxone 12 days ago    Sexual activity: Defer     Partners: Female       Peripheral Neurovascular  Peripheral Neurovascular (Adult)  Peripheral Neurovascular WDL: WDL    Neuro Cognitive  Neuro Cognitive (Adult)  Cognitive/Neuro/Behavioral WDL: WDL    Learning       Respiratory  Respiratory WDL  Respiratory WDL: WDL    Abdominal Pain       Pain Assessments  Pain (Adult)  (0-10) Pain Rating: Rest: 9  Pain Location: abdomen    NIH Stroke Scale       Alejandra Gomez RN  24 19:28 EDT

## 2024-03-22 ENCOUNTER — APPOINTMENT (OUTPATIENT)
Dept: ULTRASOUND IMAGING | Facility: HOSPITAL | Age: 49
DRG: 641 | End: 2024-03-22
Payer: COMMERCIAL

## 2024-03-22 LAB
ALBUMIN SERPL-MCNC: 4.1 G/DL (ref 3.5–5.2)
ALBUMIN/GLOB SERPL: 1.3 G/DL
ALP SERPL-CCNC: 92 U/L (ref 39–117)
ALT SERPL W P-5'-P-CCNC: 103 U/L (ref 1–41)
ANION GAP SERPL CALCULATED.3IONS-SCNC: 17.9 MMOL/L (ref 5–15)
APAP SERPL-MCNC: <5 MCG/ML (ref 0–30)
AST SERPL-CCNC: 130 U/L (ref 1–40)
BASOPHILS # BLD AUTO: 0.05 10*3/MM3 (ref 0–0.2)
BASOPHILS NFR BLD AUTO: 0.6 % (ref 0–1.5)
BILIRUB SERPL-MCNC: 2.2 MG/DL (ref 0–1.2)
BUN SERPL-MCNC: 16 MG/DL (ref 6–20)
BUN/CREAT SERPL: 4.9 (ref 7–25)
CALCIUM SPEC-SCNC: 8.6 MG/DL (ref 8.6–10.5)
CHLORIDE SERPL-SCNC: 91 MMOL/L (ref 98–107)
CO2 SERPL-SCNC: 22.1 MMOL/L (ref 22–29)
CREAT SERPL-MCNC: 3.27 MG/DL (ref 0.76–1.27)
CREAT UR-MCNC: 94.1 MG/DL
DEPRECATED RDW RBC AUTO: 41.6 FL (ref 37–54)
EGFRCR SERPLBLD CKD-EPI 2021: 22.7 ML/MIN/1.73
EOSINOPHIL # BLD AUTO: 0.09 10*3/MM3 (ref 0–0.4)
EOSINOPHIL NFR BLD AUTO: 1.2 % (ref 0.3–6.2)
ERYTHROCYTE [DISTWIDTH] IN BLOOD BY AUTOMATED COUNT: 11.9 % (ref 12.3–15.4)
GLOBULIN UR ELPH-MCNC: 3.1 GM/DL
GLUCOSE BLDC GLUCOMTR-MCNC: 234 MG/DL (ref 70–130)
GLUCOSE BLDC GLUCOMTR-MCNC: 238 MG/DL (ref 70–130)
GLUCOSE BLDC GLUCOMTR-MCNC: 242 MG/DL (ref 70–130)
GLUCOSE BLDC GLUCOMTR-MCNC: 254 MG/DL (ref 70–130)
GLUCOSE SERPL-MCNC: 206 MG/DL (ref 65–99)
HAV IGM SERPL QL IA: NORMAL
HBA1C MFR BLD: 7.9 % (ref 4.8–5.6)
HBV CORE IGM SERPL QL IA: NORMAL
HBV SURFACE AG SERPL QL IA: NORMAL
HCT VFR BLD AUTO: 35.8 % (ref 37.5–51)
HCV AB SER DONR QL: NORMAL
HGB BLD-MCNC: 12.6 G/DL (ref 13–17.7)
IMM GRANULOCYTES # BLD AUTO: 0.04 10*3/MM3 (ref 0–0.05)
IMM GRANULOCYTES NFR BLD AUTO: 0.5 % (ref 0–0.5)
LYMPHOCYTES # BLD AUTO: 1.58 10*3/MM3 (ref 0.7–3.1)
LYMPHOCYTES NFR BLD AUTO: 20.3 % (ref 19.6–45.3)
MCH RBC QN AUTO: 34.2 PG (ref 26.6–33)
MCHC RBC AUTO-ENTMCNC: 35.2 G/DL (ref 31.5–35.7)
MCV RBC AUTO: 97.3 FL (ref 79–97)
MONOCYTES # BLD AUTO: 0.89 10*3/MM3 (ref 0.1–0.9)
MONOCYTES NFR BLD AUTO: 11.4 % (ref 5–12)
NEUTROPHILS NFR BLD AUTO: 5.13 10*3/MM3 (ref 1.7–7)
NEUTROPHILS NFR BLD AUTO: 66 % (ref 42.7–76)
NRBC BLD AUTO-RTO: 0 /100 WBC (ref 0–0.2)
PLATELET # BLD AUTO: 133 10*3/MM3 (ref 140–450)
PMV BLD AUTO: 12.6 FL (ref 6–12)
POTASSIUM SERPL-SCNC: 3.6 MMOL/L (ref 3.5–5.2)
PROT ?TM UR-MCNC: 13.8 MG/DL
PROT SERPL-MCNC: 7.2 G/DL (ref 6–8.5)
PROT/CREAT UR: 146.7 MG/G CREA (ref 0–200)
RBC # BLD AUTO: 3.68 10*6/MM3 (ref 4.14–5.8)
SALICYLATES SERPL-MCNC: <0.3 MG/DL
SODIUM SERPL-SCNC: 131 MMOL/L (ref 136–145)
WBC NRBC COR # BLD AUTO: 7.78 10*3/MM3 (ref 3.4–10.8)

## 2024-03-22 PROCEDURE — 63710000001 INSULIN LISPRO (HUMAN) PER 5 UNITS: Performed by: STUDENT IN AN ORGANIZED HEALTH CARE EDUCATION/TRAINING PROGRAM

## 2024-03-22 PROCEDURE — 80179 DRUG ASSAY SALICYLATE: CPT | Performed by: STUDENT IN AN ORGANIZED HEALTH CARE EDUCATION/TRAINING PROGRAM

## 2024-03-22 PROCEDURE — 82948 REAGENT STRIP/BLOOD GLUCOSE: CPT

## 2024-03-22 PROCEDURE — 85025 COMPLETE CBC W/AUTO DIFF WBC: CPT | Performed by: STUDENT IN AN ORGANIZED HEALTH CARE EDUCATION/TRAINING PROGRAM

## 2024-03-22 PROCEDURE — 25010000002 THIAMINE HCL 200 MG/2ML SOLUTION: Performed by: NURSE PRACTITIONER

## 2024-03-22 PROCEDURE — 90791 PSYCH DIAGNOSTIC EVALUATION: CPT

## 2024-03-22 PROCEDURE — 97165 OT EVAL LOW COMPLEX 30 MIN: CPT

## 2024-03-22 PROCEDURE — 63710000001 INSULIN GLARGINE PER 5 UNITS: Performed by: STUDENT IN AN ORGANIZED HEALTH CARE EDUCATION/TRAINING PROGRAM

## 2024-03-22 PROCEDURE — 80053 COMPREHEN METABOLIC PANEL: CPT | Performed by: STUDENT IN AN ORGANIZED HEALTH CARE EDUCATION/TRAINING PROGRAM

## 2024-03-22 PROCEDURE — 25010000002 LORAZEPAM PER 2 MG: Performed by: NURSE PRACTITIONER

## 2024-03-22 PROCEDURE — 83036 HEMOGLOBIN GLYCOSYLATED A1C: CPT | Performed by: STUDENT IN AN ORGANIZED HEALTH CARE EDUCATION/TRAINING PROGRAM

## 2024-03-22 PROCEDURE — 80143 DRUG ASSAY ACETAMINOPHEN: CPT | Performed by: STUDENT IN AN ORGANIZED HEALTH CARE EDUCATION/TRAINING PROGRAM

## 2024-03-22 PROCEDURE — 76705 ECHO EXAM OF ABDOMEN: CPT

## 2024-03-22 RX ORDER — SUCRALFATE 1 G/1
1 TABLET ORAL
Status: DISPENSED | OUTPATIENT
Start: 2024-03-22 | End: 2024-03-24

## 2024-03-22 RX ORDER — FAMOTIDINE 10 MG/ML
20 INJECTION, SOLUTION INTRAVENOUS DAILY
Status: DISCONTINUED | OUTPATIENT
Start: 2024-03-22 | End: 2024-03-25 | Stop reason: HOSPADM

## 2024-03-22 RX ADMIN — THIAMINE HYDROCHLORIDE 200 MG: 100 INJECTION, SOLUTION INTRAMUSCULAR; INTRAVENOUS at 17:21

## 2024-03-22 RX ADMIN — SODIUM CHLORIDE 125 ML/HR: 9 INJECTION, SOLUTION INTRAVENOUS at 21:14

## 2024-03-22 RX ADMIN — CARVEDILOL 3.12 MG: 3.12 TABLET, FILM COATED ORAL at 08:52

## 2024-03-22 RX ADMIN — CARVEDILOL 3.12 MG: 3.12 TABLET, FILM COATED ORAL at 17:22

## 2024-03-22 RX ADMIN — SUCRALFATE 1 G: 1 TABLET ORAL at 17:22

## 2024-03-22 RX ADMIN — INSULIN LISPRO 6 UNITS: 100 INJECTION, SOLUTION INTRAVENOUS; SUBCUTANEOUS at 08:51

## 2024-03-22 RX ADMIN — LORAZEPAM 2 MG: 2 INJECTION, SOLUTION INTRAMUSCULAR; INTRAVENOUS at 02:58

## 2024-03-22 RX ADMIN — FAMOTIDINE 20 MG: 10 INJECTION INTRAVENOUS at 21:12

## 2024-03-22 RX ADMIN — INSULIN LISPRO 4 UNITS: 100 INJECTION, SOLUTION INTRAVENOUS; SUBCUTANEOUS at 13:43

## 2024-03-22 RX ADMIN — LORAZEPAM 1 MG: 1 TABLET ORAL at 11:01

## 2024-03-22 RX ADMIN — INSULIN GLARGINE 25 UNITS: 100 INJECTION, SOLUTION SUBCUTANEOUS at 21:13

## 2024-03-22 RX ADMIN — INSULIN LISPRO 4 UNITS: 100 INJECTION, SOLUTION INTRAVENOUS; SUBCUTANEOUS at 21:13

## 2024-03-22 RX ADMIN — THIAMINE HYDROCHLORIDE 200 MG: 100 INJECTION, SOLUTION INTRAMUSCULAR; INTRAVENOUS at 04:56

## 2024-03-22 RX ADMIN — INSULIN LISPRO 6 UNITS: 100 INJECTION, SOLUTION INTRAVENOUS; SUBCUTANEOUS at 17:21

## 2024-03-22 RX ADMIN — SUCRALFATE 1 G: 1 TABLET ORAL at 21:13

## 2024-03-22 RX ADMIN — FOLIC ACID 1 MG: 1 TABLET ORAL at 08:51

## 2024-03-22 RX ADMIN — INSULIN GLARGINE 25 UNITS: 100 INJECTION, SOLUTION SUBCUTANEOUS at 08:51

## 2024-03-22 RX ADMIN — Medication 10 ML: at 08:52

## 2024-03-22 RX ADMIN — THIAMINE HYDROCHLORIDE 200 MG: 100 INJECTION, SOLUTION INTRAMUSCULAR; INTRAVENOUS at 21:13

## 2024-03-22 RX ADMIN — Medication 10 ML: at 21:14

## 2024-03-22 NOTE — CASE MANAGEMENT/SOCIAL WORK
Discharge Planning Assessment  [facility]     Patient Name: Wu Damon  MRN: [id number]  Today's Date: 3/22/2024    Admit Date: 3/21/2024    Plan: Home   Discharge Needs Assessment       Row Name 03/22/24 1514       Living Environment    People in Home child(bharti), dependent;spouse    Current Living Arrangements home    Potentially Unsafe Housing Conditions none    Primary Care Provided by self    Provides Primary Care For no one    Family Caregiver if Needed spouse    Quality of Family Relationships supportive       Resource/Environmental Concerns    Resource/Environmental Concerns none       Transition Planning    Patient/Family Anticipates Transition to home    Patient/Family Anticipated Services at Transition none    Transportation Anticipated family or friend will provide       Discharge Needs Assessment    Equipment Currently Used at Home none    Concerns to be Addressed no discharge needs identified    Equipment Needed After Discharge none                   Discharge Plan       Row Name 03/22/24 1516       Plan    Plan Home    Patient/Family in Agreement with Plan yes    Plan Comments Met with pt at bedside. Introduced self, explained CCP role, facesheet verified. Pt states he lives with wife and children and is independent with ADLs.  No history of DME, HH, or SNF.  Does outpt ALCIRA treatment with [facility] and goes to suboxone clinic monthly.  Plans to return home at discharge, no identified needs. CCP will follow.  SURAJ Berumen RN                  Continued Care and Services - Admitted Since 3/21/2024    No active coordination exists for this encounter.          Demographic Summary       Row Name 03/22/24 1513       General Information    Admission Type inpatient    Arrived From home    Referral Source admission list    Reason for Consult discharge planning    Preferred Language English       Contact Information    Permission Granted to Share Info With family/designee  Minerva Damon (spouse)  [phone]                   Functional Status    No documentation.                  Psychosocial    No documentation.                  Abuse/Neglect    No documentation.                  Legal    No documentation.                  Substance Abuse    No documentation.                  Patient Forms    No documentation.                     Dionna Berumen RN

## 2024-03-22 NOTE — SIGNIFICANT NOTE
03/22/24 1552   OTHER   Discipline physical therapist   Rehab Time/Intention   Session Not Performed other (see comments)  (spoke w pt this afternoon. He denies any mobility issues or concerns at this time. He is independent w all mobility. Acute PT not indicated. Will sign off.)

## 2024-03-22 NOTE — CONSULTS
[facility] Consult Note      Patient Name: Wu Damon  : 1975  MRN: [id number]  Primary Care Physician:  Juan Carlos Kwan APRN  Referring Physician: Orville Espinoza DO  Date of admission: 3/21/2024    Subjective     Reason for Consult: Acute kidney injury    HPI:   Wu Damon is a 48 y.o. male with past medical history of history of tobacco abuse,  longstanding diabetes mellitus type 2 insulin-dependent over 20 years, with diabetic neuropathy, alcohol abuse for 5 beers a day for the last 10 years, hypertension, hypogonadism on testosterone and dyslipidemia    Patient presents for onset of nausea  with intractable emesis accompanied with abdominal discomfort and decreased oral intake associated with an unquantified weight loss.  Patient denies any previous similar episodes.  Patient denies use of marijuana, patient denies any sick contacts or diarrhea.    In the emergency department patient was found to have acute kidney injury nephrology consultation requested for the management,     Review of Systems:   14 point review of systems is otherwise negative except for mentioned above on HPI    Personal History     Past Medical History:   Diagnosis Date    Alcohol abuse     Diabetes mellitus     Hypertension     Nausea with vomiting 3/21/2024       Past Surgical History:   Procedure Laterality Date    EYE SURGERY      HERNIA REPAIR         Family History: family history includes Alcohol abuse in his father, maternal aunt, maternal grandfather, maternal grandmother, maternal uncle, mother, paternal aunt, paternal grandfather, paternal grandmother, paternal uncle, and sister; Asthma in his daughter, maternal grandmother, and mother; Cancer in his maternal grandmother, mother, and paternal aunt; Diabetes in his daughter and paternal aunt; Drug abuse in his maternal grandmother, mother, and sister; Early death in his mother; Hypertension in his maternal grandmother.    Social  History:  reports that he quit smoking about 18 years ago. His smoking use included cigarettes and cigars. He started smoking about 20 years ago. He has a 4.7 pack-year smoking history. He has quit using smokeless tobacco.  His smokeless tobacco use included chew. He reports current alcohol use of about 21.0 standard drinks of alcohol per week. He reports current drug use.    Home Medications:  Prior to Admission medications    Medication Sig Start Date End Date Taking? Authorizing Provider   buprenorphine-naloxone (SUBOXONE) 8-2 MG per SL tablet Place 1.5 tablets under the tongue Daily. Does the injection monthly.  Should have had injection today 3/21/24   Yes Kaylee Leal MD   carvedilol (Coreg) 3.125 MG tablet Take 1 tablet by mouth 2 (Two) Times a Day With Meals. 10/12/21  Yes Nesha Castillo MD   fenofibrate (TRICOR) 48 MG tablet Take 1 tablet by mouth Daily. 1/6/17  Yes Shruthi Cazares MD   folic acid (FOLVITE) 1 MG tablet Take 1 tablet by mouth Daily. 10/12/21  Yes Nesha Castillo MD   losartan (COZAAR) 50 MG tablet Take 1 tablet by mouth Daily.   Yes Kaylee Leal MD   multivitamin (One-Daily Multi-Vitamin) tablet tablet Take 1 tablet by mouth Daily. 10/12/21  Yes Nesha Castillo MD   Testosterone Cypionate 200 MG/ML solution Inject 0.5 mL as directed 1 (One) Time Per Week.   Yes Kaylee Leal MD   thiamine (thiamine) 100 MG tablet tablet Take 1 tablet by mouth Daily. 10/12/21  Yes Nesha Castillo MD   zolpidem CR (Ambien CR) 12.5 MG CR tablet Take 1 tablet by mouth At Night As Needed for Sleep.   Yes Kaylee Leal MD   insulin glargine (LANTUS, SEMGLEE) 100 UNIT/ML injection Inject 25 Units under the skin into the appropriate area as directed Every 12 (Twelve) Hours. 2/1/22   Marisabel Singh APRN       Allergies:  Allergies   Allergen Reactions    Avelox [Moxifloxacin Hcl] Shortness Of Breath    Citalopram Hydrobromide Unknown - Low Severity     Meloxicam Unknown - Low Severity    Metformin And Related Diarrhea    Quetiapine      Fatigue and nausea    Amlodipine Palpitations     HEART PALPITATIONS- ANXIETY       Objective     Vitals:   Temp:  [97.6 °F (36.4 °C)-98.7 °F (37.1 °C)] 98.7 °F (37.1 °C)  Heart Rate:  [106-123] 106  Resp:  [16] 16  BP: (104-147)/(68-87) 104/68    Intake/Output Summary (Last 24 hours) at 3/22/2024 1338  Last data filed at 3/22/2024 1336  Gross per 24 hour   Intake 2500 ml   Output 700 ml   Net 1800 ml       Physical Exam:   Constitutional: Awake, alert, no acute distress.  HEENT: Sclera anicteric, no conjunctival injection  Neck: Supple, no thyromegaly, no lymphadenopathy, trachea at midline, no JVD  Respiratory: Clear to auscultation bilaterally, nonlabored respiration  Cardiovascular: RRR, no murmurs, no rubs or gallops, no carotid bruit  Gastrointestinal: Positive bowel sounds, abdomen is soft, nontender and nondistended  : No palpable bladder  Musculoskeletal: No edema, no clubbing or cyanosis  Psychiatric: Appropriate affect, cooperative  Neurologic: Oriented x3, moving all extremities, normal speech and mental status  Skin: Warm and dry       Scheduled Meds:     carvedilol, 3.125 mg, Oral, BID With Meals  folic acid, 1 mg, Oral, Daily  insulin glargine, 25 Units, Subcutaneous, Q12H  insulin lispro, 2-9 Units, Subcutaneous, 4x Daily AC & at Bedtime  sodium chloride, 10 mL, Intravenous, Q12H  thiamine (B-1) IV, 200 mg, Intravenous, Q8H   Followed by  [START ON 3/27/2024] thiamine, 100 mg, Oral, Daily      IV Meds:   sodium chloride, 125 mL/hr, Last Rate: 150 mL/hr (03/21/24 2032)        Results Reviewed:   I have personally reviewed the results from the time of this admission to 3/22/2024 13:38 EDT     Lab Results   Component Value Date    GLUCOSE 206 (H) 03/22/2024    CALCIUM 8.6 03/22/2024     (L) 03/22/2024    K 3.6 03/22/2024    CO2 22.1 03/22/2024    CL 91 (L) 03/22/2024    BUN 16 03/22/2024    CREATININE 3.27 (H)  03/22/2024    EGFRIFAFRI >60 08/31/2022    BCR 4.9 (L) 03/22/2024    ANIONGAP 17.9 (H) 03/22/2024      Lab Results   Component Value Date    MG 1.5 (L) 08/31/2022    PHOS 1.3 (C) 10/12/2021    ALBUMIN 4.1 03/22/2024     US Abdomen Limited    Result Date: 3/22/2024  US ABDOMEN LIMITED-  CLINICAL: Dyspepsia, nausea, vomiting.  COMPARISON: CT of the abdomen and pelvis dated 03/21/2024.  FINDINGS: Diffusely increased hepatic echotexture consistent with fatty infiltration. No focal liver lesion. Small amount of sludge demonstrated within the dependent portion of the gallbladder. No gallstones or gallbladder wall thickening nor pericholecystic fluid is demonstrated. CBD diameter is normal at 6 mm.  Color Doppler and spectral analysis demonstrates normal hepatopetal flow within the main portal vein.  The right kidney measures 12.9 cm in length and is normal in appearance. No calculus or obstructive uropathy. Overall renal cortical echotexture is within normal limits. No free fluid is seen within the right upper quadrant.  CONCLUSION: Diffuse fatty infiltration of the liver, gallbladder sludge.  This report was finalized on 3/22/2024 7:03 AM by Dr. Demetri Urbano M.D on Workstation: [id number]        Assessment / Plan       Acute renal failure    Type 2 diabetes mellitus with hyperglycemia    HTN (hypertension)    HLD (hyperlipidemia)    Hyponatremia    Liver lesion    Hypokalemia    Transaminitis    Dehydration    Nausea with vomiting    Hepatic steatosis    History of alcohol use      ASSESSMENT:    Nonoliguric acute kidney injury secondary to prerenal state from intractable nausea and emesis, agree with IV fluid challenge.  Urinalysis bland last UPCR 190 CT scan abdomen pelvis.  No hydronephrosis or nephrolithiasis    Presumed hypovolemic hyponatremia.  Currently on IV Hydration will follow sodium trend    Intractable nausea and emesis concern for diabetic gastroparesis versus gastroesophageal flux disease from chronic  alcohol intake.  Currently on Zofran, will add sucralfate and famotidine IV    Diabetes mellitus type 2 over 20 years insulin-dependent with complications as per primary team    Alcohol abuse for 10 years  of daily alcohol intake,  counseling provided.  Accompanied with hepatic acidosis as per primary team    PLAN:  -Agree with fluid challenge with IV fluids  -Continue to avoid nephrotoxins and closely monitor urine output  -Continue surveillance labs, will repeat UPCR      Thank you for involving us in the care of Wu Damon.  Please feel free to call with any questions.    Itz Kitchen MD  03/22/24  13:38 EDT    [facility]  [phone]      Please note that portions of this note were completed with a voice recognition program.

## 2024-03-22 NOTE — CONSULTS
"  Access center consult.    Met with patient in room 561. Upon entering room patient sitting up in bed using laptop. Introduced self and role. Patient agreed to be evaluated.     Patient is a 47 yo MBM. He is alert and oriented x3, pleasant and cooperative. He lives at home with spouse and 3 children. Yazidi: N/A. Occupation: na. Hobbies: spending time with his children. Education: high school. Legal: past incarceration. : Minerva Damon/spouse [phone]. : na. Support system: family, \"and my group at [facility].\" History of violence/trauma/abuse: trauma, \"loss my sister 1 year ago, and loss my mother.\" Childhood trauma. My Mother killed her boyfriend. Patient feels safe in his home environment.    Access consulted for alcohol.Patient seen by Access multiple times in the past for alcohol and resources given. Patient seen in the past by chemical dependency  [facility]. Patient presented to the ED 3/21/24 with c/o acute N/V and abdominal pain. Past medical history HTN, DM, alcohol abuse. No BAL/UDS available. Patient reports last alcohol use 3/20/24. He reports his alcohol intake is 4-6 \"beat box or [employer]\" not daily but \"occasional.\"  He reports his alcohol intake has decreased from 1/2 gallon of liquor 5-6 years ago to current usage. Patient reports past rehab with [facility] 7-8days stating \"they were misleading and I left.\" Patient denies any seizures, craving \"a little bit.\" He reports initial use of ETOH began age 27yo. Pt. denies having a problem with alcohol.     He reports currently goes to [facility] for sublocade treatment and monthly groups. He reports has been with [facility] for 6 years. Patient reports sublocade treatment began in 2016 for \"taking pills/opiates.\" He discussed initially being prescribed lortab after a dental procedure stating \"I enjoyed them.\"He reports history with pain management clinic.  Patient reports longest sobriety 5 years. He discussed " "medical issues have worsened due to substance use. He is interested in abstaining and feels may be able to \"do it own my own.\" Discussed ALCIRA treatment would be beneficial in assisting with cessation.  Patient denies having a substance use problem. He feels able to discontinue use at any time on his own. Patient reports past withdrawal \"flu like\" symptoms.     Throughout encounter patient talkative, requiring frequent re-direction. Patient denies any past inpatient psychiatric care. Patient denies SI/HI/A/V/H. Sleep/appetite: poor. PTA medication ambien. Depression: 2-3/10. Anxiety: 10/10. Patient discussed loss of job 2 weeks ago. He discussed ongoing \"real bad anxiety, my mind racing.\" Patient discussed will continue sublocade and request treatment facility closer to home. Provided ALCIRA/support group and outpatient psychiatry resources. Report given to primary RN. Access will follow.   "

## 2024-03-22 NOTE — PROGRESS NOTES
Name: Wu Damon ADMIT: 3/21/2024   : 1975  PCP: Juan Carlos Kwan TON ALEXIA    MRN: [id number] LOS: 1 days   AGE/SEX: 48 y.o. male  ROOM: [facility]   Subjective   Chief Complaint   Patient presents with    Abdominal Pain    Hypertension     H/o alcohol abuse  H/o previous admissions for ETOH w/d  Has been having some n/v abd pain for a few weeks  Mostly LUQ    ROS  No f/c  + n/v  No cp/palp  No soa/cough    Objective   Vital Signs  Temp:  [97.6 °F (36.4 °C)-98.7 °F (37.1 °C)] 98.7 °F (37.1 °C)  Heart Rate:  [106-123] 106  Resp:  [16] 16  BP: (104-147)/(68-87) 104/68  SpO2:  [91 %-100 %] 93 %  on   ;   Device (Oxygen Therapy): room air  Body mass index is 37.9 kg/m².    Physical Exam  HENT:      Head: Normocephalic and atraumatic.   Eyes:      General: No scleral icterus.  Cardiovascular:      Rate and Rhythm: Regular rhythm. Tachycardia present.      Heart sounds: Normal heart sounds.   Pulmonary:      Effort: Pulmonary effort is normal. No respiratory distress.      Breath sounds: Normal breath sounds.   Abdominal:      General: There is no distension.      Palpations: Abdomen is soft.      Tenderness: There is no abdominal tenderness.   Musculoskeletal:      Cervical back: Neck supple.   Neurological:      Mental Status: He is alert.   Psychiatric:         Behavior: Behavior normal.     Needs some re-directing    Results Review:       I reviewed the patient's new clinical results.  Results from last 7 days   Lab Units 24  0552 24  1721   WBC 10*3/mm3 7.78 8.66   HEMOGLOBIN g/dL 12.6* 13.6   PLATELETS 10*3/mm3 133* 148     Results from last 7 days   Lab Units 24  0552 24  1721   SODIUM mmol/L 131* 129*   POTASSIUM mmol/L 3.6 3.3*   CHLORIDE mmol/L 91* 86*   CO2 mmol/L 22.1 23.0   BUN mg/dL 16 17   CREATININE mg/dL 3.27* 3.81*   GLUCOSE mg/dL 206* 272*   Estimated Creatinine Clearance: 36.7 mL/min (A) (by C-G formula based on SCr of 3.27 mg/dL (H)).  Results from last 7 days  "  Lab Units 03/22/24  0552 03/21/24  1721   ALBUMIN g/dL 4.1 4.7   BILIRUBIN mg/dL 2.2* 2.2*   ALK PHOS U/L 92 112   AST (SGOT) U/L 130* 139*   ALT (SGPT) U/L 103* 115*     Results from last 7 days   Lab Units 03/22/24  0552 03/21/24  1721   CALCIUM mg/dL 8.6 9.4   ALBUMIN g/dL 4.1 4.7         Coag     HbA1C   Lab Results   Component Value Date    HGBA1C 7.90 (H) 03/22/2024    HGBA1C 6.7 (H) 11/15/2023    HGBA1C 8.43 (H) 01/31/2022     Infection     Radiology(recent) CT Abdomen Pelvis Without Contrast    Result Date: 3/21/2024    1. No acute inflammatory process of bowel is identified, follow-up as indications persist.  2. No urolithiasis or hydronephrosis.  3. Redemonstration of severe hepatic steatosis with 3 indeterminate stable liver lesions. Possible cholelithiasis.  This report was finalized on 3/21/2024 7:00 PM by Dr. Chinedu Vizcarra M.D on Workstation: [id number]     No results found for: \"TROPONINT\", \"TROPONINI\", \"BNP\"  No components found for: \"TSH;2\"    carvedilol, 3.125 mg, Oral, BID With Meals  folic acid, 1 mg, Oral, Daily  insulin glargine, 25 Units, Subcutaneous, Q12H  insulin lispro, 2-9 Units, Subcutaneous, 4x Daily AC & at Bedtime  sodium chloride, 10 mL, Intravenous, Q12H  thiamine (B-1) IV, 200 mg, Intravenous, Q8H   Followed by  [START ON 3/27/2024] thiamine, 100 mg, Oral, Daily      sodium chloride, 150 mL/hr, Last Rate: 150 mL/hr (03/21/24 2032)    Diet: Liquid; Clear Liquid; Fluid Consistency: Thin (IDDSI 0)      Assessment & Plan      Active Hospital Problems    Diagnosis  POA    **Acute renal failure [N17.9]  Yes    Dehydration [E86.0]  Yes    Nausea with vomiting [R11.2]  Yes    Hepatic steatosis [K76.0]  Yes    History of alcohol use [Z87.898]  Not Applicable    Transaminitis [R74.01]  Yes    Hypokalemia [E87.6]  Yes    Liver lesion [K76.9]  Yes    Hyponatremia [E87.1]  Yes    Type 2 diabetes mellitus with hyperglycemia [E11.65]  Yes    HTN (hypertension) [I10]  Yes    HLD " (hyperlipidemia) [E78.5]  Yes      Resolved Hospital Problems   No resolved problems to display.       Mr. Damon is a 48-year-old male with history of type 2 diabetes, hypertension, hyperlipidemia, alcohol use who presents to the emergency room with nausea and vomiting and abdominal pain. He was found to have acute renal failure     Acute renal failure/hyponatremia/dehydration  -Normal saline at 150 cc an hour overnight  -Repeat CMP, CBC in a.m.  -Nephrology consultation      Nausea vomiting/hepatic steatosis/transaminitis  -PRN agents  -U/S with fatty liver, GB sludge  -Elevated LFTs may be related to alcohol use, monitor. No evidence for acute cholyctitis or pancreatitis     Transaminitis/alcohol use  -CIWA protocol initiated  -Patient started on thiamine and folic acid  -Seizure precautions    Type 2 diabetes  -Accu-Cheks before meals and at bedtime with correctional dose insulin  -Continue long-acting insulin  -Hold oral diabetic medications at this time        VTE Prophylaxis - SCDs.      RICK Padron MD  [facility]  03/22/24  12:53 EDT

## 2024-03-22 NOTE — NURSING NOTE
[facility]  [facility]  [phone]    ACCESS CENTER STATEMENT OF DISPOSITION        I, Wu Damon, was assessed in the [facility] at [facility] on 3/22/2024.  I understand the recommendations below and what follow-up action is expected of me.    1.Coty Fuentes, PMHNP-Medications  [address] [place] [address]   [phone]    2.[facility]-Medications/Counseling  [address] [place] [address]   [phone]    3. [facility]-Medications/Counseling  [address] [place] [address]  [phone]    4.Arina Lim, PMHNP-Medications  [facility]   [address] [place] [address]  [phone]    5. Dr. Mariela Franklin-Medications [facility] [address]  [phone]    Suboxone Treatment  1.[facility] [address] [place] [address], [place] [id number] [phone]    2.[facility]  [address] [place] [address]  [phone]    3. [facility]  [address], [place] [address] [phone]    Support Groups  1.Narcotics Anonymous  www.[place].net/meetings.html  [phone]    2.[facility]  www.smartrecovery.org/local                        ________________________________  Patient/Parent/Guardian/POA Signature    ________________________________  Clinician Signature    3/22/2024  08:53 EDT

## 2024-03-22 NOTE — THERAPY DISCHARGE NOTE
Acute Care - Occupational Therapy Discharge  [facility]    Patient Name: Wu Damon  : 1975    MRN: [id number]                              Today's Date: 3/22/2024       Admit Date: 3/21/2024    Visit Dx:     ICD-10-CM ICD-9-CM   1. Acute renal failure, unspecified acute renal failure type  N17.9 584.9   2. Nausea and vomiting, unspecified vomiting type  R11.2 787.01   3. Abdominal pain, unspecified abdominal location  R10.9 789.00     Patient Active Problem List   Diagnosis    Acute renal failure    Type 2 diabetes mellitus with hyperglycemia    HTN (hypertension)    HLD (hyperlipidemia)    Hyponatremia    Alcohol withdrawal syndrome, with delirium    Alcoholic hepatitis    Alcohol abuse    Suicidal ideations    Alcoholic myopathy    Chest pain    Alcoholic ketosis    Liver lesion    Hypokalemia    Alcohol withdrawal syndrome without complication    Alcoholic ketoacidosis    Transaminitis    Dehydration    Nausea with vomiting    Hepatic steatosis    History of alcohol use     Past Medical History:   Diagnosis Date    Alcohol abuse     Diabetes mellitus     Hypertension     Nausea with vomiting 3/21/2024     Past Surgical History:   Procedure Laterality Date    EYE SURGERY      HERNIA REPAIR        General Information       Row Name 24 1314          OT Time and Intention    Document Type evaluation;therapy note (daily note)  -LE     Mode of Treatment individual therapy;occupational therapy  -LE       Row Name 24 1314          General Information    Patient Profile Reviewed yes  -LE     Prior Level of Function independent:;transfer;ADL's  -LE     Existing Precautions/Restrictions --  discuss with RN and agree continue with exit alarm due to seizure and CIWA  -LE       Row Name 24 1314          Living Environment    People in Home child(bharti), dependent;spouse  -LE       Row Name 24 1314          Cognition    Orientation Status (Cognition) oriented x 4  -LE       Row Name  03/22/24 1314          Safety Issues, Functional Mobility    Comment, Safety Issues/Impairments (Mobility) non skid socks and gait belt worn.  -LE               User Key  (r) = Recorded By, (t) = Taken By, (c) = Cosigned By      Initials Name Provider Type    Sherrie Vargas OTR Occupational Therapist                   Mobility/ADL's       Row Name 03/22/24 1316          Bed Mobility    Bed Mobility supine-sit;sit-supine  -LE     Supine-Sit [place] (Bed Mobility) independent  -LE     Sit-Supine [place] (Bed Mobility) independent  -LE       Row Name 03/22/24 1316          Transfers    Transfers sit-stand transfer;stand-sit transfer;toilet transfer  -LE     Comment, (Transfers) set up for mobility as OT manages IV pole.  -LE       Row Name 03/22/24 1316          Sit-Stand Transfer    Sit-Stand [place] (Transfers) set up  -LE       Row Name 03/22/24 1316          Stand-Sit Transfer    Stand-Sit [place] (Transfers) set up  -LE       Row Name 03/22/24 1316          Toilet Transfer    [place] Level (Toilet Transfer) set up  -LE       Row Name 03/22/24 1316          Functional Mobility    Functional Mobility- Ind. Level set up required  -LE     Functional Mobility- Comment bed to bathroom to bed.  decline offer to stop at sink to brush teeth.  -LE       Row Name 03/22/24 1316          Activities of Daily Living    BADL Assessment/Intervention toileting;feeding;grooming;lower body dressing;upper body dressing;bathing  -LE       Row Name 03/22/24 1316          Lower Body Dressing Assessment/Training    [place] Level (Lower Body Dressing) don;doff;shoes/slippers;independent  -LE     Position (Lower Body Dressing) edge of bed sitting  -LE       Row Name 03/22/24 1316          Self-Feeding Assessment/Training    Comment, (Feeding) denies difficulty.  -LE       Row Name 03/22/24 1316          Toileting Assessment/Training    Comment, (Toileting) did not void, denies concerns.  -LE                User Key  (r) = Recorded By, (t) = Taken By, (c) = Cosigned By      Initials Name Provider Type    Sherrie Vargas OTR Occupational Therapist                   Obj/Interventions       Row Name 03/22/24 1318          Sensory Assessment (Somatosensory)    Sensory Assessment (Somatosensory) UE sensation intact  -[facility] Name 03/22/24 1318          Vision Assessment/Intervention    Vision Assessment Comment reports dry eyes.  denies diplopia/blurrred.  -       Row Name 03/22/24 1318          Range of Motion Comprehensive    General Range of Motion bilateral upper extremity ROM WFL  -[facility] Name 03/22/24 1318          Strength Comprehensive (MMT)    Comment, General Manual Muscle Testing (MMT) Assessment B UE 4/5.  -       Row Name 03/22/24 1318          Balance    Balance Assessment sitting static balance;standing static balance;standing dynamic balance  -LE     Static Sitting Balance independent  -LE     Static Standing Balance independent  -LE     Dynamic Standing Balance independent  -LE     Comment, Balance no LOB or unsteadiness.  -LE               User Key  (r) = Recorded By, (t) = Taken By, (c) = Cosigned By      Initials Name Provider Type    Sherrie Vargas OTR Occupational Therapist                   Goals/Plan       Row Name 03/22/24 1321          Transfer Goal 1 (OT)    Activity/Assistive Device (Transfer Goal 1, OT) sit-to-stand/stand-to-sit;toilet  -LE     [place] Level/Cues Needed (Transfer Goal 1, OT) set-up required  -LE     Time Frame (Transfer Goal 1, OT) 1 day  -LE     Progress/Outcome (Transfer Goal 1, OT) goal met  -LE               User Key  (r) = Recorded By, (t) = Taken By, (c) = Cosigned By      Initials Name Provider Type    Sherrie Vargas OTR Occupational Therapist                   Clinical Impression       Row Name 03/22/24 1319          Pain Assessment    Pretreatment Pain Rating 5/10  -LE     Posttreatment Pain Rating 5/10  -LE     Pre/Posttreatment Pain Comment  throat/esophagus  -LE       Row Name 03/22/24 1319          Plan of Care Review    Plan of Care Reviewed With patient  -LE     Outcome Evaluation Pt admit with nausea/vomiting/abdominal pain. Pt lives at home with spouse and usually completes ADL and mobility without assist.  Pt presents in bed, is able to move OOB, walk to BR and jae shoes without assist or impaired balance.  Pt with functional B UE and denies ADL concerns.   At this time no further skilled acute care OT indicated.  Discuss with RN.  -LE       Row Name 03/22/24 1319          Therapy Assessment/Plan (OT)    Therapy Frequency (OT) evaluation only  -LE       Row Name 03/22/24 1319          Therapy Plan Review/Discharge Plan (OT)    Anticipated Discharge Disposition (OT) home  -LE       Row Name 03/22/24 1319          Vital Signs    O2 Delivery Pre Treatment room air  -LE     Pre Patient Position Supine  -LE     Intra Patient Position Standing  -LE     Post Patient Position Supine  -LE       Row Name 03/22/24 1319          Positioning and Restraints    Pre-Treatment Position in bed  -LE     Post Treatment Position bed  -LE     In Bed notified nsg;fowlers;call light within reach;encouraged to call for assist;exit alarm on  -LE               User Key  (r) = Recorded By, (t) = Taken By, (c) = Cosigned By      Initials Name Provider Type    Sherrie Vargas OTR Occupational Therapist                   Outcome Measures       Row Name 03/22/24 1322          How much help from another is currently needed...    Putting on and taking off regular lower body clothing? 3  -LE     Bathing (including washing, rinsing, and drying) 3  -LE     Toileting (which includes using toilet bed pan or urinal) 3  -LE     Putting on and taking off regular upper body clothing 3  -LE     Taking care of personal grooming (such as brushing teeth) 3  -LE     Eating meals 4  -LE     AM-PAC 6 Clicks Score (OT) 19  -LE       Row Name 03/22/24 1322          Functional Assessment     Outcome Measure Options AM-PAC 6 Clicks Daily Activity (OT)  -BERENICE               User Key  (r) = Recorded By, (t) = Taken By, (c) = Cosigned By      Initials Name Provider Type    LE Sherrie Patel OTR Occupational Therapist                  Occupational Therapy Education       Title: PT OT SLP Therapies (Done)       Topic: Occupational Therapy (Done)       Point: ADL training (Done)       Description:   Instruct learner(s) on proper safety adaptation and remediation techniques during self care or transfers.   Instruct in proper use of assistive devices.                  Learning Progress Summary             Patient Acceptance, E, Bed IU by BERENICE at 3/22/2024 1324    Comment: role of OT, evaluation results.                                         User Key       Initials Effective Dates Name Provider Type Discipline    BERENICE 06/16/21 -  Sherrie Patel OTR Occupational Therapist OT                  OT Recommendation and Plan  Therapy Frequency (OT): evaluation only  Plan of Care Review  Plan of Care Reviewed With: patient  Outcome Evaluation: Pt admit with nausea/vomiting/abdominal pain. Pt lives at home with spouse and usually completes ADL and mobility without assist.  Pt presents in bed, is able to move OOB, walk to BR and jae shoes without assist or impaired balance.  Pt with functional B UE and denies ADL concerns.   At this time no further skilled acute care OT indicated.  Discuss with RN.  Plan of Care Reviewed With: patient  Outcome Evaluation: Pt admit with nausea/vomiting/abdominal pain. Pt lives at home with spouse and usually completes ADL and mobility without assist.  Pt presents in bed, is able to move OOB, walk to BR and jae shoes without assist or impaired balance.  Pt with functional B UE and denies ADL concerns.   At this time no further skilled acute care OT indicated.  Discuss with RN.     Time Calculation:   Evaluation Complexity (OT)  Review Occupational Profile/Medical/Therapy History Complexity:  brief/low complexity  Assessment, Occupational Performance/Identification of Deficit Complexity: 1-3 performance deficits  Clinical Decision Making Complexity (OT): problem focused assessment/low complexity  Overall Complexity of Evaluation (OT): low complexity     Time Calculation- OT       Row Name 03/22/24 1325             Time Calculation- OT    OT Start Time 1011  -LE      OT Stop Time 1022  -LE      OT Time Calculation (min) 11 min  -LE      OT Received On 03/22/24  -LE         Untimed Charges    OT Eval/Re-eval Minutes 11  -LE         Total Minutes    Untimed Charges Total Minutes 11  -LE       Total Minutes 11  -LE                User Key  (r) = Recorded By, (t) = Taken By, (c) = Cosigned By      Initials Name Provider Type    Sherrie Vargas OTR Occupational Therapist                  Therapy Charges for Today       Code Description Service Date Service Provider Modifiers Qty    [id number]  OT EVAL LOW COMPLEXITY 2 3/22/2024 Sherrie Patel OTR GO 1               OT Discharge Summary  Anticipated Discharge Disposition (OT): home  Reason for Discharge: At baseline function  Discharge Destination: Home    FRANCE Stokes  3/22/2024

## 2024-03-22 NOTE — H&P
"    Patient Name:  Wu Damon  YOB: 1975  MRN:  [id number]  Admit Date:  3/21/2024  Patient Care Team:  Juan Calros Kwan APRN as PCP - General (Family Medicine)      Subjective   History Present Illness     Chief Complaint   Patient presents with    Abdominal Pain    Hypertension     History of Present Illness  Mr. Damon is a 48-year-old male with history of type 2 diabetes, hypertension, hyperlipidemia, alcohol use who presents to the emergency room with nausea and vomiting and abdominal pain.  Patient states had nausea and vomiting for about 3 weeks, it is intermittent, seems to be worse in the morning and after he eats anything.  He describes the pain as epigastric pain that does not radiate, it is more of an achy pain, \"feels like indigestion\".  It is better during the day, but becomes worse anytime he eats anything.  Patient does admit to drinking \"a few white claws\" daily.  He states abdominal pain actually is improved after having a few drinks of alcohol.  Patient is on Lantus daily, but has been out of his Trulicity for several days.  Patient denies having any abdominal pain like this in the past.  He has never been told he has any liver issues.  He states about a month ago he did fall about 6 feet from a ladder and landed on his abdomen, and these nausea vomiting type symptoms could have started after that but he is not exactly sure when the symptoms started.  He states he has been having emesis daily that is green bile, he has noted no blood in his emesis.  He denies any diarrhea or constipation, no blood in his stool.  In the emergency room sodium 129, potassium 3.3, anion gap 20, creatinine 3.81, BUN 17, glucose 272, , , total bilirubin 2.2, lipase 32, white blood cell count 8.6, hemoglobin 13.6, hematocrit 38.5.  Urinalysis was negative.  CT of abdomen shows no acute inflammatory process seen of bowel, no urolithiasis or hydronephrosis, severe hepatic steatosis " with 3 indeterminate stable liver lesions, possible cholelithiasis.      Review of Systems   Constitutional:  Negative for appetite change and fever.   HENT:  Negative for nosebleeds and trouble swallowing.    Eyes:  Negative for photophobia, redness and visual disturbance.   Respiratory:  Negative for cough, chest tightness, shortness of breath and wheezing.    Cardiovascular:  Negative for chest pain, palpitations and leg swelling.   Gastrointestinal:  Positive for abdominal distention, nausea and vomiting. Negative for abdominal pain, blood in stool, constipation and diarrhea.   Endocrine: Negative.    Genitourinary: Negative.    Musculoskeletal:  Negative for gait problem and joint swelling.   Skin: Negative.    Neurological:  Negative for dizziness, seizures, speech difficulty, light-headedness and headaches.   Hematological: Negative.    Psychiatric/Behavioral:  Negative for behavioral problems and confusion.         Personal History     Past Medical History:   Diagnosis Date    Alcohol abuse     Diabetes mellitus     Hypertension     Nausea with vomiting 3/21/2024     Past Surgical History:   Procedure Laterality Date    EYE SURGERY      HERNIA REPAIR       Family History   Problem Relation Age of Onset    Alcohol abuse Mother     Asthma Mother     Cancer Mother     Drug abuse Mother     Early death Mother     Alcohol abuse Father     Alcohol abuse Sister     Drug abuse Sister     Asthma Daughter     Diabetes Daughter     Alcohol abuse Maternal Aunt     Alcohol abuse Maternal Uncle     Alcohol abuse Paternal Aunt     Cancer Paternal Aunt     Diabetes Paternal Aunt     Alcohol abuse Paternal Uncle     Alcohol abuse Maternal Grandmother     Asthma Maternal Grandmother     Cancer Maternal Grandmother     Drug abuse Maternal Grandmother     Hypertension Maternal Grandmother     Alcohol abuse Maternal Grandfather     Alcohol abuse Paternal Grandmother     Alcohol abuse Paternal Grandfather      Social History      Tobacco Use    Smoking status: Former     Current packs/day: 0.00     Average packs/day: 2.0 packs/day for 2.3 years (4.7 ttl pk-yrs)     Types: Cigarettes, Cigars     Start date: 2003     Quit date: 2006     Years since quittin.2    Smokeless tobacco: Former     Types: Chew   Vaping Use    Vaping status: Never Used   Substance Use Topics    Alcohol use: Yes     Alcohol/week: 21.0 standard drinks of alcohol     Types: 21 Shots of liquor per week     Comment: fifth volka daily    Drug use: Yes     Comment: suboxone 12 days ago     No current facility-administered medications on file prior to encounter.     Current Outpatient Medications on File Prior to Encounter   Medication Sig Dispense Refill    buprenorphine-naloxone (SUBOXONE) 8-2 MG per SL tablet Place 1.5 tablets under the tongue Daily. Does the injection monthly.  Should have had injection today 3/21/24      carvedilol (Coreg) 3.125 MG tablet Take 1 tablet by mouth 2 (Two) Times a Day With Meals. 60 tablet 0    fenofibrate (TRICOR) 48 MG tablet Take 1 tablet by mouth Daily. 30 tablet 0    folic acid (FOLVITE) 1 MG tablet Take 1 tablet by mouth Daily. 30 tablet 0    losartan (COZAAR) 50 MG tablet Take 1 tablet by mouth Daily.      multivitamin (One-Daily Multi-Vitamin) tablet tablet Take 1 tablet by mouth Daily. 30 tablet 0    Testosterone Cypionate 200 MG/ML solution Inject 0.5 mL as directed 1 (One) Time Per Week.      thiamine (thiamine) 100 MG tablet tablet Take 1 tablet by mouth Daily. 30 tablet 0    zolpidem CR (Ambien CR) 12.5 MG CR tablet Take 1 tablet by mouth At Night As Needed for Sleep.      insulin glargine (LANTUS, SEMGLEE) 100 UNIT/ML injection Inject 25 Units under the skin into the appropriate area as directed Every 12 (Twelve) Hours. 10 mL 0     Allergies   Allergen Reactions    Avelox [Moxifloxacin Hcl] Shortness Of Breath    Citalopram Hydrobromide Unknown - Low Severity    Meloxicam Unknown - Low Severity    Metformin And  Related Diarrhea    Quetiapine      Fatigue and nausea    Amlodipine Palpitations     HEART PALPITATIONS- ANXIETY       Objective    Objective     Vital Signs  Temp:  [97.6 °F (36.4 °C)-98 °F (36.7 °C)] 97.8 °F (36.6 °C)  Heart Rate:  [111-123] 111  Resp:  [16] 16  BP: (104-147)/(74-87) 104/74  SpO2:  [94 %-100 %] 96 %  on   ;   Device (Oxygen Therapy): room air  There is no height or weight on file to calculate BMI.    Physical Exam  Vitals and nursing note reviewed.   Constitutional:       General: He is not in acute distress.     Appearance: He is well-developed.   HENT:      Head: Normocephalic.   Neck:      Vascular: No JVD.   Cardiovascular:      Rate and Rhythm: Normal rate and regular rhythm.      Comments: Normal sinus rhythm on monitor heart rate 70 during my exam, no chest pain, no peripheral edema  Pulmonary:      Effort: Pulmonary effort is normal.      Breath sounds: Normal breath sounds.      Comments: Lung sounds clear, sats 98% on room air  Abdominal:      General: There is distension.      Palpations: Abdomen is soft.      Tenderness: There is no abdominal tenderness.      Comments: Abdomen is rounded and firm, normal bowel sounds , no point tenderness.   Musculoskeletal:         General: Normal range of motion.      Cervical back: Normal range of motion.   Skin:     General: Skin is warm and dry.      Capillary Refill: Capillary refill takes less than 2 seconds.   Neurological:      General: No focal deficit present.      Mental Status: He is alert and oriented to person, place, and time.   Psychiatric:         Attention and Perception: Attention normal.         Mood and Affect: Mood normal.         Speech: Speech normal.         Behavior: Behavior normal.         Cognition and Memory: Cognition normal.         Results Review:  I reviewed the patient's new clinical results.  I reviewed the patient's new imaging results and agree with the interpretation.  I reviewed the patient's other test results  and agree with the interpretation  I personally viewed and interpreted the patient's EKG/Telemetry data  Discussed with ED provider.    Lab Results (last 24 hours)       Procedure Component Value Units Date/Time    POC Glucose Once [[id number]]  (Abnormal) Collected: 03/21/24 1701    Specimen: Blood Updated: 03/21/24 1702     Glucose 275 mg/dL     CBC & Differential [[id number]]  (Abnormal) Collected: 03/21/24 1721    Specimen: Blood Updated: 03/21/24 1731    Narrative:      The following orders were created for panel order CBC & Differential.  Procedure                               Abnormality         Status                     ---------                               -----------         ------                     CBC Auto Differential[[id number]]        Abnormal            Final result                 Please view results for these tests on the individual orders.    Comprehensive Metabolic Panel [[id number]]  (Abnormal) Collected: 03/21/24 1721    Specimen: Blood Updated: 03/21/24 1751     Glucose 272 mg/dL      BUN 17 mg/dL      Creatinine 3.81 mg/dL      Sodium 129 mmol/L      Potassium 3.3 mmol/L      Chloride 86 mmol/L      CO2 23.0 mmol/L      Calcium 9.4 mg/dL      Total Protein 8.1 g/dL      Albumin 4.7 g/dL      ALT (SGPT) 115 U/L      AST (SGOT) 139 U/L      Alkaline Phosphatase 112 U/L      Total Bilirubin 2.2 mg/dL      Globulin 3.4 gm/dL      A/G Ratio 1.4 g/dL      BUN/Creatinine Ratio 4.5     Anion Gap 20.0 mmol/L      eGFR 18.9 mL/min/1.73     Narrative:      GFR Normal >60  Chronic Kidney Disease <60  Kidney Failure <15      Lipase [[id number]]  (Normal) Collected: 03/21/24 1721    Specimen: Blood Updated: 03/21/24 1751     Lipase 32 U/L     CBC Auto Differential [[id number]]  (Abnormal) Collected: 03/21/24 1721    Specimen: Blood Updated: 03/21/24 1731     WBC 8.66 10*3/mm3      RBC 4.04 10*6/mm3      Hemoglobin 13.6 g/dL      Hematocrit 38.5 %      MCV 95.3 fL      MCH 33.7 pg      MCHC 35.3 g/dL       RDW 11.7 %      RDW-SD 39.8 fl      MPV 12.5 fL      Platelets 148 10*3/mm3      Neutrophil % 75.0 %      Lymphocyte % 13.3 %      Monocyte % 9.4 %      Eosinophil % 1.2 %      Basophil % 0.5 %      Immature Grans % 0.6 %      Neutrophils, Absolute 6.51 10*3/mm3      Lymphocytes, Absolute 1.15 10*3/mm3      Monocytes, Absolute 0.81 10*3/mm3      Eosinophils, Absolute 0.10 10*3/mm3      Basophils, Absolute 0.04 10*3/mm3      Immature Grans, Absolute 0.05 10*3/mm3      nRBC 0.0 /100 WBC     Urinalysis With Microscopic If Indicated (No Culture) - Urine, Clean Catch [[id number]]  (Abnormal) Collected: 03/21/24 1831    Specimen: Urine, Clean Catch Updated: 03/21/24 1852     Color, UA Yellow     Appearance, UA Clear     pH, UA 5.5     Specific Gravity, UA 1.008     Glucose,  mg/dL (2+)     Ketones, UA Negative     Bilirubin, UA Negative     Blood, UA Negative     Protein, UA Negative     Leuk Esterase, UA Negative     Nitrite, UA Negative     Urobilinogen, UA 0.2 E.U./dL    Narrative:      Urine microscopic not indicated.    POC Glucose Once [[id number]]  (Abnormal) Collected: 03/21/24 2031    Specimen: Blood Updated: 03/21/24 2034     Glucose 288 mg/dL             Imaging Results (Last 24 Hours)       Procedure Component Value Units Date/Time    CT Abdomen Pelvis Without Contrast [[id number]] Collected: 03/21/24 1856     Updated: 03/21/24 1903    Narrative:      CT ABDOMEN PELVIS WO CONTRAST-     INDICATIONS: Abdominal pain     TECHNIQUE: Radiation dose reduction techniques were utilized, including  automated exposure control and exposure modulation based on body size.  Unenhanced ABDOMEN AND PELVIS CT     COMPARISON: 10/10/2021     FINDINGS:     Diffuse low-density of the liver is noted, compatible with severe  steatosis, similar to prior exam. 3 enhancing liver lesions are again  demonstrated, do not appear significant change, largest measuring 3.3  cm, stable at similar level.     Poorly distinguished dependent  density in the gallbladder could  represent cholelithiasis; if indicated, ultrasound correlation could be  obtained.     The urinary bladder is only partly filled, limits assessment.     Otherwise unremarkable unenhanced appearance of the liver, spleen,  adrenal glands, pancreas, kidneys, bladder.     No bowel obstruction or abnormal bowel thickening is identified. The  appendix does not appear inflamed.     No free intraperitoneal gas or free fluid. Umbilical hernia of fat is  present.     Scattered small mesenteric and para-aortic lymph nodes are seen that are  not significant by size criteria.     Abdominal aorta is not aneurysmal.     The lung bases are clear.     Degenerative changes are seen in the spine. No acute fracture is  identified.             Impression:            1. No acute inflammatory process of bowel is identified, follow-up as  indications persist.     2. No urolithiasis or hydronephrosis.     3. Redemonstration of severe hepatic steatosis with 3 indeterminate  stable liver lesions. Possible cholelithiasis.     This report was finalized on 3/21/2024 7:00 PM by Dr. Chinedu Vizcarra M.D on Workstation: [id number]               Results for orders placed during the hospital encounter of 08/10/20    Transthoracic Echo Complete With Contrast if Necessary Per Protocol    Interpretation Summary  · Left ventricular systolic function is normal. Calculated EF = 63.0%. Estimated EF was in agreement with the calculated EF. Normal left ventricular cavity size noted. All left ventricular wall segments contract normally. Left ventricular wall thickness is consistent with mild concentric hypertrophy. Left ventricular diastolic function is normal.      No orders to display        Assessment/Plan     Active Hospital Problems    Diagnosis  POA    **Acute renal failure [N17.9]  Yes    Dehydration [E86.0]  Yes    Nausea with vomiting [R11.2]  Yes    Hepatic steatosis [K76.0]  Yes    History of alcohol use [Z87.898]   Not Applicable    Transaminitis [R74.01]  Yes    Hypokalemia [E87.6]  Yes    Liver lesion [K76.9]  Yes    Hyponatremia [E87.1]  Yes    Type 2 diabetes mellitus with hyperglycemia [E11.65]  Yes    HTN (hypertension) [I10]  Yes    HLD (hyperlipidemia) [E78.5]  Yes     Mr. Damon is a 48-year-old male with history of type 2 diabetes, hypertension, hyperlipidemia, alcohol use who presents to the emergency room with nausea and vomiting and abdominal pain.     Acute renal failure/hyponatremia/dehydration  -Normal saline at 150 cc an hour overnight  -Repeat CMP, CBC in a.m.  -Telemetry unit for monitoring  -CT abdomen was unremarkable  -If no improvement in BMP in a.m., may need nephrology consult  -Telemetry unit for monitoring  -Strict I's and O's    Nausea vomiting/hepatic steatosis/transaminitis  -Zofran for nausea control  -Will check acute hepatitis panel  -Recheck CMP in a.m.  -Abdominal ultrasound ordered  -Check GI PCR  -If no improvement of symptoms or worsening, may consider GI consult for further evaluation    Transaminitis/alcohol use  -CIWA protocol initiated  -Patient started on thiamine and folic acid  -Telemetry unit for monitoring  -Seizure precautions  -Recheck CMP in a.m.  -Normal saline at 150 cc an hour overnight    Type 2 diabetes  -Accu-Cheks before meals and at bedtime with correctional dose insulin  -Continue long-acting insulin  -Hold oral diabetic medications at this time  -Check hemoglobin A1c in a.m.    I discussed the patient's findings and my recommendations with patient.    VTE Prophylaxis - SCDs.  Code Status - Full code.       ALEXIA Guerrero  [facility]  03/21/24  23:42 EDT

## 2024-03-22 NOTE — PAYOR COMM NOTE
"Wu Pulliam (48 y.o. Male)     PLEASE SEE ATTACHED FOR INPT AUTH     REF #  [id number]    PLEASE CALL MELISSA DE LA O RN/ DEPT @ [phone]  OR FAX  DEPARTMENT @  [phone]    THANK YOU   MELISSA DE LA O RN  [facility]           Date of Birth   1975    Social Security Number       Address   [facility] [address] [place] [address]    Home Phone   [phone]    MRN   [id number]       Zoroastrianism   None    Marital Status                               Admission Date   3/21/24    Admission Type   Emergency    Admitting Provider   Orville Espinoza DO    Attending Provider   Andre Padron MD    Department, Room/Bed   [facility] 5 EAST, E561/1       Discharge Date       Discharge Disposition       Discharge Destination                                 Attending Provider: Andre Padron MD    Allergies: Avelox [Moxifloxacin Hcl], Citalopram Hydrobromide, Meloxicam, Metformin And Related, Quetiapine, Amlodipine    Isolation: None   Infection: None   Code Status: CPR    Ht: 177.8 cm (70\")   Wt: 120 kg (264 lb 1.8 oz)    Admission Cmt: None   Principal Problem: Acute renal failure [N17.9]                   Active Insurance as of 3/21/2024       Primary Coverage       Payor Plan Insurance Group Employer/Plan Group    [facility] [facility] PPO [id number]       Payor Plan Address Payor Plan Phone Number Payor Plan Fax Number Effective Dates    [address] [phone]  3/21/2024 - None Entered    [place] [address]         Subscriber Name Subscriber Birth Date Member ID       WU PULLIAM 1975 [id number]                     Emergency Contacts        (Rel.) Home Phone Work Phone Mobile Phone    Minerva Pulliam (Spouse) [phone] -- [phone]              [place]: NPI [id number]  Tax ID [id number]     History & Physical        Kailyn Bbo APRN at 03/21/24 2342       Attestation signed by " "Orville Espinoza, DO at 03/22/24 0141 (Updated)      Brief Attending Admission Attestation   I have seen and examined the patient, performing an independent face-to-face diagnostic evaluation with plan of care reviewed and developed with ALEXIA Guy.  The majority of medical decision making (>50%) for this encounter was completed by myself and discussed with this APRN.  Patient seen and examined before midnight, note delayed due to patient care.    Brief Summary Statement/HPI  48 y.o. male with history of hypertension, hyperlipidemia, type 2 diabetes mellitus, alcohol use, hepatic steatosis who presents to [facility] with a 3-week history of nausea and vomiting.  Symptoms began abruptly without clear evidence of inciting factor, have been intermittent but occurring more frequently in nature since onset.  He endorses associated intermittent epigastric \"achy\" nonradiating discomfort, abdominal bloating.  Denies fever, chills, chest pain, palpitations, dyspnea, diarrhea.  Patient is being admitted for further evaluation and management.  Remainder of detailed HPI is as noted above and has been reviewed by me for completeness.    Attending Physical Exam  Temp:  [97.6 °F (36.4 °C)-98 °F (36.7 °C)] 97.8 °F (36.6 °C)  Heart Rate:  [111-123] 111  Resp:  [16] 16  BP: (104-147)/(74-87) 104/74  Constitutional: Obese, chronically ill-appearing  HEENT: Normal conjunctiva, no scleral icterus  CV: Regular rate, regular rhythm  RESP: FIO2 21, clear to auscultation B/L, normal effort  GI: soft, slight distention, mild TTP  MSK: No gross deformities appreciated, no tenderness, no edema  Skin: Warm, dry. No rashes  Psych: Appropriate mood and affect.    Results Review:  I reviewed the patient's new clinical results.  I reviewed the patient's new imaging results and agree with the interpretation.  I reviewed the patient's other test results and agree with the interpretation  I personally viewed and interpreted the " "patient's EKG/Telemetry data    Assessment/Plan  48 y.o. male with history of hypertension, hyperlipidemia, type 2 diabetes mellitus, alcohol use, hepatic steatosis who presents to [facility] with a 3-week history of nausea and vomiting.     Acute kidney injury  Dehydration  - Etiology most likely pre-renal in nature, due to hypovolemia from prolonged decreased oral intake, coupled with GI losses.  - Creatinine on admission: 3.81. Baseline creatinine appears to be 1.  - Plan to give IVF now and closely monitor, order bladder scans to ensure no urinary retention. If no improvement, consider further imaging and Nephrology consult in AM.  - Avoid nephrotoxic medications, IVF, strict I/O, daily weights.    Nausea with vomiting  - Ongoing over the past 3 weeks intermittently, coupled with intermittent \"reflux\" symptoms, bloating and abdominal belching. Lipase 32. Further imaging being ordered, will order GI PCR. Will follow up work up and give supportive medications. Consider GI consult in AM.    Transaminitis  - LFT elevated on most recent labs. Could be in part due to ongoing ETOH use. Imaging showing hepatic steatosis and possible cholelithiasis so will order abdominal US, Tylenol/Salicylate level, hepatitis panel.    Hyponatremia  - Noted on labs, likely hypovolemic in nature. Give IVF for fluid challenge and monitor repeat labs. Consider further workup if no improvement noted.    Hypokalemia  - K low on most recent labs; Will replete via electrolyte protocol. Follow up repeat labs to guide ongoing management decisions. Replete PRN per protocol. Continue to monitor    Abnormal findings on CT AP  - Imaging noting \"3 indeterminate stable liver lesions.\" Consider AM GI consult in setting of additional acute GI issues.     Type 2 diabetes mellitus with hyperglycemia  - Most recent A1c: 6.70% (11/15/23)  - Home medication regimen: Glargine 25 units BID, resume this + add SSI.    Alcohol use  - Patient " endorses ongoing alcohol use, last drink in AM on 03/20/24.  - CIWA protocol, MV, thiamine, folic acid  - Telemetry, pulse oximetry, seizure precautions, aspiration precautions  - Consult Access center. Will follow their plans/recommendations. Greatly appreciate their help.    Hypertension  - Blood pressure appears stable and acceptable acutely at this time.  Hold Losartan, resume Coreg. Trend BP to guide ongoing management decisions.    Hyperlipidemia  - Hold Tricor in setting of transaminitis.      Active Hospital Problems    Diagnosis  POA    **Acute renal failure [N17.9]  Yes    Dehydration [E86.0]  Yes    Nausea with vomiting [R11.2]  Yes    Hepatic steatosis [K76.0]  Yes    History of alcohol use [Z87.898]  Not Applicable    Transaminitis [R74.01]  Yes    Hypokalemia [E87.6]  Yes    Liver lesion [K76.9]  Yes    Hyponatremia [E87.1]  Yes    Type 2 diabetes mellitus with hyperglycemia [E11.65]  Yes    HTN (hypertension) [I10]  Yes    HLD (hyperlipidemia) [E78.5]  Yes      Resolved Hospital Problems   No resolved problems to display.     See above section for further detailed assessment and plan developed with APRN which I have reviewed.  Electronically signed by Ovrille Espinoza DO                        Patient Name:  Wu Damon  YOB: 1975  MRN:  [id number]  Admit Date:  3/21/2024  Patient Care Team:  Juan Carlos Kwan APRN as PCP - General (Family Medicine)      Subjective  History Present Illness     Chief Complaint   Patient presents with    Abdominal Pain    Hypertension     History of Present Illness  Mr. Damon is a 48-year-old male with history of type 2 diabetes, hypertension, hyperlipidemia, alcohol use who presents to the emergency room with nausea and vomiting and abdominal pain.  Patient states had nausea and vomiting for about 3 weeks, it is intermittent, seems to be worse in the morning and after he eats anything.  He describes the pain as epigastric pain that does not  "radiate, it is more of an achy pain, \"feels like indigestion\".  It is better during the day, but becomes worse anytime he eats anything.  Patient does admit to drinking \"a few white claws\" daily.  He states abdominal pain actually is improved after having a few drinks of alcohol.  Patient is on Lantus daily, but has been out of his Trulicity for several days.  Patient denies having any abdominal pain like this in the past.  He has never been told he has any liver issues.  He states about a month ago he did fall about 6 feet from a ladder and landed on his abdomen, and these nausea vomiting type symptoms could have started after that but he is not exactly sure when the symptoms started.  He states he has been having emesis daily that is green bile, he has noted no blood in his emesis.  He denies any diarrhea or constipation, no blood in his stool.  In the emergency room sodium 129, potassium 3.3, anion gap 20, creatinine 3.81, BUN 17, glucose 272, , , total bilirubin 2.2, lipase 32, white blood cell count 8.6, hemoglobin 13.6, hematocrit 38.5.  Urinalysis was negative.  CT of abdomen shows no acute inflammatory process seen of bowel, no urolithiasis or hydronephrosis, severe hepatic steatosis with 3 indeterminate stable liver lesions, possible cholelithiasis.      Review of Systems   Constitutional:  Negative for appetite change and fever.   HENT:  Negative for nosebleeds and trouble swallowing.    Eyes:  Negative for photophobia, redness and visual disturbance.   Respiratory:  Negative for cough, chest tightness, shortness of breath and wheezing.    Cardiovascular:  Negative for chest pain, palpitations and leg swelling.   Gastrointestinal:  Positive for abdominal distention, nausea and vomiting. Negative for abdominal pain, blood in stool, constipation and diarrhea.   Endocrine: Negative.    Genitourinary: Negative.    Musculoskeletal:  Negative for gait problem and joint swelling.   Skin: Negative.  "   Neurological:  Negative for dizziness, seizures, speech difficulty, light-headedness and headaches.   Hematological: Negative.    Psychiatric/Behavioral:  Negative for behavioral problems and confusion.         Personal History     Past Medical History:   Diagnosis Date    Alcohol abuse     Diabetes mellitus     Hypertension     Nausea with vomiting 3/21/2024     Past Surgical History:   Procedure Laterality Date    EYE SURGERY      HERNIA REPAIR       Family History   Problem Relation Age of Onset    Alcohol abuse Mother     Asthma Mother     Cancer Mother     Drug abuse Mother     Early death Mother     Alcohol abuse Father     Alcohol abuse Sister     Drug abuse Sister     Asthma Daughter     Diabetes Daughter     Alcohol abuse Maternal Aunt     Alcohol abuse Maternal Uncle     Alcohol abuse Paternal Aunt     Cancer Paternal Aunt     Diabetes Paternal Aunt     Alcohol abuse Paternal Uncle     Alcohol abuse Maternal Grandmother     Asthma Maternal Grandmother     Cancer Maternal Grandmother     Drug abuse Maternal Grandmother     Hypertension Maternal Grandmother     Alcohol abuse Maternal Grandfather     Alcohol abuse Paternal Grandmother     Alcohol abuse Paternal Grandfather      Social History     Tobacco Use    Smoking status: Former     Current packs/day: 0.00     Average packs/day: 2.0 packs/day for 2.3 years (4.7 ttl pk-yrs)     Types: Cigarettes, Cigars     Start date: 2003     Quit date: 2006     Years since quittin.2    Smokeless tobacco: Former     Types: Chew   Vaping Use    Vaping status: Never Used   Substance Use Topics    Alcohol use: Yes     Alcohol/week: 21.0 standard drinks of alcohol     Types: 21 Shots of liquor per week     Comment: fifth volka daily    Drug use: Yes     Comment: suboxone 12 days ago     No current facility-administered medications on file prior to encounter.     Current Outpatient Medications on File Prior to Encounter   Medication Sig Dispense Refill     buprenorphine-naloxone (SUBOXONE) 8-2 MG per SL tablet Place 1.5 tablets under the tongue Daily. Does the injection monthly.  Should have had injection today 3/21/24      carvedilol (Coreg) 3.125 MG tablet Take 1 tablet by mouth 2 (Two) Times a Day With Meals. 60 tablet 0    fenofibrate (TRICOR) 48 MG tablet Take 1 tablet by mouth Daily. 30 tablet 0    folic acid (FOLVITE) 1 MG tablet Take 1 tablet by mouth Daily. 30 tablet 0    losartan (COZAAR) 50 MG tablet Take 1 tablet by mouth Daily.      multivitamin (One-Daily Multi-Vitamin) tablet tablet Take 1 tablet by mouth Daily. 30 tablet 0    Testosterone Cypionate 200 MG/ML solution Inject 0.5 mL as directed 1 (One) Time Per Week.      thiamine (thiamine) 100 MG tablet tablet Take 1 tablet by mouth Daily. 30 tablet 0    zolpidem CR (Ambien CR) 12.5 MG CR tablet Take 1 tablet by mouth At Night As Needed for Sleep.      insulin glargine (LANTUS, SEMGLEE) 100 UNIT/ML injection Inject 25 Units under the skin into the appropriate area as directed Every 12 (Twelve) Hours. 10 mL 0     Allergies   Allergen Reactions    Avelox [Moxifloxacin Hcl] Shortness Of Breath    Citalopram Hydrobromide Unknown - Low Severity    Meloxicam Unknown - Low Severity    Metformin And Related Diarrhea    Quetiapine      Fatigue and nausea    Amlodipine Palpitations     HEART PALPITATIONS- ANXIETY       Objective   Objective     Vital Signs  Temp:  [97.6 °F (36.4 °C)-98 °F (36.7 °C)] 97.8 °F (36.6 °C)  Heart Rate:  [111-123] 111  Resp:  [16] 16  BP: (104-147)/(74-87) 104/74  SpO2:  [94 %-100 %] 96 %  on   ;   Device (Oxygen Therapy): room air  There is no height or weight on file to calculate BMI.    Physical Exam  Vitals and nursing note reviewed.   Constitutional:       General: He is not in acute distress.     Appearance: He is well-developed.   HENT:      Head: Normocephalic.   Neck:      Vascular: No JVD.   Cardiovascular:      Rate and Rhythm: Normal rate and regular rhythm.      Comments:  Normal sinus rhythm on monitor heart rate 70 during my exam, no chest pain, no peripheral edema  Pulmonary:      Effort: Pulmonary effort is normal.      Breath sounds: Normal breath sounds.      Comments: Lung sounds clear, sats 98% on room air  Abdominal:      General: There is distension.      Palpations: Abdomen is soft.      Tenderness: There is no abdominal tenderness.      Comments: Abdomen is rounded and firm, normal bowel sounds , no point tenderness.   Musculoskeletal:         General: Normal range of motion.      Cervical back: Normal range of motion.   Skin:     General: Skin is warm and dry.      Capillary Refill: Capillary refill takes less than 2 seconds.   Neurological:      General: No focal deficit present.      Mental Status: He is alert and oriented to person, place, and time.   Psychiatric:         Attention and Perception: Attention normal.         Mood and Affect: Mood normal.         Speech: Speech normal.         Behavior: Behavior normal.         Cognition and Memory: Cognition normal.         Results Review:  I reviewed the patient's new clinical results.  I reviewed the patient's new imaging results and agree with the interpretation.  I reviewed the patient's other test results and agree with the interpretation  I personally viewed and interpreted the patient's EKG/Telemetry data  Discussed with ED provider.    Lab Results (last 24 hours)       Procedure Component Value Units Date/Time    POC Glucose Once [[id number]]  (Abnormal) Collected: 03/21/24 1701    Specimen: Blood Updated: 03/21/24 1702     Glucose 275 mg/dL     CBC & Differential [[id number]]  (Abnormal) Collected: 03/21/24 1721    Specimen: Blood Updated: 03/21/24 1731    Narrative:      The following orders were created for panel order CBC & Differential.  Procedure                               Abnormality         Status                     ---------                               -----------         ------                      CBC Auto Differential[[id number]]        Abnormal            Final result                 Please view results for these tests on the individual orders.    Comprehensive Metabolic Panel [[id number]]  (Abnormal) Collected: 03/21/24 1721    Specimen: Blood Updated: 03/21/24 1751     Glucose 272 mg/dL      BUN 17 mg/dL      Creatinine 3.81 mg/dL      Sodium 129 mmol/L      Potassium 3.3 mmol/L      Chloride 86 mmol/L      CO2 23.0 mmol/L      Calcium 9.4 mg/dL      Total Protein 8.1 g/dL      Albumin 4.7 g/dL      ALT (SGPT) 115 U/L      AST (SGOT) 139 U/L      Alkaline Phosphatase 112 U/L      Total Bilirubin 2.2 mg/dL      Globulin 3.4 gm/dL      A/G Ratio 1.4 g/dL      BUN/Creatinine Ratio 4.5     Anion Gap 20.0 mmol/L      eGFR 18.9 mL/min/1.73     Narrative:      GFR Normal >60  Chronic Kidney Disease <60  Kidney Failure <15      Lipase [[id number]]  (Normal) Collected: 03/21/24 1721    Specimen: Blood Updated: 03/21/24 1751     Lipase 32 U/L     CBC Auto Differential [[id number]]  (Abnormal) Collected: 03/21/24 1721    Specimen: Blood Updated: 03/21/24 1731     WBC 8.66 10*3/mm3      RBC 4.04 10*6/mm3      Hemoglobin 13.6 g/dL      Hematocrit 38.5 %      MCV 95.3 fL      MCH 33.7 pg      MCHC 35.3 g/dL      RDW 11.7 %      RDW-SD 39.8 fl      MPV 12.5 fL      Platelets 148 10*3/mm3      Neutrophil % 75.0 %      Lymphocyte % 13.3 %      Monocyte % 9.4 %      Eosinophil % 1.2 %      Basophil % 0.5 %      Immature Grans % 0.6 %      Neutrophils, Absolute 6.51 10*3/mm3      Lymphocytes, Absolute 1.15 10*3/mm3      Monocytes, Absolute 0.81 10*3/mm3      Eosinophils, Absolute 0.10 10*3/mm3      Basophils, Absolute 0.04 10*3/mm3      Immature Grans, Absolute 0.05 10*3/mm3      nRBC 0.0 /100 WBC     Urinalysis With Microscopic If Indicated (No Culture) - Urine, Clean Catch [[id number]]  (Abnormal) Collected: 03/21/24 1831    Specimen: Urine, Clean Catch Updated: 03/21/24 1852     Color, UA Yellow     Appearance, UA Clear      pH, UA 5.5     Specific Gravity, UA 1.008     Glucose,  mg/dL (2+)     Ketones, UA Negative     Bilirubin, UA Negative     Blood, UA Negative     Protein, UA Negative     Leuk Esterase, UA Negative     Nitrite, UA Negative     Urobilinogen, UA 0.2 E.U./dL    Narrative:      Urine microscopic not indicated.    POC Glucose Once [[id number]]  (Abnormal) Collected: 03/21/24 2031    Specimen: Blood Updated: 03/21/24 2034     Glucose 288 mg/dL             Imaging Results (Last 24 Hours)       Procedure Component Value Units Date/Time    CT Abdomen Pelvis Without Contrast [[id number]] Collected: 03/21/24 1856     Updated: 03/21/24 1903    Narrative:      CT ABDOMEN PELVIS WO CONTRAST-     INDICATIONS: Abdominal pain     TECHNIQUE: Radiation dose reduction techniques were utilized, including  automated exposure control and exposure modulation based on body size.  Unenhanced ABDOMEN AND PELVIS CT     COMPARISON: 10/10/2021     FINDINGS:     Diffuse low-density of the liver is noted, compatible with severe  steatosis, similar to prior exam. 3 enhancing liver lesions are again  demonstrated, do not appear significant change, largest measuring 3.3  cm, stable at similar level.     Poorly distinguished dependent density in the gallbladder could  represent cholelithiasis; if indicated, ultrasound correlation could be  obtained.     The urinary bladder is only partly filled, limits assessment.     Otherwise unremarkable unenhanced appearance of the liver, spleen,  adrenal glands, pancreas, kidneys, bladder.     No bowel obstruction or abnormal bowel thickening is identified. The  appendix does not appear inflamed.     No free intraperitoneal gas or free fluid. Umbilical hernia of fat is  present.     Scattered small mesenteric and para-aortic lymph nodes are seen that are  not significant by size criteria.     Abdominal aorta is not aneurysmal.     The lung bases are clear.     Degenerative changes are seen in the spine. No  acute fracture is  identified.             Impression:            1. No acute inflammatory process of bowel is identified, follow-up as  indications persist.     2. No urolithiasis or hydronephrosis.     3. Redemonstration of severe hepatic steatosis with 3 indeterminate  stable liver lesions. Possible cholelithiasis.     This report was finalized on 3/21/2024 7:00 PM by Dr. Chinedu Vizcarra M.D on Workstation: [id number]               Results for orders placed during the hospital encounter of 08/10/20    Transthoracic Echo Complete With Contrast if Necessary Per Protocol    Interpretation Summary  · Left ventricular systolic function is normal. Calculated EF = 63.0%. Estimated EF was in agreement with the calculated EF. Normal left ventricular cavity size noted. All left ventricular wall segments contract normally. Left ventricular wall thickness is consistent with mild concentric hypertrophy. Left ventricular diastolic function is normal.      No orders to display        Assessment/Plan     Active Hospital Problems    Diagnosis  POA    **Acute renal failure [N17.9]  Yes    Dehydration [E86.0]  Yes    Nausea with vomiting [R11.2]  Yes    Hepatic steatosis [K76.0]  Yes    History of alcohol use [Z87.898]  Not Applicable    Transaminitis [R74.01]  Yes    Hypokalemia [E87.6]  Yes    Liver lesion [K76.9]  Yes    Hyponatremia [E87.1]  Yes    Type 2 diabetes mellitus with hyperglycemia [E11.65]  Yes    HTN (hypertension) [I10]  Yes    HLD (hyperlipidemia) [E78.5]  Yes     Mr. Damon is a 48-year-old male with history of type 2 diabetes, hypertension, hyperlipidemia, alcohol use who presents to the emergency room with nausea and vomiting and abdominal pain.     Acute renal failure/hyponatremia/dehydration  -Normal saline at 150 cc an hour overnight  -Repeat CMP, CBC in a.m.  -Telemetry unit for monitoring  -CT abdomen was unremarkable  -If no improvement in BMP in a.m., may need nephrology consult  -Telemetry unit for  monitoring  -Strict I's and O's    Nausea vomiting/hepatic steatosis/transaminitis  -Zofran for nausea control  -Will check acute hepatitis panel  -Recheck CMP in a.m.  -Abdominal ultrasound ordered  -Check GI PCR  -If no improvement of symptoms or worsening, may consider GI consult for further evaluation    Transaminitis/alcohol use  -CIWA protocol initiated  -Patient started on thiamine and folic acid  -Telemetry unit for monitoring  -Seizure precautions  -Recheck CMP in a.m.  -Normal saline at 150 cc an hour overnight    Type 2 diabetes  -Accu-Cheks before meals and at bedtime with correctional dose insulin  -Continue long-acting insulin  -Hold oral diabetic medications at this time  -Check hemoglobin A1c in a.m.    I discussed the patient's findings and my recommendations with patient.    VTE Prophylaxis - SCDs.  Code Status - Full code.       ALEXIA Guerrero  [facility]  03/21/24  23:42 EDT      Electronically signed by Orville Espinoza DO at 03/22/24 0141          Emergency Department Notes        Alejandra Gomez, RN at 03/21/24 1928          Nursing report ED to floor  Wu Damon  48 y.o.  male    HPI :  HPI (Adult)  Stated Reason for Visit: htn, vomitning  History Obtained From: patient    Chief Complaint  Chief Complaint   Patient presents with    Abdominal Pain    Hypertension       Admitting doctor:   Orville Espinoza DO    Admitting diagnosis:   The primary encounter diagnosis was Acute renal failure, unspecified acute renal failure type. Diagnoses of Nausea and vomiting, unspecified vomiting type and Abdominal pain, unspecified abdominal location were also pertinent to this visit.    Code status:   Current Code Status       Date Active Code Status Order ID Comments User Context       Prior            Allergies:   Avelox [moxifloxacin hcl], Citalopram hydrobromide, Meloxicam, Metformin and related, Quetiapine, and Amlodipine    Isolation:   No active  isolations    Intake and Output  No intake or output data in the 24 hours ending 03/21/24 1928    Weight:   There were no vitals filed for this visit.    Most recent vitals:   Vitals:    03/21/24 1653 03/21/24 1709 03/21/24 1831 03/21/24 1832   BP:  147/86 125/79    Pulse: (!) 123 116 115 116   Resp: 16  16    Temp: 97.6 °F (36.4 °C)      TempSrc: Tympanic      SpO2: 96%  95% 94%       Active LDAs/IV Access:   Lines, Drains & Airways       Active LDAs       Name Placement date Placement time Site Days    Peripheral IV 03/21/24 1853 Right Hand 03/21/24 1853  Hand  less than 1                    Labs (abnormal labs have a star):   Labs Reviewed   COMPREHENSIVE METABOLIC PANEL - Abnormal; Notable for the following components:       Result Value    Glucose 272 (*)     Creatinine 3.81 (*)     Sodium 129 (*)     Potassium 3.3 (*)     Chloride 86 (*)     ALT (SGPT) 115 (*)     AST (SGOT) 139 (*)     Total Bilirubin 2.2 (*)     BUN/Creatinine Ratio 4.5 (*)     Anion Gap 20.0 (*)     eGFR 18.9 (*)     All other components within normal limits    Narrative:     GFR Normal >60  Chronic Kidney Disease <60  Kidney Failure <15     URINALYSIS W/ MICROSCOPIC IF INDICATED (NO CULTURE) - Abnormal; Notable for the following components:    Glucose,  mg/dL (2+) (*)     All other components within normal limits    Narrative:     Urine microscopic not indicated.   CBC WITH AUTO DIFFERENTIAL - Abnormal; Notable for the following components:    RBC 4.04 (*)     MCH 33.7 (*)     RDW 11.7 (*)     MPV 12.5 (*)     Lymphocyte % 13.3 (*)     Immature Grans % 0.6 (*)     All other components within normal limits   POCT GLUCOSE FINGERSTICK - Abnormal; Notable for the following components:    Glucose 275 (*)     All other components within normal limits   LIPASE - Normal   RAINBOW DRAW    Narrative:     The following orders were created for panel order [place] Draw.  Procedure                               Abnormality         Status                      ---------                               -----------         ------                     Green Top (Gel)[[id number]]                                  Final result               Lavender Top[[id number]]                                     Final result               Gold Top - SST[[id number]]                                   Final result               Light Blue Top[[id number]]                                   Final result                 Please view results for these tests on the individual orders.   POCT GLUCOSE FINGERSTICK   POCT GLUCOSE FINGERSTICK   POCT GLUCOSE FINGERSTICK   POCT GLUCOSE FINGERSTICK   CBC AND DIFFERENTIAL    Narrative:     The following orders were created for panel order CBC & Differential.  Procedure                               Abnormality         Status                     ---------                               -----------         ------                     CBC Auto Differential[[id number]]        Abnormal            Final result                 Please view results for these tests on the individual orders.   GREEN TOP   LAVENDER TOP   GOLD TOP - SST   LIGHT BLUE TOP       EKG:   No orders to display       Meds given in ED:   Medications   sodium chloride 0.9 % flush 10 mL (has no administration in time range)   sodium chloride 0.9 % flush 10 mL (has no administration in time range)   sodium chloride 0.9 % flush 10 mL (has no administration in time range)   sodium chloride 0.9 % infusion 40 mL (has no administration in time range)   nitroglycerin (NITROSTAT) SL tablet 0.4 mg (has no administration in time range)   sennosides-docusate (PERICOLACE) 8.6-50 MG per tablet 2 tablet (has no administration in time range)     And   polyethylene glycol (MIRALAX) packet 17 g (has no administration in time range)     And   bisacodyl (DULCOLAX) EC tablet 5 mg (has no administration in time range)     And   bisacodyl (DULCOLAX) suppository 10 mg (has no administration in time range)   sodium  chloride 0.9 % infusion (has no administration in time range)   dextrose (GLUTOSE) oral gel 15 g (has no administration in time range)   dextrose (D50W) (25 g/50 mL) IV injection 25 g (has no administration in time range)   glucagon (GLUCAGEN) injection 1 mg (has no administration in time range)   insulin lispro (HUMALOG/ADMELOG) injection 2-9 Units (has no administration in time range)   sodium chloride 0.9 % bolus 1,000 mL (1,000 mL Intravenous New Bag 3/21/24 1856)   ondansetron (ZOFRAN) injection 4 mg (4 mg Intravenous Given 3/21/24 1854)       Imaging results:  CT Abdomen Pelvis Without Contrast    Result Date: 3/21/2024    1. No acute inflammatory process of bowel is identified, follow-up as indications persist.  2. No urolithiasis or hydronephrosis.  3. Redemonstration of severe hepatic steatosis with 3 indeterminate stable liver lesions. Possible cholelithiasis.  This report was finalized on 3/21/2024 7:00 PM by Dr. Chinedu Vizcarra M.D on Workstation: [id number]       Ambulatory status:   Ad patrice    Social issues:   Social History     Socioeconomic History    Marital status:    Tobacco Use    Smoking status: Former     Current packs/day: 0.00     Average packs/day: 2.0 packs/day for 2.3 years (4.7 ttl pk-yrs)     Types: Cigarettes, Cigars     Start date: 2003     Quit date: 2006     Years since quittin.2    Smokeless tobacco: Former     Types: Chew   Vaping Use    Vaping status: Never Used   Substance and Sexual Activity    Alcohol use: Yes     Alcohol/week: 21.0 standard drinks of alcohol     Types: 21 Shots of liquor per week     Comment: fifth volka daily    Drug use: Yes     Comment: suboxone 12 days ago    Sexual activity: Defer     Partners: Female       Peripheral Neurovascular  Peripheral Neurovascular (Adult)  Peripheral Neurovascular WDL: WDL    Neuro Cognitive  Neuro Cognitive (Adult)  Cognitive/Neuro/Behavioral WDL: WDL    Learning       Respiratory  Respiratory  WDL  Respiratory WDL: WDL    Abdominal Pain       Pain Assessments  Pain (Adult)  (0-10) Pain Rating: Rest: 9  Pain Location: abdomen    NIH Stroke Scale       Alejandra Gomez RN  03/21/24 19:28 EDT     Electronically signed by Alejandra Gomez RN at 03/21/24 1928       Junior Dumont MD at 03/21/24 1737           EMERGENCY DEPARTMENT ENCOUNTER  Room Number:  31/31  PCP: Juan Carlos Kwan APRN  Independent Historians: Patient,      HPI:  Chief Complaint: had concerns including Abdominal Pain and Hypertension.     A complete HPI/ROS/PMH/PSH/SH/FH are unobtainable due to:   Chronic or social conditions impacting patient care (Social Determinants of Health):       Context: Wu Damon is a 48 y.o. male with a medical history of diabetes, hypertension who presents to the ED c/o acute nausea and vomiting, abdominal pain.  Patient states has had nausea and vomiting ongoing for about 2 or 3 weeks.  Today he developed some abdominal pain which is mostly in the upper abdomen.  There is a constant aching pain but occasionally gets increasing sharp pain in the upper abdomen.  This sharp pain is often brought on by movements or deep breath.  Denies chest pain or trouble breathing.  No recent fever.  No diarrhea or blood in the stool.  Denies dysuria.  Patient denies chronic medical problems.  Currently unemployed.  Drinks occasional alcohol, no tobacco  No prior abdominal surgeries  Primary care provider-ALEXIA Liu  Patient states that he takes 60 units of Lantus insulin every morning.  He has been out of Trulicity for several days.      Review of prior external notes (non-ED) -and- Review of prior external test results outside of this encounter:   I reviewed prior medical records note patient was last hospitalized here in 2022 with alcohol withdrawal.  Other chronic conditions include hypertension and hyperlipidemia.      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    Liver lesion 08/10/2020    Hypokalemia 08/12/2020    Alcohol withdrawal syndrome without complication 08/13/2021    Alcoholic ketoacidosis 10/10/2021    Transaminitis 01/30/2022     Resolved Ambulatory Problems     Diagnosis Date Noted    No Resolved Ambulatory Problems     Past Medical History:   Diagnosis Date    Diabetes mellitus     Hypertension     Nausea with vomiting 3/21/2024         PAST SURGICAL HISTORY  Past Surgical History:   Procedure Laterality Date    EYE SURGERY      HERNIA REPAIR           FAMILY HISTORY  Family History   Problem Relation Age of Onset    Alcohol abuse Mother     Asthma Mother     Cancer Mother     Drug abuse Mother     Early death Mother     Alcohol abuse Father     Alcohol abuse Sister     Drug abuse Sister     Asthma Daughter     Diabetes Daughter     Alcohol abuse Maternal Aunt     Alcohol abuse Maternal Uncle     Alcohol abuse Paternal Aunt     Cancer Paternal Aunt     Diabetes Paternal Aunt     Alcohol abuse Paternal Uncle     Alcohol abuse Maternal Grandmother     Asthma Maternal Grandmother     Cancer Maternal Grandmother     Drug abuse Maternal Grandmother     Hypertension Maternal Grandmother     Alcohol abuse Maternal Grandfather     Alcohol abuse Paternal Grandmother     Alcohol abuse Paternal Grandfather          SOCIAL HISTORY  Social History     Socioeconomic History    Marital status:    Tobacco Use    Smoking status: Former     Current packs/day: 0.00     Average packs/day:  2.0 packs/day for 2.3 years (4.7 ttl pk-yrs)     Types: Cigarettes, Cigars     Start date: 2003     Quit date: 2006     Years since quittin.2    Smokeless tobacco: Former     Types: Chew   Vaping Use    Vaping status: Never Used   Substance and Sexual Activity    Alcohol use: Yes     Alcohol/week: 21.0 standard drinks of alcohol     Types: 21 Shots of liquor per week     Comment: fifth volka daily    Drug use: Yes     Comment: suboxone 12 days ago    Sexual activity: Defer     Partners: Female         ALLERGIES  Avelox [moxifloxacin hcl], Citalopram hydrobromide, Meloxicam, Metformin and related, Quetiapine, and Amlodipine      REVIEW OF SYSTEMS  Review of Systems   Constitutional:  Negative for fever.   Respiratory:  Negative for shortness of breath.    Cardiovascular:  Negative for chest pain.   Gastrointestinal:  Positive for abdominal pain, nausea and vomiting.   All other systems reviewed and are negative.    Included in HPI  All systems reviewed and negative except for those discussed in HPI.      PHYSICAL EXAM    I have reviewed the triage vital signs and nursing notes.    ED Triage Vitals   Temp Heart Rate Resp BP SpO2   24 1653 24 1653 24 1653 24 1709 24 1653   97.6 °F (36.4 °C) (!) 123 16 147/86 96 %      Temp src Heart Rate Source Patient Position BP Location FiO2 (%)   24 1653 24 1653 -- -- --   Tympanic Monitor          Physical Exam  GENERAL: Alert male in mild distress.  Triage vitals notable for elevated pulse of 123.  Temperature, blood pressure and O2 sats are as noted  SKIN: Warm, dry  HENT: Normocephalic, atraumatic  EYES: no scleral icterus  CV: regular rhythm, regular rate  RESPIRATORY: normal effort, lungs clear  ABDOMEN: soft, moderate epigastric tenderness without rebound or guarding, nondistended  MUSCULOSKELETAL: no deformity  NEURO: alert, moves all extremities, follows commands      LAB RESULTS  Recent Results (from the past 24  hour(s))   POC Glucose Once    Collection Time: 03/21/24  5:01 PM    Specimen: Blood   Result Value Ref Range    Glucose 275 (H) 70 - 130 mg/dL   Comprehensive Metabolic Panel    Collection Time: 03/21/24  5:21 PM    Specimen: Blood   Result Value Ref Range    Glucose 272 (H) 65 - 99 mg/dL    BUN 17 6 - 20 mg/dL    Creatinine 3.81 (H) 0.76 - 1.27 mg/dL    Sodium 129 (L) 136 - 145 mmol/L    Potassium 3.3 (L) 3.5 - 5.2 mmol/L    Chloride 86 (L) 98 - 107 mmol/L    CO2 23.0 22.0 - 29.0 mmol/L    Calcium 9.4 8.6 - 10.5 mg/dL    Total Protein 8.1 6.0 - 8.5 g/dL    Albumin 4.7 3.5 - 5.2 g/dL    ALT (SGPT) 115 (H) 1 - 41 U/L    AST (SGOT) 139 (H) 1 - 40 U/L    Alkaline Phosphatase 112 39 - 117 U/L    Total Bilirubin 2.2 (H) 0.0 - 1.2 mg/dL    Globulin 3.4 gm/dL    A/G Ratio 1.4 g/dL    BUN/Creatinine Ratio 4.5 (L) 7.0 - 25.0    Anion Gap 20.0 (H) 5.0 - 15.0 mmol/L    eGFR 18.9 (L) >60.0 mL/min/1.73   Lipase    Collection Time: 03/21/24  5:21 PM    Specimen: Blood   Result Value Ref Range    Lipase 32 13 - 60 U/L   Green Top (Gel)    Collection Time: 03/21/24  5:21 PM   Result Value Ref Range    Extra Tube Hold for add-ons.    Lavender Top    Collection Time: 03/21/24  5:21 PM   Result Value Ref Range    Extra Tube hold for add-on    Gold Top - SST    Collection Time: 03/21/24  5:21 PM   Result Value Ref Range    Extra Tube Hold for add-ons.    Light Blue Top    Collection Time: 03/21/24  5:21 PM   Result Value Ref Range    Extra Tube Hold for add-ons.    CBC Auto Differential    Collection Time: 03/21/24  5:21 PM    Specimen: Blood   Result Value Ref Range    WBC 8.66 3.40 - 10.80 10*3/mm3    RBC 4.04 (L) 4.14 - 5.80 10*6/mm3    Hemoglobin 13.6 13.0 - 17.7 g/dL    Hematocrit 38.5 37.5 - 51.0 %    MCV 95.3 79.0 - 97.0 fL    MCH 33.7 (H) 26.6 - 33.0 pg    MCHC 35.3 31.5 - 35.7 g/dL    RDW 11.7 (L) 12.3 - 15.4 %    RDW-SD 39.8 37.0 - 54.0 fl    MPV 12.5 (H) 6.0 - 12.0 fL    Platelets 148 140 - 450 10*3/mm3    Neutrophil % 75.0  42.7 - 76.0 %    Lymphocyte % 13.3 (L) 19.6 - 45.3 %    Monocyte % 9.4 5.0 - 12.0 %    Eosinophil % 1.2 0.3 - 6.2 %    Basophil % 0.5 0.0 - 1.5 %    Immature Grans % 0.6 (H) 0.0 - 0.5 %    Neutrophils, Absolute 6.51 1.70 - 7.00 10*3/mm3    Lymphocytes, Absolute 1.15 0.70 - 3.10 10*3/mm3    Monocytes, Absolute 0.81 0.10 - 0.90 10*3/mm3    Eosinophils, Absolute 0.10 0.00 - 0.40 10*3/mm3    Basophils, Absolute 0.04 0.00 - 0.20 10*3/mm3    Immature Grans, Absolute 0.05 0.00 - 0.05 10*3/mm3    nRBC 0.0 0.0 - 0.2 /100 WBC   Urinalysis With Microscopic If Indicated (No Culture) - Urine, Clean Catch    Collection Time: 03/21/24  6:31 PM    Specimen: Urine, Clean Catch   Result Value Ref Range    Color, UA Yellow Yellow, Straw    Appearance, UA Clear Clear    pH, UA 5.5 5.0 - 8.0    Specific Gravity, UA 1.008 1.005 - 1.030    Glucose,  mg/dL (2+) (A) Negative    Ketones, UA Negative Negative    Bilirubin, UA Negative Negative    Blood, UA Negative Negative    Protein, UA Negative Negative    Leuk Esterase, UA Negative Negative    Nitrite, UA Negative Negative    Urobilinogen, UA 0.2 E.U./dL 0.2 - 1.0 E.U./dL         RADIOLOGY  CT Abdomen Pelvis Without Contrast    Result Date: 3/21/2024  CT ABDOMEN PELVIS WO CONTRAST-  INDICATIONS: Abdominal pain  TECHNIQUE: Radiation dose reduction techniques were utilized, including automated exposure control and exposure modulation based on body size. Unenhanced ABDOMEN AND PELVIS CT  COMPARISON: 10/10/2021  FINDINGS:  Diffuse low-density of the liver is noted, compatible with severe steatosis, similar to prior exam. 3 enhancing liver lesions are again demonstrated, do not appear significant change, largest measuring 3.3 cm, stable at similar level.  Poorly distinguished dependent density in the gallbladder could represent cholelithiasis; if indicated, ultrasound correlation could be obtained.  The urinary bladder is only partly filled, limits assessment.  Otherwise unremarkable  unenhanced appearance of the liver, spleen, adrenal glands, pancreas, kidneys, bladder.  No bowel obstruction or abnormal bowel thickening is identified. The appendix does not appear inflamed.  No free intraperitoneal gas or free fluid. Umbilical hernia of fat is present.  Scattered small mesenteric and para-aortic lymph nodes are seen that are not significant by size criteria.  Abdominal aorta is not aneurysmal.  The lung bases are clear.  Degenerative changes are seen in the spine. No acute fracture is identified.          1. No acute inflammatory process of bowel is identified, follow-up as indications persist.  2. No urolithiasis or hydronephrosis.  3. Redemonstration of severe hepatic steatosis with 3 indeterminate stable liver lesions. Possible cholelithiasis.  This report was finalized on 3/21/2024 7:00 PM by Dr. Chinedu Vizcarra M.D on Workstation: [id number]         MEDICATIONS GIVEN IN ER  Medications   sodium chloride 0.9 % flush 10 mL (has no administration in time range)   sodium chloride 0.9 % flush 10 mL (has no administration in time range)   sodium chloride 0.9 % flush 10 mL (has no administration in time range)   sodium chloride 0.9 % infusion 40 mL (has no administration in time range)   nitroglycerin (NITROSTAT) SL tablet 0.4 mg (has no administration in time range)   sennosides-docusate (PERICOLACE) 8.6-50 MG per tablet 2 tablet (has no administration in time range)     And   polyethylene glycol (MIRALAX) packet 17 g (has no administration in time range)     And   bisacodyl (DULCOLAX) EC tablet 5 mg (has no administration in time range)     And   bisacodyl (DULCOLAX) suppository 10 mg (has no administration in time range)   sodium chloride 0.9 % infusion (has no administration in time range)   dextrose (GLUTOSE) oral gel 15 g (has no administration in time range)   dextrose (D50W) (25 g/50 mL) IV injection 25 g (has no administration in time range)   glucagon (GLUCAGEN) injection 1 mg (has no  administration in time range)   insulin lispro (HUMALOG/ADMELOG) injection 2-9 Units (has no administration in time range)   Potassium Replacement - Follow Nurse / BPA Driven Protocol (has no administration in time range)   Magnesium Standard Dose Replacement - Follow Nurse / BPA Driven Protocol (has no administration in time range)   Phosphorus Replacement - Follow Nurse / BPA Driven Protocol (has no administration in time range)   Calcium Replacement - Follow Nurse / BPA Driven Protocol (has no administration in time range)   carvedilol (COREG) tablet 3.125 mg (has no administration in time range)   folic acid (FOLVITE) tablet 1 mg (has no administration in time range)   insulin glargine (LANTUS, SEMGLEE) injection 25 Units (has no administration in time range)   thiamine (VITAMIN B-1) tablet 100 mg (has no administration in time range)   sodium chloride 0.9 % bolus 1,000 mL ( Intravenous Currently Infusing 3/21/24 1926)   ondansetron (ZOFRAN) injection 4 mg (4 mg Intravenous Given 3/21/24 1854)         ORDERS PLACED DURING THIS VISIT:  Orders Placed This Encounter   Procedures    CT Abdomen Pelvis Without Contrast    [place] Draw    Comprehensive Metabolic Panel    Lipase    Urinalysis With Microscopic If Indicated (No Culture) - Urine, Clean Catch    CBC Auto Differential    Comprehensive Metabolic Panel    Hemoglobin A1c    Diet: Liquid; Clear Liquid; Fluid Consistency: Thin (IDDSI 0)    Undress & Gown    Vital Signs    Intake & Output    Weigh Patient    Oral Care    Maintain IV Access    Telemetry - Place Orders & Notify Provider of Results When Patient Experiences Acute Chest Pain, Dysrhythmia or Respiratory Distress    May Be Off Telemetry for Tests    Place Sequential Compression Device    Maintain Sequential Compression Device    Continuous Pulse Oximetry    Up With Assistance    Daily Weights    Neuro Checks    Neurovascular Checks    Elevate HOB    Strict Intake & Output    Assess Patient For Urinary  Retention    Utilize Voiding Measures if Retention is Suspected    Bladder Scan for Suspected Urinary Retention    Monitor Every 1-2 Hours for Spontaneous Void if Bladder Scan Volume is Less Than 500mL & Patient is Without Symptoms of Bladder Discomfort / Distention    Straight Cath For Acute Retention if Bladder Scan Volume is Greater Than 500mL or Patient Has Symptoms of Bladder Discomfort / Distention (Unless Contraindicated)    Notify Provider Acute Urinary Retention    [facility] (on-call MD unless specified) Details    OT Consult: Eval & Treat ADL Performance Below Baseline, Discharge Placement Assessment    PT Consult: Eval & Treat Discharge Placement Assessment, Functional Mobility Below Baseline    Incentive Spirometry    Oxygen Therapy- Nasal Cannula; Titrate 1-6 LPM Per SpO2; 90 - 95%    POC Glucose Once    POC Glucose 4x Daily Before Meals & at Bedtime    Insert Peripheral IV    Insert Peripheral IV    Inpatient Admission    Aspiration Precautions    Fall Precautions    CBC & Differential    Green Top (Gel)    Lavender Top    Gold Top - SST    Light Blue Top    CBC & Differential         OUTPATIENT MEDICATION MANAGEMENT:  Current Facility-Administered Medications Ordered in Epic   Medication Dose Route Frequency Provider Last Rate Last Admin    sennosides-docusate (PERICOLACE) 8.6-50 MG per tablet 2 tablet  2 tablet Oral BID PRN Orville Espinoza DO        And    polyethylene glycol (MIRALAX) packet 17 g  17 g Oral Daily PRN Orville Espinoza DO        And    bisacodyl (DULCOLAX) EC tablet 5 mg  5 mg Oral Daily PRN Orville Espinoza DO        And    bisacodyl (DULCOLAX) suppository 10 mg  10 mg Rectal Daily PRN Orville Espinoza DO        Calcium Replacement - Follow Nurse / BPA Driven Protocol   Does not apply PRN Orville Espinoza DO        [START ON 3/22/2024] carvedilol (COREG) tablet 3.125 mg  3.125 mg Oral BID With Meals Orville Espinoza DO        dextrose (D50W) (25 g/50 mL) IV injection 25 g  25 g Intravenous Q15 Min PRN  Orville Espinoza DO        dextrose (GLUTOSE) oral gel 15 g  15 g Oral Q15 Min PRN Orville Espinoza DO        folic acid (FOLVITE) tablet 1 mg  1 mg Oral Daily Orville Espinoza DO        glucagon (GLUCAGEN) injection 1 mg  1 mg Intramuscular Q15 Min PRN Orville Espinoza DO        insulin glargine (LANTUS, SEMGLEE) injection 25 Units  25 Units Subcutaneous Q12H Orville Espinoza DO        insulin lispro (HUMALOG/ADMELOG) injection 2-9 Units  2-9 Units Subcutaneous 4x Daily AC & at Bedtime Orville Espinoza DO        Magnesium Standard Dose Replacement - Follow Nurse / BPA Driven Protocol   Does not apply PRN Orville Espinoza DO        nitroglycerin (NITROSTAT) SL tablet 0.4 mg  0.4 mg Sublingual Q5 Min PRN Orville Espinoza DO        Phosphorus Replacement - Follow Nurse / BPA Driven Protocol   Does not apply PRN Orville Espinoza DO        Potassium Replacement - Follow Nurse / BPA Driven Protocol   Does not apply PRN Orville Espinoza DO        sodium chloride 0.9 % flush 10 mL  10 mL Intravenous PRN Orville Espinoza DO        sodium chloride 0.9 % flush 10 mL  10 mL Intravenous Q12H Orville Espinoza DO        sodium chloride 0.9 % flush 10 mL  10 mL Intravenous PRN Orville Espinoza DO        sodium chloride 0.9 % infusion 40 mL  40 mL Intravenous PRN Orville Espinoza DO        sodium chloride 0.9 % infusion  150 mL/hr Intravenous Continuous Orville Espinoza DO        thiamine (VITAMIN B-1) tablet 100 mg  100 mg Oral Daily Orville Espinoza DO         Current Outpatient Medications Ordered in Epic   Medication Sig Dispense Refill    buprenorphine-naloxone (SUBOXONE) 8-2 MG per SL tablet Place 1.5 tablets under the tongue Daily.      carvedilol (Coreg) 3.125 MG tablet Take 1 tablet by mouth 2 (Two) Times a Day With Meals. 60 tablet 0    fenofibrate (TRICOR) 48 MG tablet Take 1 tablet by mouth Daily. 30 tablet 0    folic acid (FOLVITE) 1 MG tablet Take 1 tablet by mouth Daily. 30 tablet 0    insulin glargine (LANTUS, SEMGLEE) 100 UNIT/ML injection Inject 25 Units under  the skin into the appropriate area as directed Every 12 (Twelve) Hours. 10 mL 0    losartan (COZAAR) 50 MG tablet Take 50 mg by mouth Daily.      multivitamin (One-Daily Multi-Vitamin) tablet tablet Take 1 tablet by mouth Daily. 30 tablet 0    Testosterone Cypionate 200 MG/ML solution Inject 0.5 mL as directed 1 (One) Time Per Week.      thiamine (thiamine) 100 MG tablet tablet Take 1 tablet by mouth Daily. 30 tablet 0    zolpidem CR (Ambien CR) 12.5 MG CR tablet Take 12.5 mg by mouth At Night As Needed for Sleep.           PROCEDURES  Procedures            PROGRESS, DATA ANALYSIS, CONSULTS, AND MEDICAL DECISION MAKING  All labs have been independently interpreted by me.  All radiology studies have been reviewed by me. All EKG's have been independently viewed and interpreted by me.  Discussion below represents my analysis of pertinent findings related to patient's condition, differential diagnosis, treatment plan and final disposition.    Differential diagnosis includes but is not limited to gastritis, hepatitis, pancreatitis, bowel obstruction, bowel infection, diabetic complications including hyperglycemia or renal failure.      ED Course as of 03/21/24 2004   Thu Mar 21, 2024   1751 Patient does look dry and is somewhat tachycardic.  Will go ahead and give IV fluid bolus as well as IV Zofran. [DB]   1751 CBC shows normal white count which would go against infection.  Hemoglobin reassuring at 13.6. [DB]   1751 Glucose 275 and known diabetic. [DB]   1815 Lab work notable for elevated creatinine of 3.81 consistent with acute renal failure. [DB]   1844 I discussed evaluation and treatment of this patient with Regla Lindsey from [facility] who will admit on behalf of Dr. Orville Espinoza. [DB]   1845 CT scan of the abdomen independently interpreted by me shows no obvious perforation or obstruction.  Kidneys look grossly normal.  Patient does have pretty significant obesity. [DB]   1908 Urinalysis reviewed is fairly benign. [DB]    1908 Official reading of CT by Dr. Vizcarra is fairly unremarkable. [DB]      ED Course User Index  [DB] Junior Dumont MD             AS OF 20:04 EDT VITALS:    BP - 125/79  HR - 116  TEMP - 97.6 °F (36.4 °C) (Tympanic)  O2 SATS - 94%    COMPLEXITY OF CARE  Complicated patient with history of recurrent pancreatitis presents from GI clinic with severe abdominal pain.  Patient in significant distress upon arrival.    He was treated with IV fluids, IV antiemetics and IV Dilaudid with much improvement.    I did review ED workup as noted above including labs and CT scan which were independently interpreted by me.    Patient felt to have acute exacerbation of acute pancreatitis.  Plan admission to the hospital for workup and treatment with likely GI consultation.      DIAGNOSIS  Final diagnoses:   Acute renal failure, unspecified acute renal failure type   Nausea and vomiting, unspecified vomiting type   Abdominal pain, unspecified abdominal location         DISPOSITION  ED Disposition       ED Disposition   Decision to Admit    Condition   --    Comment   Level of Care: Telemetry [5]   Diagnosis: Acute renal failure [801885]   Admitting Physician: JESSE COLLINS [[id number]]   Attending Physician: JESSE COLLINS [[id number]]   Certification: I Certify That Inpatient Hospital Services Are Medically Necessary For Greater Than 2 Midnights                  Please note that portions of this document were completed with a voice recognition program.    Note Disclaimer: At [facility], we believe that sharing information builds trust and better relationships. You are receiving this note because you recently visited [facility]. It is possible you will see health information before a provider has talked with you about it. This kind of information can be easy to misunderstand. To help you fully understand what it means for your health, we urge you to discuss this note with your provider.         Junior Dumont MD  03/21/24  2005      Electronically signed by Junior Dumont MD at 03/21/24 2005       Degonda, Janet, RN at 03/21/24 1651          Pt reports hi bp x 1-1.5 months 209/123.  He is on bp meds.  He has been vomiting x 3 weeks.      Electronically signed by Degonda, Janet, RN at 03/21/24 1653       Medication Administration Report for Wu Damon as of 3/21/24 through 3/22/24     Legend:    Given Hold Not Given Due Canceled Entry Other Actions    Time Time (Time) Time Time-Action         Discontinued     Completed     Future     MAR Hold     Linked             Medications 03/21/24 03/22/24      sennosides-docusate (PERICOLACE) 8.6-50 MG per tablet 2 tablet  Dose: 2 tablet  Freq: 2 Times Daily PRN Route: PO  PRN Reason: Constipation  Start: 03/21/24 1911     Admin Instructions:   Start bowel management regimen if patient has not had a bowel movement after 12 hours.          And   polyethylene glycol (MIRALAX) packet 17 g  Dose: 17 g  Freq: Daily PRN Route: PO  PRN Reason: Constipation  PRN Comment: Use if senna-docusate is ineffective  Start: 03/21/24 1911     Admin Instructions:   Use if no bowel movement after 12 hours. Mix in 6-8 ounces of water.  Use 4-8 ounces of water, tea, or juice for each 17 gram dose.          And   bisacodyl (DULCOLAX) EC tablet 5 mg  Dose: 5 mg  Freq: Daily PRN Route: PO  PRN Reason: Constipation  PRN Comment: Use if polyethylene glycol is ineffective  Start: 03/21/24 1911     Admin Instructions:   Use if no bowel movement after 12 hours.  Swallow whole. Do not crush, split, or chew tablet.          And   bisacodyl (DULCOLAX) suppository 10 mg  Dose: 10 mg  Freq: Daily PRN Route: RE  PRN Reason: Constipation  PRN Comment: Use if bisacodyl oral is ineffective  Start: 03/21/24 1911     Admin Instructions:   Use if no bowel movement after 12 hours.  Hold for diarrhea          Calcium Replacement - Follow Nurse / BPA Driven Protocol  Freq: As Needed Route: XX  PRN Reason: Other  Start: 03/21/24  "1945     Admin Instructions:   Open Order & Select \"BHS Electrolyte Replacement Protocol Algorithm\" to View Details          carvedilol (COREG) tablet 3.125 mg  Dose: 3.125 mg  Freq: 2 Times Daily With Meals Route: PO  Start: 03/22/24 0800     Admin Instructions:   Hold for SBP less than 100, DBP less than 60, or heart rate less than 50. If a dose is held, please contact the provider.  Give with food.       0852-Given     1800             dextrose (D50W) (25 g/50 mL) IV injection 25 g  Dose: 25 g  Freq: Every 15 Minutes PRN Route: IV  PRN Reason: Low Blood Sugar  PRN Comment: Blood Sugar Less Than 70  Start: 03/21/24 1918     Admin Instructions:   Blood sugar less than 70; patient has IV access - Unresponsive, NPO or Unable To Safely Swallow          dextrose (GLUTOSE) oral gel 15 g  Dose: 15 g  Freq: Every 15 Minutes PRN Route: PO  PRN Reason: Low Blood Sugar  PRN Comment: Blood sugar less than 70  Start: 03/21/24 1918     Admin Instructions:   BS<70, Patient Alert, Is not NPO, Can safely swallow.          famotidine (PEPCID) injection 20 mg  Dose: 20 mg  Freq: Daily Route: IV  Start: 03/22/24 1430 End: 03/27/24 0859     Admin Instructions:   Give IV push over 2 minutes.       1430              folic acid (FOLVITE) tablet 1 mg  Dose: 1 mg  Freq: Daily Route: PO  Start: 03/21/24 2003      (2334)-Not Given            0851-Given              glucagon (GLUCAGEN) injection 1 mg  Dose: 1 mg  Freq: Every 15 Minutes PRN Route: IM  PRN Reason: Low Blood Sugar  PRN Comment: Blood Glucose Less Than 70  Start: 03/21/24 1918     Admin Instructions:   Blood Glucose Less Than 70 - Patient Without IV Access - Unresponsive, NPO or Unable To Safely Swallow  Reconstitute powder for injection by adding 1 mL of -supplied sterile diluent or sterile water for injection to a vial containing 1 mg of the drug, to provide solutions containing 1 mg/mL. Shake vial gently to dissolve.          insulin glargine (LANTUS, SEMGLEE) " injection 25 Units  Dose: 25 Units  Freq: Every 12 Hours Route: SC  Start: 03/21/24 2003     Admin Instructions:   Do not hold basal insulin without an order. Consider requesting a dose edit, if needed.        2036-Given            0851-Given     2003             insulin lispro (HUMALOG/ADMELOG) injection 2-9 Units  Dose: 2-9 Units  Freq: 4 Times Daily Before Meals & Nightly Route: SC  Start: 03/21/24 2100     Admin Instructions:   Correction Insulin - Moderate Dose (Total Insulin Dose 40-60 units/day, Average Weight Patient, Patient Taking Oral Hypoglycemic)    Blood Glucose 150-199 mg/dL - 2 units  Blood Glucose 200-249 mg/dL - 4 units  Blood Glucose 250-299 mg/dL - 6 units  Blood Glucose 300-349 mg/dL - 7 units  Blood Glucose 350-400 mg/dL - 8 units  Blood Glucose greater than 400 mg/dL - 9 units & Call Provider   Caution: Look alike/sound alike drug alert      2036-Given            0851-Given     1343-Given     1730 2100            LORazepam (ATIVAN) tablet 1 mg  Dose: 1 mg  Freq: Every 1 Hour PRN Route: PO  PRN Reason: Withdrawal  PRN Comment: For CIWA-Ar 8-10  Start: 03/21/24 2340 End: 03/26/24 2339     Admin Instructions:   Reassess 1 Hour After Administration   Caution: Look alike/sound alike drug alert       0258-Not Given:  See Alt     1101-Given             Or   LORazepam (ATIVAN) injection 1 mg  Dose: 1 mg  Freq: Every 1 Hour PRN Route: IV  PRN Reason: Withdrawal  PRN Comment: For CIWA-Ar 8-10  Start: 03/21/24 2340 End: 03/26/24 2339     Admin Instructions:   Reassess 1 Hour After Administration   Caution: Look alike/sound alike drug alert. Dilute 1:1 with normal saline.       0258-Not Given:  See Alt     1101-Not Given:  See Alt             Or   LORazepam (ATIVAN) tablet 2 mg  Dose: 2 mg  Freq: Every 1 Hour PRN Route: PO  PRN Reason: Withdrawal  PRN Comment: For CIWA-Ar 11-15 or -160, DBP , -125  Start: 03/21/24 2340 End: 03/26/24 2339     Admin Instructions:   Reassess 1 Hour  "After Administration.   Caution: Look alike/sound alike drug alert       0258-Not Given:  See Alt     1101-Not Given:  See Alt             Or   LORazepam (ATIVAN) injection 2 mg  Dose: 2 mg  Freq: Every 1 Hour PRN Route: IV  PRN Reason: Withdrawal  PRN Comment: For CIWA-Ar 11-15 or -160, DBP , -125  Start: 03/21/24 2340 End: 03/26/24 2339     Admin Instructions:   Reassess 1 Hour After Administration   Caution: Look alike/sound alike drug alert. Dilute 1:1 with normal saline.       0258-Given     1101-Not Given:  See Alt             Or   LORazepam (ATIVAN) injection 2 mg  Dose: 2 mg  Freq: Every 15 Minutes PRN Route: IV  PRN Reason: Withdrawal  PRN Comment: For CIWA-Ar Greater Than 15 or SBP greater than 160, DBP greater than 110, or HR greater than 125.  Start: 03/21/24 2340 End: 03/26/24 2339     Admin Instructions:   Reassess 15 Minutes After Each Administration.  If CIWA-Ar Does Not Decrease Contact Provider To Discuss Transfer to Higher Level of Care.   Caution: Look alike/sound alike drug alert. Dilute 1:1 with normal saline.       0258-Not Given:  See Alt     1101-Not Given:  See Alt             Or   LORazepam (ATIVAN) injection 2 mg  Dose: 2 mg  Freq: Every 15 Minutes PRN Route: IM  PRN Reason: Withdrawal  PRN Comment: For CIWA-Ar Greater Than 15 or SBP greater than 160, DBP greater than 110, or HR greater than 125.  Start: 03/21/24 2340 End: 03/26/24 2339     Admin Instructions:   Reassess 15 Minutes After Each Administration.  If CIWA-Ar Does Not Decrease Contact Provider To Discuss Transfer to Higher Level of Care.   Caution: Look alike/sound alike drug alert. Dilute 1:1 with normal saline.       0258-Not Given:  See Alt     1101-Not Given:  See Alt             Magnesium Standard Dose Replacement - Follow Nurse / BPA Driven Protocol  Freq: As Needed Route: XX  PRN Reason: Other  Start: 03/21/24 1945     Admin Instructions:   Open Order & Select \"BHS Electrolyte Replacement Protocol " "Algorithm\" to View Details          nitroglycerin (NITROSTAT) SL tablet 0.4 mg  Dose: 0.4 mg  Freq: Every 5 Minutes PRN Route: SL  PRN Reason: Chest Pain  PRN Comment: Only if SBP Greater Than 100  Start: 03/21/24 1910     Admin Instructions:   If Pain Unrelieved After 3 Doses Notify MD  May administer up to 3 doses per episode.          Phosphorus Replacement - Follow Nurse / BPA Driven Protocol  Freq: As Needed Route: XX  PRN Reason: Other  Start: 03/21/24 1945     Admin Instructions:   Open Order & Select \"BHS Electrolyte Replacement Protocol Algorithm\" to View Details          Potassium Replacement - Follow Nurse / BPA Driven Protocol  Freq: As Needed Route: XX  PRN Reason: Other  Start: 03/21/24 1945     Admin Instructions:   Open Order & Select \"BHS Electrolyte Replacement Protocol Algorithm\" to View Details          sodium chloride 0.9 % flush 10 mL  Dose: 10 mL  Freq: As Needed Route: IV  PRN Reason: Line Care  Start: 03/21/24 1910          sodium chloride 0.9 % flush 10 mL  Dose: 10 mL  Freq: Every 12 Hours Scheduled Route: IV  Start: 03/21/24 2100      2041-Given            0852-Given     2100             sodium chloride 0.9 % flush 10 mL  Dose: 10 mL  Freq: As Needed Route: IV  PRN Reason: Line Care  Start: 03/21/24 1720          sodium chloride 0.9 % infusion  Rate: 125 mL/hr Dose: 125 mL/hr  Freq: Continuous Route: IV  Start: 03/21/24 1928      2032-New Bag            1347-Rate/Dose Change              sodium chloride 0.9 % infusion 40 mL  Dose: 40 mL  Freq: As Needed Route: IV  PRN Reason: Line Care  Start: 03/21/24 1910     Admin Instructions:   Following administration of an IV intermittent medication, flush line with 40mL NS at 100mL/hr.           thiamine (B-1) injection 200 mg  Dose: 200 mg  Freq: Every 8 Hours Scheduled Route: IV  Start: 03/22/24 0030 End: 03/26/24 2159     Admin Instructions:   Doses of up to 250mg, give over 1-2 minutes (IV push). Doses 250mg and above, give over 30 minutes.   " "    (0115)-Not Given     0456-Given     0600-Canceled Entry     1400     2200          Followed by   thiamine (VITAMIN B-1) tablet 100 mg  Dose: 100 mg  Freq: Daily Route: PO  Start: 03/27/24 0900          Future Medications  Medications 03/21/24 03/22/24      sucralfate (CARAFATE) tablet 1 g  Dose: 1 g  Freq: 4 Times Daily Before Meals & Nightly Route: PO  Start: 03/22/24 1730 End: 03/24/24 1729     Admin Instructions:   If for oral administration, take on empty stomach.  If for rectal enema, dissolve 1 tablet in 10 ml water. Retain enema for as long as possible or for at least 5 minutes.  For nasogastric or slurry administration:   1. Remove the cap and plunger from a 60 mL syringe   2. Place the sucralfate tablet inside the syringe   3. Replace the plunger so that minimal airspace exists around the tablet   4. Draw up approximately 20 mL water into the syringe   5. Replace the syringe cap   6. Allow the syringe to stand for ~5 minutes, shaking occasionally   7. Shake the suspension and administer directly from the syringe into the tube   **Flush tube before and after administration**       1730     2100             Completed Medications  Medications 03/21/24 03/22/24      ondansetron (ZOFRAN) injection 4 mg  Dose: 4 mg  Freq: Once Route: IV  Start: 03/21/24 1805 End: 03/21/24 1854     Admin Instructions:   \"If multiple N/V medications ordered, use in the following order: Ondansetron, Prochlorperazine, Promethazine. Use PO unless patient refuses or patient unable to swallow.\"      1854-Given               sodium chloride 0.9 % bolus 1,000 mL  Dose: 1,000 mL  Freq: Once Route: IV  Start: 03/21/24 1805 End: 03/21/24 1926      1856-New Bag     1926-Currently Infusing [C]              Discontinued Medications  Medications 03/21/24 03/22/24      thiamine (VITAMIN B-1) tablet 100 mg  Dose: 100 mg  Freq: Daily Route: PO  Start: 03/21/24 2003 End: 03/21/24 2344      (3214)-Not Given                              Physician " Progress Notes (last 48 hours)        Andre Padron MD at 24 1251              Name: Wu Damon ADMIT: 3/21/2024   : 1975  PCP: Juan Carlos Kwan APRN    MRN: [id number] LOS: 1 days   AGE/SEX: 48 y.o. male  ROOM: [facility]   Subjective   Chief Complaint   Patient presents with    Abdominal Pain    Hypertension     H/o alcohol abuse  H/o previous admissions for ETOH w/d  Has been having some n/v abd pain for a few weeks  Mostly LUQ    ROS  No f/c  + n/v  No cp/palp  No soa/cough    Objective   Vital Signs  Temp:  [97.6 °F (36.4 °C)-98.7 °F (37.1 °C)] 98.7 °F (37.1 °C)  Heart Rate:  [106-123] 106  Resp:  [16] 16  BP: (104-147)/(68-87) 104/68  SpO2:  [91 %-100 %] 93 %  on   ;   Device (Oxygen Therapy): room air  Body mass index is 37.9 kg/m².    Physical Exam  HENT:      Head: Normocephalic and atraumatic.   Eyes:      General: No scleral icterus.  Cardiovascular:      Rate and Rhythm: Regular rhythm. Tachycardia present.      Heart sounds: Normal heart sounds.   Pulmonary:      Effort: Pulmonary effort is normal. No respiratory distress.      Breath sounds: Normal breath sounds.   Abdominal:      General: There is no distension.      Palpations: Abdomen is soft.      Tenderness: There is no abdominal tenderness.   Musculoskeletal:      Cervical back: Neck supple.   Neurological:      Mental Status: He is alert.   Psychiatric:         Behavior: Behavior normal.     Needs some re-directing    Results Review:       I reviewed the patient's new clinical results.  Results from last 7 days   Lab Units 24  0552 24  1721   WBC 10*3/mm3 7.78 8.66   HEMOGLOBIN g/dL 12.6* 13.6   PLATELETS 10*3/mm3 133* 148     Results from last 7 days   Lab Units 24  0552 24  1721   SODIUM mmol/L 131* 129*   POTASSIUM mmol/L 3.6 3.3*   CHLORIDE mmol/L 91* 86*   CO2 mmol/L 22.1 23.0   BUN mg/dL 16 17   CREATININE mg/dL 3.27* 3.81*   GLUCOSE mg/dL 206* 272*   Estimated Creatinine Clearance: 36.7  "mL/min (A) (by C-G formula based on SCr of 3.27 mg/dL (H)).  Results from last 7 days   Lab Units 03/22/24  0552 03/21/24  1721   ALBUMIN g/dL 4.1 4.7   BILIRUBIN mg/dL 2.2* 2.2*   ALK PHOS U/L 92 112   AST (SGOT) U/L 130* 139*   ALT (SGPT) U/L 103* 115*     Results from last 7 days   Lab Units 03/22/24  0552 03/21/24  1721   CALCIUM mg/dL 8.6 9.4   ALBUMIN g/dL 4.1 4.7         Coag     HbA1C   Lab Results   Component Value Date    HGBA1C 7.90 (H) 03/22/2024    HGBA1C 6.7 (H) 11/15/2023    HGBA1C 8.43 (H) 01/31/2022     Infection     Radiology(recent) CT Abdomen Pelvis Without Contrast    Result Date: 3/21/2024    1. No acute inflammatory process of bowel is identified, follow-up as indications persist.  2. No urolithiasis or hydronephrosis.  3. Redemonstration of severe hepatic steatosis with 3 indeterminate stable liver lesions. Possible cholelithiasis.  This report was finalized on 3/21/2024 7:00 PM by Dr. Chinedu Vizcarra M.D on Workstation: [id number]     No results found for: \"TROPONINT\", \"TROPONINI\", \"BNP\"  No components found for: \"TSH;2\"    carvedilol, 3.125 mg, Oral, BID With Meals  folic acid, 1 mg, Oral, Daily  insulin glargine, 25 Units, Subcutaneous, Q12H  insulin lispro, 2-9 Units, Subcutaneous, 4x Daily AC & at Bedtime  sodium chloride, 10 mL, Intravenous, Q12H  thiamine (B-1) IV, 200 mg, Intravenous, Q8H   Followed by  [START ON 3/27/2024] thiamine, 100 mg, Oral, Daily      sodium chloride, 150 mL/hr, Last Rate: 150 mL/hr (03/21/24 2032)    Diet: Liquid; Clear Liquid; Fluid Consistency: Thin (IDDSI 0)      Assessment & Plan     Active Hospital Problems    Diagnosis  POA    **Acute renal failure [N17.9]  Yes    Dehydration [E86.0]  Yes    Nausea with vomiting [R11.2]  Yes    Hepatic steatosis [K76.0]  Yes    History of alcohol use [Z87.898]  Not Applicable    Transaminitis [R74.01]  Yes    Hypokalemia [E87.6]  Yes    Liver lesion [K76.9]  Yes    Hyponatremia [E87.1]  Yes    Type 2 diabetes mellitus " with hyperglycemia [E11.65]  Yes    HTN (hypertension) [I10]  Yes    HLD (hyperlipidemia) [E78.5]  Yes      Resolved Hospital Problems   No resolved problems to display.       Mr. Damon is a 48-year-old male with history of type 2 diabetes, hypertension, hyperlipidemia, alcohol use who presents to the emergency room with nausea and vomiting and abdominal pain. He was found to have acute renal failure     Acute renal failure/hyponatremia/dehydration  -Normal saline at 150 cc an hour overnight  -Repeat CMP, CBC in a.m.  -Nephrology consultation      Nausea vomiting/hepatic steatosis/transaminitis  -PRN agents  -U/S with fatty liver, GB sludge  -Elevated LFTs may be related to alcohol use, monitor. No evidence for acute cholyctitis or pancreatitis     Transaminitis/alcohol use  -CIWA protocol initiated  -Patient started on thiamine and folic acid  -Seizure precautions    Type 2 diabetes  -Accu-Cheks before meals and at bedtime with correctional dose insulin  -Continue long-acting insulin  -Hold oral diabetic medications at this time        VTE Prophylaxis - SCDs.      RICK RN      Andre Padron MD  [facility]  24  12:53 EDT    Electronically signed by Andre Padron MD at 24 1257          Consult Notes (last 48 hours)        Itz Kitchen MD at 24 1337        Consult Orders    1. Inpatient Nephrology Consult [[id number]] ordered by Andre Padron MD at 24 1202                   [facility] Consult Note      Patient Name: Wu Damon  : 1975  MRN: [id number]  Primary Care Physician:  Juan Carlos Kwan, APRN  Referring Physician: Orville Espinoza DO  Date of admission: 3/21/2024    Subjective     Reason for Consult: Acute kidney injury    HPI:   Wu Damon is a 48 y.o. male with past medical history of history of tobacco abuse,  longstanding diabetes mellitus type 2 insulin-dependent over 20 years, with  diabetic neuropathy, alcohol abuse for 5 beers a day for the last 10 years, hypertension, hypogonadism on testosterone and dyslipidemia    Patient presents for onset of nausea  with intractable emesis accompanied with abdominal discomfort and decreased oral intake associated with an unquantified weight loss.  Patient denies any previous similar episodes.  Patient denies use of marijuana, patient denies any sick contacts or diarrhea.    In the emergency department patient was found to have acute kidney injury nephrology consultation requested for the management,     Review of Systems:   14 point review of systems is otherwise negative except for mentioned above on HPI    Personal History     Past Medical History:   Diagnosis Date    Alcohol abuse     Diabetes mellitus     Hypertension     Nausea with vomiting 3/21/2024       Past Surgical History:   Procedure Laterality Date    EYE SURGERY      HERNIA REPAIR         Family History: family history includes Alcohol abuse in his father, maternal aunt, maternal grandfather, maternal grandmother, maternal uncle, mother, paternal aunt, paternal grandfather, paternal grandmother, paternal uncle, and sister; Asthma in his daughter, maternal grandmother, and mother; Cancer in his maternal grandmother, mother, and paternal aunt; Diabetes in his daughter and paternal aunt; Drug abuse in his maternal grandmother, mother, and sister; Early death in his mother; Hypertension in his maternal grandmother.    Social History:  reports that he quit smoking about 18 years ago. His smoking use included cigarettes and cigars. He started smoking about 20 years ago. He has a 4.7 pack-year smoking history. He has quit using smokeless tobacco.  His smokeless tobacco use included chew. He reports current alcohol use of about 21.0 standard drinks of alcohol per week. He reports current drug use.    Home Medications:  Prior to Admission medications    Medication Sig Start Date End Date Taking?  Authorizing Provider   buprenorphine-naloxone (SUBOXONE) 8-2 MG per SL tablet Place 1.5 tablets under the tongue Daily. Does the injection monthly.  Should have had injection today 3/21/24   Yes Kaylee Leal MD   carvedilol (Coreg) 3.125 MG tablet Take 1 tablet by mouth 2 (Two) Times a Day With Meals. 10/12/21  Yes Nesha Castillo MD   fenofibrate (TRICOR) 48 MG tablet Take 1 tablet by mouth Daily. 1/6/17  Yes Shruthi Cazares MD   folic acid (FOLVITE) 1 MG tablet Take 1 tablet by mouth Daily. 10/12/21  Yes Nesha Castillo MD   losartan (COZAAR) 50 MG tablet Take 1 tablet by mouth Daily.   Yes Kaylee Leal MD   multivitamin (One-Daily Multi-Vitamin) tablet tablet Take 1 tablet by mouth Daily. 10/12/21  Yes Nesha Castillo MD   Testosterone Cypionate 200 MG/ML solution Inject 0.5 mL as directed 1 (One) Time Per Week.   Yes Kaylee Leal MD   thiamine (thiamine) 100 MG tablet tablet Take 1 tablet by mouth Daily. 10/12/21  Yes Nesha Castillo MD   zolpidem CR (Ambien CR) 12.5 MG CR tablet Take 1 tablet by mouth At Night As Needed for Sleep.   Yes Kaylee Leal MD   insulin glargine (LANTUS, SEMGLEE) 100 UNIT/ML injection Inject 25 Units under the skin into the appropriate area as directed Every 12 (Twelve) Hours. 2/1/22   Mairsabel Singh APRN       Allergies:  Allergies   Allergen Reactions    Avelox [Moxifloxacin Hcl] Shortness Of Breath    Citalopram Hydrobromide Unknown - Low Severity    Meloxicam Unknown - Low Severity    Metformin And Related Diarrhea    Quetiapine      Fatigue and nausea    Amlodipine Palpitations     HEART PALPITATIONS- ANXIETY       Objective     Vitals:   Temp:  [97.6 °F (36.4 °C)-98.7 °F (37.1 °C)] 98.7 °F (37.1 °C)  Heart Rate:  [106-123] 106  Resp:  [16] 16  BP: (104-147)/(68-87) 104/68    Intake/Output Summary (Last 24 hours) at 3/22/2024 1338  Last data filed at 3/22/2024 1332  Gross per 24 hour   Intake 2500 ml   Output 700 ml   Net  1800 ml       Physical Exam:   Constitutional: Awake, alert, no acute distress.  HEENT: Sclera anicteric, no conjunctival injection  Neck: Supple, no thyromegaly, no lymphadenopathy, trachea at midline, no JVD  Respiratory: Clear to auscultation bilaterally, nonlabored respiration  Cardiovascular: RRR, no murmurs, no rubs or gallops, no carotid bruit  Gastrointestinal: Positive bowel sounds, abdomen is soft, nontender and nondistended  : No palpable bladder  Musculoskeletal: No edema, no clubbing or cyanosis  Psychiatric: Appropriate affect, cooperative  Neurologic: Oriented x3, moving all extremities, normal speech and mental status  Skin: Warm and dry       Scheduled Meds:     carvedilol, 3.125 mg, Oral, BID With Meals  folic acid, 1 mg, Oral, Daily  insulin glargine, 25 Units, Subcutaneous, Q12H  insulin lispro, 2-9 Units, Subcutaneous, 4x Daily AC & at Bedtime  sodium chloride, 10 mL, Intravenous, Q12H  thiamine (B-1) IV, 200 mg, Intravenous, Q8H   Followed by  [START ON 3/27/2024] thiamine, 100 mg, Oral, Daily      IV Meds:   sodium chloride, 125 mL/hr, Last Rate: 150 mL/hr (03/21/24 2032)        Results Reviewed:   I have personally reviewed the results from the time of this admission to 3/22/2024 13:38 EDT     Lab Results   Component Value Date    GLUCOSE 206 (H) 03/22/2024    CALCIUM 8.6 03/22/2024     (L) 03/22/2024    K 3.6 03/22/2024    CO2 22.1 03/22/2024    CL 91 (L) 03/22/2024    BUN 16 03/22/2024    CREATININE 3.27 (H) 03/22/2024    EGFRIFAFRI >60 08/31/2022    BCR 4.9 (L) 03/22/2024    ANIONGAP 17.9 (H) 03/22/2024      Lab Results   Component Value Date    MG 1.5 (L) 08/31/2022    PHOS 1.3 (C) 10/12/2021    ALBUMIN 4.1 03/22/2024     US Abdomen Limited    Result Date: 3/22/2024  US ABDOMEN LIMITED-  CLINICAL: Dyspepsia, nausea, vomiting.  COMPARISON: CT of the abdomen and pelvis dated 03/21/2024.  FINDINGS: Diffusely increased hepatic echotexture consistent with fatty infiltration. No focal  liver lesion. Small amount of sludge demonstrated within the dependent portion of the gallbladder. No gallstones or gallbladder wall thickening nor pericholecystic fluid is demonstrated. CBD diameter is normal at 6 mm.  Color Doppler and spectral analysis demonstrates normal hepatopetal flow within the main portal vein.  The right kidney measures 12.9 cm in length and is normal in appearance. No calculus or obstructive uropathy. Overall renal cortical echotexture is within normal limits. No free fluid is seen within the right upper quadrant.  CONCLUSION: Diffuse fatty infiltration of the liver, gallbladder sludge.  This report was finalized on 3/22/2024 7:03 AM by Dr. Demetri Urbano M.D on Workstation: [id number]        Assessment / Plan       Acute renal failure    Type 2 diabetes mellitus with hyperglycemia    HTN (hypertension)    HLD (hyperlipidemia)    Hyponatremia    Liver lesion    Hypokalemia    Transaminitis    Dehydration    Nausea with vomiting    Hepatic steatosis    History of alcohol use      ASSESSMENT:    Nonoliguric acute kidney injury secondary to prerenal state from intractable nausea and emesis, agree with IV fluid challenge.  Urinalysis bland last UPCR 190 CT scan abdomen pelvis.  No hydronephrosis or nephrolithiasis    Presumed hypovolemic hyponatremia.  Currently on IV Hydration will follow sodium trend    Intractable nausea and emesis concern for diabetic gastroparesis versus gastroesophageal flux disease from chronic alcohol intake.  Currently on Zofran, will add sucralfate and famotidine IV    Diabetes mellitus type 2 over 20 years insulin-dependent with complications as per primary team    Alcohol abuse for 10 years  of daily alcohol intake,  counseling provided.  Accompanied with hepatic acidosis as per primary team    PLAN:  -Agree with fluid challenge with IV fluids  -Continue to avoid nephrotoxins and closely monitor urine output  -Continue surveillance labs, will repeat UPCR      Thank  you for involving us in the care of Wu Damon.  Please feel free to call with any questions.    Itz Kitchen MD  03/22/24  13:38 EDT    [facility]  [phone]      Please note that portions of this note were completed with a voice recognition program.      Electronically signed by Itz Kitchen MD at 03/22/24 4775

## 2024-03-23 LAB
ALBUMIN SERPL-MCNC: 4.1 G/DL (ref 3.5–5.2)
ALBUMIN/GLOB SERPL: 1.4 G/DL
ALP SERPL-CCNC: 88 U/L (ref 39–117)
ALT SERPL W P-5'-P-CCNC: 107 U/L (ref 1–41)
ANION GAP SERPL CALCULATED.3IONS-SCNC: 11.6 MMOL/L (ref 5–15)
AST SERPL-CCNC: 161 U/L (ref 1–40)
BASOPHILS # BLD AUTO: 0.03 10*3/MM3 (ref 0–0.2)
BASOPHILS NFR BLD AUTO: 0.5 % (ref 0–1.5)
BILIRUB SERPL-MCNC: 2.3 MG/DL (ref 0–1.2)
BUN SERPL-MCNC: 14 MG/DL (ref 6–20)
BUN/CREAT SERPL: 5.2 (ref 7–25)
CALCIUM SPEC-SCNC: 8 MG/DL (ref 8.6–10.5)
CHLORIDE SERPL-SCNC: 98 MMOL/L (ref 98–107)
CO2 SERPL-SCNC: 25.4 MMOL/L (ref 22–29)
CREAT SERPL-MCNC: 2.68 MG/DL (ref 0.76–1.27)
DEPRECATED RDW RBC AUTO: 42.4 FL (ref 37–54)
EGFRCR SERPLBLD CKD-EPI 2021: 28.8 ML/MIN/1.73
EOSINOPHIL # BLD AUTO: 0.15 10*3/MM3 (ref 0–0.4)
EOSINOPHIL NFR BLD AUTO: 2.6 % (ref 0.3–6.2)
ERYTHROCYTE [DISTWIDTH] IN BLOOD BY AUTOMATED COUNT: 11.8 % (ref 12.3–15.4)
GLOBULIN UR ELPH-MCNC: 2.9 GM/DL
GLUCOSE BLDC GLUCOMTR-MCNC: 180 MG/DL (ref 70–130)
GLUCOSE BLDC GLUCOMTR-MCNC: 211 MG/DL (ref 70–130)
GLUCOSE BLDC GLUCOMTR-MCNC: 236 MG/DL (ref 70–130)
GLUCOSE BLDC GLUCOMTR-MCNC: 246 MG/DL (ref 70–130)
GLUCOSE SERPL-MCNC: 223 MG/DL (ref 65–99)
HCT VFR BLD AUTO: 36.8 % (ref 37.5–51)
HGB BLD-MCNC: 12.5 G/DL (ref 13–17.7)
IMM GRANULOCYTES # BLD AUTO: 0.02 10*3/MM3 (ref 0–0.05)
IMM GRANULOCYTES NFR BLD AUTO: 0.3 % (ref 0–0.5)
LYMPHOCYTES # BLD AUTO: 1.24 10*3/MM3 (ref 0.7–3.1)
LYMPHOCYTES NFR BLD AUTO: 21.1 % (ref 19.6–45.3)
MCH RBC QN AUTO: 33.9 PG (ref 26.6–33)
MCHC RBC AUTO-ENTMCNC: 34 G/DL (ref 31.5–35.7)
MCV RBC AUTO: 99.7 FL (ref 79–97)
MONOCYTES # BLD AUTO: 0.56 10*3/MM3 (ref 0.1–0.9)
MONOCYTES NFR BLD AUTO: 9.5 % (ref 5–12)
NEUTROPHILS NFR BLD AUTO: 3.87 10*3/MM3 (ref 1.7–7)
NEUTROPHILS NFR BLD AUTO: 66 % (ref 42.7–76)
NRBC BLD AUTO-RTO: 0 /100 WBC (ref 0–0.2)
PLATELET # BLD AUTO: 118 10*3/MM3 (ref 140–450)
PMV BLD AUTO: 12.8 FL (ref 6–12)
POTASSIUM SERPL-SCNC: 3.6 MMOL/L (ref 3.5–5.2)
POTASSIUM SERPL-SCNC: 4.6 MMOL/L (ref 3.5–5.2)
PROT SERPL-MCNC: 7 G/DL (ref 6–8.5)
RBC # BLD AUTO: 3.69 10*6/MM3 (ref 4.14–5.8)
SODIUM SERPL-SCNC: 135 MMOL/L (ref 136–145)
WBC NRBC COR # BLD AUTO: 5.87 10*3/MM3 (ref 3.4–10.8)

## 2024-03-23 PROCEDURE — 85025 COMPLETE CBC W/AUTO DIFF WBC: CPT | Performed by: STUDENT IN AN ORGANIZED HEALTH CARE EDUCATION/TRAINING PROGRAM

## 2024-03-23 PROCEDURE — 80053 COMPREHEN METABOLIC PANEL: CPT | Performed by: STUDENT IN AN ORGANIZED HEALTH CARE EDUCATION/TRAINING PROGRAM

## 2024-03-23 PROCEDURE — 63710000001 INSULIN GLARGINE PER 5 UNITS: Performed by: STUDENT IN AN ORGANIZED HEALTH CARE EDUCATION/TRAINING PROGRAM

## 2024-03-23 PROCEDURE — 82948 REAGENT STRIP/BLOOD GLUCOSE: CPT

## 2024-03-23 PROCEDURE — 25010000002 HEPARIN (PORCINE) PER 1000 UNITS: Performed by: INTERNAL MEDICINE

## 2024-03-23 PROCEDURE — 25010000002 THIAMINE HCL 200 MG/2ML SOLUTION: Performed by: NURSE PRACTITIONER

## 2024-03-23 PROCEDURE — 84132 ASSAY OF SERUM POTASSIUM: CPT | Performed by: INTERNAL MEDICINE

## 2024-03-23 PROCEDURE — 25810000003 SODIUM CHLORIDE 0.9 % SOLUTION: Performed by: INTERNAL MEDICINE

## 2024-03-23 PROCEDURE — 25010000002 LORAZEPAM PER 2 MG: Performed by: NURSE PRACTITIONER

## 2024-03-23 PROCEDURE — 63710000001 INSULIN LISPRO (HUMAN) PER 5 UNITS: Performed by: STUDENT IN AN ORGANIZED HEALTH CARE EDUCATION/TRAINING PROGRAM

## 2024-03-23 PROCEDURE — 36415 COLL VENOUS BLD VENIPUNCTURE: CPT | Performed by: STUDENT IN AN ORGANIZED HEALTH CARE EDUCATION/TRAINING PROGRAM

## 2024-03-23 RX ORDER — LORAZEPAM 1 MG/1
1 TABLET ORAL NIGHTLY
Status: DISCONTINUED | OUTPATIENT
Start: 2024-03-23 | End: 2024-03-25 | Stop reason: HOSPADM

## 2024-03-23 RX ORDER — HEPARIN SODIUM 5000 [USP'U]/ML
5000 INJECTION, SOLUTION INTRAVENOUS; SUBCUTANEOUS EVERY 12 HOURS SCHEDULED
Status: DISCONTINUED | OUTPATIENT
Start: 2024-03-23 | End: 2024-03-25 | Stop reason: HOSPADM

## 2024-03-23 RX ORDER — POTASSIUM CHLORIDE 750 MG/1
40 TABLET, FILM COATED, EXTENDED RELEASE ORAL EVERY 4 HOURS
Status: COMPLETED | OUTPATIENT
Start: 2024-03-23 | End: 2024-03-23

## 2024-03-23 RX ADMIN — INSULIN LISPRO 2 UNITS: 100 INJECTION, SOLUTION INTRAVENOUS; SUBCUTANEOUS at 12:41

## 2024-03-23 RX ADMIN — FOLIC ACID 1 MG: 1 TABLET ORAL at 09:00

## 2024-03-23 RX ADMIN — Medication 10 ML: at 09:01

## 2024-03-23 RX ADMIN — THIAMINE HYDROCHLORIDE 200 MG: 100 INJECTION, SOLUTION INTRAMUSCULAR; INTRAVENOUS at 05:27

## 2024-03-23 RX ADMIN — CARVEDILOL 3.12 MG: 3.12 TABLET, FILM COATED ORAL at 09:00

## 2024-03-23 RX ADMIN — POTASSIUM CHLORIDE 40 MEQ: 750 TABLET, EXTENDED RELEASE ORAL at 11:41

## 2024-03-23 RX ADMIN — SUCRALFATE 1 G: 1 TABLET ORAL at 11:35

## 2024-03-23 RX ADMIN — SODIUM CHLORIDE 125 ML/HR: 9 INJECTION, SOLUTION INTRAVENOUS at 19:42

## 2024-03-23 RX ADMIN — LORAZEPAM 1 MG: 1 TABLET ORAL at 02:29

## 2024-03-23 RX ADMIN — SODIUM CHLORIDE 125 ML/HR: 9 INJECTION, SOLUTION INTRAVENOUS at 11:35

## 2024-03-23 RX ADMIN — INSULIN GLARGINE 25 UNITS: 100 INJECTION, SOLUTION SUBCUTANEOUS at 09:00

## 2024-03-23 RX ADMIN — FAMOTIDINE 20 MG: 10 INJECTION INTRAVENOUS at 09:00

## 2024-03-23 RX ADMIN — INSULIN LISPRO 4 UNITS: 100 INJECTION, SOLUTION INTRAVENOUS; SUBCUTANEOUS at 21:44

## 2024-03-23 RX ADMIN — Medication 10 ML: at 21:45

## 2024-03-23 RX ADMIN — INSULIN GLARGINE 25 UNITS: 100 INJECTION, SOLUTION SUBCUTANEOUS at 21:45

## 2024-03-23 RX ADMIN — INSULIN LISPRO 4 UNITS: 100 INJECTION, SOLUTION INTRAVENOUS; SUBCUTANEOUS at 17:40

## 2024-03-23 RX ADMIN — SUCRALFATE 1 G: 1 TABLET ORAL at 17:40

## 2024-03-23 RX ADMIN — LORAZEPAM 1 MG: 2 INJECTION, SOLUTION INTRAMUSCULAR; INTRAVENOUS at 11:36

## 2024-03-23 RX ADMIN — Medication 100 MG: at 11:41

## 2024-03-23 RX ADMIN — SUCRALFATE 1 G: 1 TABLET ORAL at 09:00

## 2024-03-23 RX ADMIN — SUCRALFATE 1 G: 1 TABLET ORAL at 21:44

## 2024-03-23 RX ADMIN — HEPARIN SODIUM 5000 UNITS: 5000 INJECTION INTRAVENOUS; SUBCUTANEOUS at 11:41

## 2024-03-23 RX ADMIN — POTASSIUM CHLORIDE 40 MEQ: 750 TABLET, EXTENDED RELEASE ORAL at 08:59

## 2024-03-23 RX ADMIN — LORAZEPAM 1 MG: 1 TABLET ORAL at 21:44

## 2024-03-23 RX ADMIN — CARVEDILOL 3.12 MG: 3.12 TABLET, FILM COATED ORAL at 17:39

## 2024-03-23 RX ADMIN — HEPARIN SODIUM 5000 UNITS: 5000 INJECTION INTRAVENOUS; SUBCUTANEOUS at 21:44

## 2024-03-23 RX ADMIN — INSULIN LISPRO 4 UNITS: 100 INJECTION, SOLUTION INTRAVENOUS; SUBCUTANEOUS at 09:00

## 2024-03-23 NOTE — PROGRESS NOTES
Name: Wu Damon ADMIT: 3/21/2024   : 1975  PCP: Juan Carlos Kwan TON ALEXIA    MRN: [id number] LOS: 2 days   AGE/SEX: 48 y.o. male  ROOM: [facility]   Subjective   Chief Complaint   Patient presents with    Abdominal Pain    Hypertension     H/o alcohol abuse  H/o previous admissions for ETOH w/d  Has been having some n/v abd pain for a few weeks  N/v better today  Wishes to try regular food  Some etoh w/d    ROS  No f/c  + n/v  No cp/palp  No soa/cough    Objective   Vital Signs  Temp:  [97.6 °F (36.4 °C)-98.8 °F (37.1 °C)] 98.8 °F (37.1 °C)  Heart Rate:  [] 106  Resp:  [12-16] 14  BP: (101-149)/(68-84) 149/84  SpO2:  [93 %-98 %] 98 %  on   ;   Device (Oxygen Therapy): room air  Body mass index is 39.1 kg/m².    Physical Exam  HENT:      Head: Normocephalic and atraumatic.   Eyes:      General: No scleral icterus.  Cardiovascular:      Rate and Rhythm: Regular rhythm. Tachycardia present.      Heart sounds: Normal heart sounds.   Pulmonary:      Effort: Pulmonary effort is normal. No respiratory distress.      Breath sounds: Normal breath sounds.   Abdominal:      General: There is no distension.      Palpations: Abdomen is soft.      Tenderness: There is no abdominal tenderness.   Musculoskeletal:      Cervical back: Neck supple.   Neurological:      Mental Status: He is alert.   Psychiatric:         Behavior: Behavior normal.         Results Review:       I reviewed the patient's new clinical results.  Results from last 7 days   Lab Units 24  0457 24  0552 24  1721   WBC 10*3/mm3 5.87 7.78 8.66   HEMOGLOBIN g/dL 12.5* 12.6* 13.6   PLATELETS 10*3/mm3 118* 133* 148     Results from last 7 days   Lab Units 24  0457 24  0552 24  1721   SODIUM mmol/L 135* 131* 129*   POTASSIUM mmol/L 3.6 3.6 3.3*   CHLORIDE mmol/L 98 91* 86*   CO2 mmol/L 25.4 22.1 23.0   BUN mg/dL 14 16 17   CREATININE mg/dL 2.68* 3.27* 3.81*   GLUCOSE mg/dL 223* 206* 272*   Estimated Creatinine  "Clearance: 45.5 mL/min (A) (by C-G formula based on SCr of 2.68 mg/dL (H)).  Results from last 7 days   Lab Units 03/23/24  0457 03/22/24  0552 03/21/24  1721   ALBUMIN g/dL 4.1 4.1 4.7   BILIRUBIN mg/dL 2.3* 2.2* 2.2*   ALK PHOS U/L 88 92 112   AST (SGOT) U/L 161* 130* 139*   ALT (SGPT) U/L 107* 103* 115*     Results from last 7 days   Lab Units 03/23/24  0457 03/22/24  0552 03/21/24  1721   CALCIUM mg/dL 8.0* 8.6 9.4   ALBUMIN g/dL 4.1 4.1 4.7         Coag     HbA1C   Lab Results   Component Value Date    HGBA1C 7.90 (H) 03/22/2024    HGBA1C 6.7 (H) 11/15/2023    HGBA1C 8.43 (H) 01/31/2022     Infection     Radiology(recent) CT Abdomen Pelvis Without Contrast    Result Date: 3/21/2024    1. No acute inflammatory process of bowel is identified, follow-up as indications persist.  2. No urolithiasis or hydronephrosis.  3. Redemonstration of severe hepatic steatosis with 3 indeterminate stable liver lesions. Possible cholelithiasis.  This report was finalized on 3/21/2024 7:00 PM by Dr. Chinedu Vizcarra M.D on Workstation: [id number]     No results found for: \"TROPONINT\", \"TROPONINI\", \"BNP\"  No components found for: \"TSH;2\"    carvedilol, 3.125 mg, Oral, BID With Meals  famotidine, 20 mg, Intravenous, Daily  folic acid, 1 mg, Oral, Daily  insulin glargine, 25 Units, Subcutaneous, Q12H  insulin lispro, 2-9 Units, Subcutaneous, 4x Daily AC & at Bedtime  LORazepam, 1 mg, Oral, Nightly  potassium chloride ER, 40 mEq, Oral, Q4H  sodium chloride, 10 mL, Intravenous, Q12H  sucralfate, 1 g, Oral, 4x Daily AC & at Bedtime  thiamine (B-1) IV, 200 mg, Intravenous, Q8H   Followed by  [START ON 3/27/2024] thiamine, 100 mg, Oral, Daily      sodium chloride, 125 mL/hr, Last Rate: 125 mL/hr (03/22/24 2114)    Diet: Liquid; Clear Liquid; Fluid Consistency: Thin (IDDSI 0)      Assessment & Plan      Active Hospital Problems    Diagnosis  POA    **Acute renal failure [N17.9]  Yes    Dehydration [E86.0]  Yes    Nausea with vomiting " [R11.2]  Yes    Hepatic steatosis [K76.0]  Yes    History of alcohol use [Z87.898]  Not Applicable    Transaminitis [R74.01]  Yes    Hypokalemia [E87.6]  Yes    Liver lesion [K76.9]  Yes    Hyponatremia [E87.1]  Yes    Type 2 diabetes mellitus with hyperglycemia [E11.65]  Yes    HTN (hypertension) [I10]  Yes    HLD (hyperlipidemia) [E78.5]  Yes      Resolved Hospital Problems   No resolved problems to display.       Mr. Damon is a 48-year-old male with history of type 2 diabetes, hypertension, hyperlipidemia, alcohol use who presents to the emergency room with nausea and vomiting and abdominal pain. He was found to have acute renal failure     Acute renal failure/hyponatremia/dehydration  -Normal saline  -Repeat CMP, CBC in a.m.  -Nephrology following      Nausea vomiting/hepatic steatosis/transaminitis  -PRN agents  -U/S with fatty liver, GB sludge  -Elevated LFTs may be related to alcohol use, monitor. No evidence for acute cholyctitis or pancreatitis  -wishes to advance diet  -continue IV pepcid     Transaminitis/alcohol use  -CIWA protocol initiated  -Patient started on thiamine and folic acid  -Seizure precautions    Type 2 diabetes  -Accu-Cheks before meals and at bedtime with correctional dose insulin  -Continue long-acting insulin  -Hold oral diabetic medications at this time        VTE Prophylaxis - SCDs. Add heparin      DW JAZ Padron MD  [facility]  03/23/24  12:53 EDT

## 2024-03-23 NOTE — NURSING NOTE
"  Access center follow up.    Patient sitting in bed using laptop. He reports feeling \"irritable\" and not much sleep overnight, flat affect. Scheduled ativan po to begin at HS. He reports has notified primary nurse. Patient has been provided resources and plans on continuing outpatient treatment with [facility] for [facility]. Last CIWA 10. Access following.   "

## 2024-03-23 NOTE — PROGRESS NOTES
[facility] Progress Note      Patient Name: Wu Damon  : 1975  MRN: [id number]  Primary Care Physician:  Juan Carlos Kwan APRN  Date of admission: 3/21/2024    Subjective     Interval History:   Follow-up acute kidney injury    Patient is feeling much better, denies any chest pain or shortness of air, no orthopnea or PND, no nausea or vomiting, he has good urine output, no lightheadedness.    Review of Systems:   As noted above    Objective     Vitals:   Temp:  [97.6 °F (36.4 °C)-98.8 °F (37.1 °C)] 98.4 °F (36.9 °C)  Heart Rate:  [] 103  Resp:  [12-16] 14  BP: (101-149)/(69-86) 132/86    Intake/Output Summary (Last 24 hours) at 3/23/2024 1447  Last data filed at 3/23/2024 1400  Gross per 24 hour   Intake 2000 ml   Output 2775 ml   Net -775 ml       Physical Exam:    General Appearance: alert, awake, chronically, no acute distress   Skin: warm and dry  HEENT: oral mucosa normal, nonicteric sclera  Neck: supple, no JVD  Lungs: CTA, unlabored breathing effort  Heart: RRR, no rub  Abdomen: soft, nontender, nondistended  : no palpable bladder  Extremities: no edema, cyanosis or clubbing  Neuro: normal speech and mental status     Scheduled Meds:     carvedilol, 3.125 mg, Oral, BID With Meals  famotidine, 20 mg, Intravenous, Daily  folic acid, 1 mg, Oral, Daily  heparin (porcine), 5,000 Units, Subcutaneous, Q12H  insulin glargine, 25 Units, Subcutaneous, Q12H  insulin lispro, 2-9 Units, Subcutaneous, 4x Daily AC & at Bedtime  LORazepam, 1 mg, Oral, Nightly  sodium chloride, 10 mL, Intravenous, Q12H  sucralfate, 1 g, Oral, 4x Daily AC & at Bedtime  thiamine, 100 mg, Oral, Daily      IV Meds:   sodium chloride, 125 mL/hr, Last Rate: 125 mL/hr (24 1135)        Results Reviewed:   I have personally reviewed the results from the time of this admission to 3/23/2024 14:47 EDT     Results from last 7 days   Lab Units 24  0457 24  0552 24  1721    SODIUM mmol/L 135* 131* 129*   POTASSIUM mmol/L 3.6 3.6 3.3*   CHLORIDE mmol/L 98 91* 86*   CO2 mmol/L 25.4 22.1 23.0   BUN mg/dL 14 16 17   CREATININE mg/dL 2.68* 3.27* 3.81*   CALCIUM mg/dL 8.0* 8.6 9.4   BILIRUBIN mg/dL 2.3* 2.2* 2.2*   ALK PHOS U/L 88 92 112   ALT (SGPT) U/L 107* 103* 115*   AST (SGOT) U/L 161* 130* 139*   GLUCOSE mg/dL 223* 206* 272*       Estimated Creatinine Clearance: 45.5 mL/min (A) (by C-G formula based on SCr of 2.68 mg/dL (H)).                Results from last 7 days   Lab Units 03/23/24  0457 03/22/24  0552 03/21/24  1721   WBC 10*3/mm3 5.87 7.78 8.66   HEMOGLOBIN g/dL 12.5* 12.6* 13.6   PLATELETS 10*3/mm3 118* 133* 148             Assessment / Plan     ASSESSMENT:  Acute kidney injury secondary to volume depletion related to intractable nausea and vomiting, creatinine improving down to 2.68, electrolyte within acceptable range  Hypovolemic hyponatremia resolved  Possible gastroparesis  Diabetes mellitus type 2    PLAN:  Continue the same treatment and IV fluid  Reassess in the next 24 hours regarding further adjustment of the IV fluid or discontinuation  Surveillance labs    I reviewed the chart and other providers notes, I reviewed labs.  I discussed the case with the patient and he voiced good understanding  Copied text in this note has been reviewed and is accurate as of 03/23/24.       Thank you for involving us in the care of Wu Damon.  Please feel free to call with any questions.    Stewart Brandon MD  03/23/24  14:47 EDT    [facility]  [phone]    Please note that portions of this note were completed with a voice recognition program.

## 2024-03-23 NOTE — PLAN OF CARE
Goal Outcome Evaluation:  Plan of Care Reviewed With: patient        Progress: improving     Vital signs stable. Alert and oriented x 4. Up ad patrice in room. On room air. Sinus tachycardia noted. IV fluids infusing at 125  cc / hr. Tolerating consistent carbohydrate diet well. Blood sugar monitored AC/HS. Denies nausea or discomfort. IV ativan given prn per CIWA protocol. Voiding without difficulty per urinal. Bed alarm refused this afternoon. Call light within patient's reach. Resting quietly in no distress. Possible discharge home in a few days.

## 2024-03-23 NOTE — PLAN OF CARE
Goal Outcome Evaluation:  Plan of Care Reviewed With: patient        Progress: improving                Problem: Adult Inpatient Plan of Care  Goal: Plan of Care Review  Flowsheets (Taken 3/23/2024 0536)  Progress: improving  Plan of Care Reviewed With: patient  Outcome Evaluation: VSS. A&Ox4. Anxious. No c/o nasuea. No vomiting. Is requesting solid food today. CIWA score anywhere from 1-10. Up ad patrice. Multiple BM this shift. All questions answered with verbalized understanding. Care on going.  Goal: Absence of Hospital-Acquired Illness or Injury  Intervention: Identify and Manage Fall Risk  Recent Flowsheet Documentation  Taken 3/23/2024 0417 by Jana Youssef, RN  Safety Promotion/Fall Prevention:   nonskid shoes/slippers when out of bed   safety round/check completed   room organization consistent   lighting adjusted   fall prevention program maintained   clutter free environment maintained   assistive device/personal items within reach  Taken 3/23/2024 0229 by Jana Youssef, RN  Safety Promotion/Fall Prevention:   safety round/check completed   room organization consistent   nonskid shoes/slippers when out of bed   lighting adjusted   fall prevention program maintained   assistive device/personal items within reach   clutter free environment maintained  Taken 3/23/2024 0012 by Jana Youssef, RN  Safety Promotion/Fall Prevention:   nonskid shoes/slippers when out of bed   safety round/check completed   room organization consistent   lighting adjusted   fall prevention program maintained   clutter free environment maintained   assistive device/personal items within reach  Taken 3/22/2024 2215 by Jana Youssef, RN  Safety Promotion/Fall Prevention:   nonskid shoes/slippers when out of bed   safety round/check completed   room organization consistent   lighting adjusted   fall prevention program maintained   clutter free environment maintained   assistive device/personal items within reach  Taken  3/22/2024 2100 by Jana Youssef RN  Safety Promotion/Fall Prevention:   safety round/check completed   room organization consistent   nonskid shoes/slippers when out of bed   lighting adjusted   fall prevention program maintained  Intervention: Prevent Skin Injury  Recent Flowsheet Documentation  Taken 3/23/2024 0417 by Jana Youssef RN  Body Position:   weight shifting   position changed independently  Taken 3/23/2024 0229 by Jana Youssef RN  Body Position: position changed independently  Taken 3/23/2024 0012 by Jana Youssef RN  Body Position:   weight shifting   position changed independently  Taken 3/22/2024 2215 by Jana Youssef RN  Body Position:   weight shifting   position changed independently  Taken 3/22/2024 2100 by Jana Youssef RN  Body Position: position changed independently  Intervention: Prevent and Manage VTE (Venous Thromboembolism) Risk  Recent Flowsheet Documentation  Taken 3/23/2024 0417 by Jana Youssef RN  Activity Management: up ad patrice  Taken 3/23/2024 0229 by Jana Youssef RN  Activity Management: up ad patrice  Taken 3/23/2024 0012 by Jana Youssef RN  Activity Management: up ad patrice  Taken 3/22/2024 2215 by Jana Youssef RN  Activity Management: up ad patrice  Taken 3/22/2024 2100 by Jana Youssef RN  Activity Management: up ad patrice  VTE Prevention/Management: patient refused intervention  Intervention: Prevent Infection  Recent Flowsheet Documentation  Taken 3/23/2024 0417 by Jana Youssef RN  Infection Prevention:   single patient room provided   rest/sleep promoted   personal protective equipment utilized   hand hygiene promoted  Taken 3/23/2024 0012 by Jana Youssef RN  Infection Prevention:   single patient room provided   rest/sleep promoted   personal protective equipment utilized   hand hygiene promoted  Taken 3/22/2024 2215 by Jana Youssef RN  Infection Prevention:   single patient room provided   rest/sleep promoted   personal  protective equipment utilized   hand hygiene promoted  Taken 3/22/2024 2100 by Jana Youssef RN  Infection Prevention:   single patient room provided   rest/sleep promoted   personal protective equipment utilized   hand hygiene promoted  Goal: Optimal Comfort and Wellbeing  Intervention: Provide Person-Centered Care  Recent Flowsheet Documentation  Taken 3/23/2024 0229 by Jana Youssef RN  Trust Relationship/Rapport:   care explained   choices provided   questions answered   thoughts/feelings acknowledged  Taken 3/22/2024 2100 by Jana Youssef RN  Trust Relationship/Rapport:   care explained   choices provided   questions answered   thoughts/feelings acknowledged     Problem: Hypertension Comorbidity  Goal: Blood Pressure in Desired Range  Intervention: Maintain Blood Pressure Management  Recent Flowsheet Documentation  Taken 3/23/2024 0417 by Jana Youssef, RN  Medication Review/Management: medications reviewed  Taken 3/23/2024 0229 by Jana Youssef RN  Medication Review/Management: medications reviewed  Taken 3/23/2024 0012 by Jana Youssef RN  Medication Review/Management: medications reviewed  Taken 3/22/2024 2215 by Jana Youssef RN  Medication Review/Management: medications reviewed  Taken 3/22/2024 2100 by Jana Youssef RN  Medication Review/Management: medications reviewed                 Outcome Evaluation: VSS. A&Ox4. Anxious. No c/o nausea. No vomiting. Is requesting solid food today. CIWA score anywhere from 1-10. Up ad patrice. Multiple BM this shift. All questions answered with verbalized understanding. Care on going.

## 2024-03-24 LAB
ALBUMIN SERPL-MCNC: 4.1 G/DL (ref 3.5–5.2)
ALBUMIN/GLOB SERPL: 1.3 G/DL
ALP SERPL-CCNC: 134 U/L (ref 39–117)
ALT SERPL W P-5'-P-CCNC: 99 U/L (ref 1–41)
ANION GAP SERPL CALCULATED.3IONS-SCNC: 10 MMOL/L (ref 5–15)
AST SERPL-CCNC: 124 U/L (ref 1–40)
BASOPHILS # BLD AUTO: 0.04 10*3/MM3 (ref 0–0.2)
BASOPHILS NFR BLD AUTO: 0.6 % (ref 0–1.5)
BILIRUB SERPL-MCNC: 1.3 MG/DL (ref 0–1.2)
BUN SERPL-MCNC: 13 MG/DL (ref 6–20)
BUN/CREAT SERPL: 6.1 (ref 7–25)
CALCIUM SPEC-SCNC: 8.2 MG/DL (ref 8.6–10.5)
CHLORIDE SERPL-SCNC: 105 MMOL/L (ref 98–107)
CO2 SERPL-SCNC: 25 MMOL/L (ref 22–29)
CREAT SERPL-MCNC: 2.13 MG/DL (ref 0.76–1.27)
DEPRECATED RDW RBC AUTO: 41.5 FL (ref 37–54)
EGFRCR SERPLBLD CKD-EPI 2021: 37.9 ML/MIN/1.73
EOSINOPHIL # BLD AUTO: 0.18 10*3/MM3 (ref 0–0.4)
EOSINOPHIL NFR BLD AUTO: 2.8 % (ref 0.3–6.2)
ERYTHROCYTE [DISTWIDTH] IN BLOOD BY AUTOMATED COUNT: 11.4 % (ref 12.3–15.4)
GLOBULIN UR ELPH-MCNC: 3.1 GM/DL
GLUCOSE BLDC GLUCOMTR-MCNC: 113 MG/DL (ref 70–130)
GLUCOSE BLDC GLUCOMTR-MCNC: 127 MG/DL (ref 70–130)
GLUCOSE BLDC GLUCOMTR-MCNC: 292 MG/DL (ref 70–130)
GLUCOSE BLDC GLUCOMTR-MCNC: 390 MG/DL (ref 70–130)
GLUCOSE SERPL-MCNC: 278 MG/DL (ref 65–99)
HCT VFR BLD AUTO: 37.4 % (ref 37.5–51)
HGB BLD-MCNC: 12.6 G/DL (ref 13–17.7)
IMM GRANULOCYTES # BLD AUTO: 0.03 10*3/MM3 (ref 0–0.05)
IMM GRANULOCYTES NFR BLD AUTO: 0.5 % (ref 0–0.5)
LYMPHOCYTES # BLD AUTO: 1.48 10*3/MM3 (ref 0.7–3.1)
LYMPHOCYTES NFR BLD AUTO: 23.4 % (ref 19.6–45.3)
MAGNESIUM SERPL-MCNC: 1.4 MG/DL (ref 1.6–2.6)
MCH RBC QN AUTO: 33.8 PG (ref 26.6–33)
MCHC RBC AUTO-ENTMCNC: 33.7 G/DL (ref 31.5–35.7)
MCV RBC AUTO: 100.3 FL (ref 79–97)
MONOCYTES # BLD AUTO: 0.74 10*3/MM3 (ref 0.1–0.9)
MONOCYTES NFR BLD AUTO: 11.7 % (ref 5–12)
NEUTROPHILS NFR BLD AUTO: 3.85 10*3/MM3 (ref 1.7–7)
NEUTROPHILS NFR BLD AUTO: 61 % (ref 42.7–76)
NRBC BLD AUTO-RTO: 0 /100 WBC (ref 0–0.2)
PHOSPHATE SERPL-MCNC: 0.5 MG/DL (ref 2.5–4.5)
PLATELET # BLD AUTO: 157 10*3/MM3 (ref 140–450)
PMV BLD AUTO: 13 FL (ref 6–12)
POTASSIUM SERPL-SCNC: 4.3 MMOL/L (ref 3.5–5.2)
PROT SERPL-MCNC: 7.2 G/DL (ref 6–8.5)
RBC # BLD AUTO: 3.73 10*6/MM3 (ref 4.14–5.8)
SODIUM SERPL-SCNC: 140 MMOL/L (ref 136–145)
URATE SERPL-MCNC: 6.7 MG/DL (ref 3.4–7)
WBC NRBC COR # BLD AUTO: 6.32 10*3/MM3 (ref 3.4–10.8)

## 2024-03-24 PROCEDURE — 84100 ASSAY OF PHOSPHORUS: CPT | Performed by: INTERNAL MEDICINE

## 2024-03-24 PROCEDURE — 25810000003 SODIUM CHLORIDE 0.9 % SOLUTION: Performed by: INTERNAL MEDICINE

## 2024-03-24 PROCEDURE — 82948 REAGENT STRIP/BLOOD GLUCOSE: CPT

## 2024-03-24 PROCEDURE — 63710000001 INSULIN GLARGINE PER 5 UNITS: Performed by: STUDENT IN AN ORGANIZED HEALTH CARE EDUCATION/TRAINING PROGRAM

## 2024-03-24 PROCEDURE — 25010000002 HEPARIN (PORCINE) PER 1000 UNITS: Performed by: INTERNAL MEDICINE

## 2024-03-24 PROCEDURE — 83735 ASSAY OF MAGNESIUM: CPT | Performed by: INTERNAL MEDICINE

## 2024-03-24 PROCEDURE — 25010000002 MAGNESIUM SULFATE 2 GM/50ML SOLUTION: Performed by: INTERNAL MEDICINE

## 2024-03-24 PROCEDURE — 85025 COMPLETE CBC W/AUTO DIFF WBC: CPT | Performed by: STUDENT IN AN ORGANIZED HEALTH CARE EDUCATION/TRAINING PROGRAM

## 2024-03-24 PROCEDURE — 36415 COLL VENOUS BLD VENIPUNCTURE: CPT | Performed by: STUDENT IN AN ORGANIZED HEALTH CARE EDUCATION/TRAINING PROGRAM

## 2024-03-24 PROCEDURE — 80053 COMPREHEN METABOLIC PANEL: CPT | Performed by: STUDENT IN AN ORGANIZED HEALTH CARE EDUCATION/TRAINING PROGRAM

## 2024-03-24 PROCEDURE — 63710000001 INSULIN LISPRO (HUMAN) PER 5 UNITS: Performed by: STUDENT IN AN ORGANIZED HEALTH CARE EDUCATION/TRAINING PROGRAM

## 2024-03-24 PROCEDURE — 63710000001 INSULIN LISPRO (HUMAN) PER 5 UNITS: Performed by: INTERNAL MEDICINE

## 2024-03-24 PROCEDURE — 84550 ASSAY OF BLOOD/URIC ACID: CPT | Performed by: INTERNAL MEDICINE

## 2024-03-24 RX ORDER — MAGNESIUM SULFATE HEPTAHYDRATE 40 MG/ML
2 INJECTION, SOLUTION INTRAVENOUS
Status: COMPLETED | OUTPATIENT
Start: 2024-03-24 | End: 2024-03-24

## 2024-03-24 RX ORDER — HYDRALAZINE HYDROCHLORIDE 25 MG/1
25 TABLET, FILM COATED ORAL EVERY 6 HOURS PRN
Status: DISCONTINUED | OUTPATIENT
Start: 2024-03-24 | End: 2024-03-25 | Stop reason: HOSPADM

## 2024-03-24 RX ORDER — INSULIN LISPRO 100 [IU]/ML
6 INJECTION, SOLUTION INTRAVENOUS; SUBCUTANEOUS
Status: DISCONTINUED | OUTPATIENT
Start: 2024-03-24 | End: 2024-03-25 | Stop reason: HOSPADM

## 2024-03-24 RX ORDER — SUCRALFATE 1 G/1
1 TABLET ORAL
Status: DISCONTINUED | OUTPATIENT
Start: 2024-03-24 | End: 2024-03-25 | Stop reason: HOSPADM

## 2024-03-24 RX ADMIN — CARVEDILOL 3.12 MG: 3.12 TABLET, FILM COATED ORAL at 17:31

## 2024-03-24 RX ADMIN — INSULIN LISPRO 6 UNITS: 100 INJECTION, SOLUTION INTRAVENOUS; SUBCUTANEOUS at 18:42

## 2024-03-24 RX ADMIN — MAGNESIUM SULFATE HEPTAHYDRATE 2 G: 40 INJECTION, SOLUTION INTRAVENOUS at 17:31

## 2024-03-24 RX ADMIN — FOLIC ACID 1 MG: 1 TABLET ORAL at 10:09

## 2024-03-24 RX ADMIN — SODIUM PHOSPHATE, MONOBASIC, MONOHYDRATE AND SODIUM PHOSPHATE, DIBASIC, ANHYDROUS 15 MMOL: 142; 276 INJECTION, SOLUTION INTRAVENOUS at 17:31

## 2024-03-24 RX ADMIN — INSULIN GLARGINE 25 UNITS: 100 INJECTION, SOLUTION SUBCUTANEOUS at 10:10

## 2024-03-24 RX ADMIN — CARVEDILOL 3.12 MG: 3.12 TABLET, FILM COATED ORAL at 10:08

## 2024-03-24 RX ADMIN — Medication 10 ML: at 20:52

## 2024-03-24 RX ADMIN — SUCRALFATE 1 G: 1 TABLET ORAL at 10:09

## 2024-03-24 RX ADMIN — INSULIN GLARGINE 25 UNITS: 100 INJECTION, SOLUTION SUBCUTANEOUS at 20:48

## 2024-03-24 RX ADMIN — Medication 100 MG: at 10:09

## 2024-03-24 RX ADMIN — INSULIN LISPRO 6 UNITS: 100 INJECTION, SOLUTION INTRAVENOUS; SUBCUTANEOUS at 10:14

## 2024-03-24 RX ADMIN — SODIUM PHOSPHATE, MONOBASIC, MONOHYDRATE AND SODIUM PHOSPHATE, DIBASIC, ANHYDROUS 15 MMOL: 142; 276 INJECTION, SOLUTION INTRAVENOUS at 20:48

## 2024-03-24 RX ADMIN — Medication 10 ML: at 10:20

## 2024-03-24 RX ADMIN — SODIUM PHOSPHATE, MONOBASIC, MONOHYDRATE AND SODIUM PHOSPHATE, DIBASIC, ANHYDROUS 15 MMOL: 142; 276 INJECTION, SOLUTION INTRAVENOUS at 13:37

## 2024-03-24 RX ADMIN — SUCRALFATE 1 G: 1 TABLET ORAL at 20:48

## 2024-03-24 RX ADMIN — MAGNESIUM SULFATE HEPTAHYDRATE 2 G: 40 INJECTION, SOLUTION INTRAVENOUS at 11:20

## 2024-03-24 RX ADMIN — MAGNESIUM SULFATE HEPTAHYDRATE 2 G: 40 INJECTION, SOLUTION INTRAVENOUS at 16:05

## 2024-03-24 RX ADMIN — HEPARIN SODIUM 5000 UNITS: 5000 INJECTION INTRAVENOUS; SUBCUTANEOUS at 10:09

## 2024-03-24 RX ADMIN — INSULIN LISPRO 8 UNITS: 100 INJECTION, SOLUTION INTRAVENOUS; SUBCUTANEOUS at 12:39

## 2024-03-24 RX ADMIN — LORAZEPAM 1 MG: 1 TABLET ORAL at 20:47

## 2024-03-24 RX ADMIN — FAMOTIDINE 20 MG: 10 INJECTION INTRAVENOUS at 10:08

## 2024-03-24 RX ADMIN — INSULIN LISPRO 6 UNITS: 100 INJECTION, SOLUTION INTRAVENOUS; SUBCUTANEOUS at 12:39

## 2024-03-24 RX ADMIN — HEPARIN SODIUM 5000 UNITS: 5000 INJECTION INTRAVENOUS; SUBCUTANEOUS at 20:48

## 2024-03-24 NOTE — PROGRESS NOTES
[facility] follow up d/t alcohol; this writer reviewed chart and spoke with RN Jana. Per RN, patient seems anxious (i.e. 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  This writer talked with RN about the possibility of psychiatrist, Dr. Oneil, seeing patient for anxiety eval/treatment; RN aware/agrees this could be beneficial.  Last CIWA 8 at 15:00; patient has [facility] and outpatient psychiatry resources; he plans to continue with outpatient [facility] treatment at [facility].  Per EMR, discharge possible in a few days pending kidney function; no further needs/concerns noted at this time per RN and/or medical team. [facility] to continue following.

## 2024-03-24 NOTE — PROGRESS NOTES
[facility] Progress Note      Patient Name: Wu Damon  : 1975  MRN: [id number]  Primary Care Physician:  Juan Carlos Kwan APRN  Date of admission: 3/21/2024    Subjective     Interval History:   HAVEN follow up    Serum creatinine continues to improve. 2.68->2.13 today.     Feels better, able to eat yesterday. Adequate oral intake for the last 24h without nausea or emesis. He denies any chest pains, shortness of breath, no orthoopnea or PND, no bladder symptoms.        Review of Systems:   As noted above    Objective     Vitals:   Temp:  [98 °F (36.7 °C)-98.4 °F (36.9 °C)] 98.4 °F (36.9 °C)  Heart Rate:  [] 109  Resp:  [16-18] 16  BP: (126-160)/(82-93) 160/93    Intake/Output Summary (Last 24 hours) at 3/24/2024 1308  Last data filed at 3/24/2024 1020  Gross per 24 hour   Intake 2580 ml   Output 1775 ml   Net 805 ml       Physical Exam:    General Appearance: alert, obese, awake, no acute distress   Skin: warm and dry  HEENT: oral mucosa normal, nonicteric sclera  Neck: supple, no JVD  Lungs: CTA, unlabored breathing effort  Heart: RRR, no rub  Abdomen: obese abdomen soft, nontender, nondistended  Extremities: no edema, cyanosis or clubbing  Neuro: normal speech and mental status     Scheduled Meds:     carvedilol, 3.125 mg, Oral, BID With Meals  famotidine, 20 mg, Intravenous, Daily  folic acid, 1 mg, Oral, Daily  heparin (porcine), 5,000 Units, Subcutaneous, Q12H  insulin glargine, 25 Units, Subcutaneous, Q12H  insulin lispro, 2-9 Units, Subcutaneous, 4x Daily AC & at Bedtime  insulin lispro, 6 Units, Subcutaneous, TID With Meals  LORazepam, 1 mg, Oral, Nightly  magnesium sulfate, 2 g, Intravenous, Q2H  sodium chloride, 10 mL, Intravenous, Q12H  sodium phosphate, 15 mmol, Intravenous, Q3H  sucralfate, 1 g, Oral, 4x Daily AC & at Bedtime  thiamine, 100 mg, Oral, Daily      IV Meds:   sodium chloride, 125 mL/hr, Last Rate: 125 mL/hr (24)        Results  Reviewed:   I have personally reviewed the results from the time of this admission to 3/24/2024 13:08 EDT     Results from last 7 days   Lab Units 03/24/24  0722 03/23/24  1542 03/23/24  0457 03/22/24  0552   SODIUM mmol/L 140  --  135* 131*   POTASSIUM mmol/L 4.3 4.6 3.6 3.6   CHLORIDE mmol/L 105  --  98 91*   CO2 mmol/L 25.0  --  25.4 22.1   BUN mg/dL 13  --  14 16   CREATININE mg/dL 2.13*  --  2.68* 3.27*   CALCIUM mg/dL 8.2*  --  8.0* 8.6   BILIRUBIN mg/dL 1.3*  --  2.3* 2.2*   ALK PHOS U/L 134*  --  88 92   ALT (SGPT) U/L 99*  --  107* 103*   AST (SGOT) U/L 124*  --  161* 130*   GLUCOSE mg/dL 278*  --  223* 206*       Estimated Creatinine Clearance: 57 mL/min (A) (by C-G formula based on SCr of 2.13 mg/dL (H)).    Results from last 7 days   Lab Units 03/24/24  0722   MAGNESIUM mg/dL 1.4*   PHOSPHORUS mg/dL 0.5*       Results from last 7 days   Lab Units 03/24/24  0722   URIC ACID mg/dL 6.7       Results from last 7 days   Lab Units 03/24/24  0722 03/23/24  0457 03/22/24  0552 03/21/24  1721   WBC 10*3/mm3 6.32 5.87 7.78 8.66   HEMOGLOBIN g/dL 12.6* 12.5* 12.6* 13.6   PLATELETS 10*3/mm3 157 118* 133* 148             Assessment / Plan     ASSESSMENT:  Acute kidney injury secondary to volume depletion related to intractable nausea and vomiting, creatinine is down to 2.13  Hypovolemic hyponatremia resolved, sodium is 1 4  Hypophosphatemia, phos 0.5 associate with poor oral intake  Possible gastroparesis  Diabetes mellitus type 2    PLAN:  Replete phosphorus  Discontinue the IV fluid  Continue current therapy  Surveillance labs.     I reviewed the chart and other providers notes, reviewed labs.  I discussed the case with the patient and with his nurse.  Copied text in this note has been reviewed and is accurate as of 03/24/24.       Thank you for involving us in the care of Wu Damon.  Please feel free to call with any questions.    Stewart Brandon MD  03/24/24  13:08 EDT    [facility]  [phone]    Please note that portions of this note were completed with a voice recognition program.

## 2024-03-24 NOTE — PLAN OF CARE
Problem: Adult Inpatient Plan of Care  Goal: Plan of Care Review  Outcome: Ongoing, Progressing  Goal: Patient-Specific Goal (Individualized)  Outcome: Ongoing, Progressing  Goal: Absence of Hospital-Acquired Illness or Injury  Outcome: Ongoing, Progressing  Intervention: Identify and Manage Fall Risk  Recent Flowsheet Documentation  Taken 3/24/2024 1000 by Zulma Lopes RN  Safety Promotion/Fall Prevention:   activity supervised   toileting scheduled   safety round/check completed   room organization consistent  Intervention: Prevent Skin Injury  Recent Flowsheet Documentation  Taken 3/24/2024 1000 by Zulma Lopes RN  Body Position:   position changed independently   30 degrees  Intervention: Prevent and Manage VTE (Venous Thromboembolism) Risk  Recent Flowsheet Documentation  Taken 3/24/2024 1000 by Zulma Lopes RN  Range of Motion: active ROM (range of motion) encouraged  Intervention: Prevent Infection  Recent Flowsheet Documentation  Taken 3/24/2024 1000 by Zulma Lopes RN  Infection Prevention:   single patient room provided   rest/sleep promoted   hand hygiene promoted   personal protective equipment utilized  Goal: Optimal Comfort and Wellbeing  Outcome: Ongoing, Progressing  Intervention: Provide Person-Centered Care  Recent Flowsheet Documentation  Taken 3/24/2024 1000 by Zulma Lopes RN  Trust Relationship/Rapport:   care explained   choices provided   emotional support provided   empathic listening provided   questions answered   questions encouraged   thoughts/feelings acknowledged  Goal: Readiness for Transition of Care  Outcome: Ongoing, Progressing     Problem: Diabetes Comorbidity  Goal: Blood Glucose Level Within Targeted Range  Outcome: Ongoing, Progressing     Problem: Hypertension Comorbidity  Goal: Blood Pressure in Desired Range  Outcome: Ongoing, Progressing  Intervention: Maintain Blood Pressure Management  Recent Flowsheet Documentation  Taken 3/24/2024 1000 by  Zulma Lopes RN  Medication Review/Management:   medications reviewed   provider consulted     Problem: Fall Injury Risk  Goal: Absence of Fall and Fall-Related Injury  Outcome: Ongoing, Progressing  Intervention: Identify and Manage Contributors  Recent Flowsheet Documentation  Taken 3/24/2024 1000 by Zulma Lopes RN  Medication Review/Management:   medications reviewed   provider consulted  Intervention: Promote Injury-Free Environment  Recent Flowsheet Documentation  Taken 3/24/2024 1000 by Zulma Lopes RN  Safety Promotion/Fall Prevention:   activity supervised   toileting scheduled   safety round/check completed   room organization consistent   Goal Outcome Evaluation:

## 2024-03-24 NOTE — PLAN OF CARE
Problem: Adult Inpatient Plan of Care  Goal: Plan of Care Review  3/24/2024 1413 by Zulma Lopes RN  Outcome: Ongoing, Progressing  3/24/2024 1113 by Zulma Lopes RN  Outcome: Ongoing, Progressing  Goal: Patient-Specific Goal (Individualized)  3/24/2024 1413 by Zulma Lopes RN  Outcome: Ongoing, Progressing  3/24/2024 1113 by Zulma Lopes RN  Outcome: Ongoing, Progressing  Goal: Absence of Hospital-Acquired Illness or Injury  3/24/2024 1413 by Zulma Lopes RN  Outcome: Ongoing, Progressing  3/24/2024 1113 by Zulma Lopes RN  Outcome: Ongoing, Progressing  Intervention: Identify and Manage Fall Risk  Recent Flowsheet Documentation  Taken 3/24/2024 1000 by Zulma Lopes RN  Safety Promotion/Fall Prevention:   activity supervised   toileting scheduled   safety round/check completed   room organization consistent  Intervention: Prevent Skin Injury  Recent Flowsheet Documentation  Taken 3/24/2024 1000 by Zulma Lopes RN  Body Position:   position changed independently   30 degrees  Intervention: Prevent and Manage VTE (Venous Thromboembolism) Risk  Recent Flowsheet Documentation  Taken 3/24/2024 1000 by Zulma Lopes RN  Range of Motion: active ROM (range of motion) encouraged  Intervention: Prevent Infection  Recent Flowsheet Documentation  Taken 3/24/2024 1000 by Zulma Lopes RN  Infection Prevention:   single patient room provided   rest/sleep promoted   hand hygiene promoted   personal protective equipment utilized  Goal: Optimal Comfort and Wellbeing  3/24/2024 1413 by Zulma Lopes RN  Outcome: Ongoing, Progressing  3/24/2024 1113 by Zulma Lopes RN  Outcome: Ongoing, Progressing  Intervention: Provide Person-Centered Care  Recent Flowsheet Documentation  Taken 3/24/2024 1000 by Zulma Lopes RN  Trust Relationship/Rapport:   care explained   choices provided   emotional support provided   empathic listening provided   questions answered   questions  encouraged   thoughts/feelings acknowledged  Goal: Readiness for Transition of Care  3/24/2024 1413 by Zulma Lopes RN  Outcome: Ongoing, Progressing  3/24/2024 1113 by Zulma Lopes RN  Outcome: Ongoing, Progressing     Problem: Diabetes Comorbidity  Goal: Blood Glucose Level Within Targeted Range  3/24/2024 1413 by Zulma Lopes RN  Outcome: Ongoing, Progressing  3/24/2024 1113 by Zulma Lopes RN  Outcome: Ongoing, Progressing     Problem: Hypertension Comorbidity  Goal: Blood Pressure in Desired Range  3/24/2024 1413 by Zulma Lopes RN  Outcome: Ongoing, Progressing  3/24/2024 1113 by Zulma Lopes RN  Outcome: Ongoing, Progressing  Intervention: Maintain Blood Pressure Management  Recent Flowsheet Documentation  Taken 3/24/2024 1000 by Zulma Lopes RN  Medication Review/Management:   medications reviewed   provider consulted     Problem: Fall Injury Risk  Goal: Absence of Fall and Fall-Related Injury  3/24/2024 1413 by Zulma Lopes RN  Outcome: Ongoing, Progressing  3/24/2024 1113 by Zulma Lopes RN  Outcome: Ongoing, Progressing  Intervention: Identify and Manage Contributors  Recent Flowsheet Documentation  Taken 3/24/2024 1000 by Zulma Lopes RN  Medication Review/Management:   medications reviewed   provider consulted  Intervention: Promote Injury-Free Environment  Recent Flowsheet Documentation  Taken 3/24/2024 1000 by Zulma oLpes RN  Safety Promotion/Fall Prevention:   activity supervised   toileting scheduled   safety round/check completed   room organization consistent   Goal Outcome Evaluation:

## 2024-03-24 NOTE — PROGRESS NOTES
Name: Wu Damon ADMIT: 3/21/2024   : 1975  PCP: Juan Carlos Kwan TON ALEXIA    MRN: [id number] LOS: 3 days   AGE/SEX: 48 y.o. male  ROOM: [facility]   Subjective   Chief Complaint   Patient presents with    Abdominal Pain    Hypertension     H/o alcohol abuse  H/o previous admissions for ETOH w/d  Has been having some n/v abd pain for a few weeks    N/v and pain are better today  tolerating regular food  Phos and mag low today  On IVFs    ROS  No f/c  - n/v  No cp/palp  No soa/cough    Objective   Vital Signs  Temp:  [98 °F (36.7 °C)-98.4 °F (36.9 °C)] 98.4 °F (36.9 °C)  Heart Rate:  [] 109  Resp:  [14-18] 16  BP: (126-160)/(82-93) 160/93  SpO2:  [98 %-100 %] 100 %  on   ;   Device (Oxygen Therapy): room air  Body mass index is 38.77 kg/m².    Physical Exam  HENT:      Head: Normocephalic and atraumatic.   Eyes:      General: No scleral icterus.  Cardiovascular:      Rate and Rhythm: Regular rhythm. Tachycardia present.      Heart sounds: Normal heart sounds.   Pulmonary:      Effort: Pulmonary effort is normal. No respiratory distress.      Breath sounds: Normal breath sounds.   Abdominal:      General: There is no distension.      Palpations: Abdomen is soft.      Tenderness: There is no abdominal tenderness.   Musculoskeletal:      Cervical back: Neck supple.   Neurological:      Mental Status: He is alert.   Psychiatric:         Behavior: Behavior normal.         Results Review:       I reviewed the patient's new clinical results.  Results from last 7 days   Lab Units 24  0722 24  0457 24  0552 24  1721   WBC 10*3/mm3 6.32 5.87 7.78 8.66   HEMOGLOBIN g/dL 12.6* 12.5* 12.6* 13.6   PLATELETS 10*3/mm3 157 118* 133* 148     Results from last 7 days   Lab Units 24  0722 24  1542 24  0457 24  0552 24  1721   SODIUM mmol/L 140  --  135* 131* 129*   POTASSIUM mmol/L 4.3 4.6 3.6 3.6 3.3*   CHLORIDE mmol/L 105  --  98 91* 86*   CO2 mmol/L 25.0  --   "25.4 22.1 23.0   BUN mg/dL 13  --  14 16 17   CREATININE mg/dL 2.13*  --  2.68* 3.27* 3.81*   GLUCOSE mg/dL 278*  --  223* 206* 272*   Estimated Creatinine Clearance: 57 mL/min (A) (by C-G formula based on SCr of 2.13 mg/dL (H)).  Results from last 7 days   Lab Units 03/24/24 0722 03/23/24 0457 03/22/24  0552 03/21/24  1721   ALBUMIN g/dL 4.1 4.1 4.1 4.7   BILIRUBIN mg/dL 1.3* 2.3* 2.2* 2.2*   ALK PHOS U/L 134* 88 92 112   AST (SGOT) U/L 124* 161* 130* 139*   ALT (SGPT) U/L 99* 107* 103* 115*     Results from last 7 days   Lab Units 03/24/24 0722 03/23/24 0457 03/22/24 0552 03/21/24  1721   CALCIUM mg/dL 8.2* 8.0* 8.6 9.4   ALBUMIN g/dL 4.1 4.1 4.1 4.7   MAGNESIUM mg/dL 1.4*  --   --   --    PHOSPHORUS mg/dL 0.5*  --   --   --          Coag     HbA1C   Lab Results   Component Value Date    HGBA1C 7.90 (H) 03/22/2024    HGBA1C 6.7 (H) 11/15/2023    HGBA1C 8.43 (H) 01/31/2022     Infection     Radiology(recent) No radiology results for the last day  No results found for: \"TROPONINT\", \"TROPONINI\", \"BNP\"  No components found for: \"TSH;2\"    carvedilol, 3.125 mg, Oral, BID With Meals  famotidine, 20 mg, Intravenous, Daily  folic acid, 1 mg, Oral, Daily  heparin (porcine), 5,000 Units, Subcutaneous, Q12H  insulin glargine, 25 Units, Subcutaneous, Q12H  insulin lispro, 2-9 Units, Subcutaneous, 4x Daily AC & at Bedtime  insulin lispro, 6 Units, Subcutaneous, TID With Meals  LORazepam, 1 mg, Oral, Nightly  sodium chloride, 10 mL, Intravenous, Q12H  sucralfate, 1 g, Oral, 4x Daily AC & at Bedtime  thiamine, 100 mg, Oral, Daily      sodium chloride, 125 mL/hr, Last Rate: 125 mL/hr (03/23/24 1942)    Diet: Regular/House, Diabetic; Consistent Carbohydrate; Fluid Consistency: Thin (IDDSI 0)      Assessment & Plan      Active Hospital Problems    Diagnosis  POA    **Acute renal failure [N17.9]  Yes    Dehydration [E86.0]  Yes    Nausea with vomiting [R11.2]  Yes    Hepatic steatosis [K76.0]  Yes    History of alcohol use " [Z87.898]  Not Applicable    Transaminitis [R74.01]  Yes    Hypokalemia [E87.6]  Yes    Liver lesion [K76.9]  Yes    Hyponatremia [E87.1]  Yes    Type 2 diabetes mellitus with hyperglycemia [E11.65]  Yes    HTN (hypertension) [I10]  Yes    HLD (hyperlipidemia) [E78.5]  Yes      Resolved Hospital Problems   No resolved problems to display.       Mr. aDmon is a 48-year-old male with history of type 2 diabetes, hypertension, hyperlipidemia, alcohol use who presents to the emergency room with nausea and vomiting and abdominal pain. He was found to have acute renal failure     Acute renal failure/hyponatremia/dehydration  -Normal saline  -Repeat CMP, CBC in a.m.  -Nephrology following      Nausea vomiting/hepatic steatosis/transaminitis  -PRN agents  -U/S with fatty liver, GB sludge  -Elevated LFTs may be related to alcohol use, monitor. No evidence for acute cholyctitis or pancreatitis  -wishes to advance diet  -continue IV pepcid     Transaminitis/alcohol use  -CIWA protocol initiated  -Patient started on thiamine and folic acid  -Seizure precautions    Type 2 diabetes  -Accu-Cheks before meals and at bedtime with correctional dose insulin  -Continue long-acting insulin  -Hold oral diabetic medications at this time  -BG elevated add scheduled lispro with meals.     Hypomag/Phos  To get IV replacement today, monitor      VTE Prophylaxis - SCDs. heparin      DW RN      nAdre Padron MD  [facility]  03/24/24  12:53 EDT

## 2024-03-25 ENCOUNTER — READMISSION MANAGEMENT (OUTPATIENT)
Dept: CALL CENTER | Facility: HOSPITAL | Age: 49
End: 2024-03-25
Payer: COMMERCIAL

## 2024-03-25 VITALS
SYSTOLIC BLOOD PRESSURE: 140 MMHG | TEMPERATURE: 97.7 F | DIASTOLIC BLOOD PRESSURE: 90 MMHG | OXYGEN SATURATION: 99 % | RESPIRATION RATE: 16 BRPM | HEART RATE: 87 BPM | WEIGHT: 262.1 LBS | BODY MASS INDEX: 37.52 KG/M2 | HEIGHT: 70 IN

## 2024-03-25 LAB
ALBUMIN SERPL-MCNC: 3.5 G/DL (ref 3.5–5.2)
ALBUMIN SERPL-MCNC: 3.8 G/DL (ref 3.5–5.2)
ALBUMIN/GLOB SERPL: 1.2 G/DL
ALP SERPL-CCNC: 104 U/L (ref 39–117)
ALT SERPL W P-5'-P-CCNC: 97 U/L (ref 1–41)
ANION GAP SERPL CALCULATED.3IONS-SCNC: 13.1 MMOL/L (ref 5–15)
ANION GAP SERPL CALCULATED.3IONS-SCNC: 14.1 MMOL/L (ref 5–15)
AST SERPL-CCNC: 137 U/L (ref 1–40)
BASOPHILS # BLD AUTO: 0.06 10*3/MM3 (ref 0–0.2)
BASOPHILS NFR BLD AUTO: 0.9 % (ref 0–1.5)
BILIRUB SERPL-MCNC: 1.4 MG/DL (ref 0–1.2)
BUN SERPL-MCNC: 8 MG/DL (ref 6–20)
BUN SERPL-MCNC: 9 MG/DL (ref 6–20)
BUN/CREAT SERPL: 5.4 (ref 7–25)
BUN/CREAT SERPL: 6.2 (ref 7–25)
CALCIUM SPEC-SCNC: 7.4 MG/DL (ref 8.6–10.5)
CALCIUM SPEC-SCNC: 7.9 MG/DL (ref 8.6–10.5)
CHLORIDE SERPL-SCNC: 103 MMOL/L (ref 98–107)
CHLORIDE SERPL-SCNC: 103 MMOL/L (ref 98–107)
CO2 SERPL-SCNC: 21.9 MMOL/L (ref 22–29)
CO2 SERPL-SCNC: 22.9 MMOL/L (ref 22–29)
CREAT SERPL-MCNC: 1.46 MG/DL (ref 0.76–1.27)
CREAT SERPL-MCNC: 1.47 MG/DL (ref 0.76–1.27)
DEPRECATED RDW RBC AUTO: 40.4 FL (ref 37–54)
EGFRCR SERPLBLD CKD-EPI 2021: 59.2 ML/MIN/1.73
EGFRCR SERPLBLD CKD-EPI 2021: 59.7 ML/MIN/1.73
EOSINOPHIL # BLD AUTO: 0.21 10*3/MM3 (ref 0–0.4)
EOSINOPHIL NFR BLD AUTO: 3.3 % (ref 0.3–6.2)
ERYTHROCYTE [DISTWIDTH] IN BLOOD BY AUTOMATED COUNT: 11.7 % (ref 12.3–15.4)
GLOBULIN UR ELPH-MCNC: 3.1 GM/DL
GLUCOSE BLDC GLUCOMTR-MCNC: 109 MG/DL (ref 70–130)
GLUCOSE BLDC GLUCOMTR-MCNC: 121 MG/DL (ref 70–130)
GLUCOSE BLDC GLUCOMTR-MCNC: 161 MG/DL (ref 70–130)
GLUCOSE SERPL-MCNC: 145 MG/DL (ref 65–99)
GLUCOSE SERPL-MCNC: 182 MG/DL (ref 65–99)
HCT VFR BLD AUTO: 35.8 % (ref 37.5–51)
HGB BLD-MCNC: 12.6 G/DL (ref 13–17.7)
IMM GRANULOCYTES # BLD AUTO: 0.03 10*3/MM3 (ref 0–0.05)
IMM GRANULOCYTES NFR BLD AUTO: 0.5 % (ref 0–0.5)
LYMPHOCYTES # BLD AUTO: 1.54 10*3/MM3 (ref 0.7–3.1)
LYMPHOCYTES NFR BLD AUTO: 23.9 % (ref 19.6–45.3)
MAGNESIUM SERPL-MCNC: 1.9 MG/DL (ref 1.6–2.6)
MCH RBC QN AUTO: 34.1 PG (ref 26.6–33)
MCHC RBC AUTO-ENTMCNC: 35.2 G/DL (ref 31.5–35.7)
MCV RBC AUTO: 96.8 FL (ref 79–97)
MONOCYTES # BLD AUTO: 0.76 10*3/MM3 (ref 0.1–0.9)
MONOCYTES NFR BLD AUTO: 11.8 % (ref 5–12)
NEUTROPHILS NFR BLD AUTO: 3.84 10*3/MM3 (ref 1.7–7)
NEUTROPHILS NFR BLD AUTO: 59.6 % (ref 42.7–76)
NRBC BLD AUTO-RTO: 0 /100 WBC (ref 0–0.2)
PHOSPHATE SERPL-MCNC: 1.3 MG/DL (ref 2.5–4.5)
PHOSPHATE SERPL-MCNC: 3 MG/DL (ref 2.5–4.5)
PLATELET # BLD AUTO: 163 10*3/MM3 (ref 140–450)
PMV BLD AUTO: 12.2 FL (ref 6–12)
POTASSIUM SERPL-SCNC: 3.3 MMOL/L (ref 3.5–5.2)
POTASSIUM SERPL-SCNC: 3.7 MMOL/L (ref 3.5–5.2)
PROT SERPL-MCNC: 6.9 G/DL (ref 6–8.5)
RBC # BLD AUTO: 3.7 10*6/MM3 (ref 4.14–5.8)
SODIUM SERPL-SCNC: 139 MMOL/L (ref 136–145)
SODIUM SERPL-SCNC: 139 MMOL/L (ref 136–145)
URATE SERPL-MCNC: 6.8 MG/DL (ref 3.4–7)
WBC NRBC COR # BLD AUTO: 6.44 10*3/MM3 (ref 3.4–10.8)

## 2024-03-25 PROCEDURE — 63710000001 INSULIN LISPRO (HUMAN) PER 5 UNITS: Performed by: INTERNAL MEDICINE

## 2024-03-25 PROCEDURE — 85025 COMPLETE CBC W/AUTO DIFF WBC: CPT | Performed by: INTERNAL MEDICINE

## 2024-03-25 PROCEDURE — 83735 ASSAY OF MAGNESIUM: CPT | Performed by: INTERNAL MEDICINE

## 2024-03-25 PROCEDURE — 82948 REAGENT STRIP/BLOOD GLUCOSE: CPT

## 2024-03-25 PROCEDURE — 84100 ASSAY OF PHOSPHORUS: CPT | Performed by: INTERNAL MEDICINE

## 2024-03-25 PROCEDURE — 84100 ASSAY OF PHOSPHORUS: CPT | Performed by: STUDENT IN AN ORGANIZED HEALTH CARE EDUCATION/TRAINING PROGRAM

## 2024-03-25 PROCEDURE — 80053 COMPREHEN METABOLIC PANEL: CPT | Performed by: STUDENT IN AN ORGANIZED HEALTH CARE EDUCATION/TRAINING PROGRAM

## 2024-03-25 PROCEDURE — 63710000001 INSULIN GLARGINE PER 5 UNITS: Performed by: STUDENT IN AN ORGANIZED HEALTH CARE EDUCATION/TRAINING PROGRAM

## 2024-03-25 PROCEDURE — 84550 ASSAY OF BLOOD/URIC ACID: CPT | Performed by: INTERNAL MEDICINE

## 2024-03-25 PROCEDURE — 25010000002 HEPARIN (PORCINE) PER 1000 UNITS: Performed by: INTERNAL MEDICINE

## 2024-03-25 PROCEDURE — 25810000003 SODIUM CHLORIDE 0.9 % SOLUTION: Performed by: INTERNAL MEDICINE

## 2024-03-25 RX ORDER — FAMOTIDINE 20 MG/1
20 TABLET, FILM COATED ORAL 2 TIMES DAILY
Qty: 60 TABLET | Refills: 0 | Status: SHIPPED | OUTPATIENT
Start: 2024-03-25

## 2024-03-25 RX ORDER — HYDRALAZINE HYDROCHLORIDE 25 MG/1
25 TABLET, FILM COATED ORAL 2 TIMES DAILY
Qty: 60 TABLET | Refills: 0 | Status: SHIPPED | OUTPATIENT
Start: 2024-03-25

## 2024-03-25 RX ORDER — POTASSIUM CHLORIDE 750 MG/1
40 TABLET, FILM COATED, EXTENDED RELEASE ORAL EVERY 4 HOURS
Status: DISCONTINUED | OUTPATIENT
Start: 2024-03-25 | End: 2024-03-25 | Stop reason: HOSPADM

## 2024-03-25 RX ORDER — SUCRALFATE 1 G/1
1 TABLET ORAL
Qty: 120 TABLET | Refills: 0 | Status: SHIPPED | OUTPATIENT
Start: 2024-03-25

## 2024-03-25 RX ADMIN — CARVEDILOL 3.12 MG: 3.12 TABLET, FILM COATED ORAL at 08:23

## 2024-03-25 RX ADMIN — SUCRALFATE 1 G: 1 TABLET ORAL at 06:35

## 2024-03-25 RX ADMIN — SUCRALFATE 1 G: 1 TABLET ORAL at 12:14

## 2024-03-25 RX ADMIN — DIBASIC SODIUM PHOSPHATE, MONOBASIC POTASSIUM PHOSPHATE AND MONOBASIC SODIUM PHOSPHATE 2 TABLET: 852; 155; 130 TABLET ORAL at 12:13

## 2024-03-25 RX ADMIN — HYDRALAZINE HYDROCHLORIDE 25 MG: 25 TABLET ORAL at 00:45

## 2024-03-25 RX ADMIN — INSULIN GLARGINE 25 UNITS: 100 INJECTION, SOLUTION SUBCUTANEOUS at 08:22

## 2024-03-25 RX ADMIN — Medication 100 MG: at 08:26

## 2024-03-25 RX ADMIN — FOLIC ACID 1 MG: 1 TABLET ORAL at 08:25

## 2024-03-25 RX ADMIN — HEPARIN SODIUM 5000 UNITS: 5000 INJECTION INTRAVENOUS; SUBCUTANEOUS at 08:25

## 2024-03-25 RX ADMIN — POTASSIUM CHLORIDE 40 MEQ: 750 TABLET, EXTENDED RELEASE ORAL at 15:11

## 2024-03-25 RX ADMIN — FAMOTIDINE 20 MG: 10 INJECTION INTRAVENOUS at 08:24

## 2024-03-25 RX ADMIN — SODIUM PHOSPHATE, MONOBASIC, MONOHYDRATE AND SODIUM PHOSPHATE, DIBASIC, ANHYDROUS 15 MMOL: 142; 276 INJECTION, SOLUTION INTRAVENOUS at 12:14

## 2024-03-25 RX ADMIN — HYDRALAZINE HYDROCHLORIDE 25 MG: 25 TABLET ORAL at 06:35

## 2024-03-25 RX ADMIN — DIBASIC SODIUM PHOSPHATE, MONOBASIC POTASSIUM PHOSPHATE AND MONOBASIC SODIUM PHOSPHATE 2 TABLET: 852; 155; 130 TABLET ORAL at 09:02

## 2024-03-25 RX ADMIN — INSULIN LISPRO 6 UNITS: 100 INJECTION, SOLUTION INTRAVENOUS; SUBCUTANEOUS at 08:21

## 2024-03-25 RX ADMIN — SODIUM PHOSPHATE, MONOBASIC, MONOHYDRATE AND SODIUM PHOSPHATE, DIBASIC, ANHYDROUS 15 MMOL: 142; 276 INJECTION, SOLUTION INTRAVENOUS at 09:02

## 2024-03-25 RX ADMIN — INSULIN LISPRO 6 UNITS: 100 INJECTION, SOLUTION INTRAVENOUS; SUBCUTANEOUS at 12:12

## 2024-03-25 RX ADMIN — Medication 10 ML: at 08:25

## 2024-03-25 RX ADMIN — MAGNESIUM OXIDE 400 MG (241.3 MG MAGNESIUM) TABLET 400 MG: TABLET at 09:02

## 2024-03-25 NOTE — PLAN OF CARE
Problem: Adult Inpatient Plan of Care  Goal: Plan of Care Review  Outcome: Ongoing, Progressing  Goal: Patient-Specific Goal (Individualized)  Outcome: Ongoing, Progressing  Goal: Absence of Hospital-Acquired Illness or Injury  Outcome: Ongoing, Progressing  Intervention: Identify and Manage Fall Risk  Recent Flowsheet Documentation  Taken 3/25/2024 0400 by Jonnie Galaviz RN  Safety Promotion/Fall Prevention:   activity supervised   assistive device/personal items within reach   clutter free environment maintained   fall prevention program maintained   nonskid shoes/slippers when out of bed   room organization consistent   safety round/check completed  Taken 3/25/2024 0220 by Jonnie Galaviz RN  Safety Promotion/Fall Prevention:   activity supervised   assistive device/personal items within reach   clutter free environment maintained   fall prevention program maintained   nonskid shoes/slippers when out of bed   room organization consistent  Taken 3/25/2024 0000 by Jonnie Galaviz RN  Safety Promotion/Fall Prevention:   activity supervised   assistive device/personal items within reach   clutter free environment maintained   fall prevention program maintained   nonskid shoes/slippers when out of bed   safety round/check completed   toileting scheduled  Taken 3/24/2024 2200 by Jonnie Galaviz, RN  Safety Promotion/Fall Prevention:   activity supervised   assistive device/personal items within reach   clutter free environment maintained   fall prevention program maintained   nonskid shoes/slippers when out of bed   room organization consistent   safety round/check completed  Taken 3/24/2024 2045 by Jonnie Galaviz, RN  Safety Promotion/Fall Prevention:   activity supervised   assistive device/personal items within reach   clutter free environment maintained   fall prevention program maintained   nonskid shoes/slippers when out of bed   room organization consistent   safety round/check completed  Intervention:  Prevent Skin Injury  Recent Flowsheet Documentation  Taken 3/25/2024 0400 by Jonnie Galaviz RN  Body Position: dangle, side of bed  Taken 3/25/2024 0220 by Jonnie Galaviz RN  Body Position: position changed independently  Taken 3/25/2024 0000 by Jonnie Galaviz RN  Body Position: dangle, side of bed  Taken 3/24/2024 2200 by Jonnie Galaviz RN  Body Position: position changed independently  Taken 3/24/2024 2045 by Jonnie Galaviz RN  Body Position: position changed independently  Intervention: Prevent and Manage VTE (Venous Thromboembolism) Risk  Recent Flowsheet Documentation  Taken 3/25/2024 0400 by Jonnie Galaviz RN  Activity Management:   ambulated to bathroom   back to bed  Taken 3/25/2024 0220 by Jonnie Galaviz RN  Activity Management: up ad patrice  VTE Prevention/Management:   bilateral   sequential compression devices off   patient refused intervention  Range of Motion: active ROM (range of motion) encouraged  Taken 3/25/2024 0000 by Jonnie Galaviz RN  Activity Management:   ambulated to bathroom   back to bed  Taken 3/24/2024 2200 by Jonnie Galaviz RN  Activity Management: up ad patrice  Taken 3/24/2024 2045 by Jonnie Galaviz RN  Activity Management: up ad patrice  Range of Motion: active ROM (range of motion) encouraged  Intervention: Prevent Infection  Recent Flowsheet Documentation  Taken 3/25/2024 0400 by Jonnie Galaviz RN  Infection Prevention:   rest/sleep promoted   single patient room provided  Taken 3/25/2024 0220 by Jonnie Galaviz RN  Infection Prevention:   rest/sleep promoted   single patient room provided  Taken 3/25/2024 0000 by Jonnie Galaviz RN  Infection Prevention:   rest/sleep promoted   single patient room provided  Taken 3/24/2024 2200 by Jonnie Galaviz RN  Infection Prevention:   rest/sleep promoted   single patient room provided  Taken 3/24/2024 2045 by Jonnie Galaviz RN  Infection Prevention:   rest/sleep promoted   single patient room provided  Goal: Optimal Comfort  and Wellbeing  Outcome: Ongoing, Progressing  Intervention: Provide Person-Centered Care  Recent Flowsheet Documentation  Taken 3/25/2024 0220 by Jonnie Galaviz RN  Trust Relationship/Rapport:   care explained   choices provided   emotional support provided   empathic listening provided   questions answered   reassurance provided   thoughts/feelings acknowledged  Taken 3/24/2024 2045 by Jonnie Galaviz RN  Trust Relationship/Rapport:   care explained   choices provided   emotional support provided   empathic listening provided   questions answered   reassurance provided   thoughts/feelings acknowledged  Goal: Readiness for Transition of Care  Outcome: Ongoing, Progressing     Problem: Diabetes Comorbidity  Goal: Blood Glucose Level Within Targeted Range  Outcome: Ongoing, Progressing     Problem: Hypertension Comorbidity  Goal: Blood Pressure in Desired Range  Outcome: Ongoing, Progressing  Intervention: Maintain Blood Pressure Management  Recent Flowsheet Documentation  Taken 3/25/2024 0400 by Jonnie Galaviz RN  Medication Review/Management: medications reviewed  Taken 3/25/2024 0220 by Jonnie Galaviz RN  Medication Review/Management: medications reviewed  Taken 3/25/2024 0000 by Jonnie Galaviz RN  Medication Review/Management: medications reviewed  Taken 3/24/2024 2200 by Jonnie Galaviz RN  Medication Review/Management: medications reviewed  Taken 3/24/2024 2045 by Jonnie Galaviz RN  Medication Review/Management: medications reviewed     Problem: Fall Injury Risk  Goal: Absence of Fall and Fall-Related Injury  Outcome: Ongoing, Progressing  Intervention: Identify and Manage Contributors  Recent Flowsheet Documentation  Taken 3/25/2024 0400 by Jonnie Galaviz RN  Medication Review/Management: medications reviewed  Taken 3/25/2024 0220 by Jonnie Galaviz RN  Medication Review/Management: medications reviewed  Taken 3/25/2024 0000 by Jonnie Galaviz RN  Medication Review/Management: medications  reviewed  Taken 3/24/2024 2200 by Jonnie Galaviz RN  Medication Review/Management: medications reviewed  Taken 3/24/2024 2045 by Jonnie Galaviz RN  Medication Review/Management: medications reviewed  Intervention: Promote Injury-Free Environment  Recent Flowsheet Documentation  Taken 3/25/2024 0400 by Jonnie Galaviz RN  Safety Promotion/Fall Prevention:   activity supervised   assistive device/personal items within reach   clutter free environment maintained   fall prevention program maintained   nonskid shoes/slippers when out of bed   room organization consistent   safety round/check completed  Taken 3/25/2024 0220 by Jonnie Galaviz RN  Safety Promotion/Fall Prevention:   activity supervised   assistive device/personal items within reach   clutter free environment maintained   fall prevention program maintained   nonskid shoes/slippers when out of bed   room organization consistent  Taken 3/25/2024 0000 by Jonnie Galaviz RN  Safety Promotion/Fall Prevention:   activity supervised   assistive device/personal items within reach   clutter free environment maintained   fall prevention program maintained   nonskid shoes/slippers when out of bed   safety round/check completed   toileting scheduled  Taken 3/24/2024 2200 by Jonnie Galaviz RN  Safety Promotion/Fall Prevention:   activity supervised   assistive device/personal items within reach   clutter free environment maintained   fall prevention program maintained   nonskid shoes/slippers when out of bed   room organization consistent   safety round/check completed  Taken 3/24/2024 2045 by Jonnie Galaviz, RN  Safety Promotion/Fall Prevention:   activity supervised   assistive device/personal items within reach   clutter free environment maintained   fall prevention program maintained   nonskid shoes/slippers when out of bed   room organization consistent   safety round/check completed   Goal Outcome Evaluation:   Pt A&O X4, VSS, NSR-ST, RA. Pt awake  throughout the night, states he works nights and is use to being awake. BP elevated, PRN hydralazine administered per MD orders. Mg improved this AM to 1.9. Phos improved to 1.3 new orders received. Patient eager to be discharged home today. Call light within reach. NAD noted.

## 2024-03-25 NOTE — DISCHARGE SUMMARY
NAME: Wu Damon ADMIT: 3/21/2024   : 1975  PCP: Juan Carlos Kwan APRN    MRN: [id number] LOS: 4 days   AGE/SEX: 48 y.o. male  ROOM: [facility]/     Date of Admission:  3/21/2024  Date of Discharge:  3/25/2024    PCP: Juan Carlos Kwan APRN    CHIEF COMPLAINT  Abdominal Pain and Hypertension      DISCHARGE DIAGNOSIS  Active Hospital Problems    Diagnosis  POA    **Acute renal failure [N17.9]  Yes    Dehydration [E86.0]  Yes    Nausea with vomiting [R11.2]  Yes    Hepatic steatosis [K76.0]  Yes    History of alcohol use [Z87.898]  Not Applicable    Transaminitis [R74.01]  Yes    Hypokalemia [E87.6]  Yes    Liver lesion [K76.9]  Yes    Hyponatremia [E87.1]  Yes    Type 2 diabetes mellitus with hyperglycemia [E11.65]  Yes    HTN (hypertension) [I10]  Yes    HLD (hyperlipidemia) [E78.5]  Yes      Resolved Hospital Problems   No resolved problems to display.       SECONDARY DIAGNOSES  Past Medical History:   Diagnosis Date    Alcohol abuse     Diabetes mellitus     Hypertension     Nausea with vomiting 3/21/2024       CONSULTS   Nephrology  Psychiatry    HOSPITAL COURSE  Mr. Damon is a 48-year-old male with history of type 2 diabetes, hypertension, hyperlipidemia, alcohol use who presents to the emergency room with nausea and vomiting and abdominal pain. He was found to have acute renal failure. Given IVFs and antiemetics with improvement symptoms. Treated for alcohol withdrawal and electrolyte abnormalities. He feels better and wishes for discharge today with outpatient follow up.     DIAGNOSTICS    2024 0514 2024 0615 Uric Acid [[id number]]   Blood    Final result Component Value Units   Uric Acid 6.8 mg/dL          2024 0514 2024 0618 Magnesium [[id number]]   Blood    Final result Component Value Units   Magnesium 1.9 mg/dL          2024 0514 2024 0628 Phosphorus [[id number]]   (Abnormal)   Blood    Final result Component Value Units   Phosphorus 1.3 Low Critical   mg/dL          03/25/2024 0514 03/25/2024 0555 CBC Auto Differential [[id number]]   (Abnormal)   Blood    Final result Component Value Units   WBC 6.44 10*3/mm3   RBC 3.70 Low  10*6/mm3   Hemoglobin 12.6 Low  g/dL   Hematocrit 35.8 Low  %   MCV 96.8 fL   MCH 34.1 High  pg   MCHC 35.2 g/dL   RDW 11.7 Low  %   RDW-SD 40.4 fl   MPV 12.2 High  fL   Platelets 163 10*3/mm3   Neutrophil % 59.6 %   Lymphocyte % 23.9 %   Monocyte % 11.8 %   Eosinophil % 3.3 %   Basophil % 0.9 %   Immature Grans % 0.5 %   Neutrophils, Absolute 3.84 10*3/mm3   Lymphocytes, Absolute 1.54 10*3/mm3   Monocytes, Absolute 0.76 10*3/mm3   Eosinophils, Absolute 0.21 10*3/mm3   Basophils, Absolute 0.06 10*3/mm3   Immature Grans, Absolute 0.03 10*3/mm3   nRBC 0.0 /100 WBC          03/25/2024 0514 03/25/2024 0618 Comprehensive Metabolic Panel [[id number]]    (Abnormal)   Blood    Final result Component Value Units   Glucose 145 High  mg/dL   BUN 8 mg/dL   Creatinine 1.47 High  mg/dL   Sodium 139 mmol/L   Potassium 3.7  mmol/L   Chloride 103 mmol/L   CO2 22.9 mmol/L   Calcium 7.9 Low  mg/dL   Total Protein 6.9 g/dL   Albumin 3.8 g/dL   ALT (SGPT) 97 High  U/L   AST (SGOT) 137 High  U/L   Alkaline Phosphatase 104 U/L   Total Bilirubin 1.4 High  mg/dL   Globulin 3.1 gm/dL   A/G Ratio 1.2 g/dL         US Abdomen Limited [[id number]] Prasanna as Reviewed   Order Status: Completed Collected: 03/22/24 0636    Updated: 03/22/24 0706   Narrative:     US ABDOMEN LIMITED-     CLINICAL: Dyspepsia, nausea, vomiting.     COMPARISON: CT of the abdomen and pelvis dated 03/21/2024.     FINDINGS: Diffusely increased hepatic echotexture consistent with fatty  infiltration. No focal liver lesion. Small amount of sludge demonstrated  within the dependent portion of the gallbladder. No gallstones or  gallbladder wall thickening nor pericholecystic fluid is demonstrated.  CBD diameter is normal at 6 mm.     Color Doppler and spectral analysis demonstrates normal hepatopetal  flow  within the main portal vein.     The right kidney measures 12.9 cm in length and is normal in appearance.  No calculus or obstructive uropathy. Overall renal cortical echotexture  is within normal limits. No free fluid is seen within the right upper  quadrant.     CONCLUSION: Diffuse fatty infiltration of the liver, gallbladder sludge.     This report was finalized on 3/22/2024 7:03 AM by Dr. Demetri Urbano M.D  on Workstation: [id number]      CT Abdomen Pelvis Without Contrast [[id number]] Reviewed: 03/21/24 1943   Order Status: Completed Collected: 03/21/24 1856    Updated: 03/21/24 1903   Narrative:     CT ABDOMEN PELVIS WO CONTRAST-     INDICATIONS: Abdominal pain     TECHNIQUE: Radiation dose reduction techniques were utilized, including  automated exposure control and exposure modulation based on body size.  Unenhanced ABDOMEN AND PELVIS CT     COMPARISON: 10/10/2021     FINDINGS:     Diffuse low-density of the liver is noted, compatible with severe  steatosis, similar to prior exam. 3 enhancing liver lesions are again  demonstrated, do not appear significant change, largest measuring 3.3  cm, stable at similar level.     Poorly distinguished dependent density in the gallbladder could  represent cholelithiasis; if indicated, ultrasound correlation could be  obtained.     The urinary bladder is only partly filled, limits assessment.     Otherwise unremarkable unenhanced appearance of the liver, spleen,  adrenal glands, pancreas, kidneys, bladder.     No bowel obstruction or abnormal bowel thickening is identified. The  appendix does not appear inflamed.     No free intraperitoneal gas or free fluid. Umbilical hernia of fat is  present.     Scattered small mesenteric and para-aortic lymph nodes are seen that are  not significant by size criteria.     Abdominal aorta is not aneurysmal.     The lung bases are clear.     Degenerative changes are seen in the spine. No acute fracture is  identified.           "  Impression:           1. No acute inflammatory process of bowel is identified, follow-up as  indications persist.     2. No urolithiasis or hydronephrosis.     3. Redemonstration of severe hepatic steatosis with 3 indeterminate  stable liver lesions. Possible cholelithiasis.       PHYSICAL EXAM  Objective:  Vital signs: (most recent): Blood pressure 140/90, pulse 87, temperature 97.7 °F (36.5 °C), temperature source Oral, resp. rate 16, height 177.8 cm (70\"), weight 119 kg (262 lb 1.6 oz), SpO2 99%.                Alert  nad  No resp distress    CONDITION ON DISCHARGE  Stable.      DISCHARGE DISPOSITION   Home or Self Care      DISCHARGE MEDICATIONS       Your medication list        START taking these medications        Instructions Last Dose Given Next Dose Due   famotidine 20 MG tablet  Commonly known as: Pepcid      Take 1 tablet by mouth 2 (Two) Times a Day.       hydrALAZINE 25 MG tablet  Commonly known as: APRESOLINE      Take 1 tablet by mouth 2 (Two) Times a Day.       Magnesium Oxide -Mg Supplement 400 (240 Mg) MG tablet      Take 1 tablet by mouth Daily.       Phospha 250 Neutral [phone] MG tablet  Generic drug: phosphorus      Take 2 tablets by mouth 2 (Two) Times a Day.       sucralfate 1 g tablet  Commonly known as: CARAFATE      Take 1 tablet by mouth 4 (Four) Times a Day Before Meals & at Bedtime.              CONTINUE taking these medications        Instructions Last Dose Given Next Dose Due   Ambien CR 12.5 MG CR tablet  Generic drug: zolpidem CR      Take 1 tablet by mouth At Night As Needed for Sleep.       buprenorphine-naloxone 8-2 MG per SL tablet  Commonly known as: SUBOXONE      Place 1.5 tablets under the tongue Daily. Does the injection monthly.  Should have had injection today 3/21/24       carvedilol 3.125 MG tablet  Commonly known as: Coreg      Take 1 tablet by mouth 2 (Two) Times a Day With Meals.       fenofibrate 48 MG tablet  Commonly known as: TRICOR      Take 1 tablet by " mouth Daily.       folic acid 1 MG tablet  Commonly known as: FOLVITE      Take 1 tablet by mouth Daily.       Lantus 100 UNIT/ML injection  Generic drug: insulin glargine      Inject 25 Units under the skin into the appropriate area as directed Every 12 (Twelve) Hours.       losartan 50 MG tablet  Commonly known as: COZAAR      Take 1 tablet by mouth Daily.       One-Daily Multi-Vitamin tablet tablet  Generic drug: multivitamin      Take 1 tablet by mouth Daily.       Testosterone Cypionate 200 MG/ML solution      Inject 0.5 mL as directed 1 (One) Time Per Week.       thiamine 100 MG tablet  Commonly known as: VITAMIN B1      Take 1 tablet by mouth Daily.                 Where to Get Your Medications        These medications were sent to [id number]      Hours: Monday to Friday 7 AM to 6 PM, Saturday & Sunday 8 AM to 4:30 PM (Closed 12 PM to 12:30 PM) Phone: [phone]   famotidine 20 MG tablet  hydrALAZINE 25 MG tablet  Magnesium Oxide -Mg Supplement 400 (240 Mg) MG tablet  Phospha 250 Neutral [phone] MG tablet  sucralfate 1 g tablet        No future appointments.  Additional Instructions for the Follow-ups that You Need to Schedule       Discharge Follow-up with Specialty: GI in 4-8 weeks to follow up on abnormal areas of liver seen on imaging   As directed      Specialty: GI in 4-8 weeks to follow up on abnormal areas of liver seen on imaging        Discharge Follow-up with Specialty: PCP in 1-2 weeks, Nephrology in 2-3 weeks, other specialists as previously recomended   As directed      Specialty: PCP in 1-2 weeks, Nephrology in 2-3 weeks, other specialists as previously recomended               Follow-up Information       Juan Carlos Kwan, APRN .    Specialties: Nurse Practitioner, Family Medicine  Contact information:  [address] [facility] [place] [address] [phone]                             TEST  RESULTS PENDING AT DISCHARGE          Andre Padron MD  [facility]  03/25/24  11:49 EDT      Time: greater than 32 minutes on discharge  It was a pleasure taking care of this patient while in the hospital.

## 2024-03-25 NOTE — PROGRESS NOTES
[facility] Progress Note      Patient Name: Wu Damon  : 1975  MRN: [id number]  Primary Care Physician:  Juan Carlos Kwan APRN  Date of admission: 3/21/2024    Subjective     Interval History:     Overnight no event  Patient watching TV this morning     Denies any chest pain, shortness of breath, or palpitations    Has been able to tolerate oral intake without any further episode of nausea or emesis No abdominal pain .     Urinating spontaneously    Review of Systems:   As noted above    Objective     Vitals:   Temp:  [97 °F (36.1 °C)-98.4 °F (36.9 °C)] 97 °F (36.1 °C)  Heart Rate:  [] 88  Resp:  [15-16] 16  BP: (142-168)/() 168/105    Intake/Output Summary (Last 24 hours) at 3/25/2024 0641  Last data filed at 3/24/2024 2326  Gross per 24 hour   Intake 960 ml   Output 2775 ml   Net -1815 ml       Physical Exam:    General Appearance: alert, oriented x 3, no acute distress   Skin: warm and dry  HEENT: oral mucosa normal, nonicteric sclera  Neck: supple, no JVD  Lungs: CTA  Heart: RRR, normal S1 and S2  Abdomen: soft, nontender, nondistended  : no palpable bladder  Extremities: no edema, cyanosis or clubbing  Neuro: normal speech and mental status     Scheduled Meds:     carvedilol, 3.125 mg, Oral, BID With Meals  famotidine, 20 mg, Intravenous, Daily  folic acid, 1 mg, Oral, Daily  heparin (porcine), 5,000 Units, Subcutaneous, Q12H  insulin glargine, 25 Units, Subcutaneous, Q12H  insulin lispro, 2-9 Units, Subcutaneous, 4x Daily AC & at Bedtime  insulin lispro, 6 Units, Subcutaneous, TID With Meals  LORazepam, 1 mg, Oral, Nightly  magnesium oxide, 400 mg, Oral, BID  phosphorus, 500 mg, Oral, 4x Daily  sodium chloride, 10 mL, Intravenous, Q12H  sucralfate, 1 g, Oral, 4x Daily AC & at Bedtime  thiamine, 100 mg, Oral, Daily      IV Meds:        Results Reviewed:   I have personally reviewed the results from the time of this admission to 3/25/2024 06:41 EDT      Results from last 7 days   Lab Units 03/25/24  0514 03/24/24  0722 03/23/24  1542 03/23/24  0457   SODIUM mmol/L 139 140  --  135*   POTASSIUM mmol/L 3.7 4.3 4.6 3.6   CHLORIDE mmol/L 103 105  --  98   CO2 mmol/L 22.9 25.0  --  25.4   BUN mg/dL 8 13  --  14   CREATININE mg/dL 1.47* 2.13*  --  2.68*   CALCIUM mg/dL 7.9* 8.2*  --  8.0*   BILIRUBIN mg/dL 1.4* 1.3*  --  2.3*   ALK PHOS U/L 104 134*  --  88   ALT (SGPT) U/L 97* 99*  --  107*   AST (SGOT) U/L 137* 124*  --  161*   GLUCOSE mg/dL 145* 278*  --  223*       Estimated Creatinine Clearance: 82.6 mL/min (A) (by C-G formula based on SCr of 1.47 mg/dL (H)).    Results from last 7 days   Lab Units 03/25/24  0514 03/24/24  0722   MAGNESIUM mg/dL 1.9 1.4*   PHOSPHORUS mg/dL 1.3* 0.5*       Results from last 7 days   Lab Units 03/25/24  0514 03/24/24  0722   URIC ACID mg/dL 6.8 6.7       Results from last 7 days   Lab Units 03/25/24  0514 03/24/24  0722 03/23/24  0457 03/22/24  0552 03/21/24  1721   WBC 10*3/mm3 6.44 6.32 5.87 7.78 8.66   HEMOGLOBIN g/dL 12.6* 12.6* 12.5* 12.6* 13.6   PLATELETS 10*3/mm3 163 157 118* 133* 148             Assessment / Plan         ASSESSMENT:     Nonoliguric acute kidney injury secondary to prerenal state from intractable nausea and emesis, agree with IV fluid challenge.  Urinalysis bland last UPCR 190 mg , CT scan abdomen pelvis.  No hydronephrosis or nephrolithiasis     Presumed hypovolemic hyponatremia.  Currently on IV Hydration will follow sodium trend     Intractable nausea and emesis concern for diabetic gastroparesis versus gastroesophageal flux disease from chronic alcohol intake.  Currently on Zofran, will add sucralfate and famotidine IV     Diabetes mellitus type 2 over 20 years insulin-dependent with complications as per primary team     Alcohol abuse for 10 years  of daily alcohol intake,  counseling provided.  Accompanied with hepatic acidosis as per primary team    Hypomagnesemia on magnesium  replacement    Hypophosphatemia on phosphorus replacement,      PLAN:  -Renal function much improved down to 1.4  -Continue phosphate replacement oral and IV.  Will repeat phosphorus later today at 2 PM.  -Once electrolytes have been replaced patient can be followed as an outpatient renal clinic upon discharge   -Continue surveillance labs     Thank you for involving us in the care of Wu Damon.  Please feel free to call with any questions.    Itz Kitchen MD  03/25/24  06:41 EDT    [facility]  [phone]    Please note that portions of this note were completed with a voice recognition program.

## 2024-03-26 NOTE — CASE MANAGEMENT/SOCIAL WORK
Case Management Discharge Note      Final Note: Discharged home.         Selected Continued Care - Discharged on 3/25/2024 Admission date: 3/21/2024 - Discharge disposition: Home or Self Care      Destination    No services have been selected for the patient.                Durable Medical Equipment    No services have been selected for the patient.                Dialysis/Infusion    No services have been selected for the patient.                Home Medical Care    No services have been selected for the patient.                Therapy    No services have been selected for the patient.                Community Resources    No services have been selected for the patient.                Community & DME    No services have been selected for the patient.                    Transportation Services  Private: Car    Final Discharge Disposition Code: 01 - home or self-care

## 2024-03-26 NOTE — OUTREACH NOTE
Prep Survey      Flowsheet Row Responses   [facility] facility patient discharged from? [facility]   Is LACE score < 7 ? No   Eligibility Readm Mgmt   Discharge diagnosis Acute renal failure   Does the patient have one of the following disease processes/diagnoses(primary or secondary)? Other   Does the patient have Home health ordered? No   Is there a DME ordered? No   Prep survey completed? Yes            Alka RYAN - Registered Nurse

## 2024-03-27 NOTE — PAYOR COMM NOTE
"Wu Pulliam (48 y.o. Male)     PLEASE SEE ATTACHED FOR DC NOTICE    REF #  [id number]    THANK YOU  MELISSA DE LA O RN/ DEPT  [facility]   [phone]  [phone]        Date of Birth   1975    Social Security Number       Address   [address] [place] [id number]    Home Phone   [phone]    MRN   [id number]       [employer]   None    Marital Status                               Admission Date   3/21/24    Admission Type   Emergency    Admitting Provider   Orville Espinoza DO    Attending Provider   Maria Del Carmen Aguayo MD    Department, Room/Bed   [facility], E561/1       Discharge Date   3/25/2024    Discharge Disposition   Home or Self Care    Discharge Destination                                 Attending Provider: Maria Del Carmen Aguayo MD    Allergies: Avelox [Moxifloxacin Hcl], Citalopram Hydrobromide, Meloxicam, Metformin And Related, Quetiapine, Amlodipine    Isolation: None   Infection: None   Code Status: Prior    Ht: 177.8 cm (70\")   Wt: 119 kg (262 lb 1.6 oz)    Admission Cmt: None   Principal Problem: Acute renal failure [N17.9]                   Active Insurance as of 3/21/2024       Primary Coverage       Payor Plan Insurance Group Employer/Plan Group    [facility] Capital Float [facility] Capital Float BLUE [facility] PPO        Payor Plan Address Payor Plan Phone Number Payor Plan Fax Number Effective Dates    [address] [phone]  2024 - None Entered    [place] [address]         Subscriber Name Subscriber Birth Date Member ID       WU PULLIAM 1975 [id number]                     Emergency Contacts        (Rel.) Home Phone Work Phone Mobile Phone    Minerva Pulliam (Spouse) [phone] -- [phone]              [place]: [facility] [id number]  Tax ID [id number]     Discharge Summary        Andre Padron MD at 24 0732                 NAME: Wu Pulliam ADMIT: 3/21/2024   : 1975  PCP: " Juan Carlos Kwan APRN    MRN: [id number] LOS: 4 days   AGE/SEX: 48 y.o. male  ROOM: E561/1     Date of Admission:  3/21/2024  Date of Discharge:  3/25/2024    PCP: Juan Carlos Kwan APRN    CHIEF COMPLAINT  Abdominal Pain and Hypertension      DISCHARGE DIAGNOSIS  Active Hospital Problems    Diagnosis  POA    **Acute renal failure [N17.9]  Yes    Dehydration [E86.0]  Yes    Nausea with vomiting [R11.2]  Yes    Hepatic steatosis [K76.0]  Yes    History of alcohol use [Z87.898]  Not Applicable    Transaminitis [R74.01]  Yes    Hypokalemia [E87.6]  Yes    Liver lesion [K76.9]  Yes    Hyponatremia [E87.1]  Yes    Type 2 diabetes mellitus with hyperglycemia [E11.65]  Yes    HTN (hypertension) [I10]  Yes    HLD (hyperlipidemia) [E78.5]  Yes      Resolved Hospital Problems   No resolved problems to display.       SECONDARY DIAGNOSES  Past Medical History:   Diagnosis Date    Alcohol abuse     Diabetes mellitus     Hypertension     Nausea with vomiting 3/21/2024       CONSULTS   Nephrology  Psychiatry    HOSPITAL COURSE  Mr. Damon is a 48-year-old male with history of type 2 diabetes, hypertension, hyperlipidemia, alcohol use who presents to the emergency room with nausea and vomiting and abdominal pain. He was found to have acute renal failure. Given IVFs and antiemetics with improvement symptoms. Treated for alcohol withdrawal and electrolyte abnormalities. He feels better and wishes for discharge today with outpatient follow up.     DIAGNOSTICS    03/25/2024 0514 03/25/2024 0615 Uric Acid [[id number]]   Blood    Final result Component Value Units   Uric Acid 6.8 mg/dL          03/25/2024 0514 03/25/2024 0618 Magnesium [[id number]]   Blood    Final result Component Value Units   Magnesium 1.9 mg/dL          03/25/2024 0514 03/25/2024 0628 Phosphorus [[id number]]   (Abnormal)   Blood    Final result Component Value Units   Phosphorus 1.3 Low Critical  mg/dL          03/25/2024 0514 03/25/2024 0555 CBC Auto Differential  [[id number]]   (Abnormal)   Blood    Final result Component Value Units   WBC 6.44 10*3/mm3   RBC 3.70 Low  10*6/mm3   Hemoglobin 12.6 Low  g/dL   Hematocrit 35.8 Low  %   MCV 96.8 fL   MCH 34.1 High  pg   MCHC 35.2 g/dL   RDW 11.7 Low  %   RDW-SD 40.4 fl   MPV 12.2 High  fL   Platelets 163 10*3/mm3   Neutrophil % 59.6 %   Lymphocyte % 23.9 %   Monocyte % 11.8 %   Eosinophil % 3.3 %   Basophil % 0.9 %   Immature Grans % 0.5 %   Neutrophils, Absolute 3.84 10*3/mm3   Lymphocytes, Absolute 1.54 10*3/mm3   Monocytes, Absolute 0.76 10*3/mm3   Eosinophils, Absolute 0.21 10*3/mm3   Basophils, Absolute 0.06 10*3/mm3   Immature Grans, Absolute 0.03 10*3/mm3   nRBC 0.0 /100 WBC          03/25/2024 0514 03/25/2024 0618 Comprehensive Metabolic Panel [[id number]]    (Abnormal)   Blood    Final result Component Value Units   Glucose 145 High  mg/dL   BUN 8 mg/dL   Creatinine 1.47 High  mg/dL   Sodium 139 mmol/L   Potassium 3.7  mmol/L   Chloride 103 mmol/L   CO2 22.9 mmol/L   Calcium 7.9 Low  mg/dL   Total Protein 6.9 g/dL   Albumin 3.8 g/dL   ALT (SGPT) 97 High  U/L   AST (SGOT) 137 High  U/L   Alkaline Phosphatase 104 U/L   Total Bilirubin 1.4 High  mg/dL   Globulin 3.1 gm/dL   A/G Ratio 1.2 g/dL         US Abdomen Limited [[id number]] Prasanna as Reviewed   Order Status: Completed Collected: 03/22/24 0636    Updated: 03/22/24 0706   Narrative:     US ABDOMEN LIMITED-     CLINICAL: Dyspepsia, nausea, vomiting.     COMPARISON: CT of the abdomen and pelvis dated 03/21/2024.     FINDINGS: Diffusely increased hepatic echotexture consistent with fatty  infiltration. No focal liver lesion. Small amount of sludge demonstrated  within the dependent portion of the gallbladder. No gallstones or  gallbladder wall thickening nor pericholecystic fluid is demonstrated.  CBD diameter is normal at 6 mm.     Color Doppler and spectral analysis demonstrates normal hepatopetal flow  within the main portal vein.     The right kidney measures 12.9 cm in  length and is normal in appearance.  No calculus or obstructive uropathy. Overall renal cortical echotexture  is within normal limits. No free fluid is seen within the right upper  quadrant.     CONCLUSION: Diffuse fatty infiltration of the liver, gallbladder sludge.     This report was finalized on 3/22/2024 7:03 AM by Dr. Demetri Urbano M.D  on Workstation: [id number]      CT Abdomen Pelvis Without Contrast [[id number]] Reviewed: 03/21/24 1943   Order Status: Completed Collected: 03/21/24 1856    Updated: 03/21/24 1903   Narrative:     CT ABDOMEN PELVIS WO CONTRAST-     INDICATIONS: Abdominal pain     TECHNIQUE: Radiation dose reduction techniques were utilized, including  automated exposure control and exposure modulation based on body size.  Unenhanced ABDOMEN AND PELVIS CT     COMPARISON: 10/10/2021     FINDINGS:     Diffuse low-density of the liver is noted, compatible with severe  steatosis, similar to prior exam. 3 enhancing liver lesions are again  demonstrated, do not appear significant change, largest measuring 3.3  cm, stable at similar level.     Poorly distinguished dependent density in the gallbladder could  represent cholelithiasis; if indicated, ultrasound correlation could be  obtained.     The urinary bladder is only partly filled, limits assessment.     Otherwise unremarkable unenhanced appearance of the liver, spleen,  adrenal glands, pancreas, kidneys, bladder.     No bowel obstruction or abnormal bowel thickening is identified. The  appendix does not appear inflamed.     No free intraperitoneal gas or free fluid. Umbilical hernia of fat is  present.     Scattered small mesenteric and para-aortic lymph nodes are seen that are  not significant by size criteria.     Abdominal aorta is not aneurysmal.     The lung bases are clear.     Degenerative changes are seen in the spine. No acute fracture is  identified.            Impression:           1. No acute inflammatory process of bowel is identified,  "follow-up as  indications persist.     2. No urolithiasis or hydronephrosis.     3. Redemonstration of severe hepatic steatosis with 3 indeterminate  stable liver lesions. Possible cholelithiasis.       PHYSICAL EXAM  Objective:  Vital signs: (most recent): Blood pressure 140/90, pulse 87, temperature 97.7 °F (36.5 °C), temperature source Oral, resp. rate 16, height 177.8 cm (70\"), weight 119 kg (262 lb 1.6 oz), SpO2 99%.                Alert  nad  No resp distress    CONDITION ON DISCHARGE  Stable.      DISCHARGE DISPOSITION   Home or Self Care      DISCHARGE MEDICATIONS       Your medication list        START taking these medications        Instructions Last Dose Given Next Dose Due   famotidine 20 MG tablet  Commonly known as: Pepcid      Take 1 tablet by mouth 2 (Two) Times a Day.       hydrALAZINE 25 MG tablet  Commonly known as: APRESOLINE      Take 1 tablet by mouth 2 (Two) Times a Day.       Magnesium Oxide -Mg Supplement 400 (240 Mg) MG tablet      Take 1 tablet by mouth Daily.       Phospha 250 Neutral [phone] MG tablet  Generic drug: phosphorus      Take 2 tablets by mouth 2 (Two) Times a Day.       sucralfate 1 g tablet  Commonly known as: CARAFATE      Take 1 tablet by mouth 4 (Four) Times a Day Before Meals & at Bedtime.              CONTINUE taking these medications        Instructions Last Dose Given Next Dose Due   Ambien CR 12.5 MG CR tablet  Generic drug: zolpidem CR      Take 1 tablet by mouth At Night As Needed for Sleep.       buprenorphine-naloxone 8-2 MG per SL tablet  Commonly known as: SUBOXONE      Place 1.5 tablets under the tongue Daily. Does the injection monthly.  Should have had injection today 3/21/24       carvedilol 3.125 MG tablet  Commonly known as: Coreg      Take 1 tablet by mouth 2 (Two) Times a Day With Meals.       fenofibrate 48 MG tablet  Commonly known as: TRICOR      Take 1 tablet by mouth Daily.       folic acid 1 MG tablet  Commonly known as: FOLVITE      Take 1 " tablet by mouth Daily.       Lantus 100 UNIT/ML injection  Generic drug: insulin glargine      Inject 25 Units under the skin into the appropriate area as directed Every 12 (Twelve) Hours.       losartan 50 MG tablet  Commonly known as: COZAAR      Take 1 tablet by mouth Daily.       One-Daily Multi-Vitamin tablet tablet  Generic drug: multivitamin      Take 1 tablet by mouth Daily.       Testosterone Cypionate 200 MG/ML solution      Inject 0.5 mL as directed 1 (One) Time Per Week.       thiamine 100 MG tablet  Commonly known as: VITAMIN B1      Take 1 tablet by mouth Daily.                 Where to Get Your Medications        These medications were sent to [address] [address]      Hours: Monday to Friday 7 AM to 6 PM, Saturday & Sunday 8 AM to 4:30 PM (Closed 12 PM to 12:30 PM) Phone: [phone]   famotidine 20 MG tablet  hydrALAZINE 25 MG tablet  Magnesium Oxide -Mg Supplement 400 (240 Mg) MG tablet  Phospha 250 Neutral [phone] MG tablet  sucralfate 1 g tablet        No future appointments.  Additional Instructions for the Follow-ups that You Need to Schedule       Discharge Follow-up with Specialty: GI in 4-8 weeks to follow up on abnormal areas of liver seen on imaging   As directed      Specialty: GI in 4-8 weeks to follow up on abnormal areas of liver seen on imaging        Discharge Follow-up with Specialty: PCP in 1-2 weeks, Nephrology in 2-3 weeks, other specialists as previously recomended   As directed      Specialty: PCP in 1-2 weeks, Nephrology in 2-3 weeks, other specialists as previously recomended               Follow-up Information       Juan Carlos Kwan, APRN .    Specialties: Nurse Practitioner, Family Medicine  Contact information:  [address] [facility] [address] [phone]                             TEST  RESULTS PENDING AT DISCHARGE         Andre Padron MD  [facility]  03/25/24  11:49  EDT      Time: greater than 32 minutes on discharge  It was a pleasure taking care of this patient while in the hospital.       Electronically signed by Andre Padron MD at 03/25/24 1149       Discharge Order (From admission, onward)       Start     Ordered    03/25/24 0730  Discharge patient  Once        Comments: After IV phos replacement   Expected Discharge Date: 03/25/24   Discharge Disposition: Home or Self Care   Physician of Record for Attribution - Please select from Treatment Team: ANDRE PADRON [[id number]]   Review needed by CMO to determine Physician of Record: No      Question Answer Comment   Physician of Record for Attribution - Please select from Treatment Team ANDRE PADRON    Review needed by CMO to determine Physician of Record No        03/25/24 0719

## 2024-03-28 ENCOUNTER — READMISSION MANAGEMENT (OUTPATIENT)
Dept: CALL CENTER | Facility: HOSPITAL | Age: 49
End: 2024-03-28
Payer: COMMERCIAL

## 2024-03-28 NOTE — PAYOR COMM NOTE
"Wu Pulliam (48 y.o. Male)     PLEASE SEE ATTACHED FOR DC NOTICE    REF #  [id number]     THANK YOU  MELISSA DE LA O RN/ DEPT  [facility]   [phone]  [phone]        Date of Birth   1975    Social Security Number       Address   [address] [facility] [address]    Home Phone   [phone]    MRN   [id number]       Sabianist   None    Marital Status                               Admission Date   3/21/24    Admission Type   Emergency    Admitting Provider   Orville Espinoza DO    Attending Provider   Maria Del Carmen Aguayo MD    Department, Room/Bed   [facility], E561/1       Discharge Date   3/25/2024    Discharge Disposition   Home or Self Care    Discharge Destination                                 Attending Provider: Maria Del Carmen Aguayo MD    Allergies: Avelox [Moxifloxacin Hcl], Citalopram Hydrobromide, Meloxicam, Metformin And Related, Quetiapine, Amlodipine    Isolation: None   Infection: None   Code Status: Prior    Ht: 177.8 cm (70\")   Wt: 119 kg (262 lb 1.6 oz)    Admission Cmt: None   Principal Problem: Acute renal failure [N17.9]                   Active Insurance as of 3/21/2024       Primary Coverage       Payor Plan Insurance Group Employer/Plan Group    [facility] BLUE CROSS [facility] BLUE CROSS BLUE SHIELD PPO [id number]       Payor Plan Address Payor Plan Phone Number Payor Plan Fax Number Effective Dates    [address] [phone]  1/1/2024 - None Entered    [facility] [id number]         Subscriber Name Subscriber Birth Date Member ID       WU PULLIAM 1975 [id number]               Secondary Coverage       Payor Plan Insurance Group Employer/Plan Group    [facility] COMMUNITY PLAN [facility] COMMUNITY PLAN [facility] [place]       Payor Plan Address Payor Plan Phone Number Payor Plan Fax Number Effective Dates    [address]   3/1/2024 - None Entered    [facility] [phone]         Subscriber Name Subscriber Birth Date Member ID       " WU PULLIAM 1975 [id number]                     Emergency Contacts        (Rel.) Home Phone Work Phone Mobile Phone    Minerva Pulliam (Spouse) [phone] -- [phone]              [place]: [facility] [id number]  Tax ID [id number]     Discharge Summary        Andre Padron MD at 24 0732                 NAME: Wu Pulliam ADMIT: 3/21/2024   : 1975  PCP: Juan Carlos Kwan APRN    MRN: [id number] LOS: 4 days   AGE/SEX: 48 y.o. male  ROOM: [facility]     Date of Admission:  3/21/2024  Date of Discharge:  3/25/2024    PCP: Juan Carlos Kwan APRN    CHIEF COMPLAINT  Abdominal Pain and Hypertension      DISCHARGE DIAGNOSIS  Active Hospital Problems    Diagnosis  POA    **Acute renal failure [N17.9]  Yes    Dehydration [E86.0]  Yes    Nausea with vomiting [R11.2]  Yes    Hepatic steatosis [K76.0]  Yes    History of alcohol use [Z87.898]  Not Applicable    Transaminitis [R74.01]  Yes    Hypokalemia [E87.6]  Yes    Liver lesion [K76.9]  Yes    Hyponatremia [E87.1]  Yes    Type 2 diabetes mellitus with hyperglycemia [E11.65]  Yes    HTN (hypertension) [I10]  Yes    HLD (hyperlipidemia) [E78.5]  Yes      Resolved Hospital Problems   No resolved problems to display.       SECONDARY DIAGNOSES  Past Medical History:   Diagnosis Date    Alcohol abuse     Diabetes mellitus     Hypertension     Nausea with vomiting 3/21/2024       CONSULTS   Nephrology  Psychiatry    HOSPITAL COURSE  Mr. Pulliam is a 48-year-old male with history of type 2 diabetes, hypertension, hyperlipidemia, alcohol use who presents to the emergency room with nausea and vomiting and abdominal pain. He was found to have acute renal failure. Given IVFs and antiemetics with improvement symptoms. Treated for alcohol withdrawal and electrolyte abnormalities. He feels better and wishes for discharge today with outpatient follow up.     DIAGNOSTICS    2024 0514 2024 0615 Uric Acid [[id number]]   Blood     Final result Component Value Units   Uric Acid 6.8 mg/dL          03/25/2024 0514 03/25/2024 0618 Magnesium [[id number]]   Blood    Final result Component Value Units   Magnesium 1.9 mg/dL          03/25/2024 0514 03/25/2024 0628 Phosphorus [[id number]]   (Abnormal)   Blood    Final result Component Value Units   Phosphorus 1.3 Low Critical  mg/dL          03/25/2024 0514 03/25/2024 0555 CBC Auto Differential [[id number]]   (Abnormal)   Blood    Final result Component Value Units   WBC 6.44 10*3/mm3   RBC 3.70 Low  10*6/mm3   Hemoglobin 12.6 Low  g/dL   Hematocrit 35.8 Low  %   MCV 96.8 fL   MCH 34.1 High  pg   MCHC 35.2 g/dL   RDW 11.7 Low  %   RDW-SD 40.4 fl   MPV 12.2 High  fL   Platelets 163 10*3/mm3   Neutrophil % 59.6 %   Lymphocyte % 23.9 %   Monocyte % 11.8 %   Eosinophil % 3.3 %   Basophil % 0.9 %   Immature Grans % 0.5 %   Neutrophils, Absolute 3.84 10*3/mm3   Lymphocytes, Absolute 1.54 10*3/mm3   Monocytes, Absolute 0.76 10*3/mm3   Eosinophils, Absolute 0.21 10*3/mm3   Basophils, Absolute 0.06 10*3/mm3   Immature Grans, Absolute 0.03 10*3/mm3   nRBC 0.0 /100 WBC          03/25/2024 0514 03/25/2024 0618 Comprehensive Metabolic Panel [[id number]]    (Abnormal)   Blood    Final result Component Value Units   Glucose 145 High  mg/dL   BUN 8 mg/dL   Creatinine 1.47 High  mg/dL   Sodium 139 mmol/L   Potassium 3.7  mmol/L   Chloride 103 mmol/L   CO2 22.9 mmol/L   Calcium 7.9 Low  mg/dL   Total Protein 6.9 g/dL   Albumin 3.8 g/dL   ALT (SGPT) 97 High  U/L   AST (SGOT) 137 High  U/L   Alkaline Phosphatase 104 U/L   Total Bilirubin 1.4 High  mg/dL   Globulin 3.1 gm/dL   A/G Ratio 1.2 g/dL         US Abdomen Limited [[id number]] Prasanna as Reviewed   Order Status: Completed Collected: 03/22/24 0636    Updated: 03/22/24 0706   Narrative:     US ABDOMEN LIMITED-     CLINICAL: Dyspepsia, nausea, vomiting.     COMPARISON: CT of the abdomen and pelvis dated 03/21/2024.     FINDINGS: Diffusely increased hepatic echotexture  consistent with fatty  infiltration. No focal liver lesion. Small amount of sludge demonstrated  within the dependent portion of the gallbladder. No gallstones or  gallbladder wall thickening nor pericholecystic fluid is demonstrated.  CBD diameter is normal at 6 mm.     Color Doppler and spectral analysis demonstrates normal hepatopetal flow  within the main portal vein.     The right kidney measures 12.9 cm in length and is normal in appearance.  No calculus or obstructive uropathy. Overall renal cortical echotexture  is within normal limits. No free fluid is seen within the right upper  quadrant.     CONCLUSION: Diffuse fatty infiltration of the liver, gallbladder sludge.     This report was finalized on 3/22/2024 7:03 AM by Dr. Demetri Urbano M.D  on Workstation: [id number]      CT Abdomen Pelvis Without Contrast [[id number]] Reviewed: 03/21/24 1943   Order Status: Completed Collected: 03/21/24 1856    Updated: 03/21/24 1903   Narrative:     CT ABDOMEN PELVIS WO CONTRAST-     INDICATIONS: Abdominal pain     TECHNIQUE: Radiation dose reduction techniques were utilized, including  automated exposure control and exposure modulation based on body size.  Unenhanced ABDOMEN AND PELVIS CT     COMPARISON: 10/10/2021     FINDINGS:     Diffuse low-density of the liver is noted, compatible with severe  steatosis, similar to prior exam. 3 enhancing liver lesions are again  demonstrated, do not appear significant change, largest measuring 3.3  cm, stable at similar level.     Poorly distinguished dependent density in the gallbladder could  represent cholelithiasis; if indicated, ultrasound correlation could be  obtained.     The urinary bladder is only partly filled, limits assessment.     Otherwise unremarkable unenhanced appearance of the liver, spleen,  adrenal glands, pancreas, kidneys, bladder.     No bowel obstruction or abnormal bowel thickening is identified. The  appendix does not appear inflamed.     No free  "intraperitoneal gas or free fluid. Umbilical hernia of fat is  present.     Scattered small mesenteric and para-aortic lymph nodes are seen that are  not significant by size criteria.     Abdominal aorta is not aneurysmal.     The lung bases are clear.     Degenerative changes are seen in the spine. No acute fracture is  identified.            Impression:           1. No acute inflammatory process of bowel is identified, follow-up as  indications persist.     2. No urolithiasis or hydronephrosis.     3. Redemonstration of severe hepatic steatosis with 3 indeterminate  stable liver lesions. Possible cholelithiasis.       PHYSICAL EXAM  Objective:  Vital signs: (most recent): Blood pressure 140/90, pulse 87, temperature 97.7 °F (36.5 °C), temperature source Oral, resp. rate 16, height 177.8 cm (70\"), weight 119 kg (262 lb 1.6 oz), SpO2 99%.                Alert  nad  No resp distress    CONDITION ON DISCHARGE  Stable.      DISCHARGE DISPOSITION   Home or Self Care      DISCHARGE MEDICATIONS       Your medication list        START taking these medications        Instructions Last Dose Given Next Dose Due   famotidine 20 MG tablet  Commonly known as: Pepcid      Take 1 tablet by mouth 2 (Two) Times a Day.       hydrALAZINE 25 MG tablet  Commonly known as: APRESOLINE      Take 1 tablet by mouth 2 (Two) Times a Day.       Magnesium Oxide -Mg Supplement 400 (240 Mg) MG tablet      Take 1 tablet by mouth Daily.       Phospha 250 Neutral [phone] MG tablet  Generic drug: phosphorus      Take 2 tablets by mouth 2 (Two) Times a Day.       sucralfate 1 g tablet  Commonly known as: CARAFATE      Take 1 tablet by mouth 4 (Four) Times a Day Before Meals & at Bedtime.              CONTINUE taking these medications        Instructions Last Dose Given Next Dose Due   Ambien CR 12.5 MG CR tablet  Generic drug: zolpidem CR      Take 1 tablet by mouth At Night As Needed for Sleep.       buprenorphine-naloxone 8-2 MG per SL " tablet  Commonly known as: SUBOXONE      Place 1.5 tablets under the tongue Daily. Does the injection monthly.  Should have had injection today 3/21/24       carvedilol 3.125 MG tablet  Commonly known as: Coreg      Take 1 tablet by mouth 2 (Two) Times a Day With Meals.       fenofibrate 48 MG tablet  Commonly known as: TRICOR      Take 1 tablet by mouth Daily.       folic acid 1 MG tablet  Commonly known as: FOLVITE      Take 1 tablet by mouth Daily.       Lantus 100 UNIT/ML injection  Generic drug: insulin glargine      Inject 25 Units under the skin into the appropriate area as directed Every 12 (Twelve) Hours.       losartan 50 MG tablet  Commonly known as: COZAAR      Take 1 tablet by mouth Daily.       One-Daily Multi-Vitamin tablet tablet  Generic drug: multivitamin      Take 1 tablet by mouth Daily.       Testosterone Cypionate 200 MG/ML solution      Inject 0.5 mL as directed 1 (One) Time Per Week.       thiamine 100 MG tablet  Commonly known as: VITAMIN B1      Take 1 tablet by mouth Daily.                 Where to Get Your Medications        These medications were sent to [place] [address]      Hours: Monday to Friday 7 AM to 6 PM, Saturday & Sunday 8 AM to 4:30 PM (Closed 12 PM to 12:30 PM) Phone: [phone]   famotidine 20 MG tablet  hydrALAZINE 25 MG tablet  Magnesium Oxide -Mg Supplement 400 (240 Mg) MG tablet  Phospha 250 Neutral [phone] MG tablet  sucralfate 1 g tablet        No future appointments.  Additional Instructions for the Follow-ups that You Need to Schedule       Discharge Follow-up with Specialty: GI in 4-8 weeks to follow up on abnormal areas of liver seen on imaging   As directed      Specialty: GI in 4-8 weeks to follow up on abnormal areas of liver seen on imaging        Discharge Follow-up with Specialty: PCP in 1-2 weeks, Nephrology in 2-3 weeks, other specialists as previously recomended   As directed      Specialty:  PCP in 1-2 weeks, Nephrology in 2-3 weeks, other specialists as previously recomended               Follow-up Information       Juan Carlos Kwan, APRN .    Specialties: Nurse Practitioner, Family Medicine  Contact information:  [address] [facility] [address] [phone]                             TEST  RESULTS PENDING AT DISCHARGE         Andre Padron MD  [facility]  03/25/24  11:49 EDT      Time: greater than 32 minutes on discharge  It was a pleasure taking care of this patient while in the hospital.       Electronically signed by Andre Padron MD at 03/25/24 3317

## 2024-03-28 NOTE — OUTREACH NOTE
Medical Week 1 Survey      Flowsheet Row Responses   [facility] patient discharged from? [facility]   Does the patient have one of the following disease processes/diagnoses(primary or secondary)? Other   Week 1 attempt successful? Yes   Call start time 1439   Call end time 1442   Discharge diagnosis Acute renal failure, dehydration, N/V, hepatic steatosis   Is patient permission given to speak with other caregiver? Yes   Person spoke with today (if not patient) and relationship Minerva Damon Spouse   Meds reviewed with patient/caregiver? Yes   Does the patient have all medications ordered at discharge? Yes   Is the patient taking all medications as directed (includes completed medication regime)? Yes   Does the patient have a primary care provider?  Yes   Does the patient have an appointment with their PCP within 7 days of discharge? No   Comments regarding PCP Follow up with Juan Carlos HALE   Nursing Interventions Educated patient on importance of making appointment, Advised patient to make appointment   Has the patient kept scheduled appointments due by today? N/A   Comments Informed wife that patient needs to have f/u with PCP, Nephrology and GI   Has home health visited the patient within 72 hours of discharge? N/A   Psychosocial issues? No   Did the patient receive a copy of their discharge instructions? Yes   Nursing interventions Reviewed instructions with patient   What is the patient's perception of their health status since discharge? Improving   Is the patient/caregiver able to teach back signs and symptoms related to disease process for when to call PCP? Yes   Is the patient/caregiver able to teach back signs and symptoms related to disease process for when to call 911? Yes   Is the patient/caregiver able to teach back the hierarchy of who to call/visit for symptoms/problems? PCP, Specialist, Home health nurse, Urgent Care, ED, 911 Yes   If the patient is a current smoker, are they able to  teach back resources for cessation? Not a smoker   Week 1 call completed? Yes   Would this patient benefit from a Referral to [facility] Social Work? No   Is the patient interested in additional calls from an ambulatory ? No   Call end time 1442            GIOVANNA FUENTES - Registered Nurse

## 2024-09-10 NOTE — NURSING NOTE
"AC follow up.    Per pt's RN, Janet, pt exhibiting anxiety and had a CIWA score of 10 at 0800 this morning. Pt given 1 mg Ativan at that time. She states that pt is supposed to be discharged soon.    Pt resting in bed on approach. Pt states that he wants to go home with anxiety medication because he feels that his alcohol use \"mellowed me out and now that I'm not drinking anymore I'm anxious\". This RN discussed following up with a psychiatrist at [facility], as he is already receiving Suboxone treatment there. Pt voiced understanding and stated that he plans to continue to see his counselor Riley there as well. This RN provided pt with additional therapy resources.     Pt continues to deny SI.    AC will follow until discharged.   " PA Initiation    Medication: FINGOLIMOD HCL 0.5 MG PO CAPS  Insurance Company: [employer] - Phone [phone] Fax [phone]  Pharmacy Filling the Rx: [facility] - [place], [place] - [address]  Filling Pharmacy Phone:    Filling Pharmacy Fax:    Start Date: 9/10/2024          Thank you,    Margaret Fonseca [facility]  Specialty Pharmacy Clinic Liaison - CardiologyNeurologyMultiple Sclerosis  [facility] [address]  [address] [place] [address]  Ph: [phone] Fax: [phone]  [email or web address]

## 2025-04-17 NOTE — PROGRESS NOTES
Continued Stay Note  [facility]     Patient Name: Wu Damon  MRN: [id number]  Today's Date: 1/6/2017    Admit Date: 1/4/2017          Discharge Plan       01/06/17 1138    Case Management/Social Work Plan    Plan Home    Additional Comments spoke to patient  No needs plan remains home  Expects DC today              Discharge Codes     None        Expected Discharge Date and Time     Expected Discharge Date Expected Discharge Time    Jan 6, 2017             Bere Celis RN     [Time Spent: ___ minutes] : I have spent [unfilled] minutes of time on the encounter which excludes teaching and separately reported services.